# Patient Record
Sex: FEMALE | Race: WHITE | NOT HISPANIC OR LATINO | Employment: OTHER | URBAN - METROPOLITAN AREA
[De-identification: names, ages, dates, MRNs, and addresses within clinical notes are randomized per-mention and may not be internally consistent; named-entity substitution may affect disease eponyms.]

---

## 2017-01-24 ENCOUNTER — HISTORICAL (OUTPATIENT)
Dept: ADMINISTRATIVE | Facility: HOSPITAL | Age: 72
End: 2017-01-24

## 2017-07-26 ENCOUNTER — HISTORICAL (OUTPATIENT)
Dept: ADMINISTRATIVE | Facility: HOSPITAL | Age: 72
End: 2017-07-26

## 2017-11-15 ENCOUNTER — HISTORICAL (OUTPATIENT)
Dept: RADIOLOGY | Facility: HOSPITAL | Age: 72
End: 2017-11-15

## 2017-11-16 ENCOUNTER — HISTORICAL (OUTPATIENT)
Dept: ADMINISTRATIVE | Facility: HOSPITAL | Age: 72
End: 2017-11-16

## 2017-11-21 ENCOUNTER — HISTORICAL (OUTPATIENT)
Dept: LAB | Facility: HOSPITAL | Age: 72
End: 2017-11-21

## 2017-11-28 ENCOUNTER — HISTORICAL (OUTPATIENT)
Dept: LAB | Facility: HOSPITAL | Age: 72
End: 2017-11-28

## 2018-01-02 ENCOUNTER — HISTORICAL (OUTPATIENT)
Dept: LAB | Facility: HOSPITAL | Age: 73
End: 2018-01-02

## 2018-03-15 ENCOUNTER — HISTORICAL (OUTPATIENT)
Dept: LAB | Facility: HOSPITAL | Age: 73
End: 2018-03-15

## 2018-03-20 ENCOUNTER — HISTORICAL (OUTPATIENT)
Dept: ENDOSCOPY | Facility: HOSPITAL | Age: 73
End: 2018-03-20

## 2018-04-02 ENCOUNTER — HISTORICAL (OUTPATIENT)
Dept: LAB | Facility: HOSPITAL | Age: 73
End: 2018-04-02

## 2018-04-26 ENCOUNTER — HISTORICAL (OUTPATIENT)
Dept: CARDIOLOGY | Facility: HOSPITAL | Age: 73
End: 2018-04-26

## 2018-06-11 ENCOUNTER — HISTORICAL (OUTPATIENT)
Dept: RADIOLOGY | Facility: HOSPITAL | Age: 73
End: 2018-06-11

## 2018-06-15 ENCOUNTER — HISTORICAL (OUTPATIENT)
Dept: LAB | Facility: HOSPITAL | Age: 73
End: 2018-06-15

## 2018-06-21 ENCOUNTER — HISTORICAL (OUTPATIENT)
Dept: LAB | Facility: HOSPITAL | Age: 73
End: 2018-06-21

## 2018-10-15 ENCOUNTER — HISTORICAL (OUTPATIENT)
Dept: LAB | Facility: HOSPITAL | Age: 73
End: 2018-10-15

## 2018-10-25 ENCOUNTER — HISTORICAL (OUTPATIENT)
Dept: LAB | Facility: HOSPITAL | Age: 73
End: 2018-10-25

## 2018-11-01 ENCOUNTER — HISTORICAL (OUTPATIENT)
Dept: LAB | Facility: HOSPITAL | Age: 73
End: 2018-11-01

## 2018-11-07 ENCOUNTER — HISTORICAL (OUTPATIENT)
Dept: ADMINISTRATIVE | Facility: HOSPITAL | Age: 73
End: 2018-11-07

## 2018-11-13 ENCOUNTER — HISTORICAL (OUTPATIENT)
Dept: LAB | Facility: HOSPITAL | Age: 73
End: 2018-11-13

## 2018-11-15 ENCOUNTER — HISTORICAL (OUTPATIENT)
Dept: ADMINISTRATIVE | Facility: HOSPITAL | Age: 73
End: 2018-11-15

## 2018-12-19 ENCOUNTER — HISTORICAL (OUTPATIENT)
Dept: ADMINISTRATIVE | Facility: HOSPITAL | Age: 73
End: 2018-12-19

## 2019-01-04 ENCOUNTER — HISTORICAL (OUTPATIENT)
Dept: ADMINISTRATIVE | Facility: HOSPITAL | Age: 74
End: 2019-01-04

## 2019-01-08 ENCOUNTER — HISTORICAL (OUTPATIENT)
Dept: ADMINISTRATIVE | Facility: HOSPITAL | Age: 74
End: 2019-01-08

## 2019-02-25 ENCOUNTER — HISTORICAL (OUTPATIENT)
Dept: ADMINISTRATIVE | Facility: HOSPITAL | Age: 74
End: 2019-02-25

## 2019-08-02 ENCOUNTER — HISTORICAL (OUTPATIENT)
Dept: ADMINISTRATIVE | Facility: HOSPITAL | Age: 74
End: 2019-08-02

## 2020-12-23 ENCOUNTER — HISTORICAL (OUTPATIENT)
Dept: ADMINISTRATIVE | Facility: HOSPITAL | Age: 75
End: 2020-12-23

## 2020-12-23 LAB
ABS NEUT (OLG): 6.28 X10(3)/MCL (ref 2.1–9.2)
ALBUMIN SERPL-MCNC: 2.7 GM/DL (ref 3.4–4.8)
ALBUMIN/GLOB SERPL: 0.9 RATIO (ref 1.1–2)
ALP SERPL-CCNC: 55 UNIT/L (ref 40–150)
ALT SERPL-CCNC: 9 UNIT/L (ref 0–55)
ANISOCYTOSIS BLD QL SMEAR: ABNORMAL
AST SERPL-CCNC: 11 UNIT/L (ref 5–34)
BASOPHILS # BLD AUTO: 0.05 X10(3)/MCL (ref 0–0.2)
BASOPHILS NFR BLD AUTO: 0.5 % (ref 0–1)
BILIRUB SERPL-MCNC: 0.2 MG/DL (ref 0.2–1.2)
BILIRUBIN DIRECT+TOT PNL SERPL-MCNC: <0.1 MG/DL (ref 0–0.5)
BILIRUBIN DIRECT+TOT PNL SERPL-MCNC: >0.1 MG/DL (ref 0–0.8)
BUN SERPL-MCNC: 17.3 MG/DL (ref 9.8–20.1)
CALCIUM SERPL-MCNC: 8.9 MG/DL (ref 8.4–10.2)
CHLORIDE SERPL-SCNC: 99 MMOL/L (ref 98–107)
CO2 SERPL-SCNC: 30 MMOL/L (ref 23–31)
CREAT SERPL-MCNC: 0.61 MG/DL (ref 0.57–1.11)
EOSINOPHIL # BLD AUTO: 0.39 X10(3)/MCL (ref 0–0.9)
EOSINOPHIL NFR BLD AUTO: 3.5 % (ref 0–6.4)
ERYTHROCYTE [DISTWIDTH] IN BLOOD BY AUTOMATED COUNT: 16.9 % (ref 11.5–17)
GLOBULIN SER-MCNC: 3 GM/DL (ref 2.4–3.5)
GLUCOSE SERPL-MCNC: 103 MG/DL (ref 82–115)
HCT VFR BLD AUTO: 35.7 % (ref 37–47)
HGB BLD-MCNC: 10.6 GM/DL (ref 12–16)
HYPOCHROMIA BLD QL SMEAR: ABNORMAL
IMM GRANULOCYTES # BLD AUTO: 0.04 10*3/UL (ref 0–0.02)
IMM GRANULOCYTES NFR BLD AUTO: 0.4 % (ref 0–0.43)
LYMPHOCYTES # BLD AUTO: 3.58 X10(3)/MCL (ref 0.6–4.6)
LYMPHOCYTES NFR BLD AUTO: 32.4 % (ref 16–44)
MCH RBC QN AUTO: 24.8 PG (ref 27–31)
MCHC RBC AUTO-ENTMCNC: 29.7 GM/DL (ref 33–36)
MCV RBC AUTO: 83.4 FL (ref 80–94)
MONOCYTES # BLD AUTO: 0.7 X10(3)/MCL (ref 0.1–1.3)
MONOCYTES NFR BLD AUTO: 6.3 % (ref 4–12.1)
NEUTROPHILS # BLD AUTO: 6.28 X10(3)/MCL (ref 2.1–9.2)
NEUTROPHILS NFR BLD AUTO: 56.9 % (ref 43–73)
NRBC BLD AUTO-RTO: 0 % (ref 0–0.2)
PLATELET # BLD AUTO: 242 X10(3)/MCL (ref 130–400)
PLATELET # BLD EST: ADEQUATE 10*3/UL
PMV BLD AUTO: 10.4 FL (ref 7.4–10.4)
POLYCHROMASIA BLD QL SMEAR: SLIGHT
POTASSIUM SERPL-SCNC: 4.5 MMOL/L (ref 3.5–5.1)
PROT SERPL-MCNC: 5.7 GM/DL (ref 5.8–7.6)
RBC # BLD AUTO: 4.28 X10(6)/MCL (ref 4.2–5.4)
RBC MORPH BLD: ABNORMAL
SODIUM SERPL-SCNC: 140 MMOL/L (ref 136–145)
WBC # SPEC AUTO: 11 X10(3)/MCL (ref 4.5–11.5)

## 2021-01-13 ENCOUNTER — HISTORICAL (OUTPATIENT)
Dept: ADMINISTRATIVE | Facility: HOSPITAL | Age: 76
End: 2021-01-13

## 2021-01-13 LAB
ABS NEUT (OLG): 8.32 X10(3)/MCL (ref 2.1–9.2)
ALBUMIN SERPL-MCNC: 2.6 GM/DL (ref 3.4–4.8)
ALBUMIN/GLOB SERPL: 0.8 RATIO (ref 1.1–2)
ALP SERPL-CCNC: 49 UNIT/L (ref 40–150)
ALT SERPL-CCNC: 8 UNIT/L (ref 0–55)
ANISOCYTOSIS BLD QL SMEAR: ABNORMAL
AST SERPL-CCNC: 10 UNIT/L (ref 5–34)
BASOPHILS # BLD AUTO: 0.07 X10(3)/MCL (ref 0–0.2)
BASOPHILS NFR BLD AUTO: 0.5 % (ref 0–1)
BILIRUB SERPL-MCNC: 0.2 MG/DL (ref 0.2–1.2)
BILIRUBIN DIRECT+TOT PNL SERPL-MCNC: 0.1 MG/DL (ref 0–0.5)
BILIRUBIN DIRECT+TOT PNL SERPL-MCNC: 0.1 MG/DL (ref 0–0.8)
BUN SERPL-MCNC: 17.1 MG/DL (ref 9.8–20.1)
CALCIUM SERPL-MCNC: 8.6 MG/DL (ref 8.4–10.2)
CHLORIDE SERPL-SCNC: 99 MMOL/L (ref 98–107)
CO2 SERPL-SCNC: 30 MMOL/L (ref 23–31)
CREAT SERPL-MCNC: 0.63 MG/DL (ref 0.57–1.11)
EOSINOPHIL # BLD AUTO: 0.49 X10(3)/MCL (ref 0–0.9)
EOSINOPHIL NFR BLD AUTO: 3.8 % (ref 0–6.4)
ERYTHROCYTE [DISTWIDTH] IN BLOOD BY AUTOMATED COUNT: 18.8 % (ref 11.5–17)
GLOBULIN SER-MCNC: 3.3 GM/DL (ref 2.4–3.5)
GLUCOSE SERPL-MCNC: 123 MG/DL (ref 82–115)
HCT VFR BLD AUTO: 40.9 % (ref 37–47)
HGB BLD-MCNC: 11.9 GM/DL (ref 12–16)
HYPOCHROMIA BLD QL SMEAR: ABNORMAL
IMM GRANULOCYTES # BLD AUTO: 0.11 10*3/UL (ref 0–0.02)
IMM GRANULOCYTES NFR BLD AUTO: 0.9 % (ref 0–0.43)
LYMPHOCYTES # BLD AUTO: 2.94 X10(3)/MCL (ref 0.6–4.6)
LYMPHOCYTES NFR BLD AUTO: 22.9 % (ref 16–44)
MACROCYTES BLD QL SMEAR: SLIGHT
MCH RBC QN AUTO: 24.6 PG (ref 27–31)
MCHC RBC AUTO-ENTMCNC: 29.1 GM/DL (ref 33–36)
MCV RBC AUTO: 84.7 FL (ref 80–94)
MICROCYTES BLD QL SMEAR: SLIGHT
MONOCYTES # BLD AUTO: 0.91 X10(3)/MCL (ref 0.1–1.3)
MONOCYTES NFR BLD AUTO: 7.1 % (ref 4–12.1)
NEUTROPHILS # BLD AUTO: 8.32 X10(3)/MCL (ref 2.1–9.2)
NEUTROPHILS NFR BLD AUTO: 64.8 % (ref 43–73)
NRBC BLD AUTO-RTO: 0.3 % (ref 0–0.2)
PLATELET # BLD AUTO: 265 X10(3)/MCL (ref 130–400)
PLATELET # BLD EST: ADEQUATE 10*3/UL
PMV BLD AUTO: 10.1 FL (ref 7.4–10.4)
POTASSIUM SERPL-SCNC: 4.1 MMOL/L (ref 3.5–5.1)
PROT SERPL-MCNC: 5.9 GM/DL (ref 5.8–7.6)
RBC # BLD AUTO: 4.83 X10(6)/MCL (ref 4.2–5.4)
RBC MORPH BLD: ABNORMAL
SODIUM SERPL-SCNC: 140 MMOL/L (ref 136–145)
WBC # SPEC AUTO: 12.8 X10(3)/MCL (ref 4.5–11.5)

## 2021-01-14 ENCOUNTER — HISTORICAL (OUTPATIENT)
Dept: ADMINISTRATIVE | Facility: HOSPITAL | Age: 76
End: 2021-01-14

## 2021-01-14 LAB
ABS NEUT (OLG): 10.53 X10(3)/MCL (ref 2.1–9.2)
ANISOCYTOSIS BLD QL SMEAR: ABNORMAL
APPEARANCE, UA: ABNORMAL
BACTERIA SPEC CULT: ABNORMAL /HPF
BASOPHILS # BLD AUTO: 0.06 X10(3)/MCL (ref 0–0.2)
BASOPHILS NFR BLD AUTO: 0.4 % (ref 0–1)
BILIRUB UR QL STRIP: NEGATIVE
COLOR UR: ABNORMAL
EOSINOPHIL # BLD AUTO: 0.39 X10(3)/MCL (ref 0–0.9)
EOSINOPHIL NFR BLD AUTO: 2.6 % (ref 0–6.4)
ERYTHROCYTE [DISTWIDTH] IN BLOOD BY AUTOMATED COUNT: 18.7 % (ref 11.5–17)
GLUCOSE (UA): NEGATIVE
HCT VFR BLD AUTO: 38.8 % (ref 37–47)
HGB BLD-MCNC: 11.4 GM/DL (ref 12–16)
HGB UR QL STRIP: ABNORMAL
HYPOCHROMIA BLD QL SMEAR: ABNORMAL
IMM GRANULOCYTES # BLD AUTO: 0.07 10*3/UL (ref 0–0.02)
IMM GRANULOCYTES NFR BLD AUTO: 0.5 % (ref 0–0.43)
KETONES UR QL STRIP: NEGATIVE
LEUKOCYTE ESTERASE UR QL STRIP: ABNORMAL
LYMPHOCYTES # BLD AUTO: 3.23 X10(3)/MCL (ref 0.6–4.6)
LYMPHOCYTES NFR BLD AUTO: 21.2 % (ref 16–44)
MCH RBC QN AUTO: 24.6 PG (ref 27–31)
MCHC RBC AUTO-ENTMCNC: 29.4 GM/DL (ref 33–36)
MCV RBC AUTO: 83.6 FL (ref 80–94)
MONOCYTES # BLD AUTO: 0.95 X10(3)/MCL (ref 0.1–1.3)
MONOCYTES NFR BLD AUTO: 6.2 % (ref 4–12.1)
NEUTROPHILS # BLD AUTO: 10.53 X10(3)/MCL (ref 2.1–9.2)
NEUTROPHILS NFR BLD AUTO: 69.1 % (ref 43–73)
NITRITE UR QL STRIP: NEGATIVE
NRBC BLD AUTO-RTO: 0 % (ref 0–0.2)
PH UR STRIP: 6 [PH] (ref 5–7)
PLATELET # BLD AUTO: 294 X10(3)/MCL (ref 130–400)
PLATELET # BLD EST: ADEQUATE 10*3/UL
PMV BLD AUTO: 10.1 FL (ref 7.4–10.4)
POIKILOCYTOSIS BLD QL SMEAR: SLIGHT
POLYCHROMASIA BLD QL SMEAR: ABNORMAL
PROT UR QL STRIP: NEGATIVE
RBC # BLD AUTO: 4.64 X10(6)/MCL (ref 4.2–5.4)
RBC #/AREA URNS HPF: ABNORMAL /HPF
RBC MORPH BLD: ABNORMAL
SP GR UR STRIP: 1.01 (ref 1–1.03)
SQUAMOUS EPITHELIAL, UA: ABNORMAL /LPF
UROBILINOGEN UR STRIP-ACNC: NEGATIVE
WBC # SPEC AUTO: 15.2 X10(3)/MCL (ref 4.5–11.5)
WBC #/AREA URNS HPF: ABNORMAL /HPF

## 2021-01-26 ENCOUNTER — HISTORICAL (OUTPATIENT)
Dept: ADMINISTRATIVE | Facility: HOSPITAL | Age: 76
End: 2021-01-26

## 2021-01-26 LAB
ABS NEUT (OLG): 9.16 X10(3)/MCL (ref 2.1–9.2)
ANISOCYTOSIS BLD QL SMEAR: NORMAL
BASOPHILS # BLD AUTO: 0.05 X10(3)/MCL (ref 0–0.2)
BASOPHILS NFR BLD AUTO: 0.3 % (ref 0–1)
EOSINOPHIL # BLD AUTO: 0.48 X10(3)/MCL (ref 0–0.9)
EOSINOPHIL NFR BLD AUTO: 3.3 % (ref 0–6.4)
ERYTHROCYTE [DISTWIDTH] IN BLOOD BY AUTOMATED COUNT: 18.5 % (ref 11.5–17)
HCT VFR BLD AUTO: 37 % (ref 37–47)
HGB BLD-MCNC: 11 GM/DL (ref 12–16)
HYPOCHROMIA BLD QL SMEAR: NORMAL
IMM GRANULOCYTES # BLD AUTO: 0.05 10*3/UL (ref 0–0.02)
IMM GRANULOCYTES NFR BLD AUTO: 0.3 % (ref 0–0.43)
LYMPHOCYTES # BLD AUTO: 4.16 X10(3)/MCL (ref 0.6–4.6)
LYMPHOCYTES NFR BLD AUTO: 28.5 % (ref 16–44)
MCH RBC QN AUTO: 24.6 PG (ref 27–31)
MCHC RBC AUTO-ENTMCNC: 29.7 GM/DL (ref 33–36)
MCV RBC AUTO: 82.8 FL (ref 80–94)
MONOCYTES # BLD AUTO: 0.71 X10(3)/MCL (ref 0.1–1.3)
MONOCYTES NFR BLD AUTO: 4.9 % (ref 4–12.1)
NEUTROPHILS # BLD AUTO: 9.16 X10(3)/MCL (ref 2.1–9.2)
NEUTROPHILS NFR BLD AUTO: 62.7 % (ref 43–73)
NRBC BLD AUTO-RTO: 0 % (ref 0–0.2)
PLATELET # BLD AUTO: 321 X10(3)/MCL (ref 130–400)
PLATELET # BLD EST: ADEQUATE 10*3/UL
PMV BLD AUTO: 10.2 FL (ref 7.4–10.4)
POLYCHROMASIA BLD QL SMEAR: SLIGHT
RBC # BLD AUTO: 4.47 X10(6)/MCL (ref 4.2–5.4)
WBC # SPEC AUTO: 14.6 X10(3)/MCL (ref 4.5–11.5)

## 2021-02-01 ENCOUNTER — HISTORICAL (OUTPATIENT)
Dept: ADMINISTRATIVE | Facility: HOSPITAL | Age: 76
End: 2021-02-01

## 2021-02-01 LAB
ABS NEUT (OLG): 5.11 X10(3)/MCL (ref 2.1–9.2)
ANISOCYTOSIS BLD QL SMEAR: ABNORMAL
BASOPHILS # BLD AUTO: 0.06 X10(3)/MCL (ref 0–0.2)
BASOPHILS NFR BLD AUTO: 0.6 % (ref 0–1)
EOSINOPHIL # BLD AUTO: 0.62 X10(3)/MCL (ref 0–0.9)
EOSINOPHIL NFR BLD AUTO: 6.4 % (ref 0–6.4)
ERYTHROCYTE [DISTWIDTH] IN BLOOD BY AUTOMATED COUNT: 18.3 % (ref 11.5–17)
HCT VFR BLD AUTO: 33.5 % (ref 37–47)
HGB BLD-MCNC: 9.8 GM/DL (ref 12–16)
HYPOCHROMIA BLD QL SMEAR: ABNORMAL
IMM GRANULOCYTES # BLD AUTO: 0.04 10*3/UL (ref 0–0.02)
IMM GRANULOCYTES NFR BLD AUTO: 0.4 % (ref 0–0.43)
LYMPHOCYTES # BLD AUTO: 3.12 X10(3)/MCL (ref 0.6–4.6)
LYMPHOCYTES NFR BLD AUTO: 32.2 % (ref 16–44)
MACROCYTES BLD QL SMEAR: SLIGHT
MCH RBC QN AUTO: 24.4 PG (ref 27–31)
MCHC RBC AUTO-ENTMCNC: 29.3 GM/DL (ref 33–36)
MCV RBC AUTO: 83.5 FL (ref 80–94)
MICROCYTES BLD QL SMEAR: SLIGHT
MONOCYTES # BLD AUTO: 0.73 X10(3)/MCL (ref 0.1–1.3)
MONOCYTES NFR BLD AUTO: 7.5 % (ref 4–12.1)
NEUTROPHILS # BLD AUTO: 5.11 X10(3)/MCL (ref 2.1–9.2)
NEUTROPHILS NFR BLD AUTO: 52.9 % (ref 43–73)
NRBC BLD AUTO-RTO: 0 % (ref 0–0.2)
PLATELET # BLD AUTO: 233 X10(3)/MCL (ref 130–400)
PLATELET # BLD EST: ADEQUATE 10*3/UL
PMV BLD AUTO: 10.1 FL (ref 7.4–10.4)
RBC # BLD AUTO: 4.01 X10(6)/MCL (ref 4.2–5.4)
RBC MORPH BLD: ABNORMAL
WBC # SPEC AUTO: 9.7 X10(3)/MCL (ref 4.5–11.5)

## 2021-02-02 ENCOUNTER — HISTORICAL (OUTPATIENT)
Dept: ADMINISTRATIVE | Facility: HOSPITAL | Age: 76
End: 2021-02-02

## 2021-04-08 ENCOUNTER — HISTORICAL (OUTPATIENT)
Dept: ADMINISTRATIVE | Facility: HOSPITAL | Age: 76
End: 2021-04-08

## 2021-04-08 LAB
ABS NEUT (OLG): 6.14 X10(3)/MCL (ref 2.1–9.2)
ALBUMIN SERPL-MCNC: 2.8 GM/DL (ref 3.4–4.8)
ALBUMIN/GLOB SERPL: 1 RATIO (ref 1.1–2)
ALP SERPL-CCNC: 50 UNIT/L (ref 40–150)
ALT SERPL-CCNC: 8 UNIT/L (ref 0–55)
ANISOCYTOSIS BLD QL SMEAR: ABNORMAL
AST SERPL-CCNC: 8 UNIT/L (ref 5–34)
BASOPHILS # BLD AUTO: 0.04 X10(3)/MCL (ref 0–0.2)
BASOPHILS NFR BLD AUTO: 0.4 % (ref 0–1)
BILIRUB SERPL-MCNC: 0.2 MG/DL (ref 0.2–1.2)
BILIRUBIN DIRECT+TOT PNL SERPL-MCNC: <0.1 MG/DL (ref 0–0.5)
BILIRUBIN DIRECT+TOT PNL SERPL-MCNC: >0.1 MG/DL (ref 0–0.8)
BUN SERPL-MCNC: 17.6 MG/DL (ref 9.8–20.1)
CALCIUM SERPL-MCNC: 8.7 MG/DL (ref 8.4–10.2)
CHLORIDE SERPL-SCNC: 102 MMOL/L (ref 98–107)
CHOLEST SERPL-MCNC: 142 MG/DL
CHOLEST/HDLC SERPL: 4 {RATIO} (ref 0–5)
CO2 SERPL-SCNC: 32 MMOL/L (ref 23–31)
CREAT SERPL-MCNC: 0.63 MG/DL (ref 0.57–1.11)
EOSINOPHIL # BLD AUTO: 0.61 X10(3)/MCL (ref 0–0.9)
EOSINOPHIL NFR BLD AUTO: 5.7 % (ref 0–6.4)
ERYTHROCYTE [DISTWIDTH] IN BLOOD BY AUTOMATED COUNT: 18.7 % (ref 11.5–17)
EST. AVERAGE GLUCOSE BLD GHB EST-MCNC: 114 MG/DL
GLOBULIN SER-MCNC: 2.9 GM/DL (ref 2.4–3.5)
GLUCOSE SERPL-MCNC: 144 MG/DL (ref 82–115)
HBA1C MFR BLD: 5.6 %
HCT VFR BLD AUTO: 32.8 % (ref 37–47)
HDLC SERPL-MCNC: 38 MG/DL (ref 40–60)
HGB BLD-MCNC: 9.6 GM/DL (ref 12–16)
HYPOCHROMIA BLD QL SMEAR: ABNORMAL
IMM GRANULOCYTES # BLD AUTO: 0.05 10*3/UL (ref 0–0.02)
IMM GRANULOCYTES NFR BLD AUTO: 0.5 % (ref 0–0.43)
LDLC SERPL CALC-MCNC: 70 MG/DL (ref 50–140)
LYMPHOCYTES # BLD AUTO: 3.03 X10(3)/MCL (ref 0.6–4.6)
LYMPHOCYTES NFR BLD AUTO: 28.5 % (ref 16–44)
MACROCYTES BLD QL SMEAR: SLIGHT
MCH RBC QN AUTO: 24.8 PG (ref 27–31)
MCHC RBC AUTO-ENTMCNC: 29.3 GM/DL (ref 33–36)
MCV RBC AUTO: 84.8 FL (ref 80–94)
MICROCYTES BLD QL SMEAR: SLIGHT
MONOCYTES # BLD AUTO: 0.76 X10(3)/MCL (ref 0.1–1.3)
MONOCYTES NFR BLD AUTO: 7.1 % (ref 4–12.1)
NEUTROPHILS # BLD AUTO: 6.14 X10(3)/MCL (ref 2.1–9.2)
NEUTROPHILS NFR BLD AUTO: 57.8 % (ref 43–73)
NRBC BLD AUTO-RTO: 0 % (ref 0–0.2)
PLATELET # BLD AUTO: 233 X10(3)/MCL (ref 130–400)
PLATELET # BLD EST: ADEQUATE 10*3/UL
PMV BLD AUTO: 10.5 FL (ref 7.4–10.4)
POLYCHROMASIA BLD QL SMEAR: SLIGHT
POTASSIUM SERPL-SCNC: 4.1 MMOL/L (ref 3.5–5.1)
PROT SERPL-MCNC: 5.7 GM/DL (ref 5.8–7.6)
RBC # BLD AUTO: 3.87 X10(6)/MCL (ref 4.2–5.4)
RBC MORPH BLD: ABNORMAL
SODIUM SERPL-SCNC: 142 MMOL/L (ref 136–145)
TRIGL SERPL-MCNC: 171 MG/DL (ref 0–150)
TSH SERPL-ACNC: 2.8 UIU/ML (ref 0.35–4.94)
VLDLC SERPL CALC-MCNC: 34 MG/DL
WBC # SPEC AUTO: 10.6 X10(3)/MCL (ref 4.5–11.5)

## 2021-04-14 ENCOUNTER — HISTORICAL (OUTPATIENT)
Dept: ADMINISTRATIVE | Facility: HOSPITAL | Age: 76
End: 2021-04-14

## 2021-04-14 LAB
BUN SERPL-MCNC: 17.3 MG/DL (ref 9.8–20.1)
CALCIUM SERPL-MCNC: 8.4 MG/DL (ref 8.4–10.2)
CHLORIDE SERPL-SCNC: 107 MMOL/L (ref 98–107)
CO2 SERPL-SCNC: 27 MMOL/L (ref 23–31)
CREAT SERPL-MCNC: 0.64 MG/DL (ref 0.57–1.11)
CREAT/UREA NIT SERPL: 27
GLUCOSE SERPL-MCNC: 159 MG/DL (ref 82–115)
POTASSIUM SERPL-SCNC: 4.5 MMOL/L (ref 3.5–5.1)
SODIUM SERPL-SCNC: 140 MMOL/L (ref 136–145)

## 2021-04-23 ENCOUNTER — HISTORICAL (OUTPATIENT)
Dept: ADMINISTRATIVE | Facility: HOSPITAL | Age: 76
End: 2021-04-23

## 2021-04-23 LAB
ALBUMIN SERPL-MCNC: 2.8 GM/DL (ref 3.4–4.8)
ALBUMIN/GLOB SERPL: 1 RATIO (ref 1.1–2)
ALP SERPL-CCNC: 47 UNIT/L (ref 40–150)
ALT SERPL-CCNC: 12 UNIT/L (ref 0–55)
AST SERPL-CCNC: 10 UNIT/L (ref 5–34)
BILIRUB SERPL-MCNC: 0.2 MG/DL (ref 0.2–1.2)
BILIRUBIN DIRECT+TOT PNL SERPL-MCNC: <0.1 MG/DL (ref 0–0.5)
BILIRUBIN DIRECT+TOT PNL SERPL-MCNC: >0.1 MG/DL (ref 0–0.8)
BNP BLD-MCNC: 38.3 PG/ML
BUN SERPL-MCNC: 15.7 MG/DL (ref 9.8–20.1)
CALCIUM SERPL-MCNC: 8.4 MG/DL (ref 8.4–10.2)
CHLORIDE SERPL-SCNC: 103 MMOL/L (ref 98–107)
CO2 SERPL-SCNC: 32 MMOL/L (ref 23–31)
CREAT SERPL-MCNC: 0.58 MG/DL (ref 0.57–1.11)
GLOBULIN SER-MCNC: 2.8 GM/DL (ref 2.4–3.5)
GLUCOSE SERPL-MCNC: 120 MG/DL (ref 82–115)
POTASSIUM SERPL-SCNC: 4.4 MMOL/L (ref 3.5–5.1)
PROT SERPL-MCNC: 5.6 GM/DL (ref 5.8–7.6)
SODIUM SERPL-SCNC: 141 MMOL/L (ref 136–145)

## 2021-04-26 ENCOUNTER — HISTORICAL (OUTPATIENT)
Dept: ADMINISTRATIVE | Facility: HOSPITAL | Age: 76
End: 2021-04-26

## 2021-04-26 LAB — BNP BLD-MCNC: 25 PG/ML

## 2021-05-10 ENCOUNTER — HISTORICAL (OUTPATIENT)
Dept: ADMINISTRATIVE | Facility: HOSPITAL | Age: 76
End: 2021-05-10

## 2021-05-10 LAB
BUN SERPL-MCNC: 18.1 MG/DL (ref 9.8–20.1)
CALCIUM SERPL-MCNC: 8.6 MG/DL (ref 8.4–10.2)
CHLORIDE SERPL-SCNC: 101 MMOL/L (ref 98–107)
CO2 SERPL-SCNC: 30 MMOL/L (ref 23–31)
CREAT SERPL-MCNC: 0.65 MG/DL (ref 0.57–1.11)
CREAT/UREA NIT SERPL: 28
GLUCOSE SERPL-MCNC: 76 MG/DL (ref 82–115)
POTASSIUM SERPL-SCNC: 4.2 MMOL/L (ref 3.5–5.1)
SODIUM SERPL-SCNC: 141 MMOL/L (ref 136–145)

## 2021-05-15 ENCOUNTER — HISTORICAL (OUTPATIENT)
Dept: ADMINISTRATIVE | Facility: HOSPITAL | Age: 76
End: 2021-05-15

## 2021-05-15 LAB
APPEARANCE, UA: ABNORMAL
BACTERIA SPEC CULT: ABNORMAL /HPF
BILIRUB UR QL STRIP: NEGATIVE
COLOR UR: ABNORMAL
GLUCOSE (UA): NEGATIVE
HGB UR QL STRIP: ABNORMAL
KETONES UR QL STRIP: NEGATIVE
LEUKOCYTE ESTERASE UR QL STRIP: ABNORMAL
NITRITE UR QL STRIP: NEGATIVE
PH UR STRIP: 5.5 [PH] (ref 5–7)
PROT UR QL STRIP: NEGATIVE
RBC #/AREA URNS HPF: ABNORMAL /HPF
SP GR UR STRIP: 1.01 (ref 1–1.03)
SQUAMOUS EPITHELIAL, UA: ABNORMAL /LPF
UROBILINOGEN UR STRIP-ACNC: NEGATIVE
WBC #/AREA URNS HPF: ABNORMAL /HPF

## 2021-05-27 ENCOUNTER — HISTORICAL (OUTPATIENT)
Dept: ADMINISTRATIVE | Facility: HOSPITAL | Age: 76
End: 2021-05-27

## 2021-05-27 LAB
ABS NEUT (OLG): 8.43 X10(3)/MCL (ref 2.1–9.2)
ANISOCYTOSIS BLD QL SMEAR: ABNORMAL
BASOPHILS # BLD AUTO: 0.06 X10(3)/MCL (ref 0–0.2)
BASOPHILS NFR BLD AUTO: 0.5 % (ref 0–1)
EOSINOPHIL # BLD AUTO: 0.34 X10(3)/MCL (ref 0–0.9)
EOSINOPHIL NFR BLD AUTO: 2.7 % (ref 0–6.4)
ERYTHROCYTE [DISTWIDTH] IN BLOOD BY AUTOMATED COUNT: 18.1 % (ref 11.5–17)
HCT VFR BLD AUTO: 33.7 % (ref 37–47)
HGB BLD-MCNC: 9.9 GM/DL (ref 12–16)
HYPOCHROMIA BLD QL SMEAR: ABNORMAL
IMM GRANULOCYTES # BLD AUTO: 0.08 10*3/UL (ref 0–0.02)
IMM GRANULOCYTES NFR BLD AUTO: 0.6 % (ref 0–0.43)
LYMPHOCYTES # BLD AUTO: 2.91 X10(3)/MCL (ref 0.6–4.6)
LYMPHOCYTES NFR BLD AUTO: 22.9 % (ref 16–44)
MACROCYTES BLD QL SMEAR: SLIGHT
MCH RBC QN AUTO: 24.5 PG (ref 27–31)
MCHC RBC AUTO-ENTMCNC: 29.4 GM/DL (ref 33–36)
MCV RBC AUTO: 83.4 FL (ref 80–94)
MICROCYTES BLD QL SMEAR: SLIGHT
MONOCYTES # BLD AUTO: 0.91 X10(3)/MCL (ref 0.1–1.3)
MONOCYTES NFR BLD AUTO: 7.1 % (ref 4–12.1)
NEUTROPHILS # BLD AUTO: 8.43 X10(3)/MCL (ref 2.1–9.2)
NEUTROPHILS NFR BLD AUTO: 66.2 % (ref 43–73)
NRBC BLD AUTO-RTO: 0 % (ref 0–0.2)
PLATELET # BLD AUTO: 228 X10(3)/MCL (ref 130–400)
PLATELET # BLD EST: ADEQUATE 10*3/UL
PMV BLD AUTO: 10.7 FL (ref 7.4–10.4)
RBC # BLD AUTO: 4.04 X10(6)/MCL (ref 4.2–5.4)
RBC MORPH BLD: ABNORMAL
WBC # SPEC AUTO: 12.7 X10(3)/MCL (ref 4.5–11.5)

## 2021-06-14 ENCOUNTER — HISTORICAL (OUTPATIENT)
Dept: ADMINISTRATIVE | Facility: HOSPITAL | Age: 76
End: 2021-06-14

## 2021-06-14 LAB
ABS NEUT (OLG): 6.03 X10(3)/MCL (ref 2.1–9.2)
ALBUMIN SERPL-MCNC: 2.8 GM/DL (ref 3.4–4.8)
ALBUMIN/GLOB SERPL: 1 RATIO (ref 1.1–2)
ALP SERPL-CCNC: 99 UNIT/L (ref 40–150)
ALT SERPL-CCNC: 10 UNIT/L (ref 0–55)
ANISOCYTOSIS BLD QL SMEAR: ABNORMAL
AST SERPL-CCNC: 7 UNIT/L (ref 5–34)
BASOPHILS # BLD AUTO: 0.05 X10(3)/MCL (ref 0–0.2)
BASOPHILS NFR BLD AUTO: 0.5 % (ref 0–1)
BILIRUB SERPL-MCNC: 0.2 MG/DL (ref 0.2–1.2)
BILIRUBIN DIRECT+TOT PNL SERPL-MCNC: <0.1 MG/DL (ref 0–0.5)
BILIRUBIN DIRECT+TOT PNL SERPL-MCNC: >0.1 MG/DL (ref 0–0.8)
BUN SERPL-MCNC: 21.4 MG/DL (ref 9.8–20.1)
CALCIUM SERPL-MCNC: 8.6 MG/DL (ref 8.4–10.2)
CHLORIDE SERPL-SCNC: 100 MMOL/L (ref 98–107)
CO2 SERPL-SCNC: 29 MMOL/L (ref 23–31)
CREAT SERPL-MCNC: 0.8 MG/DL (ref 0.57–1.11)
EOSINOPHIL # BLD AUTO: 0.7 X10(3)/MCL (ref 0–0.9)
EOSINOPHIL NFR BLD AUTO: 6.9 % (ref 0–6.4)
ERYTHROCYTE [DISTWIDTH] IN BLOOD BY AUTOMATED COUNT: 17.7 % (ref 11.5–17)
GLOBULIN SER-MCNC: 2.9 GM/DL (ref 2.4–3.5)
GLUCOSE SERPL-MCNC: 150 MG/DL (ref 82–115)
HCT VFR BLD AUTO: 31.8 % (ref 37–47)
HGB BLD-MCNC: 9.1 GM/DL (ref 12–16)
HYPOCHROMIA BLD QL SMEAR: ABNORMAL
IMM GRANULOCYTES # BLD AUTO: 0.03 10*3/UL (ref 0–0.02)
IMM GRANULOCYTES NFR BLD AUTO: 0.3 % (ref 0–0.43)
LYMPHOCYTES # BLD AUTO: 2.71 X10(3)/MCL (ref 0.6–4.6)
LYMPHOCYTES NFR BLD AUTO: 26.6 % (ref 16–44)
MCH RBC QN AUTO: 24.5 PG (ref 27–31)
MCHC RBC AUTO-ENTMCNC: 28.6 GM/DL (ref 33–36)
MCV RBC AUTO: 85.5 FL (ref 80–94)
MONOCYTES # BLD AUTO: 0.65 X10(3)/MCL (ref 0.1–1.3)
MONOCYTES NFR BLD AUTO: 6.4 % (ref 4–12.1)
NEUTROPHILS # BLD AUTO: 6.03 X10(3)/MCL (ref 2.1–9.2)
NEUTROPHILS NFR BLD AUTO: 59.3 % (ref 43–73)
NRBC BLD AUTO-RTO: 0 % (ref 0–0.2)
PLATELET # BLD AUTO: 244 X10(3)/MCL (ref 130–400)
PLATELET # BLD EST: ADEQUATE 10*3/UL
PMV BLD AUTO: 10.9 FL (ref 7.4–10.4)
POTASSIUM SERPL-SCNC: 4.5 MMOL/L (ref 3.5–5.1)
PROT SERPL-MCNC: 5.7 GM/DL (ref 5.8–7.6)
RBC # BLD AUTO: 3.72 X10(6)/MCL (ref 4.2–5.4)
RBC MORPH BLD: ABNORMAL
SODIUM SERPL-SCNC: 139 MMOL/L (ref 136–145)
WBC # SPEC AUTO: 10.2 X10(3)/MCL (ref 4.5–11.5)

## 2021-06-22 ENCOUNTER — HISTORICAL (OUTPATIENT)
Dept: ADMINISTRATIVE | Facility: HOSPITAL | Age: 76
End: 2021-06-22

## 2021-07-12 ENCOUNTER — HISTORICAL (OUTPATIENT)
Dept: ADMINISTRATIVE | Facility: HOSPITAL | Age: 76
End: 2021-07-12

## 2021-07-12 LAB
ABS NEUT (OLG): 7.99 X10(3)/MCL (ref 2.1–9.2)
ALBUMIN SERPL-MCNC: 2.8 GM/DL (ref 3.4–4.8)
ALBUMIN/GLOB SERPL: 0.9 RATIO (ref 1.1–2)
ALP SERPL-CCNC: 50 UNIT/L (ref 40–150)
ALT SERPL-CCNC: 5 UNIT/L (ref 0–55)
ANISOCYTOSIS BLD QL SMEAR: ABNORMAL
AST SERPL-CCNC: 8 UNIT/L (ref 5–34)
BASOPHILS # BLD AUTO: 0.05 X10(3)/MCL (ref 0–0.2)
BASOPHILS NFR BLD AUTO: 0.4 % (ref 0–1)
BILIRUB SERPL-MCNC: 0.2 MG/DL (ref 0.2–1.2)
BILIRUBIN DIRECT+TOT PNL SERPL-MCNC: <0.1 MG/DL (ref 0–0.5)
BILIRUBIN DIRECT+TOT PNL SERPL-MCNC: >0.1 MG/DL (ref 0–0.8)
BUN SERPL-MCNC: 20.7 MG/DL (ref 9.8–20.1)
CALCIUM SERPL-MCNC: 8.9 MG/DL (ref 8.4–10.2)
CHLORIDE SERPL-SCNC: 98 MMOL/L (ref 98–107)
CO2 SERPL-SCNC: 29 MMOL/L (ref 23–31)
CREAT SERPL-MCNC: 0.75 MG/DL (ref 0.57–1.11)
EOSINOPHIL # BLD AUTO: 0.72 X10(3)/MCL (ref 0–0.9)
EOSINOPHIL NFR BLD AUTO: 5.6 % (ref 0–6.4)
ERYTHROCYTE [DISTWIDTH] IN BLOOD BY AUTOMATED COUNT: 17.6 % (ref 11.5–17)
GLOBULIN SER-MCNC: 3 GM/DL (ref 2.4–3.5)
GLUCOSE SERPL-MCNC: 128 MG/DL (ref 82–115)
HCT VFR BLD AUTO: 34 % (ref 37–47)
HGB BLD-MCNC: 9.7 GM/DL (ref 12–16)
HYPOCHROMIA BLD QL SMEAR: ABNORMAL
IMM GRANULOCYTES # BLD AUTO: 0.05 10*3/UL (ref 0–0.02)
IMM GRANULOCYTES NFR BLD AUTO: 0.4 % (ref 0–0.43)
LYMPHOCYTES # BLD AUTO: 3.24 X10(3)/MCL (ref 0.6–4.6)
LYMPHOCYTES NFR BLD AUTO: 25.3 % (ref 16–44)
MCH RBC QN AUTO: 23.8 PG (ref 27–31)
MCHC RBC AUTO-ENTMCNC: 28.5 GM/DL (ref 33–36)
MCV RBC AUTO: 83.3 FL (ref 80–94)
MICROCYTES BLD QL SMEAR: SLIGHT
MONOCYTES # BLD AUTO: 0.76 X10(3)/MCL (ref 0.1–1.3)
MONOCYTES NFR BLD AUTO: 5.9 % (ref 4–12.1)
NEUTROPHILS # BLD AUTO: 7.99 X10(3)/MCL (ref 2.1–9.2)
NEUTROPHILS NFR BLD AUTO: 62.4 % (ref 43–73)
NRBC BLD AUTO-RTO: 0 % (ref 0–0.2)
PLATELET # BLD AUTO: 289 X10(3)/MCL (ref 130–400)
PLATELET # BLD EST: ADEQUATE 10*3/UL
PMV BLD AUTO: 10.5 FL (ref 7.4–10.4)
POTASSIUM SERPL-SCNC: 4.3 MMOL/L (ref 3.5–5.1)
PROT SERPL-MCNC: 5.8 GM/DL (ref 5.8–7.6)
RBC # BLD AUTO: 4.08 X10(6)/MCL (ref 4.2–5.4)
RBC MORPH BLD: ABNORMAL
SODIUM SERPL-SCNC: 139 MMOL/L (ref 136–145)
WBC # SPEC AUTO: 12.8 X10(3)/MCL (ref 4.5–11.5)

## 2021-07-20 ENCOUNTER — HISTORICAL (OUTPATIENT)
Dept: ADMINISTRATIVE | Facility: HOSPITAL | Age: 76
End: 2021-07-20

## 2021-08-20 ENCOUNTER — HOSPITAL ENCOUNTER (OUTPATIENT)
Dept: MEDSURG UNIT | Facility: HOSPITAL | Age: 76
End: 2021-08-24
Attending: INTERNAL MEDICINE | Admitting: INTERNAL MEDICINE

## 2021-08-20 LAB
ABS NEUT (OLG): 10.83 X10(3)/MCL (ref 2.1–9.2)
ALBUMIN SERPL-MCNC: 3.1 GM/DL (ref 3.4–4.8)
ALBUMIN/GLOB SERPL: 0.7 RATIO (ref 1.1–2)
ALP SERPL-CCNC: 68 UNIT/L (ref 40–150)
ALT SERPL-CCNC: 11 UNIT/L (ref 0–55)
AST SERPL-CCNC: 9 UNIT/L (ref 5–34)
BASOPHILS # BLD AUTO: 0.1 X10(3)/MCL (ref 0–0.2)
BASOPHILS NFR BLD AUTO: 0 %
BILIRUB SERPL-MCNC: 0.2 MG/DL
BILIRUBIN DIRECT+TOT PNL SERPL-MCNC: 0.1 MG/DL (ref 0–0.5)
BILIRUBIN DIRECT+TOT PNL SERPL-MCNC: 0.1 MG/DL (ref 0–0.8)
BNP BLD-MCNC: 18.9 PG/ML
BUN SERPL-MCNC: 19.5 MG/DL (ref 9.8–20.1)
CALCIUM SERPL-MCNC: 9.6 MG/DL (ref 8.4–10.2)
CHLORIDE SERPL-SCNC: 102 MMOL/L (ref 98–107)
CO2 SERPL-SCNC: 25 MMOL/L (ref 23–31)
CREAT SERPL-MCNC: 0.74 MG/DL (ref 0.55–1.02)
EOSINOPHIL # BLD AUTO: 1 X10(3)/MCL (ref 0–0.9)
EOSINOPHIL NFR BLD AUTO: 6 %
ERYTHROCYTE [DISTWIDTH] IN BLOOD BY AUTOMATED COUNT: 18.7 % (ref 11.5–17)
GLOBULIN SER-MCNC: 4.3 GM/DL (ref 2.4–3.5)
GLUCOSE SERPL-MCNC: 157 MG/DL (ref 82–115)
HCT VFR BLD AUTO: 42.1 % (ref 37–47)
HGB BLD-MCNC: 11.8 GM/DL (ref 12–16)
LYMPHOCYTES # BLD AUTO: 3.7 X10(3)/MCL (ref 0.6–4.6)
LYMPHOCYTES NFR BLD AUTO: 22 %
MCH RBC QN AUTO: 23.5 PG (ref 27–31)
MCHC RBC AUTO-ENTMCNC: 28 GM/DL (ref 33–36)
MCV RBC AUTO: 83.7 FL (ref 80–94)
MONOCYTES # BLD AUTO: 0.9 X10(3)/MCL (ref 0.1–1.3)
MONOCYTES NFR BLD AUTO: 5 %
NEUTROPHILS # BLD AUTO: 10.83 X10(3)/MCL (ref 2.1–9.2)
NEUTROPHILS NFR BLD AUTO: 65 %
PLATELET # BLD AUTO: 287 X10(3)/MCL (ref 130–400)
PMV BLD AUTO: 10.3 FL (ref 9.4–12.4)
POTASSIUM SERPL-SCNC: 4.3 MMOL/L (ref 3.5–5.1)
PROT SERPL-MCNC: 7.4 GM/DL (ref 5.8–7.6)
RBC # BLD AUTO: 5.03 X10(6)/MCL (ref 4.2–5.4)
SARS-COV-2 AG RESP QL IA.RAPID: NEGATIVE
SODIUM SERPL-SCNC: 141 MMOL/L (ref 136–145)
TROPONIN I SERPL-MCNC: <0.01 NG/ML (ref 0–0.04)
WBC # SPEC AUTO: 16.6 X10(3)/MCL (ref 4.5–11.5)

## 2021-08-21 LAB
APPEARANCE, UA: ABNORMAL
BACTERIA SPEC CULT: ABNORMAL /HPF
BILIRUB UR QL STRIP: NEGATIVE
COLOR UR: YELLOW
GLUCOSE (UA): NEGATIVE
HGB UR QL STRIP: ABNORMAL
KETONES UR QL STRIP: NEGATIVE
LEUKOCYTE ESTERASE UR QL STRIP: ABNORMAL
NITRITE UR QL STRIP: NEGATIVE
PH UR STRIP: 5.5 [PH] (ref 5–9)
PROT UR QL STRIP: ABNORMAL
RBC #/AREA URNS HPF: ABNORMAL /HPF
SP GR UR STRIP: 1.01 (ref 1–1.03)
SQUAMOUS EPITHELIAL, UA: ABNORMAL /HPF (ref 0–4)
UROBILINOGEN UR STRIP-ACNC: 0.2
WBC #/AREA URNS HPF: 82 /HPF (ref 0–3)

## 2021-08-22 LAB
ALBUMIN SERPL-MCNC: 2.5 GM/DL (ref 3.4–4.8)
ALBUMIN/GLOB SERPL: 0.9 RATIO (ref 1.1–2)
ALP SERPL-CCNC: 50 UNIT/L (ref 40–150)
ALT SERPL-CCNC: 5 UNIT/L (ref 0–55)
AST SERPL-CCNC: 7 UNIT/L (ref 5–34)
BILIRUB SERPL-MCNC: 0.3 MG/DL
BILIRUBIN DIRECT+TOT PNL SERPL-MCNC: 0.1 MG/DL (ref 0–0.5)
BILIRUBIN DIRECT+TOT PNL SERPL-MCNC: 0.2 MG/DL (ref 0–0.8)
BUN SERPL-MCNC: 16 MG/DL (ref 9.8–20.1)
CALCIUM SERPL-MCNC: 8.2 MG/DL (ref 8.4–10.2)
CHLORIDE SERPL-SCNC: 104 MMOL/L (ref 98–107)
CO2 SERPL-SCNC: 27 MMOL/L (ref 23–31)
CREAT SERPL-MCNC: 0.57 MG/DL (ref 0.55–1.02)
ERYTHROCYTE [DISTWIDTH] IN BLOOD BY AUTOMATED COUNT: 18.5 % (ref 11.5–17)
GLOBULIN SER-MCNC: 2.8 GM/DL (ref 2.4–3.5)
GLUCOSE SERPL-MCNC: 109 MG/DL (ref 82–115)
HCT VFR BLD AUTO: 32.1 % (ref 37–47)
HGB BLD-MCNC: 9.1 GM/DL (ref 12–16)
LACTATE SERPL-SCNC: 1.1 MMOL/L (ref 0.5–2.2)
MCH RBC QN AUTO: 23.6 PG (ref 27–31)
MCHC RBC AUTO-ENTMCNC: 28.3 GM/DL (ref 33–36)
MCV RBC AUTO: 83.4 FL (ref 80–94)
PLATELET # BLD AUTO: 227 X10(3)/MCL (ref 130–400)
PMV BLD AUTO: 10.2 FL (ref 9.4–12.4)
POTASSIUM SERPL-SCNC: 4.3 MMOL/L (ref 3.5–5.1)
PROT SERPL-MCNC: 5.3 GM/DL (ref 5.8–7.6)
RBC # BLD AUTO: 3.85 X10(6)/MCL (ref 4.2–5.4)
SODIUM SERPL-SCNC: 142 MMOL/L (ref 136–145)
WBC # SPEC AUTO: 11.1 X10(3)/MCL (ref 4.5–11.5)

## 2021-08-23 LAB
BUN SERPL-MCNC: 13.5 MG/DL (ref 9.8–20.1)
CALCIUM SERPL-MCNC: 8.9 MG/DL (ref 8.4–10.2)
CHLORIDE SERPL-SCNC: 101 MMOL/L (ref 98–107)
CO2 SERPL-SCNC: 29 MMOL/L (ref 23–31)
CREAT SERPL-MCNC: 0.63 MG/DL (ref 0.55–1.02)
CREAT/UREA NIT SERPL: 21
GLUCOSE SERPL-MCNC: 163 MG/DL (ref 82–115)
POTASSIUM SERPL-SCNC: 3.6 MMOL/L (ref 3.5–5.1)
SODIUM SERPL-SCNC: 141 MMOL/L (ref 136–145)
VANCOMYCIN TROUGH SERPL-MCNC: 22.4 UG/ML (ref 15–20)

## 2021-08-24 LAB — SARS-COV-2 AG RESP QL IA.RAPID: NEGATIVE

## 2021-08-25 ENCOUNTER — HISTORICAL (OUTPATIENT)
Dept: ADMINISTRATIVE | Facility: HOSPITAL | Age: 76
End: 2021-08-25

## 2021-08-25 LAB
ABS NEUT (OLG): 7.93 X10(3)/MCL (ref 2.1–9.2)
ALBUMIN SERPL-MCNC: 2.7 GM/DL (ref 3.4–4.8)
ALBUMIN/GLOB SERPL: 0.9 RATIO (ref 1.1–2)
ALP SERPL-CCNC: 58 UNIT/L (ref 40–150)
ALT SERPL-CCNC: 15 UNIT/L (ref 0–55)
ANISOCYTOSIS BLD QL SMEAR: ABNORMAL
AST SERPL-CCNC: 17 UNIT/L (ref 5–34)
BASOPHILS # BLD AUTO: 0.06 X10(3)/MCL (ref 0–0.2)
BASOPHILS NFR BLD AUTO: 0.5 % (ref 0–1)
BILIRUB SERPL-MCNC: 0.2 MG/DL (ref 0.2–1.2)
BILIRUBIN DIRECT+TOT PNL SERPL-MCNC: 0.1 MG/DL (ref 0–0.5)
BILIRUBIN DIRECT+TOT PNL SERPL-MCNC: 0.1 MG/DL (ref 0–0.8)
BUN SERPL-MCNC: 16.7 MG/DL (ref 9.8–20.1)
CALCIUM SERPL-MCNC: 8.7 MG/DL (ref 8.4–10.2)
CHLORIDE SERPL-SCNC: 100 MMOL/L (ref 98–107)
CHOLEST SERPL-MCNC: 181 MG/DL
CHOLEST/HDLC SERPL: 5 {RATIO} (ref 0–5)
CO2 SERPL-SCNC: 29 MMOL/L (ref 23–31)
CREAT SERPL-MCNC: 0.67 MG/DL (ref 0.57–1.11)
EOSINOPHIL # BLD AUTO: 0.61 X10(3)/MCL (ref 0–0.9)
EOSINOPHIL NFR BLD AUTO: 4.8 % (ref 0–6.4)
ERYTHROCYTE [DISTWIDTH] IN BLOOD BY AUTOMATED COUNT: 18.4 % (ref 11.5–17)
GLOBULIN SER-MCNC: 3.1 GM/DL (ref 2.4–3.5)
GLUCOSE SERPL-MCNC: 198 MG/DL (ref 82–115)
HCT VFR BLD AUTO: 33.3 % (ref 37–47)
HDLC SERPL-MCNC: 36 MG/DL (ref 40–60)
HGB BLD-MCNC: 9.6 GM/DL (ref 12–16)
HYPOCHROMIA BLD QL SMEAR: ABNORMAL
IMM GRANULOCYTES # BLD AUTO: 0.03 10*3/UL (ref 0–0.02)
IMM GRANULOCYTES NFR BLD AUTO: 0.2 % (ref 0–0.43)
LDLC SERPL CALC-MCNC: 81 MG/DL (ref 50–140)
LYMPHOCYTES # BLD AUTO: 3.34 X10(3)/MCL (ref 0.6–4.6)
LYMPHOCYTES NFR BLD AUTO: 26.1 % (ref 16–44)
MCH RBC QN AUTO: 23.3 PG (ref 27–31)
MCHC RBC AUTO-ENTMCNC: 28.8 GM/DL (ref 33–36)
MCV RBC AUTO: 80.8 FL (ref 80–94)
MICROCYTES BLD QL SMEAR: ABNORMAL
MONOCYTES # BLD AUTO: 0.81 X10(3)/MCL (ref 0.1–1.3)
MONOCYTES NFR BLD AUTO: 6.3 % (ref 4–12.1)
NEUTROPHILS # BLD AUTO: 7.93 X10(3)/MCL (ref 2.1–9.2)
NEUTROPHILS NFR BLD AUTO: 62.1 % (ref 43–73)
NRBC BLD AUTO-RTO: 0 % (ref 0–0.2)
PLATELET # BLD AUTO: 246 X10(3)/MCL (ref 130–400)
PLATELET # BLD EST: ADEQUATE 10*3/UL
PMV BLD AUTO: 10.6 FL (ref 7.4–10.4)
POTASSIUM SERPL-SCNC: 3.7 MMOL/L (ref 3.5–5.1)
PROT SERPL-MCNC: 5.8 GM/DL (ref 5.8–7.6)
RBC # BLD AUTO: 4.12 X10(6)/MCL (ref 4.2–5.4)
RBC MORPH BLD: ABNORMAL
SODIUM SERPL-SCNC: 140 MMOL/L (ref 136–145)
TRIGL SERPL-MCNC: 320 MG/DL (ref 0–150)
VLDLC SERPL CALC-MCNC: 64 MG/DL
WBC # SPEC AUTO: 12.8 X10(3)/MCL (ref 4.5–11.5)

## 2022-04-07 ENCOUNTER — HISTORICAL (OUTPATIENT)
Dept: ADMINISTRATIVE | Facility: HOSPITAL | Age: 77
End: 2022-04-07

## 2022-04-11 ENCOUNTER — HISTORICAL (OUTPATIENT)
Dept: ADMINISTRATIVE | Facility: HOSPITAL | Age: 77
End: 2022-04-11

## 2022-04-11 LAB
ABS NEUT (OLG): 5.59 (ref 2.1–9.2)
ALBUMIN SERPL-MCNC: 3 G/DL (ref 3.4–4.8)
ALBUMIN/GLOB SERPL: 1 {RATIO} (ref 1.1–2)
ALP SERPL-CCNC: 59 U/L (ref 40–150)
ALT SERPL-CCNC: 9 U/L (ref 0–55)
ANISOCYTOSIS BLD QL SMEAR: NORMAL
AST SERPL-CCNC: 10 U/L (ref 5–34)
BASOPHILS # BLD AUTO: 0.06 10*3/UL (ref 0–0.2)
BASOPHILS NFR BLD AUTO: 0.5 % (ref 0–1)
BILIRUB SERPL-MCNC: 0.2 MG/DL (ref 0.2–1.2)
BILIRUBIN DIRECT+TOT PNL SERPL-MCNC: <0.1 (ref 0–0.5)
BILIRUBIN DIRECT+TOT PNL SERPL-MCNC: >0.1 (ref 0–0.8)
BUN SERPL-MCNC: 17.3 MG/DL (ref 9.8–20.1)
CALCIUM SERPL-MCNC: 8.6 MG/DL (ref 8.4–10.2)
CHLORIDE SERPL-SCNC: 99 MMOL/L (ref 98–107)
CHOLEST SERPL-MCNC: 144 MG/DL
CHOLEST/HDLC SERPL: 4 {RATIO} (ref 0–5)
CO2 SERPL-SCNC: 31 MMOL/L (ref 23–31)
CREAT SERPL-MCNC: 0.6 MG/DL (ref 0.57–1.11)
EOSINOPHIL # BLD AUTO: 0.9 10*3/UL (ref 0–0.9)
EOSINOPHIL NFR BLD AUTO: 7.7 % (ref 0–6.4)
ERYTHROCYTE [DISTWIDTH] IN BLOOD BY AUTOMATED COUNT: 18.6 % (ref 11.5–17)
EST. AVERAGE GLUCOSE BLD GHB EST-MCNC: 114 MG/DL
GLOBULIN SER-MCNC: 3 G/DL (ref 2.4–3.5)
GLUCOSE SERPL-MCNC: 66 MG/DL (ref 82–115)
HBA1C MFR BLD: 5.6 %
HCT VFR BLD AUTO: 38.4 % (ref 37–47)
HDLC SERPL-MCNC: 35 MG/DL (ref 40–60)
HEMOLYSIS INTERF INDEX SERPL-ACNC: 9
HGB BLD-MCNC: 11.2 G/DL (ref 12–16)
HYPOCHROMIA BLD QL SMEAR: NORMAL
ICTERIC INTERF INDEX SERPL-ACNC: 0
IMM GRANULOCYTES # BLD AUTO: 0.03 10*3/UL (ref 0–0.02)
IMM GRANULOCYTES NFR BLD AUTO: 0.3 % (ref 0–0.43)
LDLC SERPL CALC-MCNC: 65 MG/DL (ref 50–140)
LIPEMIC INTERF INDEX SERPL-ACNC: -1
LYMPHOCYTES # BLD AUTO: 4.54 10*3/UL (ref 0.6–4.6)
LYMPHOCYTES NFR BLD AUTO: 38.7 % (ref 16–44)
MANUAL DIFF? (OHS): NO
MCH RBC QN AUTO: 24 PG (ref 27–31)
MCHC RBC AUTO-ENTMCNC: 29.2 G/DL (ref 33–36)
MCV RBC AUTO: 82.4 FL (ref 80–94)
MICROCYTES BLD QL SMEAR: NORMAL
MONOCYTES # BLD AUTO: 0.61 10*3/UL (ref 0.1–1.3)
MONOCYTES NFR BLD AUTO: 5.2 % (ref 4–12.1)
NEUTROPHILS # BLD AUTO: 5.59 10*3/UL (ref 2.1–9.2)
NEUTROPHILS NFR BLD AUTO: 47.6 % (ref 43–73)
NRBC BLD AUTO-RTO: 0 % (ref 0–0.2)
PLATELET # BLD AUTO: 263 10*3/UL (ref 130–400)
PLATELET # BLD EST: ADEQUATE 10*3/UL
PMV BLD AUTO: 10.2 FL (ref 7.4–10.4)
POTASSIUM SERPL-SCNC: 4.3 MMOL/L (ref 3.5–5.1)
PROT SERPL-MCNC: 6 G/DL (ref 5.8–7.6)
RBC # BLD AUTO: 4.66 10*6/UL (ref 4.2–5.4)
RBC MORPH BLD: NORMAL
SCAN RECIEVED (OHS): YES
SODIUM SERPL-SCNC: 142 MMOL/L (ref 136–145)
TRIGL SERPL-MCNC: 222 MG/DL (ref 0–150)
VLDLC SERPL CALC-MCNC: 44 MG/DL
WBC # SPEC AUTO: 11.7 10*3/UL (ref 4.5–11.5)

## 2022-04-24 VITALS
DIASTOLIC BLOOD PRESSURE: 79 MMHG | HEIGHT: 56 IN | BODY MASS INDEX: 47.92 KG/M2 | OXYGEN SATURATION: 98 % | WEIGHT: 213 LBS | SYSTOLIC BLOOD PRESSURE: 136 MMHG

## 2022-04-28 NOTE — H&P
This 72-year-old, white female with multiple medical problems was seen in our office on 1/19/18 by our nurse practitioner, Diogenes Mark, for further evaluation of constipation.  The patient was scheduled for a colonoscopy exam for which she presents today.  Her last colonoscopy was in 2006 secondary to chronic diarrhea and that was a normal exam.  She denies any family history of colon polyps or colon cancer.  She was given samples of Linzess to take, but I am not sure if she actually took that medication or not.  Her history is somewhat unreliable and there are no family members present with her this morning at present.  Will proceed with colonoscopy as planned.    ALLERGIES:  Ambien, gluten, wheat.    PAST MEDICAL HISTORY:  Arthritis, celiac disease, insulin dependent diabetes, hypercholesterolemia, hypothyroidism, low blood pressure and sleep apnea.    PAST SURGICAL HISTORY:  She has a shunt in her brain and subsequent shunt revision, cholecystectomy, appendectomy, total abdominal hysterectomy, 3 C-sections, bladder surgery, tonsillectomy.    MEDICATIONS:  Alendronate, butalbital, Acetaminophen, caffeine, cranberry, duloxetine, _______, fluticasone, Lasix, gabapentin, hydrocodone, lactulose, Levemir FlexTouch, levetiracetam, levothyroxine, loratadine, lovastatin, meclizine, metformin, _______, pantoprazole, David's colon health, potassium chloride, Pyridium, Senokot and VESIcare.    SOCIAL HISTORY:  She is  and she is disabled.  No smoking or alcohol.    FAMILY HISTORY:  No family history of colon cancer.    PHYSICAL EXAMINATION:  VITAL SIGNS:  Stable, afebrile.     GENERAL:  She is obese.  She has a walker at her side for ambulation assistance.   HEENT:  Sclerae anicteric.  Conjunctivae pink.  No adenopathy or thyromegaly.   CARDIOVASCULAR:  Regular rate and rhythm.   LUNGS:  Clear.   ABDOMEN:  Soft and nontender.  Bowel sounds are normal.  No mass or organomegaly appreciated.   EXTREMITIES:  No  clubbing, cyanosis or edema.  She walks with a walker.   NEUROLOGIC:  Alert and oriented.  No focal deficits.    IMPRESSION:    1. Chronic constipation.   2. Age risk for colon polyps and colon cancer.   3. History of celiac disease.    RECOMMENDATIONS:  Will proceed with colonoscopy evaluation.        ______________________________  MD BOBBI Bui/QUEENIE  DD:  03/20/2018  Time:  10:12AM  DT:  03/20/2018  Time:  10:54AM  Job #:  [id number]    The H&P was reviewed, the patient was examined, and the following changes to the patients condition are noted:  ______________________________________________________________________________  ______________________________________________________________________________  ______________________________________________________________________________  [  ] No changes to the patient's condition:      ______________________________                                             ___________________  PHYSICIAN SIGNATURE                                                             DATE/TIME    cc: Joseph Zacarias MD

## 2022-04-28 NOTE — OP NOTE
DATE OF SURGERY:    06/22/2021    SURGEON:  Fuad Szymanski MD    PREOPERATIVE DIAGNOSES:    1. Recurrent urinary tract infections.  2. Incomplete bladder emptying.    POSTOPERATIVE DIAGNOSES:    1. Recurrent urinary tract infections.  2. Incomplete bladder emptying.    PROCEDURE PERFORMED:  Cystoscopy with suprapubic tube placement (cystostomy).    OPERATIVE NOTE:  After informed consent was obtained, the patient was taken to the operating room after receiving a preoperative dose of antibiotics.  She was given a general anesthetic and placed in a dorsal lithotomy position.  Her abdomen and genitals were prepped and draped in the usual sterile fashion.  I placed a 22-[place] rigid cystoscope through the urethra into the bladder.  There was some debris in the bladder, and this was washed out.  I then filled her bladder to its capacity with normal saline.  Then, several centimeters above the pubic symphysis, I placed a spinal needle through the abdominal wall successfully into the bladder.  I then used a #15 blade to make an incision just lateral to the spinal needle and, through the same tract, I placed a Johnie trocar into the bladder under visualization, and I placed a 16-[place] catheter through the trocar successfully and blew the balloon up.  She tolerated the procedure well.  There were no complications.  She was extubated and brought to recovery in good condition.        ______________________________  MD LANDON Garcia/SK  DD:  06/22/2021  Time:  03:12PM  DT:  06/22/2021  Time:  03:26PM  Job #:  [id number]

## 2022-04-28 NOTE — ED PROVIDER NOTES
Patient:   Elisabet Choi            MRN: [id number]            FIN: [id number]               Age:   75 years     Sex:  Female     :  1945   Associated Diagnoses:   SOB (shortness of breath); Chronic suprapubic catheter; Sepsis; UTI symptoms; Obstructed urostomy catheter; Leukocytosis; Altered mental status   Author:   Vninie Kemp MD      Basic Information   Time seen: Date & time 2021 22:35:00.   History source: EMS.   Arrival mode: Ambulance.   History limitation: None.   Additional information: Patient's physician(s): Danny Hairston MD      History of Present Illness   The patient presents with altered mental status, Transfer for catheter change and   A 75 year old white female with a history of CHF, CAD, DM, bipolar, seizures, stroke, chronic UTI, and lumbar stenosis presents from NH concerned her suprapubic catheter needed to be changed. EMS called for support when they arrived patient was tachycardic diaphoretic reporting shortness of breath.  The onset was just prior to arrival.  The course/duration of symptoms is constant.  The degree at onset was moderate.  The degree at present is moderate.  The exacerbating factor is none.  Risk factors consist of diabetes mellitus, coronary artery disease, nursing home resident ([facility]), Bipolar, Stroke, Seizures and Chronic UTI.  Prior episodes: none.  Therapy today: emergency medical services.  Associated symptoms: shortness of breath and Diaphoresis.        Review of Systems   Constitutional symptoms:  SweatsNo fever, no chills   Skin symptoms:  No rash, no abrasions   Respiratory symptoms:  Shortness of breathNo cough,    Cardiovascular symptoms:  No chest pain, no palpitations, no syncope.    Gastrointestinal symptoms:  No abdominal pain, no nausea, no vomiting.    Genitourinary symptoms:  No hematuria,    Musculoskeletal symptoms:  No Muscle pain,    Neurologic symptoms:  No headache, no altered level of consciousness.               Additional review of systems information: All other systems reviewed and otherwise negative.      Health Status   Allergies:    Allergic Reactions (All)  Severity Not Documented  Ambien- Hallucinations.  Gluten- Ciliac disease.  Requip- Hallucinations.  Wheat- Ciliac disease.  Canceled/Inactive Reactions (All)  Severity Not Documented  Morphine- No reactions were documented.  No Known Allergies.   Medications:  (Selected)   Prescriptions  Prescribed  Nexium 40 mg oral delayed release cap ([facility]): 40 mg, Oral, Daily, # 90 tab(s), 3 Refill(s), eRx: [facility]  Topamax 100 mg oral tablet: 100 mg = 1 tab(s), Oral, BID, # 180 tab(s), 3 Refill(s), Pharmacy: [facility]  Vitamin B2 100 mg oral tablet: 200 mg = 2 tab(s), Oral, BID, # 360 tab(s), 3 Refill(s), Pharmacy: [facility]  alPRAzolam 0.25 mg oral tab: See Instructions, TAKE ONE TABLET BY MOUTH EVERY MORNING AND AT 2PM, # 60 tab(s), 1 Refill(s), eRx: [facility]  furosemide 40 mg oral tablet: 40 mg 1 tab(s), Oral, BID, # 60 tab(s), 11 Refill(s), eRx: [facility]  levetiracetam 750 mg oral tablet: 750 mg = 1 tab(s), Oral, At Bedtime, # 90 tab(s), 3 Refill(s), called to pharmacy (Rx)  levothyroxine 150 mcg (0.15 mg) oral tablet: See Instructions, TAKE ONE TABLET BY MOUTH EVERY DAY, # 30 tab(s), 11 Refill(s), eRx: [facility]  lovastatin 40 mg oral tablet: 40 mg 1 tab(s), Oral, Once a day (at bedtime), # 30 tab(s), 11 Refill(s), eRx: [facility]  magnesium oxide 400 mg oral tablet: 400 mg = 1 tab(s), Oral, Daily, # 90 tab(s), 3 Refill(s), Pharmacy: [facility]  metformin 500 mg oral tablet: 1,000 mg = 2 tab(s), Oral, BID, with meals, # 60 tab(s), 11 Refill(s), Pharmacy: [facility]  [place]  ondansetron 8 mg oral tablet: 8 mg = 1 tab(s), Oral, TID, PRN PRN nausea/vomiting, # 30 tab(s), 11 Refill(s), Pharmacy: [facility]  potassium chloride 20 mEq oral TABLET extended release: 20 mEq = 1 tab(s), Oral, At Bedtime, # 30 tab(s), 11 Refill(s), Pharmacy: [facility]  Documented Medications  Documented  Amitiza 8 mcg oral capsule: 8 mcg = 1 cap(s), Oral, BID  Compound Cream - Amantadine, Gabapentin, Cyclobenzaprine, Diclofenac, Pentoxifylline, Ibuprofen, Li...: Compound Cream - Amantadine, Gabapentin, Cyclobenzaprine, Diclofenac, Pentoxifylline, Ibuprofen, Lidocaine, See Instructions, 1-2 grams TOP 2-4x daily prn shunt incision pain, 0 Refill(s)  DULoxetine 60 mg oral delayed release capsule: 60 mg = 1 cap(s), Oral, Daily, 0 Refill(s)  Emgality Prefilled Pen 120 mg/mL subcutaneous solution: 120 mg, Subcutaneous, qMonth, 0 Refill(s)  Imitrex 100 mg oral tablet: 100 mg = 1 tab(s), Oral, q12hr, PRN PRN headache, 0 Refill(s)  MiraLax oral powder for reconstitution: = 1 tbsp, Oral, Daily, # 1 bottle(s), 0 Refill(s)  Norco 10 mg-325 mg oral tablet: 1 tab(s), Oral, TID, PRN PRN as needed for pain, 0 Refill(s)  Tresiba FlexTouch 100 units/mL subcutaneous solution: Subcutaneous, Daily, 12 units  Tresiba FlexTouch 200 units/mL subcutaneous solution: 16 units, Subcutaneous, Daily, rotate injection sites, # 9 mL, 0 Refill(s)  Trulicity Pen 1.5 mg/0.5 mL subcutaneous solution: 1.5 mg = 0.5 mL, Subcutaneous, qWednesday, 0 Refill(s)  UTI-Stat: UTI-Stat, 30 mL, Oral, Daily, dilute in 8oz, 0 Refill(s)  Vitamin D2 2000 intl units (50 mcg) oral capsule: 2,000 IntUnit = 1 cap(s), Oral, Daily, with food, # 60 cap(s), 0 Refill(s)  Zinc-220 oral capsule: 220 mg = 1 cap(s), Oral, Daily, # 100 cap(s), 0 Refill(s)  acetaminophen/butalbital/caffeine 325 mg-50 mg-40 mg oral tablet: 1 tab(s), Oral, q4hr, PRN PRN as needed, # 30 tab(s), 0 Refill(s)  cranberry oral capsule: 1 cap, Oral, Daily, 0 Refill(s)  gabapentin 600 mg oral tablet: 600 mg = 1 tab(s), Oral, At Bedtime  meclizine 25 mg oral tablet: 25 mg = 1 tab(s), Oral, TID, PRN PRN as needed  for dizziness, 0 Refill(s)  metFORMIN 1000 mg oral tablet: 1000 mg = 1 tab(s), Oral, BID  methenamine hippurate 1 g oral tablet: 1 gm = 1 tab(s), Oral, BID  nitrofurantoin macrocrystals-monohydrate 100 mg oral capsule: 100 mg = 1 cap(s), Oral, BID  thiamine 100 mg oral tablet: 200 mg = 2 tab(s), Oral, BID, # 7 tab(s), 0 Refill(s)  torsemide 20 mg oral tablet: 20 mg = 1 tab(s), Oral, BID.      Past Medical/ Family/ Social History   Medical history:    Active  arthritis (9342207)  Depression (311)  DM (diabetes mellitus) (250.00)  Dyslipidemia (272.4)  Vertigo (780.4)  Resolved  Abdominal pain (88545303):  Resolved.  Acute sinusitis (78194348):  Resolved.  Acute UTI (8742188542):  Resolved.  Screening mammogram, encounter for (434968719):  Resolved.  Celiac disease (7639875900):  Resolved.  Flank pain ([id number]):  Resolved.  hypothyroidism (72066212):  Resolved.  Itching (9008157460):  Resolved.  migraine headache (82644003):  Resolved.  MRSA (256305634):  Resolved.  Need for influenza vaccination (021385595):  Resolved.  Otitis externa (2837684):  Resolved.  seizures (455165785):  Resolved.  Sleep apnea (574441729):  Resolved.  Stroke (701579405):  Resolved.  Abdominal distension (716120681):  Resolved.  Need for pneumococcal vaccine (275238510):  Resolved., Lumbar cirrhosis.   Surgical history:    Cystoscopy w/ Endoscopic Injection (.) on 7/20/2021 at 75 Years.  Comments:  7/20/2021 15:05 Elidia Duong RN  auto-populated from documented surgical case  Cystostomy Suprapubic (.) on 6/22/2021 at 75 Years.  Comments:  6/22/2021 15:34 Floyd Benitez RN  auto-populated from documented surgical case  Cystoscopy (.) on 2/2/2021 at 75 Years.  Comments:  2/2/2021 15:50 CST - Edilma PARKER, Christine Small  auto-populated from documented surgical case  Injection of cortisone ([id number]) on 1/16/2019 at 73 Years.  Colonoscopy on 3/20/2018 at 72 Years.  Comments:  3/20/2018  11:30 CDT - Bonnie PARKER, Gustabo Newsome  auto-populated from documented surgical case  Colonoscopy (305534246) on 3/20/2018 at 72 Years.  Lasik Eye Surgery - Left on 3/6/2018 at 72 Years.  Compression Fracture of L1 Lumbar Vertebra (805.4) on 5/10/2013 at 67 Years.   (ventriculoperitoneal) shunt status (V45.2) on 2012 at 66 Years.  Creation of shunt; ventriculo-peritoneal, -pleural, other terminus (51799).  Cholecystectomy planned (759630679).   section (86440800).  Hysterectomy (635589032).  bladder surgery.  Comments:  2013 16:22 CDT - Joseph PARKER , Adelita velasquez5  right carotid endarterectomy.  left carotid endarterectomy.  Appendix (587151148).  Tonsillectomy (671165138).  Dilation and curettage (75437686).  Goiter (8579131).  kyphoplasty.  Carotid Artery Occlusion (433.10).  Comments:  2015 16:38 CST - Contributor_system, [id number]  2014 15:41:49 - Marilou Balderas:   H/O: Hysterectomy (V88.01)..   Family history:    Sister  Uterine cancer  Father  Cardiac arrest.  Mother  Peripheral vascular disease.  .   Social history: Alcohol use: past, Tobacco use: Denies, Drug use: Denies, Occupation: Unemployed, Family/social situation: Nursing home resident ([facility]).      Physical Examination               Vital Signs   Vital Signs   2021 22:26 CDT      Temperature Oral          37.6 DegC                             Temperature Oral (calculated)             99.68 DegF                             Peripheral Pulse Rate     129 bpm  HI                             Respiratory Rate          12 br/min                             SpO2                      98 %                             Oxygen Therapy            Room air                             Oxygen Flow Rate          2 L/min                             Systolic Blood Pressure   180 mmHg  HI                             Diastolic Blood Pressure  103 mmHg  HI  .   Measurements   2021 22:26 CDT      Weight Dosing              90.5 kg                             Weight Measured and Calculated in Lbs     199.52 lb                             Weight Estimated          90.5 kg                             Height/Length Dosing      160 cm                             Height/Length Estimated   160 cm                             Body Mass Index Estimated 35.35 kg/m2  .   Basic Oxygen Information   8/20/2021 22:26 CDT      SpO2                      98 %                             Oxygen Flow Rate          2 L/min                             Oxygen Therapy            Room air  .   General:  Alert, no acute distress   Skin:  Warm, dry, intact   Head:  Normocephalic, atraumatic   Eye:  Pupils are equal, round and reactive to light, extraocular movements are intact, normal conjunctiva   Cardiovascular:  Regular rate and rhythm, Normal peripheral perfusion, No edema   Respiratory:  Respirations are non-labored, breath sounds are equal, Symmetrical chest wall expansion, Breath sounds: Right, crackles present.    Gastrointestinal:  Soft, Non distended, Normal bowel sounds, Tenderness: Suprapubic, Guarding: Negative, Rebound: Negative.    , Obstructive uropathy. , Pt has a suprapubic catheter in place with no urine in the bag. , Bladder stain is 0. . Musculoskeletal:  No deformity.   Neurological:  No focal neurological deficit observed, CN II-XII intact, normal speech observed, Chronic paresis lower extremity foam boots present.    Psychiatric:  Cooperative, appropriate mood & affect.       Medical Decision Making   Differential Diagnosis:  Dementia, hypoglycemia, urinary tract infection, pneumonia, electrolyte imbalance.    Rationale:  SILVA Maier   patient is a 75-year-old female presents to the emergency department from nursing home for tachycardia, shortness of breath, obstructed Banda catheter.  Reportedly patient has been more altered.  Upon arrival patient tachycardic with heart rate in 130s, hypertensive, Banda catheter evaluated.  On  exam, patient has a history of dementia, but appears alert, no focal new deficit appreciated.  No wounds to sacrum or legs.  No reported fever.  Bedside ultrasound performed which showed Banda catheter placed.  Repositioned with adequate urine output.  Lab work center for leukocytosis of 16.  Patient meets SIRS criteria with a source started on antibiotics.  Cultures obtained.  Discussed results with son and patient.  Answered all questions at this time.  Discussed with hospitalist who will admit the patient.  Patient weaned from oxygen.  Maintaining oxygen saturation on room air..   Documents reviewed:  Emergency department nurses' notes.   Orders  Launch Order Profile (Selected)   Inpatient Orders  Ordered  EK21 22:57:00 CDT, 21 22:57:00 CDT, Stretcher, Patient Has IV, Patient on O2, Standard Precautions, -1, -1, 21 22:57:00 CDT  Patient Isolation: 21 22:57:58 CDT, Contact Precautions, Constant Indicator  Patient Isolation: 21 22:57:58 CDT, Droplet Precautions, Constant Indicator  Ordered (Dispatched)  BNP: Stat collect, 21 22:58:00 CDT, Blood, Stop date 21 22:58:00 CDT, Lab Collect, Print Label By Order Location, 21 22:58:00 CDT  CBC w/ Auto Diff: Now collect, 21 22:57:00 CDT, Blood, Stop date 21 22:57:00 CDT, Lab Collect, Print Label By Order Location, 21 22:57:00 CDT  CMP: Stat collect, 21 22:57:00 CDT, Blood, Stop date 21 22:57:00 CDT, Lab Collect, Print Label By Order Location, 21 22:57:00 CDT  COVID-19  IDnow: Stat collect, Nasal, 21 22:57:00 CDT, Stop date 21 22:57:00 CDT, Nurse collect  TSH Thyroid Function Reflex Panel: Stat collect, 21 22:58:00 CDT, Blood, Stop date 21 22:58:00 CDT, Lab Collect, Print Label By Order Location, 21 22:58:00 CDT  Troponin-I: Stat collect, 21 22:58:00 CDT, Blood, Stop date 21 22:58:00 CDT, Lab Collect, Print Label By Order Location, 21  22:58:00 CDT  Ordered (Exam Ordered)  CXR 1 View: Stat, 08/20/21 22:57:00 CDT, Pneumonia, None, Stretcher, Patient Has IV?, Patient on Oxygen?, Rad Type, Not Scheduled, 08/20/21 22:57:00 CDT.   Electrocardiogram:  Time 8/20/2021 22:11:00, rate 128, Sinus tachycardia with occasional PVCs.  Left axis deviation.  Rate of 128.  No STEMI..    Results review:  Lab results : (Date Range: 8/19/2021 0:00 CDT - 8/20/2021 22:59 CDT).      Reexamination/ Reevaluation   Vital signs   results included from flowsheet : Vital Signs   8/21/2021 3:58 CDT       Temperature Oral          37.2 DegC                             Temperature Oral (calculated)             98.96 DegF                             Peripheral Pulse Rate     96 bpm                             Heart Rate Monitored      96 bpm                             Respiratory Rate          23 br/min                             SpO2                      92 %  LOW                             Oxygen Therapy            Room air    8/21/2021 3:05 CDT       Peripheral Pulse Rate     95 bpm                             Heart Rate Monitored      93 bpm                             Respiratory Rate          20 br/min                             SpO2                      99 %                             Oxygen Therapy            Room air                             Systolic Blood Pressure   117 mmHg                             Diastolic Blood Pressure  81 mmHg                             Mean Arterial Pressure, Cuff              93 mmHg    8/21/2021 2:15 CDT       Peripheral Pulse Rate     99 bpm                             Heart Rate Monitored      99 bpm                             Respiratory Rate          1 br/min  LOW                             SpO2                      100 %                             Oxygen Therapy            Room air                             Systolic Blood Pressure   103 mmHg                             Diastolic Blood Pressure  67 mmHg                              Mean Arterial Pressure, Cuff              79 mmHg    8/21/2021 1:46 CDT       Peripheral Pulse Rate     100 bpm                             Heart Rate Monitored      100 bpm                             Respiratory Rate          17 br/min                             SpO2                      99 %    8/21/2021 1:05 CDT       Temperature Oral          37.6 DegC                             Temperature Oral (calculated)             99.68 DegF                             Peripheral Pulse Rate     102 bpm  HI                             Heart Rate Monitored      102 bpm  HI                             Respiratory Rate          22 br/min                             SpO2                      98 %                             Oxygen Therapy            Room air                             Systolic Blood Pressure   119 mmHg                             Diastolic Blood Pressure  85 mmHg                             Mean Arterial Pressure, Cuff              96 mmHg    8/21/2021 0:05 CDT       Peripheral Pulse Rate     130 bpm  HI                             Heart Rate Monitored      130 bpm  HI                             Respiratory Rate          10 br/min  LOW                             SpO2                      98 %                             Systolic Blood Pressure   182 mmHg  HI                             Diastolic Blood Pressure  125 mmHg  HI                             Mean Arterial Pressure, Cuff              144 mmHg    8/20/2021 23:06 CDT      Peripheral Pulse Rate     132 bpm  HI                             Heart Rate Monitored      131 bpm  HI                             Respiratory Rate          13 br/min                             SpO2                      99 %                             Systolic Blood Pressure   154 mmHg  HI                             Diastolic Blood Pressure  109 mmHg  HI                             Mean Arterial Pressure, Cuff              124 mmHg    8/20/2021 22:26 CDT      Temperature  Oral          37.6 DegC                             Temperature Oral (calculated)             99.68 DegF                             Peripheral Pulse Rate     129 bpm  HI                             Respiratory Rate          12 br/min                             SpO2                      98 %                             Oxygen Therapy            Room air                             Oxygen Flow Rate          2 L/min                             Systolic Blood Pressure   180 mmHg  HI                             Diastolic Blood Pressure  103 mmHg  HI     Course: improving, not worsening.   Pain status: decreased.   Assessment: exam improved.      Procedure   Renal and bladder ultrasound   Time: 8/21/2021 00:20:00 .    Confirmed: Patient, procedure, and site correct.    Consent: Patient.    Indication: Decreased urine output, Banda placement.    Technique: Patient was scanned across the abdomen and flank with a curvilinear probe.    Sonographic views: Bladder.    Findings: Banda catheter not visualized in the bladder.  Repositioned and subsequently visualized and bladder with excellent urine output.  1.4 L..    Performed by: Self.       Impression and Plan   Diagnosis   SOB (shortness of breath) (TJO07-EW R06.02)   Chronic suprapubic catheter (DLZ35-TY Z93.59)   Sepsis (MHA99-LY A41.9)   UTI symptoms (EBF52-VS R39.9)   Obstructed urostomy catheter (QBV86-UP T83.098A)   Leukocytosis (IRE20-WL D72.829)   Altered mental status (PNED [id number])   Chronic suprapubic catheter (YWH36-RF Z93.59)   SOB (shortness of breath) (DAF92-JX R06.02)   Plan   Condition: Stable.    Disposition: Patient care transitioned to: Time: 8/20/2021 23:56:00, Vinnie Kemp MD.    Counseled: Patient, Regarding diagnosis, Regarding diagnostic results, Regarding treatment plan, Patient indicated understanding of instructions.    Notes: I, Anoop Odonnell, acted solely as a scribe for and in the presence of Dr. Lynne who  performed this service., I, Dr Lynne have read note from scribe and I agree with history and physical except as amended by me.  All information was dictated from my history and my examination of patient..

## 2022-04-28 NOTE — OP NOTE
DATE OF SURGERY:    07/20/2021    SURGEON:  Fuad Szymanski MD    PREOPERATIVE DIAGNOSES:    1. Overactive bladder.    2. Recurrent urinary tract infections.    POSTOPERATIVE DIAGNOSES:    1. Overactive bladder.    2. Recurrent urinary tract infections.    PROCEDURE:  Cystoscopy with bladder Botox injection (300 units).    INDICATIONS:  This is a 75-year-old female with a history of recurrent urinary tract infections as well as overactive bladder.  She recently had a suprapubic tube placement, continues to have some urge incontinence from her urethra.  Today, I plan cystoscopy with 300 units of bladder Botox.    DESCRIPTION OF PROCEDURE:  After informed consent was obtained, the patient was taken to the operating room after receiving a preoperative dose of antibiotics.  She was given a general anesthetic and placed in the dorsal lithotomy position.  Her genitals were prepped and draped in usual sterile fashion.  I placed a 22-Spanish rigid cystoscope through the urethra in the bladder.  Her bladder was filled with approximately 200 mL of normal saline.  I used a 3 mm cystoscopic needle and I injected 21 mL injections of bladder Botox starting above the trigone, going from the left bladder wall to the right bladder wall posteriorly and superiorly for a total dose of 300 units.  She tolerated the procedure well.  There were no complications.  She was extubated and brought to recovery in good condition.        ______________________________  Fuad Szymanski MD    WBR/UN  DD:  08/17/2021  Time:  09:57AM  DT:  08/17/2021  Time:  10:03AM  Job #:  [id number]

## 2022-04-28 NOTE — OP NOTE
DATE OF SURGERY:    02/02/2021    SURGEON:  Fuad Szymanski MD    PREOPERATIVE DIAGNOSES:    1. Recurrent urinary tract infections.  2. Bladder debris.    POSTOPERATIVE DIAGNOSES:    1. Recurrent urinary tract infections.  2. Bladder debris.    PROCEDURE:  Cystoscopy with evacuation of bladder debris.    DESCRIPTION OF PROCEDURE:  After informed consent was obtained, the patient was taken to the operating room and given IV propofol.  She was placed in dorsal lithotomy position.  Her genitals were prepped and draped in usual sterile fashion.  I placed a 22-Yoruba rigid cystoscope through the urethra into the bladder.  The bladder had lots of debris all around the trigone with several fill and empty cycles.  I was able to empty all this tube debris out.  I got a good look at her bladder.  There was some erythema along the floor the bladder.  There were no tumors or stones in the bladder.  She had clear efflux from bilateral ureteral orifices.  She tolerated the procedure well.  She was brought back to Recovery in good condition.        ______________________________  Fuad Szymanski MD    WBR/US  DD:  02/02/2021  Time:  03:44PM  DT:  02/02/2021  Time:  03:53PM  Job #:  [id number]
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
## 2022-05-02 NOTE — HISTORICAL [facility] CERNER
This is a historical note converted from Maegan. Formatting and pictures may have been removed.  Please reference Maegan for original formatting and attached multimedia. Chief Complaint  pt to ed from [facility] who called for a catheter replacement. However on aasi arrival pt htn with sinus tach, profusely sweating c new onset ams . hx chf, chronic uti, hypothyroid  History of Present Illness  5-year-old  female presents to the ED sent from [facility] via EMS?with reports of?difficulty replacing her suprapubic catheter per the nursing home staff,?tachycardia, Sherlyn to me edema.? Patient is a poor historian related to her past medical history, she is able to answer most questions appropriately.?Initially her son was at the bedside and could not?provide any history or information.? According to the emergency contact Deann Harveymadi pt other son his wife Christine Contreras is POA and was the way to the hospital and knows all of the patients history. Spoke with Christine when she arrived to the hospital which she reports she has cared for the patient 12 years at home and 3 years in the NH. PT has history of frequent urinary tract infections in the past. ?Patient recently had suprapubic catheter placed by urologist ? on 6/22/2021. IT is reported the NH attempted to replace the catheter and was unsuccessful which she was sent to the ED for further evaluation. Pt does have complaints of her legs hurting and burning. She is bed and wheelchair bound at the NH. PMH includes anxiety, depression, arthritis,?ERICA?with CPAP, DM, HTN, HLD,?PVD/PAD,?CVA/TIA, CAD. Labwork revealed elevated glucose 157; WBCs 16.6; other indices unremarkable.? Chest x-ray done revealed no acute cardiopulmonary process identified.? Blood cultures were collected ?2 sets.? Patient was started on IV Zosyn therapy.? Initial vital signs revealed /103 pulse 129, respirations 22, temperature 37.6?C; O2 saturation 98% on 2 L  per nasal cannula. PT is admitted to hospital medicine services for further management.  Review of Systems  ????Except as document, all other systems reviewed and negative  Physical Exam  Vitals & Measurements  T:?37.2? ?C (Oral)? TMIN:?37.2? ?C (Oral)? TMAX:?37.6? ?C (Oral)? HR:?87(Peripheral)? HR:?87(Monitored)? RR:?20? BP:?111/63? SpO2:?100%? WT:?90.5?kg?  General:?chronically ill appearing, looks stated age, non-toxic appearing, family at bedside  Eye: PERRL, clear conjunctiva  HENT:?healed surgical intervention noted?to right frontal scalp, oropharynx and nasal mucosal surfaces moist, no maxillary/frontal sinus tenderness to palpation  Neck:? no thyromegaly or lymphadenopathy  Respiratory:?diminished bilaterally, symmetrical expansion, unlabored respirations  Cardiovascular:?tachycardia , regular rate and rhythm, cap refill brisk, 4+ pitting edema BLE  Gastrointestinal:?soft, obese, non-tender, non-distended with normal bowel sounds, without masses to palpation  Genitourinary: no CVA tenderness to palpation, SP catheter to gravity drainage  Musculoskeletal:?generalized weakness  Integumentary: warm and dry, erythema to BLE left greater than right with cellulitic skin changes  Neurologic: no signs of peripheral neurological deficit, motor/sensory function intact  Psychiatric: cooperative, appropriate mood and affect  Cognition and Speech: clear and coherent  Assessment/Plan  IMPRESSION:  Acute UTI-POA  Suprapubic catheter obstruction  Sepsis- likely secondary to UTI  Leukocytosis- likely secondary to UTI  HTN-accelerated  Cellulitis BLE  DM type II with hyperglycemia  PVD/PAD  Seizures- none recent  Hx of CVA/TIA  Weakness  ?   PLAN:  Urine culture and sensitivity  daily weights  Accurate I&O  blood cultures and sensitivities  Will add IV vancomycin with cellulitis, will adjust according to C&S  Repeat labs in the am  GI/DVT prophylaxis  Resume home medication as deemed necessary  ?   Source of history:  patient, family, medical record, ED provider  Present at bedside:family  Referral source:ED  History limitation:clinical condition  ?   PCP: KEITH ULRICH  ?   ADVANCED DIRECTIVES:YES, YURI Contreras  CODE STATUS: DNIR/DNI  ?   I, Carmen BROOKS, FNP, reviewed and discussed the case with ?NAT?Laurent. ?See addendum for further per MD.?  ?   ADDENDUM:  I,?Meena Mancilla?assumed care of this patient today at?2pm  For this patient encounter, I reviewed the NP or PA documentation, treatment plan, and medical decision making; and I had face-to-face time with this patient. ?Labs and imaging were reviewed and I agree with history, physical and medical decision making as detailed above. ?At least 45 minutes have been spent on above H&P, in collaboration with above nurse practitioner  ?  ?   a. History?  5-year-old female?with knownmedical history of anxiety, depression, arthritis,?ERICA?with CPAP, DM, HTN, HLD,?PVD/PAD,?CVA/TIA, CAD, obesity with BMI?35.35?admitted today?for?difficulty placing suprapubic catheter?at the nursing home. ?Patient underwent?this procedure on 6/22/2021 with Dr. Szymanski. ?When nursing home?personnels were unable to place the catheter, they sent patient over to the ER?for further evaluation  Patient?is a poor historian. ?Her biggest complaint is burning of her?left leg?and wants her to be better. ?Noted both legs are swollen?left greater than right?has more redness. ?She reports she is bedbound and wheelchair-bound, does not get up and walk.? Used to live with her daughter?now living in nursing home at [facility]  Urinalysis in the ER was concerning for UTI  Currently on 2 L/min oxygen by nasal cannula  blood pressure on arrival to the ER was elevated but now well controlled  Labs shows glucose of 157  WBC 16.6 with left shift  Urinalysis shows 2+ blood, 2+ protein, 2+ leukocyte, nitrite negative, WBC 82, squamous epithelial none  Covid rapid negative  Urine culture has been sent  Blood  cultures x2 collected  ?  b. Physical exam  Gen:, Elderly, poor historian, laying in bed, hard of hearing  CVS:?S1-S2 positive, plus pitting edema bilateral lower extremity  Resp:?Lungs clear to auscultation to the bases  Abd:?Soft, obese, bowel sounds positive  Skin: Erythema and swelling of the left leg greater than right  CNS:?Awake?and alert  ?   c. Medical decision making  Acute UTI-POA  Suprapubic catheter obstruction-6/22/2021  Cellulitis BLE  Leukocytosis- likely secondary to UTI  HTN-accelerated  DM type II with hyperglycemia  PVD/PAD  Seizures- none recent  Hx of CVA/TIA  ?  Admitted to our service as inpatient on 8/21/2021  Empirically started on Zosyn and bank?for UTI and cellulitis lower extremity  Urine culture sent  Blood cultures x2 collected and sent  Continue Lasix  Resume appropriate home medication for chronic medical conditions  Tramadol 50 mg every 8 hourly as needed?pain  Daily weights  Accurate I&O  Elevate legs if possible  Morning labs ordered  Insulin sliding scale  Accu-Cheks before meals and at bedtime  Hypoglycemia protocol in place  ?   AAsadMD  ?   Problem List/Past Medical History  Chronic UTI, anxiety, depression, vertigo, osteoarthritis, bipolar disorder, celiac disease, ERICA with CPAP, DM, HTN, HLD, seizures, CVA, TIA, PVD/PAD, chronic back pain, TMJ, migraines, prior MRSA infection  Procedure/Surgical History  Cystoscopy w/ Endoscopic Injection (.) (07/20/2021)  Cystourethroscopy, with injection(s) for chemodenervation of the bladder (07/20/2021)  Introduction of Other Therapeutic Substance into Genitourinary Tract, Via Natural or Artificial Opening Endoscopic (07/20/2021)  Cystostomy Suprapubic (.) (06/22/2021)  Cystostomy, cystotomy with drainage (06/22/2021)  Drainage of Bladder with Drainage Device, Percutaneous Approach (06/22/2021)  Cystoscopy (.) (02/02/2021)  Cystourethroscopy (separate procedure) (02/02/2021)  Inspection of Bladder, Via Natural or Artificial Opening  Endoscopic (2021)  Insertion of Infusion Device into Superior Vena Cava, Percutaneous Approach (2020)  Injection of cortisone (2019)  Colonoscopy (2018)  Colonoscopy (2018)  Colonoscopy, flexible; diagnostic, including collection of specimen(s) by brushing or washing, when performed (separate procedure) (2018)  Inspection of Lower Intestinal Tract, Via Natural or Artificial Opening Endoscopic (2018)  Lasik Eye Surgery - Left (2018)  HOSPITAL OBSERVATION SERVICE, PER HOUR (2014)  Biopsy of other bone, except facial bones (05/10/2013)  Compression Fracture of L1 Lumbar Vertebra (05/10/2013)  Fusion or refusion of 2-3 vertebrae (05/10/2013)  Lumbar and Lumbosacral Fusion of the Posterior Column, Posterior Technique (05/10/2013)  Other excision of joint of other specified site (05/10/2013)  Other repair and plastic operations on spinal cord structures (05/10/2013)  Percutaneous vertebral augmentation (05/10/2013)  Central Venous Catheter Placement with Guidance (2013)  Insertion of indwelling urinary catheter (11/10/2012)  Insertion of temporary indwelling bladder catheter; simple (eg, Banda). (11/10/2012)   (ventriculoperitoneal) shunt status (2012)  Application of short arm splint (forearm to hand); static. (2012)  Application of splint (2012)  H/O: Hysterectomy  Appendix  bladder surgery   section  Cholecystectomy planned  Creation of shunt; ventriculo-peritoneal, -pleural, other terminus  Dilation and curettage  Goiter  Hysterectomy  kyphoplasty  left carotid endarterectomy  Carotid Artery Occlusion  right carotid endarterectomy  Tonsillectomy   Medications  Inpatient  acetaminophen, 1000 mg= 2 tab(s), Oral, q6hr, PRN  Dextrose 50% and Water (50 mL vial/syringe), 25 mL, IV Push, Once, PRN  Dextrose 50% and Water (50 mL vial/syringe), 25 mL, IV Push, As Directed, PRN  Dextrose 50% and Water (50 mL vial/syringe), 50 mL, IV Push, As  Directed, PRN  Dextrose 50% in Water intravenous solution, 25 mL, IV Push, As Directed, PRN  glucagon, 1 mg= 1 EA, IM, q10min, PRN  glucagon, 1 mg= 1 EA, IM, q10min, PRN  insulin lispro, 2-14 units, Subcutaneous, As Directed, PRN  Normal Saline (0.9% NS) IV 1,000 mL, 1000 mL, IV  Zofran, 4 mg= 2 mL, IV Push, q4hr, PRN  Zosyn 3.375 gm (for IVPB)  Home  acetaminophen/butalbital/caffeine 325 mg-50 mg-40 mg oral tablet, 1 tab(s), Oral, q4hr, PRN  alPRAzolam 0.25 mg oral tab, See Instructions, 1 refills  Amitiza 8 mcg oral capsule, 8 mcg= 1 cap(s), Oral, BID  aspirin 81 mg oral tablet, CHEWABLE, Daily  Compound Cream - Amantadine, Gabapentin, Cyclobenzaprine, Diclofenac, Pentoxifylline, Ibuprofen, Li, See Instructions,? ?Not taking  cranberry oral capsule, 1 cap, Oral, Daily  DULoxetine 60 mg oral delayed release capsule, 60 mg= 1 cap(s), Oral, Daily  Emgality Prefilled Pen 120 mg/mL subcutaneous solution, 120 mg, Subcutaneous, qMonth  furosemide 40 mg oral tablet, 40 mg= 1 tab(s), Oral, BID, 11 refills,? ?Not taking  gabapentin 600 mg oral tablet, 600 mg= 1 tab(s), Oral, At Bedtime  Imitrex 100 mg oral tablet, 100 mg= 1 tab(s), Oral, q12hr, PRN  levetiracetam 750 mg oral tablet, 750 mg= 1 tab(s), Oral, At Bedtime, 3 refills  levothyroxine 150 mcg (0.15 mg) oral tablet, See Instructions, 11 refills  lovastatin 40 mg oral tablet, 40 mg= 1 tab(s), Oral, Once a day (at bedtime), 11 refills  magnesium oxide 400 mg oral tablet, 400 mg= 1 tab(s), Oral, Daily, 3 refills  meclizine 25 mg oral tablet, 25 mg= 1 tab(s), Oral, TID, PRN,? ?Not taking  metFORMIN 1000 mg oral tablet, 1000 mg= 1 tab(s), Oral, BID  metformin 500 mg oral tablet, 1000 mg= 2 tab(s), Oral, BID, 11 refills,? ?Not taking  methenamine hippurate 1 g oral tablet, 1 gm= 1 tab(s), Oral, BID  MiraLax oral powder for reconstitution, 1 tbsp, Oral, Daily  Nexium 40 mg oral delayed release cap ([facility] Substitution), 40 mg, Oral, Daily, 3 refills  nitrofurantoin  macrocrystals-monohydrate 100 mg oral capsule, 100 mg= 1 cap(s), Oral, BID  Norco 10 mg-325 mg oral tablet, 1 tab(s), Oral, TID, PRN  ondansetron 8 mg oral tablet, 8 mg= 1 tab(s), Oral, TID, PRN, 11 refills,? ?Not taking  potassium chloride 20 mEq oral TABLET extended release, 20 mEq= 1 tab(s), Oral, At Bedtime, 11 refills  thiamine 100 mg oral tablet, 200 mg= 2 tab(s), Oral, BID,? ?Not taking  Topamax 100 mg oral tablet, 100 mg= 1 tab(s), Oral, BID, 3 refills  torsemide 20 mg oral tablet, 20 mg= 1 tab(s), Oral, BID  Tresiba FlexTouch 100 units/mL subcutaneous solution, Subcutaneous, Daily  Tresiba FlexTouch 200 units/mL subcutaneous solution, 16 units, Subcutaneous, Daily,? ?Not taking  Trulicity Pen 1.5 mg/0.5 mL subcutaneous solution, 1.5 mg= 0.5 mL, Subcutaneous, qWednesday  UTI-Stat, 30 mL, Oral, Daily  Vitamin B2 100 mg oral tablet, 200 mg= 2 tab(s), Oral, BID, 3 refills,? ?Not taking  Vitamin D2 2000 intl units (50 mcg) oral capsule, 2000 IntUnit= 1 cap(s), Oral, Daily,? ?Not taking  Zinc-220 oral capsule, 220 mg= 1 cap(s), Oral, Daily,? ?Not taking  Allergies  Ambien?(hallucinations)  Gluten?(ciliac disease)  Requip?(hallucinations)  Wheat?(ciliac disease)  Social History  Abuse/Neglect  No, No, Yes, 08/20/2021  No, 07/20/2021  No, 06/22/2021  No, No, Yes, 02/02/2021  No, 02/02/2021  No, No, Yes, 02/01/2021  Alcohol - Denies Alcohol Use, 06/10/2012  Past, 01/28/2016  Employment/School  Unemployed, 03/20/2015  Exercise - Does not exercise, 03/20/2015  Exercise frequency: Daily. Exercise type: Walking., 01/28/2016  Home/Environment  Lives with Children. Living situation: Home with assistance. Home equipment: Walker/Cane., 01/28/2016  Nutrition/Health  Regular, 03/20/2015  Sexual  Sexually active: No., 01/28/2016  Spiritual/Cultural  [employer], 02/01/2021  Substance Use - Denies Substance Abuse, 01/27/2013  Never, 09/07/2020  Never, 01/28/2016  Tobacco - Denies Tobacco Use, 06/10/2012  Never (less than 100 in  lifetime), No, 08/20/2021  Never (less than 100 in lifetime), N/A, 07/20/2021  Never (less than 100 in lifetime), N/A, 06/22/2021  Never (less than 100 in lifetime), N/A, 02/02/2021  Never (less than 100 in lifetime), N/A, 02/01/2021  Family History  Cardiac arrest.: Father.  Peripheral vascular disease.: Mother.  Uterine cancer: Sister.  Immunizations  Vaccine Date Status Comments   COVID-19 mRNA, LNP-S, PF - Moderna 01/29/2021 Recorded 2nd dose   COVID-19 mRNA, LNP-S, PF - Moderna 01/02/2021 Recorded 1st dose   influenza virus vaccine, inactivated 10/15/2018 Given    zoster vaccine live 10/30/2017 Recorded    pneumococcal 13-valent conjugate vaccine 07/27/2017 Given    influenza virus vaccine, inactivated 01/02/2017 Recorded    pneumococcal 23-polyvalent vaccine 01/01/2012 Recorded    Lab Results  Labs Last 24 Hours?  ?Chemistry? Hematology/Coagulation?   Sodium Lvl: 141 mmol/L (08/20/21 23:15:00) WBC:?16.6 x10(3)/mcL?High (08/20/21 23:15:00)   Potassium Lvl: 4.3 mmol/L (08/20/21 23:15:00) RBC: 5.03 x10(6)/mcL (08/20/21 23:15:00)   Chloride: 102 mmol/L (08/20/21 23:15:00) Hgb:?11.8 gm/dL?Low (08/20/21 23:15:00)   CO2: 25 mmol/L (08/20/21 23:15:00) Hct: 42.1 % (08/20/21 23:15:00)   Calcium Lvl: 9.6 mg/dL (08/20/21 23:15:00) Platelet: 287 x10(3)/mcL (08/20/21 23:15:00)   Glucose Lvl:?157 mg/dL?High (08/20/21 23:15:00) MCV: 83.7 fL (08/20/21 23:15:00)   BUN: 19.5 mg/dL (08/20/21 23:15:00) MCH:?23.5 pg?Low (08/20/21 23:15:00)   Creatinine: 0.74 mg/dL (08/20/21 23:15:00) MCHC:?28 gm/dL?Low (08/20/21 23:15:00)   Est Creat Clearance Ser: 54.32 mL/min (08/21/21 00:27:55) RDW:?18.7 %?High (08/20/21 23:15:00)   eGFR-AA: >60 (08/20/21 23:15:00) MPV: 10.3 fL (08/20/21 23:15:00)   eGFR-ROSALINA: >60 (08/20/21 23:15:00) Abs Neut:?10.83 x10(3)/mcL?High (08/20/21 23:15:00)   Bili Total: 0.2 mg/dL (08/20/21 23:15:00) Neutro Auto: 65 % (08/20/21 23:15:00)   Bili Direct: 0.1 mg/dL (08/20/21 23:15:00) Lymph Auto: 22 % (08/20/21 23:15:00)    Bili Indirect: 0.1 mg/dL (08/20/21 23:15:00) Mono Auto: 5 % (08/20/21 23:15:00)   AST: 9 unit/L (08/20/21 23:15:00) Eos Auto: 6 % (08/20/21 23:15:00)   ALT: 11 unit/L (08/20/21 23:15:00) Abs Eos:?1 x10(3)/mcL?High (08/20/21 23:15:00)   Alk Phos: 68 unit/L (08/20/21 23:15:00) Basophil Auto: 0 % (08/20/21 23:15:00)   Total Protein: 7.4 gm/dL (08/20/21 23:15:00) Abs Neutro:?10.83 x10(3)/mcL?High (08/20/21 23:15:00)   Albumin Lvl:?3.1 gm/dL?Low (08/20/21 23:15:00) Abs Lymph: 3.7 x10(3)/mcL (08/20/21 23:15:00)   Globulin:?4.3 gm/dL?High (08/20/21 23:15:00) Abs Mono: 0.9 x10(3)/mcL (08/20/21 23:15:00)   A/G Ratio:?0.7 ratio?Low (08/20/21 23:15:00) Abs Baso: 0.1 x10(3)/mcL (08/20/21 23:15:00)   BNP: 18.9 pg/mL (08/20/21 23:15:00)    Troponin-I: <0.010 (08/20/21 23:15:00)    TSH: 3.0929 uIU/mL (08/20/21 23:15:00)    Diagnostic Results  Accession:?[id number]  Order:?XR Chest 1 View  Report Dt/Tm:?08/21/2021 08:59  Report:?  ?  CLINICAL: ?Pneumonia.  ?  COMPARISON: May 25, 2021.  ?  FINDINGS: ?Cardiopericardial silhouette is within normal limits. Lung  lower lung zone mild atelectasis. No acute dense focal or segmental  consolidation, congestion, pleural effusion or pneumothorax. Catheter  tubing projects over the right neck, chest and abdomen  ?  IMPRESSION:  ?  No acute cardiopulmonary process identified.  ?  ?  ?

## 2022-05-02 NOTE — HISTORICAL [facility]
This is a historical note converted from Maegan. Formatting and pictures may have been removed.  Please reference Certrudy for original formatting and attached multimedia. Admit and Discharge Dates  Admit Date: 08/20/2021  Discharge Date: 08/24/2021  Physicians  Attending Physician - Darrell MEDLEY, Boubacar LOPEZ  Admitting Physician - Darrell MEDLEY, Boubacar LOPEZ  Primary Care Physician - Yovanny MEDLEY, Danny DELGADO  Discharge Diagnosis  Acute UTI-POA--> Pseudomonas Aeruginosa and Pryeus Mirabilis  Suprapubic catheter obstruction-placed recently on 6/22/2021- replaced on admission 8/20  Cellulitis BLE, Left?>Right- improved  Leukocytosis- likely secondary to UTI/ Cellulitis - resolved  Anemia- chronic  HTN-accelerated- resolved  DM type II with hyperglycemia  PVD/PAD  Seizures- none recent  Known Hx of CVA/TIA  Surgical Procedures  No procedures recorded for this visit.  Immunizations  No immunizations recorded for this visit.  Hospital Course  75-year-old  female presents to the ED sent from [facility] via EMS?with reports of?difficulty replacing her suprapubic catheter per the nursing home staff,?tachycardia, Sherlyn to me edema.? Patient is a poor historian related to her past medical history, she is able to answer most questions appropriately.?Initially her son was at the bedside and could not?provide any history or information.? According to the emergency contact Deann Contreras pt other son his wife Christine Contreras is POA and was the way to the hospital and knows all of the patients history. Spoke with Christine when she arrived to the hospital which she reports she has cared for the patient 12 years at home and 3 years in the NH. PT has history of frequent urinary tract infections in the past. ?Patient recently had suprapubic catheter placed by urologist ? on 6/22/2021. IT is reported the NH attempted to replace the catheter and was unsuccessful which she was sent to the ED for further evaluation. Pt  does have complaints of her legs hurting and burning. She is bed and wheelchair bound at the NH. PMH includes anxiety, depression, arthritis,?ERICA?with CPAP, DM, HTN, HLD,?PVD/PAD,?CVA/TIA, CAD. Labwork revealed elevated glucose 157; WBCs 16.6; other indices unremarkable.? Chest x-ray done revealed no acute cardiopulmonary process identified.? Blood cultures were collected ?2 sets.? Patient was started on IV Zosyn therapy.? Initial vital signs revealed /103 pulse 129, respirations 22, temperature 37.6?C; O2 saturation 98% on 2 L per nasal cannula.  PT is admitted to hospital medicine services for further management on 8/21 for Acute UTI and cellulitis B.L LE (L>R)  Empirically started on Zosyn and Vanc for UTI and cellulitis lower extremity?respectively  Urine culture finalized as Pseudomonas Aeruginosa and Proteus Mirabilis sensitive to cefepime, zosyn, discussed with nursing home, they prefer cefepime. ?It will treat both UTI?as well as the cellulitis. ?Ordered?cefepime 2 g IV every 12?for 2 more days to complete total 5 days treatment  Nursing home requested?midline placed?before discharge.  Blood cultures x2 so far negative  We resumed appropriate home medication for chronic medical conditions  Tramadol 50 mg every 8 hourly as needed?pain  Elevate legs if possible  She will remain?hemodynamically stable and afebrile during the hospital stay.? Reported she is bedbound/wheelchair bound  ?   Was seen and examined on the day of discharge  Vital signs reviewed  Gen:, Elderly, poor historian, laying in bed, hard of hearing  CVS:?S1-S2 positive, 1+plus pitting edema bilateral lower extremity  Resp:?Lungs clear to auscultation to the bases  Abd:?Soft, obese, bowel sounds positive, suprapubic catheter present  Skin: Erythema and swelling much improved  CNS:?Awake?and alert  ?   Explained in detail to the patient about the discharge plan, medications, and follow-up?visits.?Pt understand and agree with the treatment  plan.  Condition stable  Diet- diabetic/ cardiac  Medications Per DC med rec  Activities as tolerated  Follow-up with PCP in 1 to 2 weeks  F/U with Dr Szymanski in 2 wks  For further questions contact hospitalist office  ?   Discharge time?48 minutes   Discharge Medication Reconciliation  Prescribed  cefepime (cefepime 2 g injection)?2 gm, IV, q12hr  Continue  DULoxetine (DULoxetine 60 mg oral delayed release capsule)?60 mg, Oral, Daily  SUMAtriptan (Imitrex 100 mg oral tablet)?100 mg, Oral, q12hr, PRN headache  Template Non-Formulary Med (Compound Cream - Amantadine, Gabapentin, Cyclobenzaprine, Diclofenac, Pentoxifylline, Ibuprofen, Li...)?See Instructions  Template Non-Formulary Med (UTI-Stat)?30 mL, Oral, Daily  acetaminophen/butalbital/caffeine (acetaminophen/butalbital/caffeine 325 mg-50 mg-40 mg oral tablet)?1 tab(s), Oral, q4hr, PRN as needed  alPRAzolam (alPRAzolam 0.25 mg oral tab)?See Instructions  aspirin (aspirin 81 mg oral tablet, CHEWABLE), Daily  cranberry (cranberry oral capsule)?1 cap, Oral, Daily  dulaglutide (Trulicity Pen 1.5 mg/0.5 mL subcutaneous solution)?1.5 mg, Subcutaneous, qWednesday  ergocalciferol (Vitamin D2 2000 intl units (50 mcg) oral capsule)?2,000 IntUnit, Oral, Daily  furosemide (furosemide 40 mg oral tablet)?40 mg, Oral, BID  gabapentin (gabapentin 600 mg oral tablet)?600 mg, Oral, At Bedtime  galcanezumab (Emgality Prefilled Pen 120 mg/mL subcutaneous solution)?120 mg, Subcutaneous, qMonth  insulin degludec (Tresiba FlexTouch 100 units/mL subcutaneous solution), Subcutaneous, Daily  levETIRAcetam (levetiracetam 750 mg oral tablet)?750 mg, Oral, At Bedtime  levothyroxine (levothyroxine 150 mcg (0.15 mg) oral tablet)?See Instructions  lovastatin (lovastatin 40 mg oral tablet)?40 mg, Oral, Once a day (at bedtime)  lubiprostone (Amitiza 8 mcg oral capsule)?8 mcg, Oral, BID  magnesium oxide (magnesium oxide 400 mg oral tablet)?400 mg, Oral, Daily  metFORMIN (metFORMIN 1000 mg oral  tablet)?1000 mg, Oral, BID  methenamine (methenamine hippurate 1 g oral tablet)?1 gm, Oral, BID  polyethylene glycol 3350 (MiraLax oral powder for reconstitution)?1 tbsp, Oral, Daily  potassium chloride (potassium chloride 20 mEq oral TABLET extended release)?20 mEq, Oral, At Bedtime  riboflavin (Vitamin B2 100 mg oral tablet)?200 mg, Oral, BID  thiamine (thiamine 100 mg oral tablet)?200 mg, Oral, BID  torsemide (torsemide 20 mg oral tablet)?20 mg, Oral, BID  zinc sulfate (Zinc-220 oral capsule)?220 mg, Oral, Daily  Discontinue  acetaminophen-HYDROcodone (Norco 10 mg-325 mg oral tablet)?1 tab(s), Oral, TID, PRN as needed for pain  insulin degludec (Tresiba FlexTouch 200 units/mL subcutaneous solution)?16 units, Subcutaneous, Daily  meclizine (meclizine 25 mg oral tablet)?25 mg, Oral, TID, PRN as needed for dizziness  metFORMIN (metformin 500 mg oral tablet)?1,000 mg, Oral, BID  nitrofurantoin (nitrofurantoin macrocrystals-monohydrate 100 mg oral capsule)?100 mg, Oral, BID  ondansetron (ondansetron 8 mg oral tablet)?8 mg, Oral, TID, PRN nausea/vomiting  pantoprazole (Nexium 40 mg oral delayed release cap ([facility] Substitution))?40 mg, Oral, Daily  topiramate (Topamax 100 mg oral tablet)?100 mg, Oral, BID  Education and Orders Provided  Discharge - 08/24/21 8:51:00 CDT, Post-Acute Services/Facilities, Give all scheduled vaccinations prior to discharge. Return to [facility] once midline is placed for 2 more days of Cefipime 2 gms q 8 for cellulitis and P. Aeruginosa and P. Mirabilis UTI.?  Discharge Activity - Activity as Tolerated?  Discharge Diet - Diabetic, Diabetic/Cardiac diet?  Follow up  Danny Hairston, within 2 to 4 weeks  Fuad Szymanski, within 2 to 4 weeks  [employer]  No Qualifying Data

## 2022-05-04 ENCOUNTER — LAB REQUISITION (OUTPATIENT)
Dept: LAB | Facility: HOSPITAL | Age: 77
End: 2022-05-04
Payer: MEDICARE

## 2022-05-04 DIAGNOSIS — D64.9 ANEMIA, UNSPECIFIED: ICD-10-CM

## 2022-05-04 LAB
ANISOCYTOSIS BLD QL SMEAR: ABNORMAL
BASOPHILS # BLD AUTO: 0.07 X10(3)/MCL (ref 0–0.2)
BASOPHILS NFR BLD AUTO: 0.4 %
EOSINOPHIL # BLD AUTO: 0.46 X10(3)/MCL (ref 0–0.9)
EOSINOPHIL NFR BLD AUTO: 2.7 %
ERYTHROCYTE [DISTWIDTH] IN BLOOD BY AUTOMATED COUNT: 19.2 % (ref 11.5–17)
HCT VFR BLD AUTO: 40.9 % (ref 37–47)
HGB BLD-MCNC: 12.1 GM/DL (ref 12–16)
HYPOCHROMIA BLD QL SMEAR: ABNORMAL
IMM GRANULOCYTES # BLD AUTO: 0.07 X10(3)/MCL (ref 0–0.02)
IMM GRANULOCYTES NFR BLD AUTO: 0.4 % (ref 0–0.43)
LYMPHOCYTES # BLD AUTO: 5.87 X10(3)/MCL (ref 0.6–4.6)
LYMPHOCYTES NFR BLD AUTO: 35.1 %
MACROCYTES BLD QL SMEAR: ABNORMAL
MCH RBC QN AUTO: 24.2 PG (ref 27–31)
MCHC RBC AUTO-ENTMCNC: 29.6 MG/DL (ref 33–36)
MCV RBC AUTO: 82 FL (ref 80–94)
MICROCYTES BLD QL SMEAR: ABNORMAL
MONOCYTES # BLD AUTO: 1.05 X10(3)/MCL (ref 0.1–1.3)
MONOCYTES NFR BLD AUTO: 6.3 %
NEUTROPHILS # BLD AUTO: 9.2 X10(3)/MCL (ref 2.1–9.2)
NEUTROPHILS NFR BLD AUTO: 55.1 %
NRBC BLD AUTO-RTO: 0 %
PLATELET # BLD AUTO: 316 X10(3)/MCL (ref 130–400)
PLATELET # BLD EST: ADEQUATE 10*3/UL
PMV BLD AUTO: 10.8 FL (ref 9.4–12.4)
RBC # BLD AUTO: 4.99 X10(6)/MCL (ref 4.2–5.4)
RBC MORPH BLD: ABNORMAL
WBC # SPEC AUTO: 16.7 X10(3)/MCL (ref 4.5–11.5)

## 2022-05-04 PROCEDURE — 85025 COMPLETE CBC W/AUTO DIFF WBC: CPT | Performed by: INTERNAL MEDICINE

## 2022-05-05 ENCOUNTER — LAB REQUISITION (OUTPATIENT)
Dept: LAB | Facility: HOSPITAL | Age: 77
End: 2022-05-05
Payer: MEDICARE

## 2022-05-05 DIAGNOSIS — D72.829 ELEVATED WHITE BLOOD CELL COUNT, UNSPECIFIED: ICD-10-CM

## 2022-05-05 LAB
ALBUMIN SERPL-MCNC: 3.3 GM/DL (ref 3.4–4.8)
ALBUMIN/GLOB SERPL: 1 RATIO (ref 1.1–2)
ALP SERPL-CCNC: 63 UNIT/L (ref 40–150)
ALT SERPL-CCNC: 11 UNIT/L (ref 0–55)
ANISOCYTOSIS BLD QL SMEAR: ABNORMAL
AST SERPL-CCNC: 10 UNIT/L (ref 5–34)
BASOPHILS # BLD AUTO: 0.09 X10(3)/MCL (ref 0–0.2)
BASOPHILS NFR BLD AUTO: 0.5 %
BILIRUBIN DIRECT+TOT PNL SERPL-MCNC: 0.1 MG/DL (ref 0–0.5)
BILIRUBIN DIRECT+TOT PNL SERPL-MCNC: 0.1 MG/DL (ref 0–0.8)
BILIRUBIN DIRECT+TOT PNL SERPL-MCNC: 0.2 MG/DL
BUN SERPL-MCNC: 31.8 MG/DL (ref 9.8–20.1)
CALCIUM SERPL-MCNC: 9.2 MG/DL (ref 8.4–10.2)
CHLORIDE SERPL-SCNC: 98 MMOL/L (ref 98–107)
CO2 SERPL-SCNC: 30 MMOL/L (ref 23–31)
CREAT SERPL-MCNC: 0.66 MG/DL (ref 0.55–1.02)
EOSINOPHIL # BLD AUTO: 0.54 X10(3)/MCL (ref 0–0.9)
EOSINOPHIL NFR BLD AUTO: 3.2 %
ERYTHROCYTE [DISTWIDTH] IN BLOOD BY AUTOMATED COUNT: 19.6 % (ref 11.5–17)
GLOBULIN SER-MCNC: 3.3 GM/DL (ref 2.4–3.5)
GLUCOSE SERPL-MCNC: 120 MG/DL (ref 82–115)
HCT VFR BLD AUTO: 40.7 % (ref 37–47)
HGB BLD-MCNC: 11.8 GM/DL (ref 12–16)
HYPOCHROMIA BLD QL SMEAR: ABNORMAL
IMM GRANULOCYTES # BLD AUTO: 0.07 X10(3)/MCL (ref 0–0.02)
IMM GRANULOCYTES NFR BLD AUTO: 0.4 % (ref 0–0.43)
LYMPHOCYTES # BLD AUTO: 5.83 X10(3)/MCL (ref 0.6–4.6)
LYMPHOCYTES NFR BLD AUTO: 34.8 %
MACROCYTES BLD QL SMEAR: ABNORMAL
MCH RBC QN AUTO: 24 PG (ref 27–31)
MCHC RBC AUTO-ENTMCNC: 29 MG/DL (ref 33–36)
MCV RBC AUTO: 82.9 FL (ref 80–94)
MICROCYTES BLD QL SMEAR: ABNORMAL
MONOCYTES # BLD AUTO: 1.46 X10(3)/MCL (ref 0.1–1.3)
MONOCYTES NFR BLD AUTO: 8.7 %
NEUTROPHILS # BLD AUTO: 8.8 X10(3)/MCL (ref 2.1–9.2)
NEUTROPHILS NFR BLD AUTO: 52.4 %
NRBC BLD AUTO-RTO: 0 %
PLATELET # BLD AUTO: 312 X10(3)/MCL (ref 130–400)
PLATELET # BLD EST: ADEQUATE 10*3/UL
PMV BLD AUTO: 10.4 FL (ref 9.4–12.4)
POTASSIUM SERPL-SCNC: 4.5 MMOL/L (ref 3.5–5.1)
PROT SERPL-MCNC: 6.6 GM/DL (ref 5.8–7.6)
RBC # BLD AUTO: 4.91 X10(6)/MCL (ref 4.2–5.4)
RBC MORPH BLD: ABNORMAL
SODIUM SERPL-SCNC: 142 MMOL/L (ref 136–145)
WBC # SPEC AUTO: 16.7 X10(3)/MCL (ref 4.5–11.5)

## 2022-05-05 PROCEDURE — 85025 COMPLETE CBC W/AUTO DIFF WBC: CPT | Performed by: INTERNAL MEDICINE

## 2022-05-05 PROCEDURE — 80053 COMPREHEN METABOLIC PANEL: CPT | Performed by: INTERNAL MEDICINE

## 2022-05-07 ENCOUNTER — LAB REQUISITION (OUTPATIENT)
Dept: LAB | Facility: HOSPITAL | Age: 77
End: 2022-05-07
Payer: MEDICARE

## 2022-05-07 DIAGNOSIS — D72.829 ELEVATED WHITE BLOOD CELL COUNT, UNSPECIFIED: ICD-10-CM

## 2022-05-07 PROCEDURE — 87077 CULTURE AEROBIC IDENTIFY: CPT | Performed by: INTERNAL MEDICINE

## 2022-05-07 PROCEDURE — 87045 FECES CULTURE AEROBIC BACT: CPT | Performed by: INTERNAL MEDICINE

## 2022-05-09 ENCOUNTER — LAB REQUISITION (OUTPATIENT)
Dept: LAB | Facility: HOSPITAL | Age: 77
End: 2022-05-09
Payer: MEDICARE

## 2022-05-09 DIAGNOSIS — N17.9 ACUTE KIDNEY FAILURE, UNSPECIFIED: ICD-10-CM

## 2022-05-09 LAB
ALBUMIN SERPL-MCNC: 2.9 GM/DL (ref 3.4–4.8)
ALBUMIN/GLOB SERPL: 0.7 RATIO (ref 1.1–2)
ALP SERPL-CCNC: 52 UNIT/L (ref 40–150)
ALT SERPL-CCNC: 13 UNIT/L (ref 0–55)
AST SERPL-CCNC: 14 UNIT/L (ref 5–34)
BILIRUBIN DIRECT+TOT PNL SERPL-MCNC: 0.1 MG/DL (ref 0–0.5)
BILIRUBIN DIRECT+TOT PNL SERPL-MCNC: 0.1 MG/DL (ref 0–0.8)
BILIRUBIN DIRECT+TOT PNL SERPL-MCNC: 0.2 MG/DL
BUN SERPL-MCNC: 13.3 MG/DL (ref 9.8–20.1)
CALCIUM SERPL-MCNC: 9.1 MG/DL (ref 8.4–10.2)
CHLORIDE SERPL-SCNC: 97 MMOL/L (ref 98–107)
CO2 SERPL-SCNC: 30 MMOL/L (ref 23–31)
CREAT SERPL-MCNC: 0.58 MG/DL (ref 0.55–1.02)
GLOBULIN SER-MCNC: 3.9 GM/DL (ref 2.4–3.5)
GLUCOSE SERPL-MCNC: 112 MG/DL (ref 82–115)
POTASSIUM SERPL-SCNC: 4.1 MMOL/L (ref 3.5–5.1)
PROT SERPL-MCNC: 6.8 GM/DL (ref 5.8–7.6)
SODIUM SERPL-SCNC: 139 MMOL/L (ref 136–145)

## 2022-05-09 PROCEDURE — 80053 COMPREHEN METABOLIC PANEL: CPT | Performed by: INTERNAL MEDICINE

## 2022-05-10 ENCOUNTER — LAB REQUISITION (OUTPATIENT)
Dept: LAB | Facility: HOSPITAL | Age: 77
End: 2022-05-10
Payer: MEDICARE

## 2022-05-10 DIAGNOSIS — D64.1 SECONDARY SIDEROBLASTIC ANEMIA DUE TO DISEASE: ICD-10-CM

## 2022-05-10 LAB
ANISOCYTOSIS BLD QL SMEAR: ABNORMAL
BACTERIA SPEC CULT: NORMAL
BASOPHILS # BLD AUTO: 0.06 X10(3)/MCL (ref 0–0.2)
BASOPHILS NFR BLD AUTO: 0.5 %
EOSINOPHIL # BLD AUTO: 0.58 X10(3)/MCL (ref 0–0.9)
EOSINOPHIL NFR BLD AUTO: 4.4 %
ERYTHROCYTE [DISTWIDTH] IN BLOOD BY AUTOMATED COUNT: 18.9 % (ref 11.5–17)
HCT VFR BLD AUTO: 38 % (ref 37–47)
HGB BLD-MCNC: 11.2 GM/DL (ref 12–16)
HYPOCHROMIA BLD QL SMEAR: ABNORMAL
IMM GRANULOCYTES # BLD AUTO: 0.05 X10(3)/MCL (ref 0–0.02)
IMM GRANULOCYTES NFR BLD AUTO: 0.4 % (ref 0–0.43)
LYMPHOCYTES # BLD AUTO: 3.73 X10(3)/MCL (ref 0.6–4.6)
LYMPHOCYTES NFR BLD AUTO: 28.4 %
MACROCYTES BLD QL SMEAR: ABNORMAL
MCH RBC QN AUTO: 24.4 PG (ref 27–31)
MCHC RBC AUTO-ENTMCNC: 29.5 MG/DL (ref 33–36)
MCV RBC AUTO: 82.8 FL (ref 80–94)
MICROCYTES BLD QL SMEAR: ABNORMAL
MONOCYTES # BLD AUTO: 0.67 X10(3)/MCL (ref 0.1–1.3)
MONOCYTES NFR BLD AUTO: 5.1 %
NEUTROPHILS # BLD AUTO: 8 X10(3)/MCL (ref 2.1–9.2)
NEUTROPHILS NFR BLD AUTO: 61.2 %
NRBC BLD AUTO-RTO: 0 %
PLATELET # BLD AUTO: 287 X10(3)/MCL (ref 130–400)
PLATELET # BLD EST: ADEQUATE 10*3/UL
PMV BLD AUTO: 10.3 FL (ref 9.4–12.4)
RBC # BLD AUTO: 4.59 X10(6)/MCL (ref 4.2–5.4)
RBC MORPH BLD: ABNORMAL
WBC # SPEC AUTO: 13.1 X10(3)/MCL (ref 4.5–11.5)

## 2022-05-10 PROCEDURE — 85025 COMPLETE CBC W/AUTO DIFF WBC: CPT | Performed by: INTERNAL MEDICINE

## 2022-06-26 ENCOUNTER — HOSPITAL ENCOUNTER (INPATIENT)
Facility: HOSPITAL | Age: 77
LOS: 6 days | Discharge: NURSING, REHAB OR LONG-TERM CARE | DRG: 536 | End: 2022-07-02
Attending: STUDENT IN AN ORGANIZED HEALTH CARE EDUCATION/TRAINING PROGRAM | Admitting: INTERNAL MEDICINE
Payer: MEDICARE

## 2022-06-26 DIAGNOSIS — S70.00XA CONTUSION OF HIP, UNSPECIFIED LATERALITY, INITIAL ENCOUNTER: ICD-10-CM
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
## 2022-06-26 DIAGNOSIS — Z86.73 H/O: CVA (CEREBROVASCULAR ACCIDENT): Primary | ICD-10-CM

## 2022-06-26 DIAGNOSIS — R07.9 CHEST PAIN, RULE OUT ACUTE MYOCARDIAL INFARCTION: ICD-10-CM

## 2022-06-26 DIAGNOSIS — R07.9 CHEST PAIN: ICD-10-CM

## 2022-06-26 DIAGNOSIS — S72.002A HIP FX, LEFT, CLOSED, INITIAL ENCOUNTER: ICD-10-CM

## 2022-06-26 LAB
ALBUMIN SERPL-MCNC: 2.9 GM/DL (ref 3.4–4.8)
ALBUMIN/GLOB SERPL: 1 RATIO (ref 1.1–2)
ALP SERPL-CCNC: 78 UNIT/L (ref 40–150)
ALT SERPL-CCNC: 10 UNIT/L (ref 0–55)
AST SERPL-CCNC: 10 UNIT/L (ref 5–34)
BASOPHILS # BLD AUTO: 0.05 X10(3)/MCL (ref 0–0.2)
BASOPHILS NFR BLD AUTO: 0.4 %
BILIRUBIN DIRECT+TOT PNL SERPL-MCNC: 0.2 MG/DL
BUN SERPL-MCNC: 14.7 MG/DL (ref 9.8–20.1)
CALCIUM SERPL-MCNC: 8.7 MG/DL (ref 8.4–10.2)
CHLORIDE SERPL-SCNC: 98 MMOL/L (ref 98–107)
CO2 SERPL-SCNC: 30 MMOL/L (ref 23–31)
CREAT SERPL-MCNC: 0.58 MG/DL (ref 0.55–1.02)
EOSINOPHIL # BLD AUTO: 0.41 X10(3)/MCL (ref 0–0.9)
EOSINOPHIL NFR BLD AUTO: 3.1 %
ERYTHROCYTE [DISTWIDTH] IN BLOOD BY AUTOMATED COUNT: 18.6 % (ref 11.5–17)
GLOBULIN SER-MCNC: 2.9 GM/DL (ref 2.4–3.5)
GLUCOSE SERPL-MCNC: 95 MG/DL (ref 82–115)
HCT VFR BLD AUTO: 38.5 % (ref 37–47)
HGB BLD-MCNC: 11.4 GM/DL (ref 12–16)
IMM GRANULOCYTES # BLD AUTO: 0.06 X10(3)/MCL (ref 0–0.02)
IMM GRANULOCYTES NFR BLD AUTO: 0.4 % (ref 0–0.43)
LYMPHOCYTES # BLD AUTO: 3.52 X10(3)/MCL (ref 0.6–4.6)
LYMPHOCYTES NFR BLD AUTO: 26.2 %
MCH RBC QN AUTO: 24.6 PG (ref 27–31)
MCHC RBC AUTO-ENTMCNC: 29.6 MG/DL (ref 33–36)
MCV RBC AUTO: 83.2 FL (ref 80–94)
MONOCYTES # BLD AUTO: 0.66 X10(3)/MCL (ref 0.1–1.3)
MONOCYTES NFR BLD AUTO: 4.9 %
NEUTROPHILS # BLD AUTO: 8.7 X10(3)/MCL (ref 2.1–9.2)
NEUTROPHILS NFR BLD AUTO: 65 %
NRBC BLD AUTO-RTO: 0 %
PLATELET # BLD AUTO: 254 X10(3)/MCL (ref 130–400)
PMV BLD AUTO: 10.9 FL (ref 9.4–12.4)
POTASSIUM SERPL-SCNC: 4 MMOL/L (ref 3.5–5.1)
PROT SERPL-MCNC: 5.8 GM/DL (ref 5.8–7.6)
RBC # BLD AUTO: 4.63 X10(6)/MCL (ref 4.2–5.4)
SODIUM SERPL-SCNC: 138 MMOL/L (ref 136–145)
WBC # SPEC AUTO: 13.4 X10(3)/MCL (ref 4.5–11.5)

## 2022-06-26 PROCEDURE — 99285 EMERGENCY DEPT VISIT HI MDM: CPT | Mod: 25

## 2022-06-26 PROCEDURE — 36415 COLL VENOUS BLD VENIPUNCTURE: CPT | Performed by: STUDENT IN AN ORGANIZED HEALTH CARE EDUCATION/TRAINING PROGRAM

## 2022-06-26 PROCEDURE — 27000221 HC OXYGEN, UP TO 24 HOURS

## 2022-06-26 PROCEDURE — 11000001 HC ACUTE MED/SURG PRIVATE ROOM

## 2022-06-26 PROCEDURE — 25000003 PHARM REV CODE 250: Performed by: STUDENT IN AN ORGANIZED HEALTH CARE EDUCATION/TRAINING PROGRAM

## 2022-06-26 PROCEDURE — 85025 COMPLETE CBC W/AUTO DIFF WBC: CPT | Performed by: STUDENT IN AN ORGANIZED HEALTH CARE EDUCATION/TRAINING PROGRAM

## 2022-06-26 PROCEDURE — 80053 COMPREHEN METABOLIC PANEL: CPT | Performed by: STUDENT IN AN ORGANIZED HEALTH CARE EDUCATION/TRAINING PROGRAM

## 2022-06-26 RX ORDER — NALOXONE HCL 0.4 MG/ML
0.02 VIAL (ML) INJECTION
Status: DISCONTINUED | OUTPATIENT
Start: 2022-06-27 | End: 2022-07-02 | Stop reason: HOSPADM

## 2022-06-26 RX ORDER — IBUPROFEN 200 MG
16 TABLET ORAL
Status: DISCONTINUED | OUTPATIENT
Start: 2022-06-27 | End: 2022-07-02 | Stop reason: HOSPADM

## 2022-06-26 RX ORDER — ENOXAPARIN SODIUM 100 MG/ML
40 INJECTION SUBCUTANEOUS EVERY 24 HOURS
Status: DISCONTINUED | OUTPATIENT
Start: 2022-06-27 | End: 2022-07-02 | Stop reason: HOSPADM

## 2022-06-26 RX ORDER — SODIUM CHLORIDE 0.9 % (FLUSH) 0.9 %
10 SYRINGE (ML) INJECTION EVERY 12 HOURS PRN
Status: DISCONTINUED | OUTPATIENT
Start: 2022-06-27 | End: 2022-07-02 | Stop reason: HOSPADM

## 2022-06-26 RX ORDER — GLUCAGON 1 MG
1 KIT INJECTION
Status: DISCONTINUED | OUTPATIENT
Start: 2022-06-27 | End: 2022-07-02 | Stop reason: HOSPADM

## 2022-06-26 RX ORDER — ACETAMINOPHEN 500 MG
1000 TABLET ORAL
Status: COMPLETED | OUTPATIENT
Start: 2022-06-26 | End: 2022-06-26

## 2022-06-26 RX ORDER — INSULIN ASPART 100 [IU]/ML
0-5 INJECTION, SOLUTION INTRAVENOUS; SUBCUTANEOUS
Status: DISCONTINUED | OUTPATIENT
Start: 2022-06-27 | End: 2022-07-02 | Stop reason: HOSPADM

## 2022-06-26 RX ORDER — IBUPROFEN 200 MG
24 TABLET ORAL
Status: DISCONTINUED | OUTPATIENT
Start: 2022-06-27 | End: 2022-07-02 | Stop reason: HOSPADM

## 2022-06-26 RX ADMIN — ACETAMINOPHEN 1000 MG: 500 TABLET, FILM COATED ORAL at 08:06

## 2022-06-27 LAB
ALBUMIN SERPL-MCNC: 2.7 GM/DL (ref 3.4–4.8)
ALBUMIN/GLOB SERPL: 1 RATIO (ref 1.1–2)
ALP SERPL-CCNC: 75 UNIT/L (ref 40–150)
ALT SERPL-CCNC: 9 UNIT/L (ref 0–55)
AST SERPL-CCNC: 9 UNIT/L (ref 5–34)
BASOPHILS # BLD AUTO: 0.06 X10(3)/MCL (ref 0–0.2)
BASOPHILS NFR BLD AUTO: 0.5 %
BILIRUBIN DIRECT+TOT PNL SERPL-MCNC: 0.2 MG/DL
BUN SERPL-MCNC: 14.6 MG/DL (ref 9.8–20.1)
CALCIUM SERPL-MCNC: 8.7 MG/DL (ref 8.4–10.2)
CHLORIDE SERPL-SCNC: 100 MMOL/L (ref 98–107)
CO2 SERPL-SCNC: 32 MMOL/L (ref 23–31)
CREAT SERPL-MCNC: 0.55 MG/DL (ref 0.55–1.02)
EOSINOPHIL # BLD AUTO: 0.38 X10(3)/MCL (ref 0–0.9)
EOSINOPHIL NFR BLD AUTO: 3.3 %
ERYTHROCYTE [DISTWIDTH] IN BLOOD BY AUTOMATED COUNT: 18.4 % (ref 11.5–17)
GLOBULIN SER-MCNC: 2.8 GM/DL (ref 2.4–3.5)
GLUCOSE SERPL-MCNC: 115 MG/DL (ref 82–115)
HCT VFR BLD AUTO: 38.2 % (ref 37–47)
HGB BLD-MCNC: 11.3 GM/DL (ref 12–16)
IMM GRANULOCYTES # BLD AUTO: 0.07 X10(3)/MCL (ref 0–0.02)
IMM GRANULOCYTES NFR BLD AUTO: 0.6 % (ref 0–0.43)
LYMPHOCYTES # BLD AUTO: 3.54 X10(3)/MCL (ref 0.6–4.6)
LYMPHOCYTES NFR BLD AUTO: 30.5 %
MCH RBC QN AUTO: 24.9 PG (ref 27–31)
MCHC RBC AUTO-ENTMCNC: 29.6 MG/DL (ref 33–36)
MCV RBC AUTO: 84.3 FL (ref 80–94)
MONOCYTES # BLD AUTO: 0.6 X10(3)/MCL (ref 0.1–1.3)
MONOCYTES NFR BLD AUTO: 5.2 %
NEUTROPHILS # BLD AUTO: 7 X10(3)/MCL (ref 2.1–9.2)
NEUTROPHILS NFR BLD AUTO: 59.9 %
NRBC BLD AUTO-RTO: 0 %
PLATELET # BLD AUTO: 233 X10(3)/MCL (ref 130–400)
PMV BLD AUTO: 10 FL (ref 9.4–12.4)
POCT GLUCOSE: 119 MG/DL (ref 70–110)
POCT GLUCOSE: 124 MG/DL (ref 70–110)
POCT GLUCOSE: 128 MG/DL (ref 70–110)
POTASSIUM SERPL-SCNC: 4.1 MMOL/L (ref 3.5–5.1)
PROT SERPL-MCNC: 5.5 GM/DL (ref 5.8–7.6)
RBC # BLD AUTO: 4.53 X10(6)/MCL (ref 4.2–5.4)
SODIUM SERPL-SCNC: 139 MMOL/L (ref 136–145)
WBC # SPEC AUTO: 11.6 X10(3)/MCL (ref 4.5–11.5)

## 2022-06-27 PROCEDURE — 63600175 PHARM REV CODE 636 W HCPCS: Performed by: INTERNAL MEDICINE

## 2022-06-27 PROCEDURE — 11000001 HC ACUTE MED/SURG PRIVATE ROOM

## 2022-06-27 PROCEDURE — 80053 COMPREHEN METABOLIC PANEL: CPT | Performed by: NURSE PRACTITIONER

## 2022-06-27 PROCEDURE — 99223 1ST HOSP IP/OBS HIGH 75: CPT | Mod: ,,, | Performed by: ORTHOPAEDIC SURGERY

## 2022-06-27 PROCEDURE — 36415 COLL VENOUS BLD VENIPUNCTURE: CPT | Performed by: NURSE PRACTITIONER

## 2022-06-27 PROCEDURE — 85025 COMPLETE CBC W/AUTO DIFF WBC: CPT | Performed by: NURSE PRACTITIONER

## 2022-06-27 PROCEDURE — 25000003 PHARM REV CODE 250: Performed by: INTERNAL MEDICINE

## 2022-06-27 PROCEDURE — 99223 PR INITIAL HOSPITAL CARE,LEVL III: ICD-10-PCS | Mod: ,,, | Performed by: ORTHOPAEDIC SURGERY

## 2022-06-27 PROCEDURE — 63600175 PHARM REV CODE 636 W HCPCS: Performed by: NURSE PRACTITIONER

## 2022-06-27 PROCEDURE — C9399 UNCLASSIFIED DRUGS OR BIOLOG: HCPCS | Performed by: INTERNAL MEDICINE

## 2022-06-27 RX ORDER — HYDROCODONE BITARTRATE AND ACETAMINOPHEN 10; 325 MG/1; MG/1
1 TABLET ORAL EVERY 6 HOURS PRN
Status: DISCONTINUED | OUTPATIENT
Start: 2022-06-27 | End: 2022-07-02 | Stop reason: HOSPADM

## 2022-06-27 RX ORDER — LEVETIRACETAM 500 MG/1
500 TABLET ORAL 2 TIMES DAILY
Status: DISCONTINUED | OUTPATIENT
Start: 2022-06-27 | End: 2022-07-02 | Stop reason: HOSPADM

## 2022-06-27 RX ORDER — FLUOXETINE HYDROCHLORIDE 20 MG/1
20 CAPSULE ORAL 2 TIMES DAILY
Status: DISCONTINUED | OUTPATIENT
Start: 2022-06-27 | End: 2022-07-02 | Stop reason: HOSPADM

## 2022-06-27 RX ORDER — LEVOTHYROXINE SODIUM 100 UG/1
200 TABLET ORAL DAILY
Status: DISCONTINUED | OUTPATIENT
Start: 2022-06-27 | End: 2022-07-02 | Stop reason: HOSPADM

## 2022-06-27 RX ORDER — OXYBUTYNIN CHLORIDE 5 MG/1
10 TABLET, EXTENDED RELEASE ORAL DAILY
Status: DISCONTINUED | OUTPATIENT
Start: 2022-06-27 | End: 2022-07-02 | Stop reason: HOSPADM

## 2022-06-27 RX ORDER — PANTOPRAZOLE SODIUM 40 MG/1
40 TABLET, DELAYED RELEASE ORAL DAILY
Status: DISCONTINUED | OUTPATIENT
Start: 2022-06-27 | End: 2022-07-02 | Stop reason: HOSPADM

## 2022-06-27 RX ORDER — ASPIRIN 81 MG/1
81 TABLET ORAL DAILY
Status: DISCONTINUED | OUTPATIENT
Start: 2022-06-27 | End: 2022-07-02 | Stop reason: HOSPADM

## 2022-06-27 RX ORDER — NITROFURANTOIN MACROCRYSTALS 50 MG/1
100 CAPSULE ORAL NIGHTLY
Status: DISCONTINUED | OUTPATIENT
Start: 2022-06-27 | End: 2022-07-02 | Stop reason: HOSPADM

## 2022-06-27 RX ORDER — TOPIRAMATE 100 MG/1
100 TABLET, FILM COATED ORAL 2 TIMES DAILY
Status: DISCONTINUED | OUTPATIENT
Start: 2022-06-27 | End: 2022-07-02 | Stop reason: HOSPADM

## 2022-06-27 RX ORDER — LEVOTHYROXINE SODIUM 25 UG/1
25 TABLET ORAL DAILY
Status: DISCONTINUED | OUTPATIENT
Start: 2022-06-27 | End: 2022-07-02 | Stop reason: HOSPADM

## 2022-06-27 RX ORDER — ATORVASTATIN CALCIUM 10 MG/1
20 TABLET, FILM COATED ORAL NIGHTLY
Status: DISCONTINUED | OUTPATIENT
Start: 2022-06-27 | End: 2022-07-02 | Stop reason: HOSPADM

## 2022-06-27 RX ORDER — METOPROLOL SUCCINATE 50 MG/1
50 TABLET, EXTENDED RELEASE ORAL DAILY
Status: DISCONTINUED | OUTPATIENT
Start: 2022-06-27 | End: 2022-06-28

## 2022-06-27 RX ORDER — FUROSEMIDE 40 MG/1
40 TABLET ORAL 2 TIMES DAILY
Status: DISCONTINUED | OUTPATIENT
Start: 2022-06-27 | End: 2022-06-28

## 2022-06-27 RX ORDER — MORPHINE SULFATE 15 MG/1
15 TABLET ORAL EVERY 12 HOURS
Status: DISCONTINUED | OUTPATIENT
Start: 2022-06-27 | End: 2022-06-28

## 2022-06-27 RX ORDER — AMITRIPTYLINE HYDROCHLORIDE 25 MG/1
25 TABLET, FILM COATED ORAL NIGHTLY PRN
Status: DISCONTINUED | OUTPATIENT
Start: 2022-06-27 | End: 2022-07-02 | Stop reason: HOSPADM

## 2022-06-27 RX ORDER — MORPHINE SULFATE 4 MG/ML
4 INJECTION, SOLUTION INTRAMUSCULAR; INTRAVENOUS EVERY 4 HOURS PRN
Status: DISCONTINUED | OUTPATIENT
Start: 2022-06-27 | End: 2022-07-02 | Stop reason: HOSPADM

## 2022-06-27 RX ORDER — LISINOPRIL 5 MG/1
5 TABLET ORAL DAILY
Status: DISCONTINUED | OUTPATIENT
Start: 2022-06-27 | End: 2022-06-28

## 2022-06-27 RX ORDER — ONDANSETRON 2 MG/ML
4 INJECTION INTRAMUSCULAR; INTRAVENOUS EVERY 4 HOURS PRN
Status: DISCONTINUED | OUTPATIENT
Start: 2022-06-27 | End: 2022-07-02 | Stop reason: HOSPADM

## 2022-06-27 RX ADMIN — ATORVASTATIN CALCIUM 20 MG: 10 TABLET, FILM COATED ORAL at 08:06

## 2022-06-27 RX ADMIN — MORPHINE SULFATE 15 MG: 15 TABLET ORAL at 08:06

## 2022-06-27 RX ADMIN — LEVETIRACETAM 500 MG: 500 TABLET, FILM COATED ORAL at 08:06

## 2022-06-27 RX ADMIN — LISINOPRIL 5 MG: 5 TABLET ORAL at 02:06

## 2022-06-27 RX ADMIN — FLUOXETINE 20 MG: 20 CAPSULE ORAL at 08:06

## 2022-06-27 RX ADMIN — INSULIN DETEMIR 15 UNITS: 100 INJECTION, SOLUTION SUBCUTANEOUS at 08:06

## 2022-06-27 RX ADMIN — MORPHINE SULFATE 4 MG: 4 INJECTION INTRAVENOUS at 06:06

## 2022-06-27 RX ADMIN — FUROSEMIDE 40 MG: 40 TABLET ORAL at 08:06

## 2022-06-27 RX ADMIN — METOPROLOL SUCCINATE 50 MG: 50 TABLET, FILM COATED, EXTENDED RELEASE ORAL at 02:06

## 2022-06-27 RX ADMIN — TOPIRAMATE 100 MG: 100 TABLET, FILM COATED ORAL at 08:06

## 2022-06-27 RX ADMIN — HYDROCODONE BITARTRATE AND ACETAMINOPHEN 1 TABLET: 10; 325 TABLET ORAL at 03:06

## 2022-06-27 RX ADMIN — ASPIRIN 81 MG: 81 TABLET, COATED ORAL at 02:06

## 2022-06-27 RX ADMIN — NITROFURANTOIN MACROCRYSTALS 100 MG: 50 CAPSULE ORAL at 08:06

## 2022-06-27 RX ADMIN — PANTOPRAZOLE SODIUM 40 MG: 40 TABLET, DELAYED RELEASE ORAL at 02:06

## 2022-06-27 NOTE — PROGRESS NOTES
Hospital Medicine  Progress Note    Patient Name: Elisabet Choi  MRN: [id number]  Status: IP- Inpatient   Admission Date: 6/26/2022  Length of Stay: 1      CC: hospital follow-up for left hip pain       SUBJECTIVE   76 year old female with multiple medica comorbidities and bed-bound, presented to the ED 6/26 from the NH following an unwitnessed fall.  Patient states that she fell out of the bed and hit her head.  Patient's daughter-in-law at the bedside states that the patient does not walk, mostly bed-bound  Patient's only complaint is a left-sided headache. ED lab work showed WBC 13.4, all other indices unremarkable.  CT head negative for acute intracranial abnormality.  CT C-spine negative.  XR noted questionable left hip fracture.  Orthopedics was consulted and patient was admitted to Hospital Medicine for further management.    Today: Patient seen and examined at bedside, and chart reviewed. Complains of left hip and headache.  Otherwise stable.   Seen by Ortho CT ordered, but would be a poor candidate for surgical intervention.  CT of the LLE notes possible displaced avulsion fracture involving posterior facet of the left greater trochanter.       MEDICATIONS   Scheduled   enoxaparin  40 mg Subcutaneous Daily     Continuous Infusions  None    PRN  dextrose 10%, dextrose 10%, glucagon (human recombinant), glucose, glucose, insulin aspart U-100, morphine, naloxone, ondansetron, sodium chloride 0.9%       PHYSICAL EXAM   VITALS: T 98.4 °F (36.9 °C)   BP (!) 124/58   P 73   RR 17   O2 100 %    GENERAL: Awake and in NAD  LUNGS: CTA B/L  CVS: Normal rate  GI/: Soft, NT, bowel sounds positive.  EXTREMITIES: LLE foot drop noted, but pedal pulse 2+  NEURO: AAOx3  PSYCH: Cooperative      LABS   CBC  Recent Labs     06/27/22  0422   WBC 11.6*   HGB 11.3*   HCT 38.2         CHEM  Recent Labs     06/27/22 0422      K 4.1   CO2 32*   BUN 14.6   CREATININE 0.55   CALCIUM 8.7   BILITOT 0.2   AST 9   ALT 9    ALKPHOS 75   ALBUMIN 2.7*        DIAGNOSITICS   X-Ray Hip 2 or 3 views Left (with Pelvis when performed)  Impression: No acute osseous process appreciated.  Date:    06/27/2022  Time:    11:06    CT Pelvis Without Contrast  Impression:   1.  A tiny calcification present adjacent to the posterior facet of the left greater trochanter is favored to represent a displaced avulsion fracture.  Please correlate with the patient's traumatic history and consider MRI to confirm if additional imaging is desired.  2.  Contour deformity to the left pubic body which is not favored to represent an acute fracture.  This may represent an old healed fracture  3.  Subtle wedge compression deformity to the L5 vertebral body favored to represent an age indeterminate superior endplate compression fracture with less than 10% loss of height.  L1 vertebral augmentation and transpedicular fusion not included in the field of view.  4.  Diffuse demineralization  5.  Suprapubic catheter with suspected cystitis.  6.  Nonspecific inflammatory changes are present to the subcutaneous tissue about the pelvis consistent with anasarca or cellulitis.  Date:    06/27/2022  Time:    08:40      ASSESSMENT   Presumed left femoral neck fracture, closed/displaced  L5 compression fracutre  Mild leukocytosis, suspect reactive  IDDM II  Essential HTN  h/o cardiomyopathy, compensated  ERICA on CPAP  Seizure disorder  h/o hydrocephalus  h/o Celiac's disease    PLAN   Will await Ortho input on CT findings, but patient poor surgical candidate  In the interim will work on pain control, resume home Swain, will add long-acting morphine.  Continue with IV morphine as needed for breakthrough  Home medications reviewed and resumed as deemed appropriate        Prophylaxis: SC Lovenox        Rodriguez Peters MD  [facility]  06/27/2022

## 2022-06-27 NOTE — H&P
[facility] Hospital Medicine History & Physical Examination       Patient Name: Elisabet Choi  MRN: [id number]  Patient Class: IP- Inpatient   Admission Date: 6/26/2022  6:50 PM  Length of Stay: 0  Admitting Service: Hospital Medicine   Attending Physician: Boubacar Ramírez MD   Primary Care Provider: Joseph Zacarias MD  History source: EMR, patient and/or patient's family    CHIEF COMPLAINT   Fall (Sent from [facility]. Had unwitnessed fall out of bed this afternoon. Hx of dementia and multiple brain sx per ems. Pt at baseline mental status per ems. -bt. -loc. Ccollar present on arrival. C/o l sided head pain)      HISTORY OF PRESENT ILLNESS:   Ms. Choi is a 76 year old female who presented to the ED from the NH which she resides after an unwitnessed fall today.  Patient states that she fell out of the bed and hit her head.  Patient's daughter-in-law at the bedside states that the patient does not walk in the bed has no railings.  Patient's only complaint is a left-sided headache.  Today's ED lab work showed WBC 13.4, all other indices unremarkable.  CT head negative for acute intracranial abnormality.  CT C-spine negative.  Left hip x-ray with pelvis showed left hip fracture per ER MD read.  Orthopedics was consulted in the ED.  The patient was admitted to Hospital Medicine for management.    PAST MEDICAL HISTORY:     Diabetes mellitus type 2  Hypertension  Hyperlipidemia  CHF  Hydrocephalus  CVA/TIA  Seizure disorder  PVD/CAD  ERICA with CPAP  Celiac disease  Chronic UTI  Chronic back pain  TMJ  Migraines  Anxiety/depression  Vertigo  Bipolar  Osteoarthritis      PAST SURGICAL HISTORY:     Past Surgical History:   Procedure Laterality Date    ABDOMINAL SURGERY      APPENDECTOMY      BACK SURGERY      BLADDER SURGERY      BRAIN SURGERY      CAROTID ENDARTERECTOMY      CHOLECYSTECTOMY      CSF SHUNT      HERNIA REPAIR      HYSTERECTOMY      TONSILLECTOMY       VASCULAR SURGERY         ALLERGIES:   Ambien [zolpidem], Gluten protein, and Wheat containing prod    FAMILY HISTORY:   Reviewed and non-contributory     SOCIAL HISTORY:     Social History     Tobacco Use    Smoking status: Never Smoker    Smokeless tobacco: Not on file   Substance Use Topics    Alcohol use: No        HOME MEDICATIONS:     Prior to Admission medications    Medication Sig Start Date End Date Taking? Authorizing Provider   amitriptyline (ELAVIL) 25 MG tablet Take 25 mg by mouth nightly as needed for Insomnia.    Historical Provider   aspirin (ECOTRIN) 81 MG EC tablet Take 1 tablet (81 mg total) by mouth once daily. 7/1/14 7/1/15  Anca Sharma NP   fluoxetine (PROZAC) 20 MG capsule Take 20 mg by mouth 3 (three) times daily.    Historical Provider   furosemide (LASIX) 40 MG tablet Take 40 mg by mouth 2 (two) times daily.    Historical Provider   insulin detemir (LEVEMIR) 100 unit/mL injection Inject 17 Units into the skin every evening.    Historical Provider   levetiracetam (KEPPRA) 750 MG Tab Take 500 mg by mouth 2 (two) times daily.    Historical Provider   levothyroxine (SYNTHROID) 200 MCG tablet Take 200 mcg by mouth once daily.    Historical Provider   levothyroxine (SYNTHROID) 25 MCG tablet Take 25 mcg by mouth once daily.    Historical Provider   lisinopril (PRINIVIL,ZESTRIL) 5 MG tablet Take 1 tablet (5 mg total) by mouth once daily. 6/30/14   Anca Sharma NP   metformin (GLUCOPHAGE) 1000 MG tablet Take 1 tablet (1,000 mg total) by mouth 2 (two) times daily with meals. 6/30/14   Anca Sharma NP   metoprolol succinate (TOPROL-XL) 50 MG 24 hr tablet Take 1 tablet (50 mg total) by mouth once daily. 6/30/14 6/30/15  Anca Sharma NP   nitrofurantoin (MACRODANTIN) 100 MG capsule Take 100 mg by mouth nightly.    Historical Provider   nitroGLYCERIN 0.4 MG/DOSE TL SPRY (NITROLINGUAL) 0.4 mg/dose spray Place 1 spray under the tongue every 5 (five) minutes as needed for Chest  pain. 6/30/14 6/30/15  Anca Sharma NP   pantoprazole (PROTONIX) 40 MG tablet Take 1 tablet (40 mg total) by mouth once daily. 6/30/14 6/30/15  Anca Sharma NP   simvastatin (ZOCOR) 40 MG tablet Take 40 mg by mouth every evening.    Historical Provider   solifenacin (VESICARE) 5 MG tablet Take 10 mg by mouth every evening.    Historical Provider   topiramate (TOPAMAX) 100 MG tablet Take 100 mg by mouth 2 (two) times daily.    Historical Provider   triazolam (HALCION) 0.25 MG Tab Take 0.125 mg by mouth nightly as needed.    Historical Provider       REVIEW OF SYSTEMS:   Except as documented, all other systems reviewed and negative     PHYSICAL EXAM:   T 98.2 °F (36.8 °C)   /73   P 83   RR 20   O2 99 %  GENERAL: awake, alert, oriented and in no acute distress, non-toxic appearing   HEENT: normocephalic atraumatic   NECK: supple   LUNGS: Clear bilaterally, no wheezing or rales, no accessory muscle use   CVS: Regular rate and rhythm, normal peripheral perfusion  ABD: Soft, non-tender, non-distended, bowel sounds present  EXTREMITIES: no clubbing or cyanosis  SKIN: Warm, dry.   NEURO: alert and oriented, grossly without focal deficits   PSYCHIATRIC: Cooperative    LABS AND IMAGING:     Recent Labs     06/26/22  2200   WBC 13.4*   RBC 4.63   HGB 11.4*   HCT 38.5   MCV 83.2   MCH 24.6*   MCHC 29.6*   RDW 18.6*        No results for input(s): LACTIC in the last 72 hours.  No results for input(s): INR, APTT, D-DIMER in the last 72 hours.  No results for input(s): HGBA1C, CHOL, TRIG, LDL, VLDL, HDL in the last 72 hours.   No results for input(s): NA, K, CHLORIDE, CO2, BUN, CREATININE, GLUCOSE, CALCIUM, MG, PHOS, ALBUMIN, GLOBULIN, ALKPHOS, ALT, AST, BILITOT, LIPASE, CRP, LDH, HAPTOGLOBIN, FERRITIN, IRON, TIBC, TSH, FREET4 in the last 72 hours.  No results for input(s): BNP, CPK, TROPONINI in the last 72 hours.       CT Cervical Spine Without Contrast  Narrative: EXAMINATION:  CT CERVICAL SPINE WITHOUT  CONTRAST    CLINICAL HISTORY:  fall;    TECHNIQUE:  Multiple cross-sectional images of the cervical spine were obtained without the use of contrast.  Coronal and sagittal reformatted images were obtained.  An automated dose exposure technique was utilized.  This limits radiation does the patient.    COMPARISON:  None    FINDINGS:  The alignment curvature of the cervical spine is normal.  The vertebral body heights and intervertebral disc spaces are maintained.  No evidence for compression deformity, fracture, or subluxation.  The predental space and prevertebral soft tissues are normal.  Multilevel uncovertebral facet arthropathy identified with areas of neural foraminal narrowing.  No evidence for central canal stenosis.    The soft tissues of the neck are normal.  The lung apices are clear.  Impression: Spondylotic changes of the cervical spine without fracture.    Electronically signed by: Brandan Chavez MD  Date:    06/26/2022  Time:    19:33  CT Head Without Contrast  Narrative: EXAMINATION:  CT HEAD WITHOUT CONTRAST    CLINICAL HISTORY:  fall;    TECHNIQUE:  Multiple cross-section of the head were obtained without the use of contrast.  Coronal and sagittal reformatted images were obtained.  An automated dose exposure technique was utilized.  This limits radiation does the patient.    COMPARISON:  None    FINDINGS:  A right frontal approach ventriculostomy shunt is identified with prior craniectomy/craniotomy.  No evidence for acute abnormality.  Ventriculostomy shunt terminates in the expected location of the right ventricle adjacent to the medial wall posteriorly.  No acute ischemic changes or infarct.  No intraparenchymal or contusion.  No mass, mass effect midline shift, edema, or extra-axial fluid collection.  Gray-white matter differentiation maintained with hypodensities in the periventricular deep cortical white matter consistent with nonspecific small-vessel ischemia.  Age-appropriate cerebral and  cerebellar volume loss with ex vacuo dilatation.  Remote infarct is identified in the left cerebellar hemisphere with encephalomalacia and gliosis.    The globes are intact.  The paranasal sinuses and mastoid air cells are well pneumatized.  Impression: No acute intracranial abnormality with prior ventriculostomy shunt as above.    Electronically signed by: Brandan Chavez MD  Date:    06/26/2022  Time:    19:30      ASSESSMENT & PLAN:     1. Ground level fall resulting in left hip fracture  2.  Mild leukoyctosis - likely reactive    Hx: HTN, HLD, DM2, CHF, CVA/TIA, PAD/PVD    PLAN:  -Orthopedics consulted-appreciate recommendations  -Pain control  -Low dose ISS  -Fall precautions-  -Resume home meds as appropriate once reconciled  -Labs in AM    I, Jaqui Holman NP have reviewed and discussed this case with Dr. Ramírez.  Please see addendum for further assessment and plan from attending MD.    DVT prophylaxis: Lovenox  Code status: Full    __________________________________________________________________  I, Dr. Boubacar Ramírez assumed care of this patient.  For the patient encounter, I performed the substantive portion of the visit, I reviewed the NPPA documentation, treatment plan, and medical decision making.  I had face to face time with this patient.  I have personally reviewed the labs and test results that are presently available. I have reviewed or attempted to review medical records based upon their availability. If patient was admitted under observational status it is with my approval.      76-year-old female with multiple medical comorbidities including dementia, status post  shunt, CVA/TIAs, and hyper all prior documentation is a very poor historian.  She is essentially bedbound but fell out of her bed and this has resulted in left hip fracture.  At bedside she is in no distress and no pain and was resting comfortably upon my arrival.  Benefit of hip surgery minimal given her nonambulatory status and  medical comorbidities, but Orthopedic surgery will evaluate the patient in the morning.  The nursing home has sent absolutely no paperwork including code status or LA post, medications, etc and this information is being requested this time.  Last admission note states the patient was a DNR/DNI but requesting this information now.    Time seen: 11PM   Boubacar Ramírez MD

## 2022-06-27 NOTE — ED PROVIDER NOTES
Encounter Date: 6/26/2022    SCRIBE #1 NOTE: I, Kirstin Molina, am scribing for, and in the presence of,  Vinnie Kemp MD. I have scribed the following portions of the note - Other sections scribed: HPI, ROS, PE.       History     Chief Complaint   Patient presents with    Fall     Sent from [facility]. Had unwitnessed fall out of bed this afternoon. Hx of dementia and multiple brain sx per ems. Pt at baseline mental status per ems. -bt. -loc. Ccollar present on arrival. C/o l sided head pain     75 y/o female with hx of celiac disease, DM, CHF, and hydrocephalus with a  shunt presents to the ED following a fall at the nursing home this morning. The pt states she fell out of bed and hit her head. She complains of a left sided HA. Denies any other complaints. The pt's daughter-in-law reports the pt's bed has no railings. She notes the pt does not walk.     The history is provided by the patient and a relative. No  was used.   Fall  The accident occurred today. The fall occurred from a bed. She fell from an unknown height. She landed on a hard floor. The point of impact was the head. The pain is present in the head. Associated symptoms include headaches. Pertinent negatives include no fever and no abdominal pain. Treatment on scene includes a c-collar.     Review of patient's allergies indicates:   Allergen Reactions    Ambien [zolpidem]      hallucinates    Gluten protein     Wheat containing prod      Past Medical History:   Diagnosis Date    Arthritis     Back pain     Celiac disease     CHF (congestive heart failure)     Diabetes mellitus     Encounter for blood transfusion     Fluid retention     Goiter     Hydrocephalus     Stroke      Past Surgical History:   Procedure Laterality Date    ABDOMINAL SURGERY      APPENDECTOMY      BACK SURGERY      BLADDER SURGERY      BRAIN SURGERY      CAROTID ENDARTERECTOMY      CHOLECYSTECTOMY      CSF SHUNT       HERNIA REPAIR      HYSTERECTOMY      TONSILLECTOMY      VASCULAR SURGERY       History reviewed. No pertinent family history.  Social History     Tobacco Use    Smoking status: Never Smoker   Substance Use Topics    Alcohol use: No    Drug use: No     Review of Systems   Constitutional: Negative for fever.   HENT: Negative for sore throat.    Eyes: Negative for visual disturbance.   Respiratory: Negative for shortness of breath.    Cardiovascular: Negative for chest pain.   Gastrointestinal: Negative for abdominal pain.   Genitourinary: Negative for dysuria.   Musculoskeletal: Negative for joint swelling.   Skin: Negative for rash.   Neurological: Positive for headaches. Negative for weakness.   Psychiatric/Behavioral: Negative for confusion.   All other systems reviewed and are negative.      Physical Exam     Initial Vitals [06/26/22 1846]   BP Pulse Resp Temp SpO2   (!) 112/55 83 20 98.2 °F (36.8 °C) 97 %      MAP       --         Physical Exam    Nursing note and vitals reviewed.  Constitutional: She appears well-developed and well-nourished.   HENT:   Head: Normocephalic and atraumatic.    shunt to right scalp   Eyes: EOM are normal. Pupils are equal, round, and reactive to light.   Neck:   Normal range of motion.  Cardiovascular: Normal rate, regular rhythm, normal heart sounds and intact distal pulses.   No murmur heard.  Pulmonary/Chest: Breath sounds normal. No respiratory distress. She has no wheezes. She has no rales.   Abdominal: Abdomen is soft. She exhibits distension. There is no abdominal tenderness. There is no rebound.   Genitourinary:    Genitourinary Comments: Suprapubic kraft catheter with clear urine in tube, no surrounding erythema.     Musculoskeletal:         General: No tenderness or edema. Normal range of motion.      Cervical back: Normal range of motion.      Comments: LLE shortened and externally rotated     Neurological: She is alert. She has normal strength. No cranial nerve  deficit. GCS score is 15. GCS eye subscore is 4. GCS verbal subscore is 5. GCS motor subscore is 6.   Skin: Skin is warm and dry. Capillary refill takes less than 2 seconds. No rash noted. No erythema.   Craniotomy scar to right scalp   Psychiatric: She has a normal mood and affect.         ED Course   Procedures  Labs Reviewed   COMPREHENSIVE METABOLIC PANEL - Abnormal; Notable for the following components:       Result Value    Albumin Level 2.9 (*)     Albumin/Globulin Ratio 1.0 (*)     All other components within normal limits   CBC WITH DIFFERENTIAL - Abnormal; Notable for the following components:    WBC 13.4 (*)     Hgb 11.4 (*)     MCH 24.6 (*)     MCHC 29.6 (*)     RDW 18.6 (*)     IG# 0.06 (*)     All other components within normal limits   COMPREHENSIVE METABOLIC PANEL - Abnormal; Notable for the following components:    Carbon Dioxide 32 (*)     Protein Total 5.5 (*)     Albumin Level 2.7 (*)     Albumin/Globulin Ratio 1.0 (*)     All other components within normal limits   CBC WITH DIFFERENTIAL - Abnormal; Notable for the following components:    WBC 11.6 (*)     Hgb 11.3 (*)     MCH 24.9 (*)     MCHC 29.6 (*)     RDW 18.4 (*)     IG# 0.07 (*)     IG% 0.6 (*)     All other components within normal limits   POCT GLUCOSE - Abnormal; Notable for the following components:    POCT Glucose 124 (*)     All other components within normal limits   CBC W/ AUTO DIFFERENTIAL    Narrative:     The following orders were created for panel order CBC auto differential.  Procedure                               Abnormality         Status                     ---------                               -----------         ------                     CBC with Differential[[id number]]        Abnormal            Final result                 Please view results for these tests on the individual orders.   CBC W/ AUTO DIFFERENTIAL    Narrative:     The following orders were created for panel order CBC with Automated  Differential.  Procedure                               Abnormality         Status                     ---------                               -----------         ------                     CBC with Differential[[id number]]        Abnormal            Final result                 Please view results for these tests on the individual orders.        ECG Results    None       Imaging Results          CT Pelvis Without Contrast (Final result)  Result time 06/27/22 08:40:37    Final result by Ck Brian MD (06/27/22 08:40:37)                 Impression:      A tiny calcification present adjacent to the posterior facet of the left greater trochanter is favored to represent a displaced avulsion fracture.  Please correlate with the patient's traumatic history and consider MRI to confirm if additional imaging is desired.    Contour deformity to the left pubic body which is not favored to represent an acute fracture.  This may represent an old healed fracture    Subtle wedge compression deformity to the L5 vertebral body favored to represent an age indeterminate superior endplate compression fracture with less than 10% loss of height.  L1 vertebral augmentation and transpedicular fusion not included in the field of view.    Diffuse demineralization    Suprapubic catheter with suspected cystitis.    Nonspecific inflammatory changes are present to the subcutaneous tissue about the pelvis consistent with anasarca or cellulitis.    Other findings as above      Electronically signed by: Ck Brian  Date:    06/27/2022  Time:    08:40             Narrative:    EXAMINATION:  CT PELVIS WITHOUT CONTRAST    CLINICAL HISTORY:  Fx;    TECHNIQUE:  Unenhanced axial images of the pelvis and right hip were obtained along with coronal and sagittal reconstructions.  Automatic exposure control was used to reduce radiation exposure to the patient.    COMPARISON:  CT scan pelvis used for comparison dated 06/29/2014 a suprapubic catheter is  in place.  Thickening of the bladder wall consistent with cystitis.    FINDINGS:  The bones are diffusely demineralized.  10% loss of height noted to the L5 vertebral body with 3 mm anterolisthesis at L4.  Vertebral augmentation at L1 with residual compression deformity.  Facet joints are well articulated.  The transpedicular shawnee and screw construct is partially visualized in the field of view.    A tiny calcification present adjacent to the posterior facet of the left greater trochanter is favored to represent a displaced avulsion fracture and is best seen on image 75 of series 2..  A contour deformity is present to the left pubic body.  Hypertrophic changes present to the acetabula consistent with osteoarthritis.  No evidence for avascular necrosis.    Nonspecific inflammatory changes are present about the pelvis along with subcutaneous edema that may represent anasarca.  A radiopaque catheter is coiled within the right lower quadrant and is not entirely included in the field of view.  Renal cortical scarring and bilateral extrarenal pelvises are incidentally noted.  The uterus is not clearly visible and may be surgically absent.                    Preliminary result by Ck Brian MD (06/27/22 05:58:39)                 Narrative:    START OF REPORT:  Technique: CT imaging of the pelvis was performed without intravenous contrast with direct axial as well as sagittal and coronal reconstruction images.    Comparison: None.    Clinical history: Fall, hip injury.    Findings: Overall ,diffusely osteopenic bones.  Pelvis: No acute fracture is identified.  Hip:  Right: No fracture dislocation or subluxation is seen involving the visualized right hip bony structures.  Left: No fracture dislocation or subluxation is seen involving the visualized left hip bony structures.  Femur:  Proximal Femur: The visualized proximal right and left femurs appear intact.  Pelvic structures:  Bladder: The urinary bladder is empty  with a suprapubic catheter in place with the walls appearing somewhat thickened even given collapsed state.  Rectosigmoid colon: Normal.  Uterus: Not visualised.  Adnexa: No adnexal masses.  Lymph nodes: No pathologically enlarged nodes.  Pelvic cavity: Normal.  Pelvic wall: Bilateral inguinal hernia containing fat.      Impression:  1. The urinary bladder is empty with a suprapubic catheter in place with the walls appearing somewhat thickened even given collapsed state. An element of cystitis is not excluded. Correlate with clinical and laboratory findings.  2. No fracture dislocation or subluxation is seen involving the visualized bony structures of the bilateral hips. Details and other findings as discussed above.                                 X-Ray Hip 2 or 3 views Left (with Pelvis when performed) (Final result)  Result time 06/27/22 11:06:33    Final result by Elfego Dobbs MD (06/27/22 11:06:33)                 Impression:      No acute osseous process appreciated.      Electronically signed by: Elfego Dobbs  Date:    06/27/2022  Time:    11:06             Narrative:    EXAMINATION:  XR HIP WITH PELVIS WHEN PERFORMED, 2 OR 3 VIEWS LEFT    CLINICAL HISTORY:  pain, left leg shortened, rotated;    TECHNIQUE:  AP view of the pelvis and frog leg lateral view of the left hip.    COMPARISON:  No relevant comparison studies available at time of dictation.    FINDINGS:  No acute fracture identified.  Left hip aligned with moderate underlying degenerative changes.  A catheter overlies the lower pelvis.                               CT Cervical Spine Without Contrast (Final result)  Result time 06/26/22 19:33:17    Final result by Brandan Chavez MD (06/26/22 19:33:17)                 Impression:      Spondylotic changes of the cervical spine without fracture.      Electronically signed by: Brandan Chavez MD  Date:    06/26/2022  Time:    19:33             Narrative:    EXAMINATION:  CT CERVICAL SPINE WITHOUT  CONTRAST    CLINICAL HISTORY:  fall;    TECHNIQUE:  Multiple cross-sectional images of the cervical spine were obtained without the use of contrast.  Coronal and sagittal reformatted images were obtained.  An automated dose exposure technique was utilized.  This limits radiation does the patient.    COMPARISON:  None    FINDINGS:  The alignment curvature of the cervical spine is normal.  The vertebral body heights and intervertebral disc spaces are maintained.  No evidence for compression deformity, fracture, or subluxation.  The predental space and prevertebral soft tissues are normal.  Multilevel uncovertebral facet arthropathy identified with areas of neural foraminal narrowing.  No evidence for central canal stenosis.    The soft tissues of the neck are normal.  The lung apices are clear.                               CT Head Without Contrast (Final result)  Result time 06/26/22 19:30:03    Final result by Brandan Chavez MD (06/26/22 19:30:03)                 Impression:      No acute intracranial abnormality with prior ventriculostomy shunt as above.      Electronically signed by: Brandan Chavez MD  Date:    06/26/2022  Time:    19:30             Narrative:    EXAMINATION:  CT HEAD WITHOUT CONTRAST    CLINICAL HISTORY:  fall;    TECHNIQUE:  Multiple cross-section of the head were obtained without the use of contrast.  Coronal and sagittal reformatted images were obtained.  An automated dose exposure technique was utilized.  This limits radiation does the patient.    COMPARISON:  None    FINDINGS:  A right frontal approach ventriculostomy shunt is identified with prior craniectomy/craniotomy.  No evidence for acute abnormality.  Ventriculostomy shunt terminates in the expected location of the right ventricle adjacent to the medial wall posteriorly.  No acute ischemic changes or infarct.  No intraparenchymal or contusion.  No mass, mass effect midline shift, edema, or extra-axial fluid collection.  Gray-white matter  differentiation maintained with hypodensities in the periventricular deep cortical white matter consistent with nonspecific small-vessel ischemia.  Age-appropriate cerebral and cerebellar volume loss with ex vacuo dilatation.  Remote infarct is identified in the left cerebellar hemisphere with encephalomalacia and gliosis.    The globes are intact.  The paranasal sinuses and mastoid air cells are well pneumatized.                               RADIOLOGY REPORT (Final result)  Result time 07/06/22 15:44:31    Final result by Unknown User (07/06/22 15:44:31)                                   Medications   ketorolac injection 15 mg (15 mg Intravenous Given 6/29/22 1009)   acetaminophen tablet 1,000 mg (1,000 mg Oral Given 6/26/22 2038)   sodium chloride 0.9% bolus 500 mL (0 mLs Intravenous Stopped 6/28/22 0935)     Medical Decision Making:   ED Management:  Patient is a 75 y/o female presents for fall, L hip pain, shortened/rotated.  Imaging with concern for fx.  Discussed with ortho.  Admitted to medicine.  Results discussed with family.  Answered questions at this time.           Scribe Attestation:   Scribe #1: I performed the above scribed service and the documentation accurately describes the services I performed. I attest to the accuracy of the note.        ED Course as of 07/07/22 0725   Sun Jun 26, 2022 2149 Consulted ortho [VM]      ED Course User Index  [VM] Kirstin Molina             Clinical Impression:   Final diagnoses:  [S72.002A] Hip fx, left, closed, initial encounter  [W19.XXXA] Fall, initial encounter  [S70.00XA] Contusion of hip, unspecified laterality, initial encounter          ED Disposition Condition    Admit               Vinnie Kemp MD  07/07/22 0742

## 2022-06-27 NOTE — CONSULTS
[facility] - Emergency Dept  Orthopedic Trauma  Consult Note    Patient Name: Elisabet Choi  MRN: [id number]  Admission Date: 6/26/2022  Hospital Length of Stay: 1 days  Attending Provider: Boubacar Ramírez MD  Primary Care Provider: No primary care provider on file.        Consults  Subjective:         Chief Complaint:   Chief Complaint   Patient presents with    Fall     Sent from [facility]. Had unwitnessed fall out of bed this afternoon. Hx of dementia and multiple brain sx per ems. Pt at baseline mental status per ems. -bt. -loc. Ccollar present on arrival. C/o l sided head pain        HPI:  Consult of her left hip pain.  And fracture.  There is no fracture on imaging.  Patient is nonambulatory oxygen a poor surgical candidate.       75 y/o female with hx of celiac disease, DM, CHF, and hydrocephalus with a  shunt presents to the ED following a fall at the nursing home this morning. The pt states she fell out of bed and hit her head. She complains of a left sided HA. Denies any other complaints. The pt's daughter-in-law reports the pt's bed has no railings. She notes the pt does not walk.     Past Medical History:   Diagnosis Date    Arthritis     Back pain     Celiac disease     CHF (congestive heart failure)     Diabetes mellitus     Encounter for blood transfusion     Fluid retention     Goiter     Hydrocephalus     Stroke        Past Surgical History:   Procedure Laterality Date    ABDOMINAL SURGERY      APPENDECTOMY      BACK SURGERY      BLADDER SURGERY      BRAIN SURGERY      CAROTID ENDARTERECTOMY      CHOLECYSTECTOMY      CSF SHUNT      HERNIA REPAIR      HYSTERECTOMY      TONSILLECTOMY      VASCULAR SURGERY         Review of patient's allergies indicates:   Allergen Reactions    Ambien [zolpidem]      hallucinates    Gluten protein     Wheat containing prod        Current Facility-Administered Medications   Medication    dextrose 10% bolus  125 mL    dextrose 10% bolus 250 mL    enoxaparin injection 40 mg    glucagon (human recombinant) injection 1 mg    glucose chewable tablet 16 g    glucose chewable tablet 24 g    insulin aspart U-100 injection 0-5 Units    morphine injection 4 mg    naloxone 0.4 mg/mL injection 0.02 mg    ondansetron injection 4 mg    sodium chloride 0.9% flush 10 mL     Current Outpatient Medications   Medication Sig    amitriptyline (ELAVIL) 25 MG tablet Take 25 mg by mouth nightly as needed for Insomnia.    aspirin (ECOTRIN) 81 MG EC tablet Take 1 tablet (81 mg total) by mouth once daily.    fluoxetine (PROZAC) 20 MG capsule Take 20 mg by mouth 3 (three) times daily.    furosemide (LASIX) 40 MG tablet Take 40 mg by mouth 2 (two) times daily.    insulin detemir (LEVEMIR) 100 unit/mL injection Inject 17 Units into the skin every evening.    levetiracetam (KEPPRA) 750 MG Tab Take 500 mg by mouth 2 (two) times daily.    levothyroxine (SYNTHROID) 200 MCG tablet Take 200 mcg by mouth once daily.    levothyroxine (SYNTHROID) 25 MCG tablet Take 25 mcg by mouth once daily.    lisinopril (PRINIVIL,ZESTRIL) 5 MG tablet Take 1 tablet (5 mg total) by mouth once daily.    metformin (GLUCOPHAGE) 1000 MG tablet Take 1 tablet (1,000 mg total) by mouth 2 (two) times daily with meals.    metoprolol succinate (TOPROL-XL) 50 MG 24 hr tablet Take 1 tablet (50 mg total) by mouth once daily.    nitrofurantoin (MACRODANTIN) 100 MG capsule Take 100 mg by mouth nightly.    nitroGLYCERIN 0.4 MG/DOSE TL SPRY (NITROLINGUAL) 0.4 mg/dose spray Place 1 spray under the tongue every 5 (five) minutes as needed for Chest pain.    pantoprazole (PROTONIX) 40 MG tablet Take 1 tablet (40 mg total) by mouth once daily.    simvastatin (ZOCOR) 40 MG tablet Take 40 mg by mouth every evening.    solifenacin (VESICARE) 5 MG tablet Take 10 mg by mouth every evening.    topiramate (TOPAMAX) 100 MG tablet Take 100 mg by mouth 2 (two) times daily.     triazolam (HALCION) 0.25 MG Tab Take 0.125 mg by mouth nightly as needed.     Family History    None       Tobacco Use    Smoking status: Never Smoker    Smokeless tobacco: Not on file   Substance and Sexual Activity    Alcohol use: No    Drug use: No    Sexual activity: Not on file       ROS:  Constitutional: Denies fever chills  Eyes: No change in vision  ENT: No ringing or current infections  CV: No chest pain  Resp: No labored breathing  MSK: Pain evident at site of injury located in HPI,   Integ: No signs of abrasions or lacerations  Neuro: No numbness or tingling  Lymphatic: No swelling outside the area of injury   Objective:     Vital Signs (Most Recent):  Temp: 98.4 °F (36.9 °C) (06/27/22 0700)  Pulse: 70 (06/27/22 0700)  Resp: 18 (06/27/22 0611)  BP: (!) 96/57 (06/27/22 0700)  SpO2: 99 % (06/27/22 0700) Vital Signs (24h Range):  Temp:  [97.9 °F (36.6 °C)-98.4 °F (36.9 °C)] 98.4 °F (36.9 °C)  Pulse:  [67-83] 70  Resp:  [12-20] 18  SpO2:  [97 %-100 %] 99 %  BP: ()/(55-73) 96/57           There is no height or weight on file to calculate BMI.      Intake/Output Summary (Last 24 hours) at 6/27/2022 0851  Last data filed at 6/27/2022 0616  Gross per 24 hour   Intake --   Output 500 ml   Net -500 ml       Ortho/SPM Exam  General the patient is alert and oriented x3 no acute distress nontoxic-appearing appropriate affect.    Constitutional: Vital signs are examined and stable.  Resp: No signs of labored breathing                 LLE: -Skin:  Negative log roll negative axial compression test           -MSK:  Minimal movement on exam which is patient's baseline 5/5           -Neuro:  Sensation intact to light touch L3-S1 dermatomes           -Lymphatic: No signs of lymphadenopathy           -CV: Capillary refill is less than 2 seconds. DP/PT pulses 2/4. Compartments soft and compressible                      RLE: -Skin: Dressing CDI, No signs of new abrasions or lacerations, no scars           -MSK: :   Minimal movement on exam which the patient baseline           -Neuro:  Sensation intact to light touch L3-S1 dermatomes           -Lymphatic: No signs of lymphadenopathy           -CV: Capillary refill is less than 2 seconds. DP/PT pulses  2/4. Compartments soft and compressible.     Significant Labs: All pertinent labs within the past 24 hours have been reviewed.  Recent Lab Results       06/27/22  0623   06/27/22  0422   06/26/22  2200        Albumin/Globulin Ratio   1.0   1.0       Albumin   2.7   2.9       Alkaline Phosphatase   75   78       ALT   9   10       AST   9   10       Baso #   0.06   0.05       Basophil %   0.5   0.4       BILIRUBIN TOTAL   0.2   0.2       BUN   14.6   14.7       Calcium   8.7   8.7       Chloride   100   98       CO2   32   30       Creatinine   0.55   0.58       eGFR if non African American   >60   >60       Eos #   0.38   0.41       Eosinophil %   3.3   3.1       Globulin, Total   2.8   2.9       Glucose   115   95       Hematocrit   38.2   38.5       Hemoglobin   11.3   11.4       Immature Grans (Abs)   0.07   0.06       Immature Granulocytes   0.6   0.4       Lymph #   3.54   3.52       LYMPH %   30.5   26.2       MCH   24.9   24.6       MCHC   29.6   29.6       MCV   84.3   83.2       Mono #   0.60   0.66       Mono %   5.2   4.9       MPV   10.0   10.9       Neut #   7.0   8.7       Neut %   59.9   65.0       nRBC   0.0   0.0       Platelets   233   254       POCT Glucose 124           Potassium   4.1   4.0       PROTEIN TOTAL   5.5   5.8       RBC   4.53   4.63       RDW   18.4   18.6       Sodium   139   138       WBC   11.6   13.4              Significant Imaging: I have reviewed all pertinent imaging results/findings.  CT Head Without Contrast    Result Date: 6/26/2022  EXAMINATION: CT HEAD WITHOUT CONTRAST CLINICAL HISTORY: fall; TECHNIQUE: Multiple cross-section of the head were obtained without the use of contrast.  Coronal and sagittal reformatted images were obtained.   An automated dose exposure technique was utilized.  This limits radiation does the patient. COMPARISON: None FINDINGS: A right frontal approach ventriculostomy shunt is identified with prior craniectomy/craniotomy.  No evidence for acute abnormality.  Ventriculostomy shunt terminates in the expected location of the right ventricle adjacent to the medial wall posteriorly.  No acute ischemic changes or infarct.  No intraparenchymal or contusion.  No mass, mass effect midline shift, edema, or extra-axial fluid collection.  Gray-white matter differentiation maintained with hypodensities in the periventricular deep cortical white matter consistent with nonspecific small-vessel ischemia.  Age-appropriate cerebral and cerebellar volume loss with ex vacuo dilatation.  Remote infarct is identified in the left cerebellar hemisphere with encephalomalacia and gliosis. The globes are intact.  The paranasal sinuses and mastoid air cells are well pneumatized.     No acute intracranial abnormality with prior ventriculostomy shunt as above. Electronically signed by: Brandan Chavez MD Date:    06/26/2022 Time:    19:30    CT Cervical Spine Without Contrast    Result Date: 6/26/2022  EXAMINATION: CT CERVICAL SPINE WITHOUT CONTRAST CLINICAL HISTORY: fall; TECHNIQUE: Multiple cross-sectional images of the cervical spine were obtained without the use of contrast.  Coronal and sagittal reformatted images were obtained.  An automated dose exposure technique was utilized.  This limits radiation does the patient. COMPARISON: None FINDINGS: The alignment curvature of the cervical spine is normal.  The vertebral body heights and intervertebral disc spaces are maintained.  No evidence for compression deformity, fracture, or subluxation.  The predental space and prevertebral soft tissues are normal.  Multilevel uncovertebral facet arthropathy identified with areas of neural foraminal narrowing.  No evidence for central canal stenosis. The soft  tissues of the neck are normal.  The lung apices are clear.     Spondylotic changes of the cervical spine without fracture. Electronically signed by: Brandan Chavez MD Date:    06/26/2022 Time:    19:33    CT Pelvis Without Contrast    Result Date: 6/27/2022  START OF REPORT: Technique: CT imaging of the pelvis was performed without intravenous contrast with direct axial as well as sagittal and coronal reconstruction images. Comparison: None. Clinical history: Fall, hip injury. Findings: Overall ,diffusely osteopenic bones. Pelvis: No acute fracture is identified. Hip: Right: No fracture dislocation or subluxation is seen involving the visualized right hip bony structures. Left: No fracture dislocation or subluxation is seen involving the visualized left hip bony structures. Femur: Proximal Femur: The visualized proximal right and left femurs appear intact. Pelvic structures: Bladder: The urinary bladder is empty with a suprapubic catheter in place with the walls appearing somewhat thickened even given collapsed state. Rectosigmoid colon: Normal. Uterus: Not visualised. Adnexa: No adnexal masses. Lymph nodes: No pathologically enlarged nodes. Pelvic cavity: Normal. Pelvic wall: Bilateral inguinal hernia containing fat.     1. The urinary bladder is empty with a suprapubic catheter in place with the walls appearing somewhat thickened even given collapsed state. An element of cystitis is not excluded. Correlate with clinical and laboratory findings. 2. No fracture dislocation or subluxation is seen involving the visualized bony structures of the bilateral hips. Details and other findings as discussed above.       Assessment/Plan:     There are no hospital problems to display for this patient.    Patient does not have a femoral neck fracture or IT fracture seen on imaging.  She could have an occult fracture although she is a poor surgical candidate and already nonambulatory.  She does not put weight on her lower  extremities.  She will be treated non operatively and conservative.  She can follow up outpatient with a joint arthroplasty surgeon.        Thank you for your consult. I will sign off. Please contact us if you have any additional questions.        his note/OR report was created with the assistance of  voice recognition software or phone  dictation.  There may be transcription errors as a result of using this technology however minimal. Effort has been made to assure accuracy of transcription but any obvious errors or omissions should be clarified with the author of the document.       Ramos Beltran, DO   Orthopedic Trauma Surgery  [facility] - Emergency Dept

## 2022-06-28 LAB
POCT GLUCOSE: 122 MG/DL (ref 70–110)
POCT GLUCOSE: 127 MG/DL (ref 70–110)
POCT GLUCOSE: 152 MG/DL (ref 70–110)
POCT GLUCOSE: 93 MG/DL (ref 70–110)

## 2022-06-28 PROCEDURE — C9399 UNCLASSIFIED DRUGS OR BIOLOG: HCPCS | Performed by: INTERNAL MEDICINE

## 2022-06-28 PROCEDURE — 25000003 PHARM REV CODE 250: Performed by: INTERNAL MEDICINE

## 2022-06-28 PROCEDURE — 63600175 PHARM REV CODE 636 W HCPCS: Performed by: INTERNAL MEDICINE

## 2022-06-28 PROCEDURE — 27000221 HC OXYGEN, UP TO 24 HOURS

## 2022-06-28 PROCEDURE — 11000001 HC ACUTE MED/SURG PRIVATE ROOM

## 2022-06-28 PROCEDURE — 63600175 PHARM REV CODE 636 W HCPCS: Performed by: NURSE PRACTITIONER

## 2022-06-28 RX ORDER — KETOROLAC TROMETHAMINE 30 MG/ML
15 INJECTION, SOLUTION INTRAMUSCULAR; INTRAVENOUS EVERY 6 HOURS PRN
Status: DISPENSED | OUTPATIENT
Start: 2022-06-28 | End: 2022-07-01

## 2022-06-28 RX ADMIN — LEVETIRACETAM 500 MG: 500 TABLET, FILM COATED ORAL at 09:06

## 2022-06-28 RX ADMIN — PANTOPRAZOLE SODIUM 40 MG: 40 TABLET, DELAYED RELEASE ORAL at 09:06

## 2022-06-28 RX ADMIN — OXYBUTYNIN CHLORIDE 10 MG: 5 TABLET, EXTENDED RELEASE ORAL at 09:06

## 2022-06-28 RX ADMIN — INSULIN DETEMIR 15 UNITS: 100 INJECTION, SOLUTION SUBCUTANEOUS at 09:06

## 2022-06-28 RX ADMIN — ATORVASTATIN CALCIUM 20 MG: 10 TABLET, FILM COATED ORAL at 09:06

## 2022-06-28 RX ADMIN — ASPIRIN 81 MG: 81 TABLET, COATED ORAL at 09:06

## 2022-06-28 RX ADMIN — NITROFURANTOIN MACROCRYSTALS 100 MG: 50 CAPSULE ORAL at 09:06

## 2022-06-28 RX ADMIN — TOPIRAMATE 100 MG: 100 TABLET, FILM COATED ORAL at 09:06

## 2022-06-28 RX ADMIN — FLUOXETINE 20 MG: 20 CAPSULE ORAL at 09:06

## 2022-06-28 RX ADMIN — SODIUM CHLORIDE 500 ML: 9 INJECTION, SOLUTION INTRAVENOUS at 08:06

## 2022-06-28 RX ADMIN — LEVOTHYROXINE SODIUM 25 MCG: 25 TABLET ORAL at 09:06

## 2022-06-28 RX ADMIN — ENOXAPARIN SODIUM 40 MG: 40 INJECTION SUBCUTANEOUS at 04:06

## 2022-06-28 NOTE — NURSING
WOCN Consult-left heel  77 y/o female in with let hip pain from fall at nursing home. No surgery was indicated-See Dr. Drake note.    Other skin issues sm maroon area on lt outer heel-see photo- care put in place and will follow-up in a few days.

## 2022-06-28 NOTE — PROGRESS NOTES
Hospital Medicine  Progress Note    Patient Name: Elisabet Choi  MRN: [id number]  Status: IP- Inpatient   Admission Date: 6/26/2022  Length of Stay: 2      CC: hospital follow-up for left hip pain       SUBJECTIVE   76 year old female with multiple medica comorbidities and bed-bound, presented to the ED 6/26 from the NH following an unwitnessed fall.  Patient states that she fell out of the bed and hit her head.  Patient's daughter-in-law at the bedside states that the patient does not walk, mostly bed-bound  Patient's only complaint is a left-sided headache. ED lab work showed WBC 13.4, all other indices unremarkable.  CT head negative for acute intracranial abnormality.  CT C-spine negative.  XR noted questionable left hip fracture.  Orthopedics was consulted and patient was admitted to Hospital Medicine for further management.  Seen by Ortho CT ordered, but would be a poor candidate for surgical intervention.  CT of the LLE notes possible displaced avulsion fracture involving posterior facet of the left greater trochanter.  Surgical intervention no recommended, and patient/family also against surgery.    Today: Patient seen and examined at bedside, and chart reviewed. Patient lethargic and hypotensive this AM.  Has responded to IV fluid bolus.      MEDICATIONS   Scheduled   aspirin  81 mg Oral Daily    atorvastatin  20 mg Oral QHS    enoxaparin  40 mg Subcutaneous Daily    FLUoxetine  20 mg Oral BID    insulin detemir U-100  15 Units Subcutaneous QHS    levETIRAcetam  500 mg Oral BID    levothyroxine  200 mcg Oral Daily    levothyroxine  25 mcg Oral Daily    nitrofurantoin  100 mg Oral Nightly    oxybutynin  10 mg Oral Daily    pantoprazole  40 mg Oral Daily    topiramate  100 mg Oral BID     Continuous Infusions  None    PRN  amitriptyline, dextrose 10%, dextrose 10%, glucagon (human recombinant), glucose, glucose, HYDROcodone-acetaminophen, insulin aspart U-100, ketorolac, morphine, naloxone,  ondansetron, sodium chloride 0.9%       PHYSICAL EXAM   VITALS: T 98.7 °F (37.1 °C)   BP (!) 77/49   P 61   RR 18   O2 99 %    GENERAL: Awake and in NAD  LUNGS: CTA B/L  CVS: Normal rate  GI/: Soft, NT, bowel sounds positive.  EXTREMITIES: perihperal pusles 1+  NEURO: Lethargic, but awakens  PSYCH: Cooperative      LABS   CBC  Recent Labs     06/27/22  0422   WBC 11.6*   HGB 11.3*   HCT 38.2         CHEM  Recent Labs     06/27/22  0422      K 4.1   CO2 32*   BUN 14.6   CREATININE 0.55   CALCIUM 8.7   BILITOT 0.2   AST 9   ALT 9   ALKPHOS 75   ALBUMIN 2.7*        ASSESSMENT   Presumed left femoral neck fracture, closed/displaced  L5 compression fracutre  Mild leukocytosis, suspect reactive  IDDM II  Essential HTN  h/o cardiomyopathy, compensated  ERICA on CPAP  Seizure disorder  h/o hydrocephalus  h/o Celiac's disease    PLAN   Hold all antihypertensives  Intermittent IV fluid boluses to keep SBP >90  Hold Narcotics, will add Toradol for pain  If pressure drops again, can attempt a trial of Narcan  Otherwise continue to monitor        Prophylaxis: SC Lovenox        Rodriguez Peters MD  [facility]  06/28/2022

## 2022-06-28 NOTE — PLAN OF CARE
06/28/22 1057   Discharge Assessment   Assessment Type Discharge Planning Assessment   Source of Information family   If unable to respond/provide information was family/caregiver contacted? Yes   Contact Name/Number Deann Mancuso [phone]   Communicated JOSÉ MIGUEL with patient/caregiver Date not available/Unable to determine   Lives With other (see comments)  (Resides at [facility] nursing and rehab)   Facility Arrived From: [facility]   Do you expect to return to your current living situation? Yes   Do you have help at home or someone to help you manage your care at home? Yes   Prior to hospitilization cognitive status: Alert/Oriented   Current cognitive status: Alert/Oriented   Walking or Climbing Stairs Difficulty ambulation difficulty, dependent   Mobility Management Staff uses lift to get pt up in wheelchair otherwise, bedbound   Dressing/Bathing Difficulty dressing difficulty, dependent;bathing difficulty, dependent   Discharge Plan A Skilled Nursing Facility   Discharge Plan B Skilled Nursing Facility   DME Needed Upon Discharge  none   Discharge Plan discussed with: Adult children;Patient   Discharge Barriers Identified None   DCP to return to [facility]

## 2022-06-28 NOTE — PLAN OF CARE
Problem: Adult Inpatient Plan of Care  Goal: Plan of Care Review  Outcome: Ongoing, Progressing  Flowsheets (Taken 6/27/2022 2237)  Plan of Care Reviewed With: patient     Problem: Fall Injury Risk  Goal: Absence of Fall and Fall-Related Injury  Outcome: Ongoing, Progressing  Intervention: Promote Injury-Free Environment  Flowsheets (Taken 6/27/2022 2237)  Safety Promotion/Fall Prevention:   assistive device/personal item within reach   bed alarm set   high risk medications identified   lighting adjusted   medications reviewed     Problem: Diabetes Comorbidity  Goal: Blood Glucose Level Within Targeted Range  Outcome: Ongoing, Progressing  Intervention: Monitor and Manage Glycemia  Flowsheets (Taken 6/27/2022 2237)  Glycemic Management:   blood glucose monitored   supplemental insulin given     Problem: Impaired Wound Healing  Goal: Optimal Wound Healing  Outcome: Ongoing, Progressing  Intervention: Promote Wound Healing  Flowsheets (Taken 6/27/2022 2237)  Activity Management:   Arm raise - L1   Rolling - L1

## 2022-06-29 LAB
ANION GAP SERPL CALC-SCNC: 8 MEQ/L
BUN SERPL-MCNC: 14.9 MG/DL (ref 9.8–20.1)
CALCIUM SERPL-MCNC: 8.8 MG/DL (ref 8.4–10.2)
CHLORIDE SERPL-SCNC: 103 MMOL/L (ref 98–107)
CO2 SERPL-SCNC: 28 MMOL/L (ref 23–31)
CREAT SERPL-MCNC: 0.54 MG/DL (ref 0.55–1.02)
CREAT/UREA NIT SERPL: 28
ERYTHROCYTE [DISTWIDTH] IN BLOOD BY AUTOMATED COUNT: 18.9 % (ref 11.5–17)
GLUCOSE SERPL-MCNC: 116 MG/DL (ref 82–115)
HCT VFR BLD AUTO: 38.9 % (ref 37–47)
HGB BLD-MCNC: 11.3 GM/DL (ref 12–16)
MCH RBC QN AUTO: 24.8 PG (ref 27–31)
MCHC RBC AUTO-ENTMCNC: 29 MG/DL (ref 33–36)
MCV RBC AUTO: 85.3 FL (ref 80–94)
NRBC BLD AUTO-RTO: 0 %
PLATELET # BLD AUTO: 226 X10(3)/MCL (ref 130–400)
PMV BLD AUTO: 10.1 FL (ref 9.4–12.4)
POCT GLUCOSE: 125 MG/DL (ref 70–110)
POTASSIUM SERPL-SCNC: 3.8 MMOL/L (ref 3.5–5.1)
RBC # BLD AUTO: 4.56 X10(6)/MCL (ref 4.2–5.4)
SODIUM SERPL-SCNC: 139 MMOL/L (ref 136–145)
WBC # SPEC AUTO: 10 X10(3)/MCL (ref 4.5–11.5)

## 2022-06-29 PROCEDURE — 80048 BASIC METABOLIC PNL TOTAL CA: CPT | Performed by: INTERNAL MEDICINE

## 2022-06-29 PROCEDURE — 11000001 HC ACUTE MED/SURG PRIVATE ROOM

## 2022-06-29 PROCEDURE — 25000003 PHARM REV CODE 250: Performed by: INTERNAL MEDICINE

## 2022-06-29 PROCEDURE — 36415 COLL VENOUS BLD VENIPUNCTURE: CPT | Performed by: INTERNAL MEDICINE

## 2022-06-29 PROCEDURE — 85027 COMPLETE CBC AUTOMATED: CPT | Performed by: INTERNAL MEDICINE

## 2022-06-29 PROCEDURE — 63600175 PHARM REV CODE 636 W HCPCS: Performed by: NURSE PRACTITIONER

## 2022-06-29 PROCEDURE — C9399 UNCLASSIFIED DRUGS OR BIOLOG: HCPCS | Performed by: INTERNAL MEDICINE

## 2022-06-29 PROCEDURE — 63600175 PHARM REV CODE 636 W HCPCS: Performed by: INTERNAL MEDICINE

## 2022-06-29 RX ADMIN — ATORVASTATIN CALCIUM 20 MG: 10 TABLET, FILM COATED ORAL at 08:06

## 2022-06-29 RX ADMIN — OXYBUTYNIN CHLORIDE 10 MG: 5 TABLET, EXTENDED RELEASE ORAL at 09:06

## 2022-06-29 RX ADMIN — LEVOTHYROXINE SODIUM 25 MCG: 25 TABLET ORAL at 09:06

## 2022-06-29 RX ADMIN — LEVETIRACETAM 500 MG: 500 TABLET, FILM COATED ORAL at 09:06

## 2022-06-29 RX ADMIN — FLUOXETINE 20 MG: 20 CAPSULE ORAL at 08:06

## 2022-06-29 RX ADMIN — LEVETIRACETAM 500 MG: 500 TABLET, FILM COATED ORAL at 08:06

## 2022-06-29 RX ADMIN — ENOXAPARIN SODIUM 40 MG: 40 INJECTION SUBCUTANEOUS at 04:06

## 2022-06-29 RX ADMIN — HYDROCODONE BITARTRATE AND ACETAMINOPHEN 1 TABLET: 10; 325 TABLET ORAL at 11:06

## 2022-06-29 RX ADMIN — FLUOXETINE 20 MG: 20 CAPSULE ORAL at 09:06

## 2022-06-29 RX ADMIN — TOPIRAMATE 100 MG: 100 TABLET, FILM COATED ORAL at 08:06

## 2022-06-29 RX ADMIN — TOPIRAMATE 100 MG: 100 TABLET, FILM COATED ORAL at 09:06

## 2022-06-29 RX ADMIN — NITROFURANTOIN MACROCRYSTALS 100 MG: 50 CAPSULE ORAL at 08:06

## 2022-06-29 RX ADMIN — LEVOTHYROXINE SODIUM 200 MCG: 100 TABLET ORAL at 09:06

## 2022-06-29 RX ADMIN — PANTOPRAZOLE SODIUM 40 MG: 40 TABLET, DELAYED RELEASE ORAL at 09:06

## 2022-06-29 RX ADMIN — INSULIN DETEMIR 15 UNITS: 100 INJECTION, SOLUTION SUBCUTANEOUS at 08:06

## 2022-06-29 RX ADMIN — ASPIRIN 81 MG: 81 TABLET, COATED ORAL at 09:06

## 2022-06-29 RX ADMIN — KETOROLAC TROMETHAMINE 15 MG: 30 INJECTION, SOLUTION INTRAMUSCULAR; INTRAVENOUS at 10:06

## 2022-06-29 NOTE — PLAN OF CARE
Problem: Adult Inpatient Plan of Care  Goal: Plan of Care Review  Outcome: Ongoing, Progressing  Flowsheets (Taken 6/28/2022 2355)  Plan of Care Reviewed With: patient     Problem: Infection  Goal: Absence of Infection Signs and Symptoms  Outcome: Ongoing, Progressing  Intervention: Prevent or Manage Infection  Flowsheets (Taken 6/28/2022 2355)  Fever Reduction/Comfort Measures: lightweight bedding  Infection Management: aseptic technique maintained     Problem: Diabetes Comorbidity  Goal: Blood Glucose Level Within Targeted Range  Outcome: Ongoing, Progressing  Intervention: Monitor and Manage Glycemia  Flowsheets (Taken 6/28/2022 2355)  Glycemic Management:   blood glucose monitored   oral hydration promoted

## 2022-06-29 NOTE — CONSULTS
[facility] - Ortho Neuro  Adult Nutrition  Consult Note    SUMMARY     Recommendations    Recommendation/Intervention: - continue diabetic diet; encourage intake  - added boost glucose control; provides 240kcal, 14 gm protein per container  Goals: - po intake >75% of meals by follow up  Nutrition Goal Status: new    Assessment and Plan    Nutrition Problem  Inadequate Oral Intake    Related to (etiology):   Current condition    Signs and Symptoms (as evidenced by):   Intake <75% of meals    Interventions(treatment strategy):  Modified composition of meals / snacks and Commercial beverage    Nutrition Diagnosis Status:   New             Malnutrition Assessment  Malnutrition in the context of acute illness or injury    Degree of Malnutrition:  Does not meet criteria  Energy Intake:  </= 50% of estimated energy requirement for >/= 5 days  Interpretation of Weight Loss:  does not meet criteria  Body Fat: does not meet criteria  Area of Body Fat Loss:  does not meet criteria  Muscle Mass Loss:  does not meet criteria  Area of Muscle Mass Loss: does not meet criteria  Fluid Accumulation:  does not meet criteria  Edema:  does not meet criteria   Reduced  Strength:  not applicable    A minimum of two characteristics is recommended for diagnosis of either severe or non-severe malnutrition.                                          Reason for Assessment    Diagnosis: other (see comments) (Presumed left femoral neck fracture, closed/displaced  L5 compression fracutre  Mild leukocytosis, suspect reactive  IDDM II  Essential HTN)  Relevant Medical History: h/o cardiomyopathy, compensated  ERICA on CPAP  Seizure disorder  h/o hydrocephalus  h/o Celiac's disease  General Information Comments: - pt awake but not able to give much hx, nods yes to if she ate lunch today and if she had a good appetite before admit; shakes her head no when asked if she has a good appetite now; agreeable to adding boost oral supplement  "chocolate flavor    Nutrition Risk Screen    Nutrition Risk Screen: no indicators present    Nutrition/Diet History    Factors Affecting Nutritional Intake: decreased appetite    Anthropometrics    Temp: 97.9 °F (36.6 °C)  Height: 4' 9.01" (144.8 cm)  Height (inches): 57.01 in  Weight Method: Bed Scale  Weight: 91.3 kg (201 lb 4.5 oz)  Weight (lb): 201.28 lb  Ideal Body Weight (IBW), Female: 85.05 lb  % Ideal Body Weight, Female (lb): 236.66 %  BMI (Calculated): 43.5  BMI Grade: greater than 40 - morbid obesity  Usual Body Weight (UBW), kg:  (unknown)       Lab/Procedures/Meds    Pertinent Labs Comments: 6/29: glu 116, Crea0.54  Pertinent Medications Comments: insulin    Physical Findings/Assessment         Estimated/Assessed Needs    Weight Used For Calorie Calculations: 91.3 kg (201 lb 4.5 oz)  Energy Calorie Requirements (kcal): 1532 kcal (1.2 stress factor)  Energy Need Method: Benjy[place]  Protein Requirements: 100 gm (1.1 gm/kg)  Weight Used For Protein Calculations: 91.3 kg (201 lb 4.5 oz)     Estimated Fluid Requirement Method: RDA Method  RDA Method (mL): 1532         Nutrition Prescription Ordered    Current Diet Order: diabetic  Oral Nutrition Supplement: boost glucose control    Evaluation of Received Nutrient/Fluid Intake    Energy Calories Required: not meeting needs  Protein Required: not meeting needs  Tolerance: tolerating  % Intake of Estimated Energy Needs: 25 - 50 %  % Meal Intake: 25 - 50 %    Nutrition Risk    Level of Risk/Frequency of Follow-up: moderate       Monitor and Evaluation    Food and Nutrient Intake: food and beverage intake  Anthropometric Measurements: weight change       Nutrition Follow-Up    RD Follow-up?: Yes  "

## 2022-06-29 NOTE — PROGRESS NOTES
Hospital Medicine  Progress Note    Patient Name: Elisabet Choi  MRN: [id number]  Status: IP- Inpatient   Admission Date: 6/26/2022  Length of Stay: 3      CC: hospital follow-up for left hip pain       SUBJECTIVE   76 year old female with multiple medica comorbidities and bed-bound, presented to the ED 6/26 from the NH following an unwitnessed fall.  Patient states that she fell out of the bed and hit her head.  Patient's daughter-in-law at the bedside states that the patient does not walk, mostly bed-bound  Patient's only complaint is a left-sided headache. ED lab work showed WBC 13.4, all other indices unremarkable.  CT head negative for acute intracranial abnormality.  CT C-spine negative.  XR noted questionable left hip fracture.  Orthopedics was consulted and patient was admitted to Hospital Medicine for further management.  Seen by Ortho CT ordered, but would be a poor candidate for surgical intervention.  CT of the LLE notes possible displaced avulsion fracture involving posterior facet of the left greater trochanter.  Surgical intervention no recommended, and patient/family also against surgery.    Today: Patient seen and examined at bedside, and chart reviewed. Pressures better today, though still very weak.  Complains of a lot of pain lower back and leg.      MEDICATIONS   Scheduled   aspirin  81 mg Oral Daily    atorvastatin  20 mg Oral QHS    enoxaparin  40 mg Subcutaneous Daily    FLUoxetine  20 mg Oral BID    insulin detemir U-100  15 Units Subcutaneous QHS    levETIRAcetam  500 mg Oral BID    levothyroxine  200 mcg Oral Daily    levothyroxine  25 mcg Oral Daily    nitrofurantoin  100 mg Oral Nightly    oxybutynin  10 mg Oral Daily    pantoprazole  40 mg Oral Daily    topiramate  100 mg Oral BID     Continuous Infusions  None    PRN  amitriptyline, dextrose 10%, dextrose 10%, glucagon (human recombinant), glucose, glucose, HYDROcodone-acetaminophen, insulin aspart U-100, ketorolac, morphine,  naloxone, ondansetron, sodium chloride 0.9%       PHYSICAL EXAM   VITALS: T 97.9 °F (36.6 °C)   /69   P 72   RR 18   O2 98 %    GENERAL: Awake and in mild distress due to pain  LUNGS: CTA B/L  CVS: Normal rate  GI/: Soft, NT, bowel sounds positive.  EXTREMITIES: perihperal pusles 1+  NEURO: Awake and alert  PSYCH: Cooperative      LABS   CBC  Recent Labs     06/29/22  0415   WBC 10.0   HGB 11.3*   HCT 38.9         CHEM  Recent Labs     06/27/22  0422 06/29/22  0415    139   K 4.1 3.8   CO2 32* 28   BUN 14.6 14.9   CREATININE 0.55 0.54*   CALCIUM 8.7 8.8   BILITOT 0.2  --    AST 9  --    ALT 9  --    ALKPHOS 75  --    ALBUMIN 2.7*  --         ASSESSMENT   Presumed left femoral neck fracture, closed/displaced  L5 compression fracutre  Mild leukocytosis, suspect reactive  IDDM II  Essential HTN  h/o cardiomyopathy, compensated  ERICA on CPAP  Seizure disorder  h/o hydrocephalus  h/o Celiac's disease    PLAN   Hold all antihypertensives  Cautious with narcotics, continue multimodal analgesics  Will continue to monitor for now.  Otherwise continue to monitor        Prophylaxis: SC Lovenoron Peters MD  [facility]  06/29/2022

## 2022-06-30 LAB
POCT GLUCOSE: 101 MG/DL (ref 70–110)
POCT GLUCOSE: 163 MG/DL (ref 70–110)
POCT GLUCOSE: 187 MG/DL (ref 70–110)

## 2022-06-30 PROCEDURE — 63600175 PHARM REV CODE 636 W HCPCS: Performed by: INTERNAL MEDICINE

## 2022-06-30 PROCEDURE — C9399 UNCLASSIFIED DRUGS OR BIOLOG: HCPCS | Performed by: INTERNAL MEDICINE

## 2022-06-30 PROCEDURE — 25000003 PHARM REV CODE 250: Performed by: INTERNAL MEDICINE

## 2022-06-30 PROCEDURE — 11000001 HC ACUTE MED/SURG PRIVATE ROOM

## 2022-06-30 PROCEDURE — 63600175 PHARM REV CODE 636 W HCPCS: Performed by: NURSE PRACTITIONER

## 2022-06-30 PROCEDURE — 27000221 HC OXYGEN, UP TO 24 HOURS

## 2022-06-30 RX ADMIN — LEVOTHYROXINE SODIUM 25 MCG: 25 TABLET ORAL at 08:06

## 2022-06-30 RX ADMIN — INSULIN DETEMIR 15 UNITS: 100 INJECTION, SOLUTION SUBCUTANEOUS at 10:06

## 2022-06-30 RX ADMIN — PANTOPRAZOLE SODIUM 40 MG: 40 TABLET, DELAYED RELEASE ORAL at 08:06

## 2022-06-30 RX ADMIN — ENOXAPARIN SODIUM 40 MG: 40 INJECTION SUBCUTANEOUS at 05:06

## 2022-06-30 RX ADMIN — TOPIRAMATE 100 MG: 100 TABLET, FILM COATED ORAL at 08:06

## 2022-06-30 RX ADMIN — NITROFURANTOIN MACROCRYSTALS 100 MG: 50 CAPSULE ORAL at 10:06

## 2022-06-30 RX ADMIN — OXYBUTYNIN CHLORIDE 10 MG: 5 TABLET, EXTENDED RELEASE ORAL at 08:06

## 2022-06-30 RX ADMIN — TOPIRAMATE 100 MG: 100 TABLET, FILM COATED ORAL at 10:06

## 2022-06-30 RX ADMIN — ATORVASTATIN CALCIUM 20 MG: 10 TABLET, FILM COATED ORAL at 10:06

## 2022-06-30 RX ADMIN — LEVOTHYROXINE SODIUM 200 MCG: 100 TABLET ORAL at 08:06

## 2022-06-30 RX ADMIN — LEVETIRACETAM 500 MG: 500 TABLET, FILM COATED ORAL at 08:06

## 2022-06-30 RX ADMIN — FLUOXETINE 20 MG: 20 CAPSULE ORAL at 08:06

## 2022-06-30 RX ADMIN — FLUOXETINE 20 MG: 20 CAPSULE ORAL at 10:06

## 2022-06-30 RX ADMIN — ASPIRIN 81 MG: 81 TABLET, COATED ORAL at 08:06

## 2022-06-30 RX ADMIN — LEVETIRACETAM 500 MG: 500 TABLET, FILM COATED ORAL at 10:06

## 2022-06-30 NOTE — PROGRESS NOTES
Hospital Medicine  Progress Note    Patient Name: Elisabet Choi  MRN: [id number]  Status: IP- Inpatient   Admission Date: 6/26/2022  Length of Stay: 4      CC: hospital follow-up for left hip pain       SUBJECTIVE   76 year old female with multiple medica comorbidities and bed-bound, presented to the ED 6/26 from the NH following an unwitnessed fall.  Patient states that she fell out of the bed and hit her head.  Patient's daughter-in-law at the bedside states that the patient does not walk, mostly bed-bound  Patient's only complaint is a left-sided headache. ED lab work showed WBC 13.4, all other indices unremarkable.  CT head negative for acute intracranial abnormality.  CT C-spine negative.  XR noted questionable left hip fracture.  Orthopedics was consulted and patient was admitted to Hospital Medicine for further management.  Seen by Ortho CT ordered, but would be a poor candidate for surgical intervention.  CT of the LLE notes possible displaced avulsion fracture involving posterior facet of the left greater trochanter.  Surgical intervention no recommended, and patient/family also against surgery.    Today: Patient is ox3 and NAD.   She was sleeping when I entered the room. Very pleasant lady       MEDICATIONS   Scheduled   aspirin  81 mg Oral Daily    atorvastatin  20 mg Oral QHS    enoxaparin  40 mg Subcutaneous Daily    FLUoxetine  20 mg Oral BID    insulin detemir U-100  15 Units Subcutaneous QHS    levETIRAcetam  500 mg Oral BID    levothyroxine  200 mcg Oral Daily    levothyroxine  25 mcg Oral Daily    nitrofurantoin  100 mg Oral Nightly    oxybutynin  10 mg Oral Daily    pantoprazole  40 mg Oral Daily    topiramate  100 mg Oral BID     Continuous Infusions  None    PRN  amitriptyline, dextrose 10%, dextrose 10%, glucagon (human recombinant), glucose, glucose, HYDROcodone-acetaminophen, insulin aspart U-100, ketorolac, morphine, naloxone, ondansetron, sodium chloride 0.9%       PHYSICAL EXAM    VITALS: T 98.9 °F (37.2 °C)   /68   P 73   RR 20   O2 100 %    GENERAL: Awake and in mild distress due to pain  LUNGS: CTA B/L  CVS: Normal rate  GI/: Soft, NT, bowel sounds positive.  EXTREMITIES: perihperal pusles 1+  NEURO: Awake and alert  PSYCH: Cooperative      LABS   CBC  Recent Labs     06/29/22  0415   WBC 10.0   HGB 11.3*   HCT 38.9         CHEM  Recent Labs     06/29/22  0415      K 3.8   CO2 28   BUN 14.9   CREATININE 0.54*   CALCIUM 8.8        ASSESSMENT   Presumed left femoral neck fracture, closed/displaced  L5 compression fracutre  Mild leukocytosis, suspect reactive  IDDM II  Essential HTN  h/o cardiomyopathy, compensated  ERICA on CPAP  Seizure disorder  h/o hydrocephalus  h/o Celiac's disease    PLAN   Hold all antihypertensives  Cautious with narcotics, continue multimodal analgesics  Will continue to monitor for now.  Otherwise continue to monitor  Monitor, possible dc tomorrow am,       Prophylaxis: SC Lovenox        El Orantes MD  [facility] Medicine  06/30/2022

## 2022-07-01 LAB
POCT GLUCOSE: 143 MG/DL (ref 70–110)
POCT GLUCOSE: 82 MG/DL (ref 70–110)
TROPONIN I SERPL-MCNC: <0.01 NG/ML (ref 0–0.04)

## 2022-07-01 PROCEDURE — 84484 ASSAY OF TROPONIN QUANT: CPT | Performed by: STUDENT IN AN ORGANIZED HEALTH CARE EDUCATION/TRAINING PROGRAM

## 2022-07-01 PROCEDURE — 63600175 PHARM REV CODE 636 W HCPCS: Performed by: NURSE PRACTITIONER

## 2022-07-01 PROCEDURE — 63600175 PHARM REV CODE 636 W HCPCS: Performed by: INTERNAL MEDICINE

## 2022-07-01 PROCEDURE — 36415 COLL VENOUS BLD VENIPUNCTURE: CPT | Performed by: STUDENT IN AN ORGANIZED HEALTH CARE EDUCATION/TRAINING PROGRAM

## 2022-07-01 PROCEDURE — 93010 ELECTROCARDIOGRAM REPORT: CPT | Mod: ,,, | Performed by: INTERNAL MEDICINE

## 2022-07-01 PROCEDURE — 93005 ELECTROCARDIOGRAM TRACING: CPT

## 2022-07-01 PROCEDURE — 11000001 HC ACUTE MED/SURG PRIVATE ROOM

## 2022-07-01 PROCEDURE — 93010 EKG 12-LEAD: ICD-10-PCS | Mod: ,,, | Performed by: INTERNAL MEDICINE

## 2022-07-01 PROCEDURE — 25000003 PHARM REV CODE 250: Performed by: INTERNAL MEDICINE

## 2022-07-01 PROCEDURE — 27000221 HC OXYGEN, UP TO 24 HOURS

## 2022-07-01 PROCEDURE — C9399 UNCLASSIFIED DRUGS OR BIOLOG: HCPCS | Performed by: INTERNAL MEDICINE

## 2022-07-01 RX ADMIN — ENOXAPARIN SODIUM 40 MG: 40 INJECTION SUBCUTANEOUS at 05:07

## 2022-07-01 RX ADMIN — OXYBUTYNIN CHLORIDE 10 MG: 5 TABLET, EXTENDED RELEASE ORAL at 08:07

## 2022-07-01 RX ADMIN — LEVETIRACETAM 500 MG: 500 TABLET, FILM COATED ORAL at 08:07

## 2022-07-01 RX ADMIN — LEVOTHYROXINE SODIUM 200 MCG: 100 TABLET ORAL at 08:07

## 2022-07-01 RX ADMIN — TOPIRAMATE 100 MG: 100 TABLET, FILM COATED ORAL at 08:07

## 2022-07-01 RX ADMIN — NITROFURANTOIN MACROCRYSTALS 100 MG: 50 CAPSULE ORAL at 08:07

## 2022-07-01 RX ADMIN — ASPIRIN 81 MG: 81 TABLET, COATED ORAL at 08:07

## 2022-07-01 RX ADMIN — ATORVASTATIN CALCIUM 20 MG: 10 TABLET, FILM COATED ORAL at 08:07

## 2022-07-01 RX ADMIN — FLUOXETINE 20 MG: 20 CAPSULE ORAL at 08:07

## 2022-07-01 RX ADMIN — PANTOPRAZOLE SODIUM 40 MG: 40 TABLET, DELAYED RELEASE ORAL at 08:07

## 2022-07-01 RX ADMIN — INSULIN DETEMIR 15 UNITS: 100 INJECTION, SOLUTION SUBCUTANEOUS at 08:07

## 2022-07-01 RX ADMIN — HYDROCODONE BITARTRATE AND ACETAMINOPHEN 1 TABLET: 10; 325 TABLET ORAL at 10:07

## 2022-07-01 RX ADMIN — LEVOTHYROXINE SODIUM 25 MCG: 25 TABLET ORAL at 08:07

## 2022-07-01 NOTE — CONSULTS
[facility] - Ortho Neuro  Adult Nutrition  Consult Note    SUMMARY     Recommendations    Recommendation/Intervention: - continue diabetic diet; encourage intake  - added boost glucose control; provides 240kcal, 14 gm protein per container  Goals: - po intake >75% of meals by follow up  Nutrition Goal Status: progressing    Assessment and Plan    Nutrition Problem  Inadequate Oral Intake    Related to (etiology):   Current condition    Signs and Symptoms (as evidenced by):   Intake <75% of meals    Interventions(treatment strategy):  Modified composition of meals / snacks and Commercial beverage    Nutrition Diagnosis Status:   Continues             Malnutrition Assessment  Malnutrition in the context of acute illness or injury    Degree of Malnutrition:  Does not meet criteria  Energy Intake:  </= 50% of estimated energy requirement for >/= 5 days  Interpretation of Weight Loss:  does not meet criteria  Body Fat: does not meet criteria  Area of Body Fat Loss:  does not meet criteria  Muscle Mass Loss:  does not meet criteria  Area of Muscle Mass Loss: does not meet criteria  Fluid Accumulation:  does not meet criteria  Edema:  does not meet criteria   Reduced  Strength:  not applicable    A minimum of two characteristics is recommended for diagnosis of either severe or non-severe malnutrition.                                          Reason for Assessment    Diagnosis: other (see comments) (Presumed left femoral neck fracture, closed/displaced  L5 compression fracutre  Mild leukocytosis, suspect reactive  IDDM II  Essential HTN)  Relevant Medical History: h/o cardiomyopathy, compensated  ERICA on CPAP  Seizure disorder  h/o hydrocephalus  h/o Celiac's disease  General Information Comments: - pt awake but not able to give much hx, nods yes to if she ate lunch today and if she had a good appetite before admit; shakes her head no when asked if she has a good appetite now; agreeable to adding boost  "oral supplement chocolate flavor   7/1: eating about 50% of meals per nursing, tolerating diet    Nutrition Risk Screen    Nutrition Risk Screen: no indicators present    Nutrition/Diet History    Factors Affecting Nutritional Intake: decreased appetite    Anthropometrics    Temp: 98.5 °F (36.9 °C)  Height: 4' 9.01" (144.8 cm)  Height (inches): 57.01 in  Weight Method: Bed Scale  Weight: 91.3 kg (201 lb 4.5 oz)  Weight (lb): 201.28 lb  Ideal Body Weight (IBW), Female: 85.05 lb  % Ideal Body Weight, Female (lb): 236.66 %  BMI (Calculated): 43.5  BMI Grade: greater than 40 - morbid obesity  Usual Body Weight (UBW), kg:  (unknown)       Lab/Procedures/Meds    Pertinent Labs Comments: 6/29: glu 116, Crea0.54  Pertinent Medications Comments: insulin    Physical Findings/Assessment         Estimated/Assessed Needs    Weight Used For Calorie Calculations: 91.3 kg (201 lb 4.5 oz)  Energy Calorie Requirements (kcal): 1532 kcal (1.2 stress factor)  Energy Need Method: [place]  Protein Requirements: 100 gm (1.1 gm/kg)  Weight Used For Protein Calculations: 91.3 kg (201 lb 4.5 oz)     Estimated Fluid Requirement Method: RDA Method  RDA Method (mL): 1532         Nutrition Prescription Ordered    Current Diet Order: diabetic  Oral Nutrition Supplement: boost glucose control    Evaluation of Received Nutrient/Fluid Intake    Energy Calories Required: not meeting needs  Protein Required: not meeting needs  Tolerance: tolerating  % Intake of Estimated Energy Needs: 25 - 50 %  % Meal Intake: 25 - 50 %    Nutrition Risk    Level of Risk/Frequency of Follow-up: moderate       Monitor and Evaluation    Food and Nutrient Intake: food and beverage intake  Anthropometric Measurements: weight change       Nutrition Follow-Up    RD Follow-up?: Yes  "

## 2022-07-01 NOTE — PLAN OF CARE
Problem: Adult Inpatient Plan of Care  Goal: Plan of Care Review  Outcome: Ongoing, Progressing  Flowsheets (Taken 6/30/2022 2313)  Plan of Care Reviewed With: patient  Goal: Patient-Specific Goal (Individualized)  Outcome: Ongoing, Progressing  Flowsheets (Taken 6/30/2022 2313)  Patient-Specific Goals (Include Timeframe): to go back to NH  Goal: Absence of Hospital-Acquired Illness or Injury  Outcome: Ongoing, Progressing  Intervention: Prevent Skin Injury  Flowsheets (Taken 6/30/2022 2313)  Skin Protection:   adhesive use limited   protective footwear used   skin-to-device areas padded

## 2022-07-01 NOTE — PROGRESS NOTES
Hospital Medicine  Progress Note    Patient Name: Elisabet Choi  MRN: [id number]  Status: IP- Inpatient   Admission Date: 6/26/2022  Length of Stay: 5      CC: hospital follow-up for left hip pain       SUBJECTIVE   76 year old female with multiple medica comorbidities and bed-bound, presented to the ED 6/26 from the NH following an unwitnessed fall.  Patient states that she fell out of the bed and hit her head.  Patient's daughter-in-law at the bedside states that the patient does not walk, mostly bed-bound  Patient's only complaint is a left-sided headache. ED lab work showed WBC 13.4, all other indices unremarkable.  CT head negative for acute intracranial abnormality.  CT C-spine negative.  XR noted questionable left hip fracture.  Orthopedics was consulted and patient was admitted to Hospital Medicine for further management.  Seen by Ortho CT ordered, but would be a poor candidate for surgical intervention.  CT of the LLE notes possible displaced avulsion fracture involving posterior facet of the left greater trochanter.  Surgical intervention no recommended, and patient/family also against surgery.    Today: Patient is ox3 and NAD.   She was complaining of positional chest pain today with movement.  Denies shortness of breath and lightheadedness.      MEDICATIONS   Scheduled   aspirin  81 mg Oral Daily    atorvastatin  20 mg Oral QHS    enoxaparin  40 mg Subcutaneous Daily    FLUoxetine  20 mg Oral BID    insulin detemir U-100  15 Units Subcutaneous QHS    levETIRAcetam  500 mg Oral BID    levothyroxine  200 mcg Oral Daily    levothyroxine  25 mcg Oral Daily    nitrofurantoin  100 mg Oral Nightly    oxybutynin  10 mg Oral Daily    pantoprazole  40 mg Oral Daily    topiramate  100 mg Oral BID     Continuous Infusions  None    PRN  amitriptyline, dextrose 10%, dextrose 10%, glucagon (human recombinant), glucose, glucose, HYDROcodone-acetaminophen, insulin aspart U-100, morphine, naloxone, ondansetron,  sodium chloride 0.9%       PHYSICAL EXAM   VITALS: T 98.5 °F (36.9 °C)   /67   P 69   RR 20   O2 100 %    GENERAL: Awake and in mild distress due to pain  LUNGS: CTA B/L  CVS: Normal rate  GI/: Soft, NT, bowel sounds positive.  EXTREMITIES: perihperal pusles 1+  NEURO: Awake and alert  PSYCH: Cooperative      LABS   CBC  Recent Labs     06/29/22  0415   WBC 10.0   HGB 11.3*   HCT 38.9         CHEM  Recent Labs     06/29/22  0415      K 3.8   CO2 28   BUN 14.9   CREATININE 0.54*   CALCIUM 8.8        ASSESSMENT   Presumed left femoral neck fracture, closed/displaced  L5 compression fracutre  Mild leukocytosis, suspect reactive  IDDM II  Essential HTN  h/o cardiomyopathy, compensated  ERICA on CPAP  Seizure disorder  h/o hydrocephalus  h/o Celiac's disease    PLAN   Although chest pain is most likely positional and musculoskeletal will get EKG and troponin rule out ACS.  Troponin x2 negative, discharge the pt today.   Hold all antihypertensives  Cautious with narcotics, continue multimodal analgesics  Will continue to monitor for now.  Otherwise continue to monitor  Monitor, possible dc tomorrow am    Prophylaxis: ERIN Oratnes MD  [facility] Medicine  07/01/2022

## 2022-07-01 NOTE — PLAN OF CARE
Problem: Adult Inpatient Plan of Care  Goal: Plan of Care Review  Outcome: Ongoing, Progressing  Goal: Patient-Specific Goal (Individualized)  Outcome: Ongoing, Progressing  Goal: Absence of Hospital-Acquired Illness or Injury  Outcome: Ongoing, Progressing  Goal: Optimal Comfort and Wellbeing  Outcome: Ongoing, Progressing  Goal: Readiness for Transition of Care  Outcome: Ongoing, Progressing     Problem: Infection  Goal: Absence of Infection Signs and Symptoms  Outcome: Ongoing, Progressing     Problem: Skin Injury Risk Increased  Goal: Skin Health and Integrity  Outcome: Ongoing, Progressing     Problem: Fall Injury Risk  Goal: Absence of Fall and Fall-Related Injury  Outcome: Ongoing, Progressing     Problem: Diabetes Comorbidity  Goal: Blood Glucose Level Within Targeted Range  Outcome: Ongoing, Progressing     Problem: Impaired Wound Healing  Goal: Optimal Wound Healing  Outcome: Ongoing, Progressing     Problem: Bariatric Environmental Safety  Goal: Safety Maintained with Care  Outcome: Ongoing, Progressing

## 2022-07-02 VITALS
HEIGHT: 57 IN | DIASTOLIC BLOOD PRESSURE: 73 MMHG | TEMPERATURE: 98 F | OXYGEN SATURATION: 97 % | RESPIRATION RATE: 18 BRPM | SYSTOLIC BLOOD PRESSURE: 128 MMHG | WEIGHT: 201.25 LBS | BODY MASS INDEX: 43.42 KG/M2 | HEART RATE: 67 BPM

## 2022-07-02 LAB
ALBUMIN SERPL-MCNC: 2.5 GM/DL (ref 3.4–4.8)
ALBUMIN/GLOB SERPL: 0.8 RATIO (ref 1.1–2)
ALP SERPL-CCNC: 92 UNIT/L (ref 40–150)
ALT SERPL-CCNC: 16 UNIT/L (ref 0–55)
AST SERPL-CCNC: 13 UNIT/L (ref 5–34)
BILIRUBIN DIRECT+TOT PNL SERPL-MCNC: 0.2 MG/DL
BUN SERPL-MCNC: 8.7 MG/DL (ref 9.8–20.1)
CALCIUM SERPL-MCNC: 9.3 MG/DL (ref 8.4–10.2)
CHLORIDE SERPL-SCNC: 109 MMOL/L (ref 98–107)
CO2 SERPL-SCNC: 22 MMOL/L (ref 23–31)
CREAT SERPL-MCNC: 0.44 MG/DL (ref 0.55–1.02)
GLOBULIN SER-MCNC: 3.2 GM/DL (ref 2.4–3.5)
GLUCOSE SERPL-MCNC: 93 MG/DL (ref 82–115)
POCT GLUCOSE: 107 MG/DL (ref 70–110)
POCT GLUCOSE: 167 MG/DL (ref 70–110)
POTASSIUM SERPL-SCNC: 3.8 MMOL/L (ref 3.5–5.1)
PROT SERPL-MCNC: 5.7 GM/DL (ref 5.8–7.6)
SODIUM SERPL-SCNC: 139 MMOL/L (ref 136–145)

## 2022-07-02 PROCEDURE — 36415 COLL VENOUS BLD VENIPUNCTURE: CPT | Performed by: STUDENT IN AN ORGANIZED HEALTH CARE EDUCATION/TRAINING PROGRAM

## 2022-07-02 PROCEDURE — 25000003 PHARM REV CODE 250: Performed by: INTERNAL MEDICINE

## 2022-07-02 PROCEDURE — 80053 COMPREHEN METABOLIC PANEL: CPT | Performed by: STUDENT IN AN ORGANIZED HEALTH CARE EDUCATION/TRAINING PROGRAM

## 2022-07-02 RX ORDER — HYDROCODONE BITARTRATE AND ACETAMINOPHEN 10; 325 MG/1; MG/1
1 TABLET ORAL EVERY 6 HOURS PRN
Qty: 15 TABLET | Refills: 0 | Status: ON HOLD | OUTPATIENT
Start: 2022-07-02 | End: 2024-03-26 | Stop reason: HOSPADM

## 2022-07-02 RX ADMIN — HYDROCODONE BITARTRATE AND ACETAMINOPHEN 1 TABLET: 10; 325 TABLET ORAL at 01:07

## 2022-07-02 RX ADMIN — PANTOPRAZOLE SODIUM 40 MG: 40 TABLET, DELAYED RELEASE ORAL at 09:07

## 2022-07-02 RX ADMIN — OXYBUTYNIN CHLORIDE 10 MG: 5 TABLET, EXTENDED RELEASE ORAL at 09:07

## 2022-07-02 RX ADMIN — HYDROCODONE BITARTRATE AND ACETAMINOPHEN 1 TABLET: 10; 325 TABLET ORAL at 09:07

## 2022-07-02 RX ADMIN — TOPIRAMATE 100 MG: 100 TABLET, FILM COATED ORAL at 09:07

## 2022-07-02 RX ADMIN — LEVOTHYROXINE SODIUM 25 MCG: 25 TABLET ORAL at 09:07

## 2022-07-02 RX ADMIN — LEVETIRACETAM 500 MG: 500 TABLET, FILM COATED ORAL at 09:07

## 2022-07-02 RX ADMIN — LEVOTHYROXINE SODIUM 200 MCG: 100 TABLET ORAL at 09:07

## 2022-07-02 RX ADMIN — ASPIRIN 81 MG: 81 TABLET, COATED ORAL at 09:07

## 2022-07-02 RX ADMIN — FLUOXETINE 20 MG: 20 CAPSULE ORAL at 09:07

## 2022-07-02 NOTE — PLAN OF CARE
Problem: Adult Inpatient Plan of Care  Goal: Plan of Care Review  Outcome: Ongoing, Progressing  Flowsheets (Taken 7/1/2022 2138)  Plan of Care Reviewed With: patient  Goal: Patient-Specific Goal (Individualized)  Outcome: Ongoing, Progressing  Flowsheets (Taken 7/1/2022 2138)  Patient-Specific Goals (Include Timeframe): to go  back to NH  Goal: Absence of Hospital-Acquired Illness or Injury  Outcome: Ongoing, Progressing  Intervention: Prevent Skin Injury  Flowsheets (Taken 7/1/2022 2138)  Skin Protection:   adhesive use limited   protective footwear used   incontinence pads utilized  Intervention: Prevent Infection  Flowsheets (Taken 7/1/2022 2138)  Infection Prevention:   rest/sleep promoted   single patient room provided   hand hygiene promoted

## 2022-07-02 NOTE — PLAN OF CARE
Pt is returning to [facility] home today,  Transport per [facility].  Nurse Ce co-ordinated.  Updated clinical sent to [facility] thru [facility]

## 2022-07-11 NOTE — DISCHARGE SUMMARY
[facility] Hospital Medicine Discharge Summary    Admit Date: 6/26/2022  Discharge Date and Time: 7/2/2022  2:07 PM   Discharging Physician: El Orantes MD.  Primary Care Physician: No primary care provider on file.  Consults: orthopedic surgery    Discharge Diagnoses:  Presumed left femoral neck fracture, closed/displaced  L5 compression fracutre  Mild leukocytosis, suspect reactive  IDDM II  Essential HTN  h/o cardiomyopathy, compensated  ERICA on CPAP  Seizure disorder  h/o hydrocephalus  h/o Celiac's disease    Hospital Course:   A 76 year old female with multiple medica comorbidities and bed-bound, presented to the ED 6/26 from the NH following an unwitnessed fall.  Patient states that she fell out of the bed and hit her head.  Patient's daughter-in-law at the bedside states that the patient does not walk, mostly bed-bound  Patient's only complaint is a left-sided headache. ED lab work showed WBC 13.4, all other indices unremarkable.  CT head negative for acute intracranial abnormality.  CT C-spine negative.  XR noted questionable left hip fracture.  Orthopedics was consulted and patient was admitted to Hospital Medicine for further management.  Seen by Ortho CT ordered, but would be a poor candidate for surgical intervention.  CT of the LLE notes possible displaced avulsion fracture involving posterior facet of the left greater trochanter.  Surgical intervention no recommended, and patient/family also against surgery.  Patient is a poor surgical candidate.    Although chest pain is most likely positional and musculoskeletal will get EKG and troponin rule out ACS.  Troponin x2 negative, discharge the pt today.   Hold all antihypertensives  Cautious with narcotics, continue multimodal analgesics  Will continue to monitor for now.  Otherwise continue to monitor    Vitals:  VITAL SIGNS: 24 HRS MIN & MAX LAST   No data recorded 98 °F (36.7 °C)   No data recorded 128/73   No data  recorded  67   No data recorded 18   No data recorded 97 %       Physical Exam:  GENERAL: Awake and in mild distress due to pain  LUNGS: CTA B/L  CVS: Normal rate  GI/: Soft, NT, bowel sounds positive.  EXTREMITIES: perihperal pusles 1+  NEURO: Awake and alert  PSYCH: Cooperative    Procedures Performed: No admission procedures for hospital encounter.     Significant Diagnostic Studies: See Full reports for all details    No results for input(s): WBC, RBC, HGB, HCT, MCV, MCH, MCHC, RDW, PLT, MPV, GRAN, LYMPH, MONO, BASO, NRBC in the last 168 hours.    No results for input(s): NA, K, CL, CO2, ANIONGAP, BUN, CREATININE, GLU, CALCIUM, PH, MG, ALBUMIN, PROT, ALKPHOS, ALT, AST, BILITOT in the last 168 hours.     Microbiology Results (last 7 days)     ** No results found for the last 168 hours. **           X-Ray Hip 2 or 3 views Left (with Pelvis when performed)  Narrative: EXAMINATION:  XR HIP WITH PELVIS WHEN PERFORMED, 2 OR 3 VIEWS LEFT    CLINICAL HISTORY:  pain, left leg shortened, rotated;    TECHNIQUE:  AP view of the pelvis and frog leg lateral view of the left hip.    COMPARISON:  No relevant comparison studies available at time of dictation.    FINDINGS:  No acute fracture identified.  Left hip aligned with moderate underlying degenerative changes.  A catheter overlies the lower pelvis.  Impression: No acute osseous process appreciated.    Electronically signed by: Elfego Dobbs  Date:    06/27/2022  Time:    11:06  CT Pelvis Without Contrast  Narrative: EXAMINATION:  CT PELVIS WITHOUT CONTRAST    CLINICAL HISTORY:  Fx;    TECHNIQUE:  Unenhanced axial images of the pelvis and right hip were obtained along with coronal and sagittal reconstructions.  Automatic exposure control was used to reduce radiation exposure to the patient.    COMPARISON:  CT scan pelvis used for comparison dated 06/29/2014 a suprapubic catheter is in place.  Thickening of the bladder wall consistent with cystitis.    FINDINGS:  The bones  are diffusely demineralized.  10% loss of height noted to the L5 vertebral body with 3 mm anterolisthesis at L4.  Vertebral augmentation at L1 with residual compression deformity.  Facet joints are well articulated.  The transpedicular shawnee and screw construct is partially visualized in the field of view.    A tiny calcification present adjacent to the posterior facet of the left greater trochanter is favored to represent a displaced avulsion fracture and is best seen on image 75 of series 2..  A contour deformity is present to the left pubic body.  Hypertrophic changes present to the acetabula consistent with osteoarthritis.  No evidence for avascular necrosis.    Nonspecific inflammatory changes are present about the pelvis along with subcutaneous edema that may represent anasarca.  A radiopaque catheter is coiled within the right lower quadrant and is not entirely included in the field of view.  Renal cortical scarring and bilateral extrarenal pelvises are incidentally noted.  The uterus is not clearly visible and may be surgically absent.  Impression: A tiny calcification present adjacent to the posterior facet of the left greater trochanter is favored to represent a displaced avulsion fracture.  Please correlate with the patient's traumatic history and consider MRI to confirm if additional imaging is desired.    Contour deformity to the left pubic body which is not favored to represent an acute fracture.  This may represent an old healed fracture    Subtle wedge compression deformity to the L5 vertebral body favored to represent an age indeterminate superior endplate compression fracture with less than 10% loss of height.  L1 vertebral augmentation and transpedicular fusion not included in the field of view.    Diffuse demineralization    Suprapubic catheter with suspected cystitis.    Nonspecific inflammatory changes are present to the subcutaneous tissue about the pelvis consistent with anasarca or  cellulitis.    Other findings as above    Electronically signed by: Ck Brian  Date:    06/27/2022  Time:    08:40         Medication List      START taking these medications    HYDROcodone-acetaminophen  mg per tablet  Commonly known as: NORCO  Take 1 tablet by mouth every 6 (six) hours as needed for Pain.        CONTINUE taking these medications    amitriptyline 25 MG tablet  Commonly known as: ELAVIL     aspirin 81 MG EC tablet  Commonly known as: ECOTRIN  Take 1 tablet (81 mg total) by mouth once daily.     FLUoxetine 20 MG capsule     furosemide 40 MG tablet  Commonly known as: LASIX     insulin detemir U-100 100 unit/mL injection  Commonly known as: Levemir     levETIRAcetam 750 MG Tab  Commonly known as: KEPPRA     * levothyroxine 25 MCG tablet  Commonly known as: SYNTHROID     * levothyroxine 200 MCG tablet  Commonly known as: SYNTHROID     nitrofurantoin 100 MG capsule  Commonly known as: MACRODANTIN     pantoprazole 40 MG tablet  Commonly known as: PROTONIX  Take 1 tablet (40 mg total) by mouth once daily.     simvastatin 40 MG tablet  Commonly known as: ZOCOR     solifenacin 5 MG tablet  Commonly known as: VESICARE     topiramate 100 MG tablet  Commonly known as: TOPAMAX         * This list has 2 medication(s) that are the same as other medications prescribed for you. Read the directions carefully, and ask your doctor or other care provider to review them with you.            STOP taking these medications    lisinopriL 5 MG tablet  Commonly known as: PRINIVIL,ZESTRIL     metFORMIN 1000 MG tablet  Commonly known as: GLUCOPHAGE     metoprolol succinate 50 MG 24 hr tablet  Commonly known as: TOPROL-XL     nitroGLYCERIN 0.4 MG/DOSE TL SPRY 400 mcg/spray spray  Commonly known as: NITROLINGUAL     triazolam 0.25 MG Tab  Commonly known as: HALCION           Where to Get Your Medications      You can get these medications from any pharmacy    Bring a paper prescription for each of these  medications  · HYDROcodone-acetaminophen  mg per tablet          Explained in detail to the patient about the discharge plan, medications, and follow-up visits. Pt understands and agrees with the treatment plan  Discharge Disposition: Skilled Nursing Facility   Discharged Condition: stable  Diet-    Medications Per DC med rec  Activities as tolerated    For further questions contact hospitalist office    Discharge time 33 minutes    For worsening symptoms, chest pain, shortness of breath, increased abdominal pain, high grade fever, stroke or stroke like symptoms, immediately go to the nearest Emergency Room or call 911 as soon as possible.      El Boogie M.D, on 7/11/2022. at 3:38 PM.         30 year-old  male, with history of depression and anxiety, with prior in-patient hospitalization (2), with no prior suicidal ideation or suicide attempt, presenting self-referred for medication refill in the setting of not being linked with psychiatrist.    Patient is calm and cooperative, pleasant; linear, organized. Patient has no presence of thought disorder. Reports good sleep and appetite. Denies depressed mood or anhedonia, hopelessness or suicidal ideation. Denies manic / psychotic symptoms. No delusions elicited. Reports positive therapeutic relationships and strong social supports.      Patient reports needing his medication secondary to running out of them in the setting of not being link with psychiatrist at [facility]. Reports future orientation, with motivation to continue outpatient treatment. Engaged in safety planning. Patient denying any other needs or complaints.

## 2022-07-24 ENCOUNTER — HOSPITAL ENCOUNTER (EMERGENCY)
Facility: HOSPITAL | Age: 77
Discharge: HOME | End: 2022-07-24
Attending: EMERGENCY MEDICINE
Payer: MEDICARE

## 2022-07-24 VITALS
OXYGEN SATURATION: 96 % | TEMPERATURE: 98 F | SYSTOLIC BLOOD PRESSURE: 119 MMHG | BODY MASS INDEX: 39.14 KG/M2 | HEIGHT: 56 IN | HEART RATE: 86 BPM | WEIGHT: 174 LBS | DIASTOLIC BLOOD PRESSURE: 70 MMHG | RESPIRATION RATE: 18 BRPM

## 2022-07-24 DIAGNOSIS — T83.010A SUPRAPUBIC CATHETER DYSFUNCTION, INITIAL ENCOUNTER: Primary | ICD-10-CM

## 2022-07-24 PROCEDURE — 99285 EMERGENCY DEPT VISIT HI MDM: CPT | Mod: 25

## 2022-07-24 PROCEDURE — 63600175 PHARM REV CODE 636 W HCPCS: Performed by: EMERGENCY MEDICINE

## 2022-07-24 PROCEDURE — 96372 THER/PROPH/DIAG INJ SC/IM: CPT | Performed by: EMERGENCY MEDICINE

## 2022-07-24 RX ORDER — CEPHALEXIN 500 MG/1
500 CAPSULE ORAL 4 TIMES DAILY
Qty: 20 CAPSULE | Refills: 0 | Status: SHIPPED | OUTPATIENT
Start: 2022-07-24 | End: 2022-07-29

## 2022-07-24 RX ORDER — CEFTRIAXONE 1 G/1
500 INJECTION, POWDER, FOR SOLUTION INTRAMUSCULAR; INTRAVENOUS
Status: COMPLETED | OUTPATIENT
Start: 2022-07-24 | End: 2022-07-24

## 2022-07-24 RX ADMIN — CEFTRIAXONE SODIUM 500 MG: 1 INJECTION, POWDER, FOR SOLUTION INTRAMUSCULAR; INTRAVENOUS at 07:07

## 2022-07-24 NOTE — ED PROVIDER NOTES
Encounter Date: 7/24/2022       History     Chief Complaint   Patient presents with    Suprapubic cath problem     Patient sent from [facility], suprapubic cath changed at 1330 today no urine output, hx of CVA     Patient is a 76-year-old female that was transferred from nursing home secondary to displaced suprapubic catheter.  Patient's family member states super pubic catheter became displaced earlier today and nursing staff nursing home try to replace it.  She was sent to the ER because there is no urine out put from the catheter.  Family member states that patient had this suprapubic catheter placed approximately 6 months ago by Dr. Szymanski.        Review of patient's allergies indicates:   Allergen Reactions    Ambien [zolpidem]      hallucinates    Gluten protein     Wheat containing prod      Past Medical History:   Diagnosis Date    Arthritis     Back pain     Celiac disease     CHF (congestive heart failure)     Diabetes mellitus     Encounter for blood transfusion     Fluid retention     Goiter     Hydrocephalus     Stroke      Past Surgical History:   Procedure Laterality Date    ABDOMINAL SURGERY      APPENDECTOMY      BACK SURGERY      BLADDER SURGERY      BRAIN SURGERY      CAROTID ENDARTERECTOMY      CHOLECYSTECTOMY      CSF SHUNT      HERNIA REPAIR      HYSTERECTOMY      TONSILLECTOMY      VASCULAR SURGERY       No family history on file.  Social History     Tobacco Use    Smoking status: Never Smoker   Substance Use Topics    Alcohol use: No    Drug use: No     Review of Systems   Constitutional: Negative.    Respiratory: Negative.    Cardiovascular: Negative.    Gastrointestinal: Negative.    Genitourinary: Negative for hematuria and vaginal bleeding.        Displaced suprapubic catheter.   Musculoskeletal: Negative.    Neurological: Negative for dizziness, numbness and headaches.   Psychiatric/Behavioral: Negative for agitation.       Physical Exam     Initial Vitals  [07/24/22 1752]   BP Pulse Resp Temp SpO2   102/68 88 18 97.5 °F (36.4 °C) 98 %      MAP       --         Physical Exam    Nursing note and vitals reviewed.  Constitutional: She appears well-developed and well-nourished.   Cardiovascular: Normal rate, regular rhythm and normal heart sounds.   Abdominal: Abdomen is soft. She exhibits no distension. There is no abdominal tenderness. There is no guarding.   Genitourinary:    Genitourinary Comments: No urine within suprapubic catheter tubing.  Suprapubic ostomy site is several inches to the right of midline.       Neurological: She is alert. She has normal strength.   Skin: Skin is warm. No rash noted. No erythema.   Psychiatric: She has a normal mood and affect.         ED Course   Procedures  Labs Reviewed - No data to display       Imaging Results          CT Pelvis Without Contrast (Final result)  Result time 07/24/22 18:48:56    Final result by Jarod Mae MD (07/24/22 18:48:56)                 Impression:      Right approach suprapubic catheter terminates within the anterior pelvic wall musculature and does not enter into the urinary bladder.  Please reposition.      Electronically signed by: Jarod Mae  Date:    07/24/2022  Time:    18:48             Narrative:    EXAMINATION:  CT PELVIS WITHOUT CONTRAST    CLINICAL HISTORY:  Suprapubic cath placement;    TECHNIQUE:  Multidetector axial images were performed of the pelvis without administration of contrast.  Images were reconstructed.    Dose length product was 755 mGycm. Automated radiation control was utilized to minimize radiation dose.    COMPARISON:  June 27, 2022.    FINDINGS:  There has been interval replacement of Asuprapubic urinary bladder catheter.  The new catheter subcutaneously enters right anterolateral aspect of the pelvis on image 50 and terminates within the right anterior pelvic wall musculature on image 42 series 3.  The catheter does not traverses into the urinary bladder lumen.  Along  the path of the new catheter are subcutaneous inflammations, small air locules and also small fluid collection measuring 3.2 x 2.5 cm on image 45 series 3.  There is mild thickening of the right anterolateral wall of the urinary bladder likely related to previous catheter placement.  There is adjacent track of thick opacity related to previous catheter placement.  No urinary bladder lumen calculus.    Bilateral inguinal fat containing hernias.  Noninflamed diverticulosis coli.  There is similar some rectosigmoid mural thickening probably contributed by decompression.  No intra-abdominopelvic free fluid.  Bilateral mild dilatation of the kidneys pelvis without hydroureter is without interval difference.  Osseous findings as described in the recent CT pelvis report.                                 Medications   cefTRIAXone injection 500 mg (has no administration in time range)                 ED Course as of 07/24/22 1937   Sun Jul 24, 2022 1918 CT pelvis shows that suprapubic catheter is not in the bladder.  I tried to manipulate catheter but could not advance it into the bladder. [KG]   1920 Dr.A. Trinh-recommends remove suprapubic catheter at, either place Banda catheter or just have patient urinate in her diaper.  Patient to follow-up with Dr. Szymanski. [KG]   1921 I discussed option of Badna versus no Banda.  Daughter and patient agree that they do not want a Banda cath at this time,  the patient will use a bathroom in her diaper. [KG]   1922 Patient is in no apparent distress.  Wound dressing will be put over the suprapubic ostomy site. [KG]      ED Course User Index  [KG] Wilfrido Mccoy MD             Clinical Impression:   Final diagnoses:  [T83.010A] Suprapubic catheter dysfunction, initial encounter (Primary)          ED Disposition Condition    Discharge Stable        ED Prescriptions     Medication Sig Dispense Start Date End Date Auth. Provider    cephALEXin (KEFLEX) 500 MG capsule Take 1 capsule  (500 mg total) by mouth 4 (four) times daily. for 5 days 20 capsule 7/24/2022 7/29/2022 Wilfrido Mccoy MD        Follow-up Information     Follow up With Specialties Details Why Contact Info    [facility] - Emergency Dept Emergency Medicine  As needed [address] [address]  [phone]           Wilfrido Mccoy MD  07/24/22 1934       Wilfrido Mccoy MD  07/24/22 1935       Wilfrido Mccoy MD  07/24/22 1937

## 2022-07-25 NOTE — DISCHARGE INSTRUCTIONS
Follow-up with Dr. Szymanski, urologist later this week secondary to dislodged suprapubic catheter.

## 2022-09-14 ENCOUNTER — LAB REQUISITION (OUTPATIENT)
Dept: LAB | Facility: HOSPITAL | Age: 77
End: 2022-09-14
Payer: MEDICARE

## 2022-09-14 DIAGNOSIS — D64.9 ANEMIA, UNSPECIFIED: ICD-10-CM

## 2022-09-14 LAB
BASOPHILS # BLD AUTO: 0.06 X10(3)/MCL (ref 0–0.2)
BASOPHILS NFR BLD AUTO: 0.4 %
EOSINOPHIL # BLD AUTO: 0.86 X10(3)/MCL (ref 0–0.9)
EOSINOPHIL NFR BLD AUTO: 6.3 %
ERYTHROCYTE [DISTWIDTH] IN BLOOD BY AUTOMATED COUNT: 17.1 % (ref 11.5–17)
HCT VFR BLD AUTO: 34 % (ref 37–47)
HGB BLD-MCNC: 10.5 GM/DL (ref 12–16)
IMM GRANULOCYTES # BLD AUTO: 0.05 X10(3)/MCL (ref 0–0.04)
IMM GRANULOCYTES NFR BLD AUTO: 0.4 %
LYMPHOCYTES # BLD AUTO: 4.73 X10(3)/MCL (ref 0.6–4.6)
LYMPHOCYTES NFR BLD AUTO: 34.7 %
MCH RBC QN AUTO: 26.5 PG (ref 27–31)
MCHC RBC AUTO-ENTMCNC: 30.9 MG/DL (ref 33–36)
MCV RBC AUTO: 85.9 FL (ref 80–94)
MONOCYTES # BLD AUTO: 0.9 X10(3)/MCL (ref 0.1–1.3)
MONOCYTES NFR BLD AUTO: 6.6 %
NEUTROPHILS # BLD AUTO: 7 X10(3)/MCL (ref 2.1–9.2)
NEUTROPHILS NFR BLD AUTO: 51.6 %
NRBC BLD AUTO-RTO: 0 %
PLATELET # BLD AUTO: 224 X10(3)/MCL (ref 130–400)
PMV BLD AUTO: 10.6 FL (ref 7.4–10.4)
RBC # BLD AUTO: 3.96 X10(6)/MCL (ref 4.2–5.4)
WBC # SPEC AUTO: 13.6 X10(3)/MCL (ref 4.5–11.5)

## 2022-09-14 PROCEDURE — 85025 COMPLETE CBC W/AUTO DIFF WBC: CPT | Performed by: INTERNAL MEDICINE

## 2022-10-06 ENCOUNTER — LAB REQUISITION (OUTPATIENT)
Dept: LAB | Facility: HOSPITAL | Age: 77
End: 2022-10-06
Payer: MEDICARE

## 2022-10-06 DIAGNOSIS — E78.5 HYPERLIPIDEMIA, UNSPECIFIED: ICD-10-CM

## 2022-10-06 DIAGNOSIS — I10 ESSENTIAL (PRIMARY) HYPERTENSION: ICD-10-CM

## 2022-10-06 LAB
ALBUMIN SERPL-MCNC: 2.6 GM/DL (ref 3.4–4.8)
ALBUMIN/GLOB SERPL: 1 RATIO (ref 1.1–2)
ALP SERPL-CCNC: 75 UNIT/L (ref 40–150)
ALT SERPL-CCNC: 14 UNIT/L (ref 0–55)
AST SERPL-CCNC: 10 UNIT/L (ref 5–34)
BASOPHILS # BLD AUTO: 0.04 X10(3)/MCL (ref 0–0.2)
BASOPHILS NFR BLD AUTO: 0.5 %
BILIRUBIN DIRECT+TOT PNL SERPL-MCNC: 0.3 MG/DL
BUN SERPL-MCNC: 19.3 MG/DL (ref 9.8–20.1)
CALCIUM SERPL-MCNC: 8.6 MG/DL (ref 8.4–10.2)
CHLORIDE SERPL-SCNC: 101 MMOL/L (ref 98–107)
CHOLEST SERPL-MCNC: 198 MG/DL
CHOLEST/HDLC SERPL: 6 {RATIO} (ref 0–5)
CO2 SERPL-SCNC: 29 MMOL/L (ref 23–31)
CREAT SERPL-MCNC: 0.61 MG/DL (ref 0.55–1.02)
EOSINOPHIL # BLD AUTO: 0.29 X10(3)/MCL (ref 0–0.9)
EOSINOPHIL NFR BLD AUTO: 3.4 %
ERYTHROCYTE [DISTWIDTH] IN BLOOD BY AUTOMATED COUNT: 16.9 % (ref 11.5–17)
EST. AVERAGE GLUCOSE BLD GHB EST-MCNC: 99.7 MG/DL
GFR SERPLBLD CREATININE-BSD FMLA CKD-EPI: >60 MLS/MIN/1.73/M2
GLOBULIN SER-MCNC: 2.7 GM/DL (ref 2.4–3.5)
GLUCOSE SERPL-MCNC: 85 MG/DL (ref 82–115)
HBA1C MFR BLD: 5.1 %
HCT VFR BLD AUTO: 32.1 % (ref 37–47)
HDLC SERPL-MCNC: 35 MG/DL (ref 35–60)
HGB BLD-MCNC: 9.7 GM/DL (ref 12–16)
IMM GRANULOCYTES # BLD AUTO: 0.03 X10(3)/MCL (ref 0–0.04)
IMM GRANULOCYTES NFR BLD AUTO: 0.4 %
LDLC SERPL CALC-MCNC: 131 MG/DL (ref 50–140)
LYMPHOCYTES # BLD AUTO: 3.25 X10(3)/MCL (ref 0.6–4.6)
LYMPHOCYTES NFR BLD AUTO: 38.3 %
MCH RBC QN AUTO: 26.8 PG (ref 27–31)
MCHC RBC AUTO-ENTMCNC: 30.2 MG/DL (ref 33–36)
MCV RBC AUTO: 88.7 FL (ref 80–94)
MONOCYTES # BLD AUTO: 0.76 X10(3)/MCL (ref 0.1–1.3)
MONOCYTES NFR BLD AUTO: 9 %
NEUTROPHILS # BLD AUTO: 4.1 X10(3)/MCL (ref 2.1–9.2)
NEUTROPHILS NFR BLD AUTO: 48.4 %
NRBC BLD AUTO-RTO: 0 %
PLATELET # BLD AUTO: 219 X10(3)/MCL (ref 130–400)
PMV BLD AUTO: 11 FL (ref 7.4–10.4)
POTASSIUM SERPL-SCNC: 4 MMOL/L (ref 3.5–5.1)
PROT SERPL-MCNC: 5.3 GM/DL (ref 5.8–7.6)
RBC # BLD AUTO: 3.62 X10(6)/MCL (ref 4.2–5.4)
SODIUM SERPL-SCNC: 139 MMOL/L (ref 136–145)
TRIGL SERPL-MCNC: 158 MG/DL (ref 37–140)
VLDLC SERPL CALC-MCNC: 32 MG/DL
WBC # SPEC AUTO: 8.5 X10(3)/MCL (ref 4.5–11.5)

## 2022-10-06 PROCEDURE — 80061 LIPID PANEL: CPT | Performed by: INTERNAL MEDICINE

## 2022-10-06 PROCEDURE — 83036 HEMOGLOBIN GLYCOSYLATED A1C: CPT | Performed by: INTERNAL MEDICINE

## 2022-10-06 PROCEDURE — 80053 COMPREHEN METABOLIC PANEL: CPT | Performed by: INTERNAL MEDICINE

## 2022-10-06 PROCEDURE — 85025 COMPLETE CBC W/AUTO DIFF WBC: CPT | Performed by: INTERNAL MEDICINE

## 2022-12-21 ENCOUNTER — LAB REQUISITION (OUTPATIENT)
Dept: LAB | Facility: HOSPITAL | Age: 77
End: 2022-12-21
Payer: MEDICARE

## 2022-12-21 DIAGNOSIS — I50.9 HEART FAILURE, UNSPECIFIED: ICD-10-CM

## 2022-12-21 LAB
ANION GAP SERPL CALC-SCNC: 9 MEQ/L
BUN SERPL-MCNC: 30.5 MG/DL (ref 9.8–20.1)
CALCIUM SERPL-MCNC: 8.4 MG/DL (ref 8.4–10.2)
CHLORIDE SERPL-SCNC: 101 MMOL/L (ref 98–107)
CO2 SERPL-SCNC: 30 MMOL/L (ref 23–31)
CREAT SERPL-MCNC: 0.74 MG/DL (ref 0.55–1.02)
CREAT/UREA NIT SERPL: 41
GFR SERPLBLD CREATININE-BSD FMLA CKD-EPI: >60 MLS/MIN/1.73/M2
GLUCOSE SERPL-MCNC: 144 MG/DL (ref 82–115)
POTASSIUM SERPL-SCNC: 4.3 MMOL/L (ref 3.5–5.1)
SODIUM SERPL-SCNC: 140 MMOL/L (ref 136–145)

## 2022-12-21 PROCEDURE — 80048 BASIC METABOLIC PNL TOTAL CA: CPT | Performed by: INTERNAL MEDICINE

## 2023-01-12 ENCOUNTER — LAB REQUISITION (OUTPATIENT)
Dept: LAB | Facility: HOSPITAL | Age: 78
End: 2023-01-12
Payer: MEDICARE

## 2023-01-12 DIAGNOSIS — M86.9 OSTEOMYELITIS, UNSPECIFIED: ICD-10-CM

## 2023-01-12 LAB
ALBUMIN SERPL-MCNC: 2.8 G/DL (ref 3.4–4.8)
ALBUMIN/GLOB SERPL: 0.8 RATIO (ref 1.1–2)
ALP SERPL-CCNC: 60 UNIT/L (ref 40–150)
ALT SERPL-CCNC: 13 UNIT/L (ref 0–55)
AST SERPL-CCNC: 12 UNIT/L (ref 5–34)
BASOPHILS # BLD AUTO: 0.05 X10(3)/MCL (ref 0–0.2)
BASOPHILS NFR BLD AUTO: 0.5 %
BILIRUBIN DIRECT+TOT PNL SERPL-MCNC: <0.5 MG/DL
BUN SERPL-MCNC: 25.7 MG/DL (ref 9.8–20.1)
CALCIUM SERPL-MCNC: 9.4 MG/DL (ref 8.4–10.2)
CHLORIDE SERPL-SCNC: 101 MMOL/L (ref 98–107)
CO2 SERPL-SCNC: 28 MMOL/L (ref 23–31)
CREAT SERPL-MCNC: 0.73 MG/DL (ref 0.55–1.02)
CRP SERPL HS-MCNC: 26 MG/L
EOSINOPHIL # BLD AUTO: 0.49 X10(3)/MCL (ref 0–0.9)
EOSINOPHIL NFR BLD AUTO: 5.3 %
ERYTHROCYTE [DISTWIDTH] IN BLOOD BY AUTOMATED COUNT: 17.1 % (ref 11.5–17)
ERYTHROCYTE [SEDIMENTATION RATE] IN BLOOD: 57 MM/HR (ref 0–20)
GFR SERPLBLD CREATININE-BSD FMLA CKD-EPI: >60 MLS/MIN/1.73/M2
GLOBULIN SER-MCNC: 3.4 GM/DL (ref 2.4–3.5)
GLUCOSE SERPL-MCNC: 142 MG/DL (ref 82–115)
HCT VFR BLD AUTO: 33.9 % (ref 37–47)
HGB BLD-MCNC: 10.2 GM/DL (ref 12–16)
IMM GRANULOCYTES # BLD AUTO: 0.04 X10(3)/MCL (ref 0–0.04)
IMM GRANULOCYTES NFR BLD AUTO: 0.4 %
LYMPHOCYTES # BLD AUTO: 3.67 X10(3)/MCL (ref 0.6–4.6)
LYMPHOCYTES NFR BLD AUTO: 40 %
MCH RBC QN AUTO: 25.5 PG
MCHC RBC AUTO-ENTMCNC: 30.1 MG/DL (ref 33–36)
MCV RBC AUTO: 84.8 FL (ref 80–94)
MONOCYTES # BLD AUTO: 0.64 X10(3)/MCL (ref 0.1–1.3)
MONOCYTES NFR BLD AUTO: 7 %
NEUTROPHILS # BLD AUTO: 4.29 X10(3)/MCL (ref 2.1–9.2)
NEUTROPHILS NFR BLD AUTO: 46.8 %
NRBC BLD AUTO-RTO: 0 %
PLATELET # BLD AUTO: 197 X10(3)/MCL (ref 130–400)
PMV BLD AUTO: 10.2 FL (ref 7.4–10.4)
POTASSIUM SERPL-SCNC: 3.9 MMOL/L (ref 3.5–5.1)
PROT SERPL-MCNC: 6.2 GM/DL (ref 5.8–7.6)
RBC # BLD AUTO: 4 X10(6)/MCL (ref 4.2–5.4)
SODIUM SERPL-SCNC: 141 MMOL/L (ref 136–145)
WBC # SPEC AUTO: 9.2 X10(3)/MCL (ref 4.5–11.5)

## 2023-01-12 PROCEDURE — 85651 RBC SED RATE NONAUTOMATED: CPT | Performed by: INTERNAL MEDICINE

## 2023-01-12 PROCEDURE — 86141 C-REACTIVE PROTEIN HS: CPT | Performed by: INTERNAL MEDICINE

## 2023-01-12 PROCEDURE — 80053 COMPREHEN METABOLIC PANEL: CPT | Performed by: INTERNAL MEDICINE

## 2023-01-12 PROCEDURE — 85025 COMPLETE CBC W/AUTO DIFF WBC: CPT | Performed by: INTERNAL MEDICINE

## 2023-02-02 ENCOUNTER — LAB REQUISITION (OUTPATIENT)
Dept: LAB | Facility: HOSPITAL | Age: 78
End: 2023-02-02
Payer: MEDICARE

## 2023-02-02 DIAGNOSIS — M86.9 OSTEOMYELITIS, UNSPECIFIED: ICD-10-CM

## 2023-02-02 LAB
ALBUMIN SERPL-MCNC: 3 G/DL (ref 3.4–4.8)
ALBUMIN/GLOB SERPL: 1.1 RATIO (ref 1.1–2)
ALP SERPL-CCNC: 65 UNIT/L (ref 40–150)
ALT SERPL-CCNC: 26 UNIT/L (ref 0–55)
ANISOCYTOSIS BLD QL SMEAR: ABNORMAL
AST SERPL-CCNC: 9 UNIT/L (ref 5–34)
BASOPHILS # BLD AUTO: 0.06 X10(3)/MCL (ref 0–0.2)
BASOPHILS NFR BLD AUTO: 0.5 %
BILIRUBIN DIRECT+TOT PNL SERPL-MCNC: 0.2 MG/DL
BUN SERPL-MCNC: 26.8 MG/DL (ref 9.8–20.1)
CALCIUM SERPL-MCNC: 8.5 MG/DL (ref 8.4–10.2)
CHLORIDE SERPL-SCNC: 100 MMOL/L (ref 98–107)
CO2 SERPL-SCNC: 28 MMOL/L (ref 23–31)
CREAT SERPL-MCNC: 0.7 MG/DL (ref 0.55–1.02)
CRP SERPL HS-MCNC: 12 MG/L
EOSINOPHIL # BLD AUTO: 0.59 X10(3)/MCL (ref 0–0.9)
EOSINOPHIL NFR BLD AUTO: 5 %
ERYTHROCYTE [DISTWIDTH] IN BLOOD BY AUTOMATED COUNT: 17 % (ref 11.5–17)
ERYTHROCYTE [SEDIMENTATION RATE] IN BLOOD: 42 MM/HR (ref 0–20)
GFR SERPLBLD CREATININE-BSD FMLA CKD-EPI: >60 MLS/MIN/1.73/M2
GLOBULIN SER-MCNC: 2.8 GM/DL (ref 2.4–3.5)
GLUCOSE SERPL-MCNC: 100 MG/DL (ref 82–115)
HCT VFR BLD AUTO: 36.4 % (ref 37–47)
HGB BLD-MCNC: 10.9 GM/DL (ref 12–16)
HYPOCHROMIA BLD QL SMEAR: ABNORMAL
IMM GRANULOCYTES # BLD AUTO: 0.05 X10(3)/MCL (ref 0–0.04)
IMM GRANULOCYTES NFR BLD AUTO: 0.4 %
LYMPHOCYTES # BLD AUTO: 4.64 X10(3)/MCL (ref 0.6–4.6)
LYMPHOCYTES NFR BLD AUTO: 39.6 %
MACROCYTES BLD QL SMEAR: SLIGHT
MCH RBC QN AUTO: 25.8 PG
MCHC RBC AUTO-ENTMCNC: 29.9 MG/DL (ref 33–36)
MCV RBC AUTO: 86.3 FL (ref 80–94)
MICROCYTES BLD QL SMEAR: SLIGHT
MONOCYTES # BLD AUTO: 0.81 X10(3)/MCL (ref 0.1–1.3)
MONOCYTES NFR BLD AUTO: 6.9 %
NEUTROPHILS # BLD AUTO: 5.58 X10(3)/MCL (ref 2.1–9.2)
NEUTROPHILS NFR BLD AUTO: 47.6 %
NRBC BLD AUTO-RTO: 0 %
PLATELET # BLD AUTO: 198 X10(3)/MCL (ref 130–400)
PLATELET # BLD EST: ADEQUATE 10*3/UL
PMV BLD AUTO: 10.5 FL (ref 7.4–10.4)
POTASSIUM SERPL-SCNC: 4.6 MMOL/L (ref 3.5–5.1)
PROT SERPL-MCNC: 5.8 GM/DL (ref 5.8–7.6)
RBC # BLD AUTO: 4.22 X10(6)/MCL (ref 4.2–5.4)
RBC MORPH BLD: ABNORMAL
SODIUM SERPL-SCNC: 138 MMOL/L (ref 136–145)
WBC # SPEC AUTO: 11.7 X10(3)/MCL (ref 4.5–11.5)

## 2023-02-02 PROCEDURE — 85651 RBC SED RATE NONAUTOMATED: CPT | Performed by: INTERNAL MEDICINE

## 2023-02-02 PROCEDURE — 80053 COMPREHEN METABOLIC PANEL: CPT | Performed by: INTERNAL MEDICINE

## 2023-02-02 PROCEDURE — 86141 C-REACTIVE PROTEIN HS: CPT | Performed by: INTERNAL MEDICINE

## 2023-02-02 PROCEDURE — 85025 COMPLETE CBC W/AUTO DIFF WBC: CPT | Performed by: INTERNAL MEDICINE

## 2023-03-01 ENCOUNTER — LAB REQUISITION (OUTPATIENT)
Dept: LAB | Facility: HOSPITAL | Age: 78
End: 2023-03-01
Payer: MEDICARE

## 2023-03-01 DIAGNOSIS — M86.9 OSTEOMYELITIS, UNSPECIFIED: ICD-10-CM

## 2023-03-01 LAB
ALBUMIN SERPL-MCNC: 3.1 G/DL (ref 3.4–4.8)
ALBUMIN/GLOB SERPL: 1 RATIO (ref 1.1–2)
ALP SERPL-CCNC: 73 UNIT/L (ref 40–150)
ALT SERPL-CCNC: 15 UNIT/L (ref 0–55)
AST SERPL-CCNC: 12 UNIT/L (ref 5–34)
BASOPHILS # BLD AUTO: 0.05 X10(3)/MCL (ref 0–0.2)
BASOPHILS NFR BLD AUTO: 0.5 %
BILIRUBIN DIRECT+TOT PNL SERPL-MCNC: 0.3 MG/DL
BUN SERPL-MCNC: 28.9 MG/DL (ref 9.8–20.1)
CALCIUM SERPL-MCNC: 9.4 MG/DL (ref 8.4–10.2)
CHLORIDE SERPL-SCNC: 93 MMOL/L (ref 98–107)
CO2 SERPL-SCNC: 32 MMOL/L (ref 23–31)
CREAT SERPL-MCNC: 0.83 MG/DL (ref 0.55–1.02)
CRP SERPL HS-MCNC: 4 MG/L
EOSINOPHIL # BLD AUTO: 0.47 X10(3)/MCL (ref 0–0.9)
EOSINOPHIL NFR BLD AUTO: 4.3 %
ERYTHROCYTE [DISTWIDTH] IN BLOOD BY AUTOMATED COUNT: 17 % (ref 11.5–17)
ERYTHROCYTE [SEDIMENTATION RATE] IN BLOOD: 26 MM/HR (ref 0–20)
GFR SERPLBLD CREATININE-BSD FMLA CKD-EPI: >60 MLS/MIN/1.73/M2
GLOBULIN SER-MCNC: 3 GM/DL (ref 2.4–3.5)
GLUCOSE SERPL-MCNC: 72 MG/DL (ref 82–115)
HCT VFR BLD AUTO: 38.9 % (ref 37–47)
HGB BLD-MCNC: 11.9 G/DL (ref 12–16)
IMM GRANULOCYTES # BLD AUTO: 0.03 X10(3)/MCL (ref 0–0.04)
IMM GRANULOCYTES NFR BLD AUTO: 0.3 %
LYMPHOCYTES # BLD AUTO: 4.33 X10(3)/MCL (ref 0.6–4.6)
LYMPHOCYTES NFR BLD AUTO: 39.7 %
MCH RBC QN AUTO: 26.6 PG
MCHC RBC AUTO-ENTMCNC: 30.6 G/DL (ref 33–36)
MCV RBC AUTO: 87 FL (ref 80–94)
MONOCYTES # BLD AUTO: 0.8 X10(3)/MCL (ref 0.1–1.3)
MONOCYTES NFR BLD AUTO: 7.3 %
NEUTROPHILS # BLD AUTO: 5.22 X10(3)/MCL (ref 2.1–9.2)
NEUTROPHILS NFR BLD AUTO: 47.9 %
NRBC BLD AUTO-RTO: 0 %
PLATELET # BLD AUTO: 171 X10(3)/MCL (ref 130–400)
PMV BLD AUTO: 10.7 FL (ref 7.4–10.4)
POTASSIUM SERPL-SCNC: 4.8 MMOL/L (ref 3.5–5.1)
PROT SERPL-MCNC: 6.1 GM/DL (ref 5.8–7.6)
RBC # BLD AUTO: 4.47 X10(6)/MCL (ref 4.2–5.4)
SODIUM SERPL-SCNC: 139 MMOL/L (ref 136–145)
WBC # SPEC AUTO: 10.9 X10(3)/MCL (ref 4.5–11.5)

## 2023-03-01 PROCEDURE — 85651 RBC SED RATE NONAUTOMATED: CPT | Performed by: INTERNAL MEDICINE

## 2023-03-01 PROCEDURE — 85025 COMPLETE CBC W/AUTO DIFF WBC: CPT | Performed by: INTERNAL MEDICINE

## 2023-03-01 PROCEDURE — 80053 COMPREHEN METABOLIC PANEL: CPT | Performed by: INTERNAL MEDICINE

## 2023-03-01 PROCEDURE — 86141 C-REACTIVE PROTEIN HS: CPT | Performed by: INTERNAL MEDICINE

## 2023-04-10 ENCOUNTER — LAB REQUISITION (OUTPATIENT)
Dept: LAB | Facility: HOSPITAL | Age: 78
End: 2023-04-10
Payer: MEDICARE

## 2023-04-10 DIAGNOSIS — I10 ESSENTIAL (PRIMARY) HYPERTENSION: ICD-10-CM

## 2023-04-10 LAB
ALBUMIN SERPL-MCNC: 3.1 G/DL (ref 3.4–4.8)
ALBUMIN/GLOB SERPL: 1 RATIO (ref 1.1–2)
ALP SERPL-CCNC: 70 UNIT/L (ref 40–150)
ALT SERPL-CCNC: 14 UNIT/L (ref 0–55)
AST SERPL-CCNC: 11 UNIT/L (ref 5–34)
BASOPHILS # BLD AUTO: 0.08 X10(3)/MCL (ref 0–0.2)
BASOPHILS NFR BLD AUTO: 0.6 %
BILIRUBIN DIRECT+TOT PNL SERPL-MCNC: 0.3 MG/DL
BUN SERPL-MCNC: 19.4 MG/DL (ref 9.8–20.1)
CALCIUM SERPL-MCNC: 8.8 MG/DL (ref 8.4–10.2)
CHLORIDE SERPL-SCNC: 101 MMOL/L (ref 98–107)
CHOLEST SERPL-MCNC: 215 MG/DL
CHOLEST/HDLC SERPL: 5 {RATIO} (ref 0–5)
CO2 SERPL-SCNC: 26 MMOL/L (ref 23–31)
CREAT SERPL-MCNC: 0.77 MG/DL (ref 0.55–1.02)
EOSINOPHIL # BLD AUTO: 0.55 X10(3)/MCL (ref 0–0.9)
EOSINOPHIL NFR BLD AUTO: 4.1 %
ERYTHROCYTE [DISTWIDTH] IN BLOOD BY AUTOMATED COUNT: 16.4 % (ref 11.5–17)
EST. AVERAGE GLUCOSE BLD GHB EST-MCNC: 125.5 MG/DL
GFR SERPLBLD CREATININE-BSD FMLA CKD-EPI: >60 MLS/MIN/1.73/M2
GLOBULIN SER-MCNC: 3.1 GM/DL (ref 2.4–3.5)
GLUCOSE SERPL-MCNC: 175 MG/DL (ref 82–115)
HBA1C MFR BLD: 6 %
HCT VFR BLD AUTO: 37.9 % (ref 37–47)
HDLC SERPL-MCNC: 44 MG/DL (ref 35–60)
HGB BLD-MCNC: 11.7 G/DL (ref 12–16)
IMM GRANULOCYTES # BLD AUTO: 0.08 X10(3)/MCL (ref 0–0.04)
IMM GRANULOCYTES NFR BLD AUTO: 0.6 %
LDLC SERPL CALC-MCNC: 127 MG/DL (ref 50–140)
LYMPHOCYTES # BLD AUTO: 3.91 X10(3)/MCL (ref 0.6–4.6)
LYMPHOCYTES NFR BLD AUTO: 29 %
MCH RBC QN AUTO: 27.9 PG (ref 27–31)
MCHC RBC AUTO-ENTMCNC: 30.9 G/DL (ref 33–36)
MCV RBC AUTO: 90.5 FL (ref 80–94)
MONOCYTES # BLD AUTO: 0.78 X10(3)/MCL (ref 0.1–1.3)
MONOCYTES NFR BLD AUTO: 5.8 %
NEUTROPHILS # BLD AUTO: 8.1 X10(3)/MCL (ref 2.1–9.2)
NEUTROPHILS NFR BLD AUTO: 59.9 %
NRBC BLD AUTO-RTO: 0 %
PLATELET # BLD AUTO: 211 X10(3)/MCL (ref 130–400)
PMV BLD AUTO: 10.5 FL (ref 7.4–10.4)
POTASSIUM SERPL-SCNC: 4 MMOL/L (ref 3.5–5.1)
PROT SERPL-MCNC: 6.2 GM/DL (ref 5.8–7.6)
RBC # BLD AUTO: 4.19 X10(6)/MCL (ref 4.2–5.4)
SODIUM SERPL-SCNC: 141 MMOL/L (ref 136–145)
TRIGL SERPL-MCNC: 222 MG/DL (ref 37–140)
TSH SERPL-ACNC: 2.25 UIU/ML (ref 0.35–4.94)
VLDLC SERPL CALC-MCNC: 44 MG/DL
WBC # SPEC AUTO: 13.5 X10(3)/MCL (ref 4.5–11.5)

## 2023-04-10 PROCEDURE — 80061 LIPID PANEL: CPT | Performed by: INTERNAL MEDICINE

## 2023-04-10 PROCEDURE — 83036 HEMOGLOBIN GLYCOSYLATED A1C: CPT | Performed by: INTERNAL MEDICINE

## 2023-04-10 PROCEDURE — 84443 ASSAY THYROID STIM HORMONE: CPT | Performed by: INTERNAL MEDICINE

## 2023-04-10 PROCEDURE — 85025 COMPLETE CBC W/AUTO DIFF WBC: CPT | Performed by: INTERNAL MEDICINE

## 2023-04-10 PROCEDURE — 80053 COMPREHEN METABOLIC PANEL: CPT | Performed by: INTERNAL MEDICINE

## 2023-04-21 ENCOUNTER — HOSPITAL ENCOUNTER (OUTPATIENT)
Facility: HOSPITAL | Age: 78
Discharge: NURSING, REHAB OR LONG-TERM CARE | End: 2023-04-24
Attending: STUDENT IN AN ORGANIZED HEALTH CARE EDUCATION/TRAINING PROGRAM | Admitting: INTERNAL MEDICINE
Payer: MEDICARE

## 2023-04-21 DIAGNOSIS — T83.010A SUPRAPUBIC CATHETER DYSFUNCTION, INITIAL ENCOUNTER: Primary | ICD-10-CM

## 2023-04-21 DIAGNOSIS — B99.9 INTRA-ABDOMINAL INFECTION: ICD-10-CM

## 2023-04-21 DIAGNOSIS — N39.0 RECURRENT UTI: ICD-10-CM

## 2023-04-21 LAB
ALBUMIN SERPL-MCNC: 3 G/DL (ref 3.4–4.8)
ALBUMIN/GLOB SERPL: 0.8 RATIO (ref 1.1–2)
ALP SERPL-CCNC: 65 UNIT/L (ref 40–150)
ALT SERPL-CCNC: 12 UNIT/L (ref 0–55)
APPEARANCE UR: CLEAR
AST SERPL-CCNC: 9 UNIT/L (ref 5–34)
BACTERIA #/AREA URNS AUTO: ABNORMAL /HPF
BASOPHILS # BLD AUTO: 0.06 X10(3)/MCL (ref 0–0.2)
BASOPHILS NFR BLD AUTO: 0.5 %
BILIRUB UR QL STRIP.AUTO: NEGATIVE MG/DL
BILIRUBIN DIRECT+TOT PNL SERPL-MCNC: 0.2 MG/DL
BUN SERPL-MCNC: 28.1 MG/DL (ref 9.8–20.1)
CALCIUM SERPL-MCNC: 9.7 MG/DL (ref 8.4–10.2)
CHLORIDE SERPL-SCNC: 102 MMOL/L (ref 98–107)
CO2 SERPL-SCNC: 29 MMOL/L (ref 23–31)
COLOR UR AUTO: YELLOW
CREAT SERPL-MCNC: 0.73 MG/DL (ref 0.55–1.02)
EOSINOPHIL # BLD AUTO: 0.74 X10(3)/MCL (ref 0–0.9)
EOSINOPHIL NFR BLD AUTO: 6.5 %
ERYTHROCYTE [DISTWIDTH] IN BLOOD BY AUTOMATED COUNT: 15.9 % (ref 11.5–17)
GFR SERPLBLD CREATININE-BSD FMLA CKD-EPI: >60 MLS/MIN/1.73/M2
GLOBULIN SER-MCNC: 4 GM/DL (ref 2.4–3.5)
GLUCOSE SERPL-MCNC: 236 MG/DL (ref 82–115)
GLUCOSE UR QL STRIP.AUTO: NEGATIVE MG/DL
HCT VFR BLD AUTO: 37.7 % (ref 37–47)
HGB BLD-MCNC: 11.6 G/DL (ref 12–16)
IMM GRANULOCYTES # BLD AUTO: 0.04 X10(3)/MCL (ref 0–0.04)
IMM GRANULOCYTES NFR BLD AUTO: 0.4 %
KETONES UR QL STRIP.AUTO: NEGATIVE MG/DL
LACTATE SERPL-SCNC: 1.7 MMOL/L (ref 0.5–2.2)
LEUKOCYTE ESTERASE UR QL STRIP.AUTO: ABNORMAL UNIT/L
LYMPHOCYTES # BLD AUTO: 3.27 X10(3)/MCL (ref 0.6–4.6)
LYMPHOCYTES NFR BLD AUTO: 28.8 %
MCH RBC QN AUTO: 28.4 PG (ref 27–31)
MCHC RBC AUTO-ENTMCNC: 30.8 G/DL (ref 33–36)
MCV RBC AUTO: 92.2 FL (ref 80–94)
MONOCYTES # BLD AUTO: 0.72 X10(3)/MCL (ref 0.1–1.3)
MONOCYTES NFR BLD AUTO: 6.3 %
NEUTROPHILS # BLD AUTO: 6.53 X10(3)/MCL (ref 2.1–9.2)
NEUTROPHILS NFR BLD AUTO: 57.5 %
NITRITE UR QL STRIP.AUTO: NEGATIVE
NRBC BLD AUTO-RTO: 0 %
PH UR STRIP.AUTO: 6.5 [PH]
PLATELET # BLD AUTO: 242 X10(3)/MCL (ref 130–400)
PMV BLD AUTO: 9.8 FL (ref 7.4–10.4)
POTASSIUM SERPL-SCNC: 4.1 MMOL/L (ref 3.5–5.1)
PROT SERPL-MCNC: 7 GM/DL (ref 5.8–7.6)
PROT UR QL STRIP.AUTO: ABNORMAL MG/DL
RBC # BLD AUTO: 4.09 X10(6)/MCL (ref 4.2–5.4)
RBC #/AREA URNS AUTO: ABNORMAL /HPF
RBC UR QL AUTO: ABNORMAL UNIT/L
SODIUM SERPL-SCNC: 138 MMOL/L (ref 136–145)
SP GR UR STRIP.AUTO: 1.01
SQUAMOUS #/AREA URNS AUTO: ABNORMAL /HPF
UROBILINOGEN UR STRIP-ACNC: 0.2 MG/DL
WBC # SPEC AUTO: 11.4 X10(3)/MCL (ref 4.5–11.5)
WBC #/AREA URNS AUTO: ABNORMAL /HPF

## 2023-04-21 PROCEDURE — 96365 THER/PROPH/DIAG IV INF INIT: CPT

## 2023-04-21 PROCEDURE — 51705 CHANGE OF BLADDER TUBE: CPT | Mod: 52

## 2023-04-21 PROCEDURE — G0378 HOSPITAL OBSERVATION PER HR: HCPCS

## 2023-04-21 PROCEDURE — 80053 COMPREHEN METABOLIC PANEL: CPT | Performed by: STUDENT IN AN ORGANIZED HEALTH CARE EDUCATION/TRAINING PROGRAM

## 2023-04-21 PROCEDURE — 87040 BLOOD CULTURE FOR BACTERIA: CPT | Mod: 91 | Performed by: STUDENT IN AN ORGANIZED HEALTH CARE EDUCATION/TRAINING PROGRAM

## 2023-04-21 PROCEDURE — 99285 EMERGENCY DEPT VISIT HI MDM: CPT | Mod: 25

## 2023-04-21 PROCEDURE — 25000003 PHARM REV CODE 250: Performed by: STUDENT IN AN ORGANIZED HEALTH CARE EDUCATION/TRAINING PROGRAM

## 2023-04-21 PROCEDURE — 85025 COMPLETE CBC W/AUTO DIFF WBC: CPT | Performed by: STUDENT IN AN ORGANIZED HEALTH CARE EDUCATION/TRAINING PROGRAM

## 2023-04-21 PROCEDURE — 25500020 PHARM REV CODE 255: Performed by: STUDENT IN AN ORGANIZED HEALTH CARE EDUCATION/TRAINING PROGRAM

## 2023-04-21 PROCEDURE — 87186 SC STD MICRODIL/AGAR DIL: CPT | Performed by: STUDENT IN AN ORGANIZED HEALTH CARE EDUCATION/TRAINING PROGRAM

## 2023-04-21 PROCEDURE — 87088 URINE BACTERIA CULTURE: CPT | Performed by: STUDENT IN AN ORGANIZED HEALTH CARE EDUCATION/TRAINING PROGRAM

## 2023-04-21 PROCEDURE — 96366 THER/PROPH/DIAG IV INF ADDON: CPT | Mod: 59

## 2023-04-21 PROCEDURE — 81001 URINALYSIS AUTO W/SCOPE: CPT | Performed by: STUDENT IN AN ORGANIZED HEALTH CARE EDUCATION/TRAINING PROGRAM

## 2023-04-21 PROCEDURE — 83605 ASSAY OF LACTIC ACID: CPT | Performed by: STUDENT IN AN ORGANIZED HEALTH CARE EDUCATION/TRAINING PROGRAM

## 2023-04-21 PROCEDURE — 63600175 PHARM REV CODE 636 W HCPCS: Performed by: STUDENT IN AN ORGANIZED HEALTH CARE EDUCATION/TRAINING PROGRAM

## 2023-04-21 RX ORDER — HYDROCODONE BITARTRATE AND ACETAMINOPHEN 10; 325 MG/1; MG/1
1 TABLET ORAL
Status: COMPLETED | OUTPATIENT
Start: 2023-04-21 | End: 2023-04-21

## 2023-04-21 RX ORDER — SODIUM CHLORIDE 0.9 % (FLUSH) 0.9 %
10 SYRINGE (ML) INJECTION
Status: DISCONTINUED | OUTPATIENT
Start: 2023-04-21 | End: 2023-04-24 | Stop reason: HOSPADM

## 2023-04-21 RX ORDER — TALC
6 POWDER (GRAM) TOPICAL NIGHTLY PRN
Status: DISCONTINUED | OUTPATIENT
Start: 2023-04-21 | End: 2023-04-24 | Stop reason: HOSPADM

## 2023-04-21 RX ADMIN — IOPAMIDOL 100 ML: 755 INJECTION, SOLUTION INTRAVENOUS at 03:04

## 2023-04-21 RX ADMIN — HYDROCODONE BITARTRATE AND ACETAMINOPHEN 1 TABLET: 10; 325 TABLET ORAL at 06:04

## 2023-04-21 RX ADMIN — PIPERACILLIN AND TAZOBACTAM 4.5 G: 4; .5 INJECTION, POWDER, FOR SOLUTION INTRAVENOUS; PARENTERAL at 06:04

## 2023-04-21 NOTE — ED PROVIDER NOTES
Encounter Date: 4/21/2023       History     Chief Complaint   Patient presents with    Abdominal Pain     Redness w/leaking around suprapubic noted by staff on yesterday.      HPI    77-year-old nursing home resident with a past medical history CHF, type 2 diabetes, recurrent urinary tract infections with a suprapubic catheter, history of CVA/TIA, psychiatric disorder, epilepsy and insulin-dependent type 2 diabetes presents emergency department for concerns of redness leaking around suprapubic catheter.  Nursing home noticed this yesterday.  They attempted to change the suprapubic catheter today and sent her to emergency department.  The daughter states they sent her because he was not producing urine with the nursing home states they sent her because there was redness and leaking around the catheter.      Patient had the suprapubic catheter placed by Dr. Szymanski in June of 2021 secondary to recurrent urinary tract infections  Review of patient's allergies indicates:   Allergen Reactions    Gluten      Other reaction(s): ciliac disease    Gluten protein     Ropinirole      Other reaction(s): hallucinations    Wheat      Other reaction(s): ciliac disease    Wheat containing prod     Zolpidem      hallucinates  Other reaction(s): hallucinations     Past Medical History:   Diagnosis Date    Arthritis     Back pain     Celiac disease     CHF (congestive heart failure)     Diabetes mellitus     Encounter for blood transfusion     Fluid retention     Goiter     Hydrocephalus     Stroke      Past Surgical History:   Procedure Laterality Date    ABDOMINAL SURGERY      APPENDECTOMY      BACK SURGERY      BLADDER SURGERY      BRAIN SURGERY      CAROTID ENDARTERECTOMY      CHOLECYSTECTOMY      CSF SHUNT      HERNIA REPAIR      HYSTERECTOMY      TONSILLECTOMY      VASCULAR SURGERY       No family history on file.  Social History     Tobacco Use    Smoking status: Never   Substance Use Topics    Alcohol use: No    Drug use: No      Review of Systems   Constitutional:  Negative for fever.   Respiratory:  Negative for cough and shortness of breath.    Cardiovascular:  Negative for chest pain.   Gastrointestinal:  Positive for abdominal distention and abdominal pain. Negative for constipation, diarrhea, nausea and vomiting.   All other systems reviewed and are negative.    Physical Exam     Initial Vitals [04/21/23 1234]   BP Pulse Resp Temp SpO2   (!) 101/59 106 20 98.5 °F (36.9 °C) 96 %      MAP       --         Physical Exam    Nursing note and vitals reviewed.  Constitutional: She appears well-developed and well-nourished. No distress.   Cardiovascular:  Normal rate and regular rhythm.           Pulmonary/Chest: Breath sounds normal. No respiratory distress.   Abdominal: Abdomen is soft. Bowel sounds are normal. She exhibits distension. There is abdominal tenderness.   A suprapubic catheter noted to the right lower abdomen few inches away from midline There is no rebound and no guarding.   Musculoskeletal:         General: No tenderness. Normal range of motion.     Neurological: She is alert.   Skin: Skin is warm. Capillary refill takes less than 2 seconds. No erythema.   Psychiatric: She has a normal mood and affect. Thought content normal.       ED Course   Suprapubic Tube    Date/Time: 4/21/2023 5:53 PM  Performed by: Fredo Holland MD  Authorized by: Keri Hector MD   Consent: Verbal consent obtained.  Risks and benefits: risks, benefits and alternatives were discussed  Consent given by: patient  Patient understanding: patient states understanding of the procedure being performed  Indications: catheter change  Local anesthesia used: no    Anesthesia:  Local anesthesia used: no    Sedation:  Patient sedated: no    Suprapubic aspiration by: catheter  Catheter type: Banda  Catheter size: 16 Fr  Number of attempts: 1  Comments: Unable to pass a Banda.  Per Urology he stated not to put too much pressure.  Will not complete  this procedure      Labs Reviewed   COMPREHENSIVE METABOLIC PANEL - Abnormal; Notable for the following components:       Result Value    Glucose Level 236 (*)     Blood Urea Nitrogen 28.1 (*)     Albumin Level 3.0 (*)     Globulin 4.0 (*)     Albumin/Globulin Ratio 0.8 (*)     All other components within normal limits   URINALYSIS, REFLEX TO URINE CULTURE - Abnormal; Notable for the following components:    Protein, UA Trace (*)     Blood, UA Trace (*)     Leukocyte Esterase, UA Moderate (*)     All other components within normal limits   CBC WITH DIFFERENTIAL - Abnormal; Notable for the following components:    RBC 4.09 (*)     Hgb 11.6 (*)     MCHC 30.8 (*)     All other components within normal limits   URINALYSIS, MICROSCOPIC - Abnormal; Notable for the following components:    WBC, UA 50-99 (*)     All other components within normal limits   LACTIC ACID, PLASMA - Normal   CULTURE, URINE   BLOOD CULTURE [facility]   BLOOD CULTURE [facility]   CBC W/ AUTO DIFFERENTIAL    Narrative:     The following orders were created for panel order CBC auto differential.  Procedure                               Abnormality         Status                     ---------                               -----------         ------                     CBC with Differential[[id number]]        Abnormal            Final result                 Please view results for these tests on the individual orders.          Imaging Results               CT Abdomen Pelvis With Contrast (Final result)  Result time 04/21/23 15:38:14      Final result by Alicia Helton MD (04/21/23 15:38:14)                   Impression:      The suprapubic catheter has retracted from the urinary bladder and is in the soft tissues the right anterior pelvic wall.  The balloon is still inflated.  There is some air and inflammatory changes seen in the soft tissues of the anterior pelvic wall on the right side consistent with possible developing infection.  No drainable fluid  collection is seen at this time.    This report was flagged in Epic as abnormal.      Electronically signed by: Alicia Baileybennett  Date:    04/21/2023  Time:    15:38               Narrative:    EXAMINATION:  CT ABDOMEN PELVIS WITH CONTRAST    CLINICAL HISTORY:  Abdominal pain, acute, nonlocalized;    TECHNIQUE:  Low dose axial images, sagittal and coronal reformations were obtained from the lung bases to the pubic symphysis following the IV administration of contrast. Automatic exposure control (AEC) is utilized to reduce patient radiation exposure.    COMPARISON:  None.    FINDINGS:  There is bibasilar atelectasis.  There is a small right-sided pleural effusion..    The liver appears normal.  No liver mass or lesion is seen.  Portal and hepatic veins appear normal.    The gallbladder appears normal.  No obvious gallstones are seen.  No biliary dilatation is seen.  No pericholecystic fluid is seen.    The pancreas appears normal.  No pancreatic mass or lesion is seen.    The spleen shows no acute abnormality.    The adrenal glands appear normal.  No adrenal nodule is seen.    The kidneys appear normal.  No hydronephrosis is seen.  No hydroureter is seen.  No nephrolithiasis is seen.  No obvious ureteral stones are seen.    There is a ventriculoperitoneal shunt seen in place.  This was seen on prior examination as well.    There is a suprapubic catheter seen which appears to have retracted from the urinary bladder and is along the anterior pelvic wall.  There is a tract from the right anterior pelvic wall to the urinary bladder.  There is some air in the soft tissues of the anterior pelvic wall on images number 64 through 69.  There are associated inflammatory changes in the region.  An abscess is not seen at this time..  There is a small bubble of air within the urinary bladder is well.    No colitis is seen.  No diverticulitis is seen.  No obvious colonic mass or lesion is seen.    No free air is seen.  No free  fluid is seen.                                       Medications   sodium chloride 0.9% flush 10 mL (has no administration in time range)   melatonin tablet 6 mg (has no administration in time range)   piperacillin-tazobactam (ZOSYN) 4.5 g in dextrose 5 % in water (D5W) 5 % 100 mL IVPB (MB+) (has no administration in time range)   iopamidoL (ISOVUE-370) injection 100 mL (100 mLs Intravenous Given 4/21/23 1523)     Medical Decision Making:   Differential Diagnosis:   malplacement of catheter, UTI, abdominal pain, constipation, cirrhosis   Medical Decision Making  Problems Addressed:  Intra-abdominal infection: undiagnosed new problem with uncertain prognosis  Suprapubic catheter dysfunction, initial encounter: acute illness or injury    Amount and/or Complexity of Data Reviewed  Independent Historian: friend  External Data Reviewed: labs and notes.  Labs: ordered. Decision-making details documented in ED Course.  Radiology: ordered.    Risk  OTC drugs.  Prescription drug management.  Decision regarding hospitalization.              ED Course as of 04/21/23 1755 Fri Apr 21, 2023   1345 Bacteria, UA: None Seen [BS]   1610 BUN(!): 28.1 [BS]   1610 Creatinine: 0.73 [BS]   1610 Suprapubic catheter in subcutaneous tissues on CT.  This catheter was removed.  Concern for starting of an infection secondary to mild placement of catheter.  We will consult hospitalist for admission.  Will also speak with Urology. [BS]   1636 Spoke with Urology on-call, Dr. Villar, recommends to attempt to replace the suprapubic catheter.  I informed him of the CT scan results.  He states that it does not go in easily leave it removed out and place a Banda catheter. [BS]   1707 Hospitalist agrees with admission and antibiotic coverage [BS]   1750 Hospitalist agrees with admission and Zosyn. [BS]   1750 Unable to place suprapubic catheter secondary to resistance [BS]      ED Course User Index  [BS] Fredo Holland MD                    Clinical Impression:   Final diagnoses:  [B99.9] Intra-abdominal infection  [T83.010A] Suprapubic catheter dysfunction, initial encounter (Primary)        ED Disposition Condition    Observation Stable                Fredo Holland MD  04/21/23 4597

## 2023-04-21 NOTE — Clinical Note
Diagnosis: Intra-abdominal infection [390833]   Future Attending Provider: JAYANT CORONADO [[id number]]   Admitting Provider:: KIMBERLY DAVIS [[id number]]

## 2023-04-22 LAB
ALBUMIN SERPL-MCNC: 3 G/DL (ref 3.4–4.8)
ALBUMIN/GLOB SERPL: 0.9 RATIO (ref 1.1–2)
ALP SERPL-CCNC: 63 UNIT/L (ref 40–150)
ALT SERPL-CCNC: 11 UNIT/L (ref 0–55)
AST SERPL-CCNC: 9 UNIT/L (ref 5–34)
BASOPHILS # BLD AUTO: 0.07 X10(3)/MCL (ref 0–0.2)
BASOPHILS NFR BLD AUTO: 0.5 %
BILIRUBIN DIRECT+TOT PNL SERPL-MCNC: 0.3 MG/DL
BUN SERPL-MCNC: 27.6 MG/DL (ref 9.8–20.1)
CALCIUM SERPL-MCNC: 9 MG/DL (ref 8.4–10.2)
CHLORIDE SERPL-SCNC: 105 MMOL/L (ref 98–107)
CO2 SERPL-SCNC: 24 MMOL/L (ref 23–31)
CREAT SERPL-MCNC: 0.75 MG/DL (ref 0.55–1.02)
EOSINOPHIL # BLD AUTO: 0.64 X10(3)/MCL (ref 0–0.9)
EOSINOPHIL NFR BLD AUTO: 4.9 %
ERYTHROCYTE [DISTWIDTH] IN BLOOD BY AUTOMATED COUNT: 15.9 % (ref 11.5–17)
GFR SERPLBLD CREATININE-BSD FMLA CKD-EPI: >60 MLS/MIN/1.73/M2
GLOBULIN SER-MCNC: 3.4 GM/DL (ref 2.4–3.5)
GLUCOSE SERPL-MCNC: 219 MG/DL (ref 82–115)
HCT VFR BLD AUTO: 37.2 % (ref 37–47)
HGB BLD-MCNC: 11.6 G/DL (ref 12–16)
IMM GRANULOCYTES # BLD AUTO: 0.05 X10(3)/MCL (ref 0–0.04)
IMM GRANULOCYTES NFR BLD AUTO: 0.4 %
LYMPHOCYTES # BLD AUTO: 3.64 X10(3)/MCL (ref 0.6–4.6)
LYMPHOCYTES NFR BLD AUTO: 27.6 %
MAGNESIUM SERPL-MCNC: 2.3 MG/DL (ref 1.6–2.6)
MCH RBC QN AUTO: 28.2 PG (ref 27–31)
MCHC RBC AUTO-ENTMCNC: 31.2 G/DL (ref 33–36)
MCV RBC AUTO: 90.3 FL (ref 80–94)
MONOCYTES # BLD AUTO: 0.72 X10(3)/MCL (ref 0.1–1.3)
MONOCYTES NFR BLD AUTO: 5.5 %
NEUTROPHILS # BLD AUTO: 8.06 X10(3)/MCL (ref 2.1–9.2)
NEUTROPHILS NFR BLD AUTO: 61.1 %
NRBC BLD AUTO-RTO: 0 %
PHOSPHATE SERPL-MCNC: 4.3 MG/DL (ref 2.3–4.7)
PLATELET # BLD AUTO: 257 X10(3)/MCL (ref 130–400)
PMV BLD AUTO: 9.7 FL (ref 7.4–10.4)
POCT GLUCOSE: 183 MG/DL (ref 70–110)
POCT GLUCOSE: 190 MG/DL (ref 70–110)
POCT GLUCOSE: 196 MG/DL (ref 70–110)
POTASSIUM SERPL-SCNC: 4.1 MMOL/L (ref 3.5–5.1)
PROT SERPL-MCNC: 6.4 GM/DL (ref 5.8–7.6)
RBC # BLD AUTO: 4.12 X10(6)/MCL (ref 4.2–5.4)
SODIUM SERPL-SCNC: 141 MMOL/L (ref 136–145)
WBC # SPEC AUTO: 13.2 X10(3)/MCL (ref 4.5–11.5)

## 2023-04-22 PROCEDURE — 25000003 PHARM REV CODE 250: Performed by: STUDENT IN AN ORGANIZED HEALTH CARE EDUCATION/TRAINING PROGRAM

## 2023-04-22 PROCEDURE — 84100 ASSAY OF PHOSPHORUS: CPT | Performed by: NURSE PRACTITIONER

## 2023-04-22 PROCEDURE — 96366 THER/PROPH/DIAG IV INF ADDON: CPT

## 2023-04-22 PROCEDURE — 80053 COMPREHEN METABOLIC PANEL: CPT | Performed by: STUDENT IN AN ORGANIZED HEALTH CARE EDUCATION/TRAINING PROGRAM

## 2023-04-22 PROCEDURE — 51798 US URINE CAPACITY MEASURE: CPT

## 2023-04-22 PROCEDURE — G0378 HOSPITAL OBSERVATION PER HR: HCPCS

## 2023-04-22 PROCEDURE — 83735 ASSAY OF MAGNESIUM: CPT | Performed by: NURSE PRACTITIONER

## 2023-04-22 PROCEDURE — 25000003 PHARM REV CODE 250: Performed by: NURSE PRACTITIONER

## 2023-04-22 PROCEDURE — 63600175 PHARM REV CODE 636 W HCPCS: Performed by: STUDENT IN AN ORGANIZED HEALTH CARE EDUCATION/TRAINING PROGRAM

## 2023-04-22 PROCEDURE — 85025 COMPLETE CBC W/AUTO DIFF WBC: CPT | Performed by: STUDENT IN AN ORGANIZED HEALTH CARE EDUCATION/TRAINING PROGRAM

## 2023-04-22 RX ORDER — LISINOPRIL 40 MG/1
40 TABLET ORAL DAILY
Status: ON HOLD | COMMUNITY
End: 2023-04-24 | Stop reason: HOSPADM

## 2023-04-22 RX ORDER — LEVETIRACETAM 100 MG/ML
750 SOLUTION ORAL 2 TIMES DAILY
Status: DISCONTINUED | OUTPATIENT
Start: 2023-04-22 | End: 2023-04-24 | Stop reason: HOSPADM

## 2023-04-22 RX ORDER — DOXAZOSIN 1 MG/1
1 TABLET ORAL NIGHTLY
COMMUNITY

## 2023-04-22 RX ORDER — LANOLIN ALCOHOL/MO/W.PET/CERES
100 CREAM (GRAM) TOPICAL DAILY
COMMUNITY

## 2023-04-22 RX ORDER — FOLIC ACID 1 MG/1
1 TABLET ORAL DAILY
COMMUNITY

## 2023-04-22 RX ORDER — ATORVASTATIN CALCIUM 80 MG/1
80 TABLET, FILM COATED ORAL NIGHTLY
COMMUNITY

## 2023-04-22 RX ORDER — AMLODIPINE BESYLATE 10 MG/1
10 TABLET ORAL DAILY
COMMUNITY

## 2023-04-22 RX ORDER — CHOLECALCIFEROL (VITAMIN D3) 25 MCG
1000 TABLET ORAL DAILY
COMMUNITY

## 2023-04-22 RX ORDER — TORSEMIDE 20 MG/1
20 TABLET ORAL 2 TIMES DAILY
Status: DISCONTINUED | OUTPATIENT
Start: 2023-04-22 | End: 2023-04-23

## 2023-04-22 RX ORDER — ALPRAZOLAM 0.25 MG/1
0.25 TABLET ORAL 2 TIMES DAILY
Status: ON HOLD | COMMUNITY
End: 2024-03-26

## 2023-04-22 RX ORDER — HYDROCODONE BITARTRATE AND ACETAMINOPHEN 5; 325 MG/1; MG/1
1 TABLET ORAL EVERY 6 HOURS PRN
Status: DISCONTINUED | OUTPATIENT
Start: 2023-04-22 | End: 2023-04-24 | Stop reason: HOSPADM

## 2023-04-22 RX ORDER — ACETAMINOPHEN 500 MG
1000 TABLET ORAL EVERY 4 HOURS PRN
COMMUNITY

## 2023-04-22 RX ORDER — EZETIMIBE 10 MG/1
10 TABLET ORAL DAILY
COMMUNITY

## 2023-04-22 RX ORDER — ACETAMINOPHEN 325 MG/1
650 TABLET ORAL EVERY 6 HOURS PRN
Status: DISCONTINUED | OUTPATIENT
Start: 2023-04-22 | End: 2023-04-24 | Stop reason: HOSPADM

## 2023-04-22 RX ORDER — POLYETHYLENE GLYCOL 3350 17 G/17G
17 POWDER, FOR SOLUTION ORAL DAILY
COMMUNITY

## 2023-04-22 RX ORDER — GABAPENTIN 600 MG/1
600 TABLET ORAL NIGHTLY
Status: ON HOLD | COMMUNITY
End: 2024-03-26

## 2023-04-22 RX ADMIN — LEVETIRACETAM 750 MG: 100 SOLUTION ORAL at 09:04

## 2023-04-22 RX ADMIN — HYDROCODONE BITARTRATE AND ACETAMINOPHEN 1 TABLET: 5; 325 TABLET ORAL at 11:04

## 2023-04-22 RX ADMIN — TORSEMIDE 20 MG: 20 TABLET ORAL at 09:04

## 2023-04-22 RX ADMIN — TORSEMIDE 20 MG: 20 TABLET ORAL at 01:04

## 2023-04-22 RX ADMIN — PIPERACILLIN AND TAZOBACTAM 4.5 G: 4; .5 INJECTION, POWDER, FOR SOLUTION INTRAVENOUS; PARENTERAL at 11:04

## 2023-04-22 RX ADMIN — PIPERACILLIN AND TAZOBACTAM 4.5 G: 4; .5 INJECTION, POWDER, FOR SOLUTION INTRAVENOUS; PARENTERAL at 04:04

## 2023-04-22 RX ADMIN — HYDROCODONE BITARTRATE AND ACETAMINOPHEN 1 TABLET: 5; 325 TABLET ORAL at 05:04

## 2023-04-22 RX ADMIN — PIPERACILLIN AND TAZOBACTAM 4.5 G: 4; .5 INJECTION, POWDER, FOR SOLUTION INTRAVENOUS; PARENTERAL at 07:04

## 2023-04-22 NOTE — PLAN OF CARE
Problem: Adult Inpatient Plan of Care  Goal: Plan of Care Review  Outcome: Ongoing, Progressing  Goal: Patient-Specific Goal (Individualized)  Outcome: Ongoing, Progressing  Goal: Absence of Hospital-Acquired Illness or Injury  Outcome: Ongoing, Progressing  Goal: Optimal Comfort and Wellbeing  Outcome: Ongoing, Progressing  Goal: Readiness for Transition of Care  Outcome: Ongoing, Progressing     Problem: Infection  Goal: Absence of Infection Signs and Symptoms  Outcome: Ongoing, Progressing     Problem: Diabetes Comorbidity  Goal: Blood Glucose Level Within Targeted Range  Outcome: Ongoing, Progressing     Problem: Fall Injury Risk  Goal: Absence of Fall and Fall-Related Injury  Outcome: Ongoing, Progressing     Problem: Skin Injury Risk Increased  Goal: Skin Health and Integrity  Outcome: Ongoing, Progressing     Problem: Impaired Wound Healing  Goal: Optimal Wound Healing  Outcome: Ongoing, Progressing

## 2023-04-22 NOTE — H&P
[facility] Hospital Medicine History & Physical Examination       Patient Name: Elisabet Choi  MRN: [id number]  Patient Class: OP- Observation   Admission Date: 4/21/2023   Admitting Physician: El Orantes MD   Length of Stay: 0  Attending Physician: El Orantes MD   Primary Care Provider: [facility]  Face-to-Face encounter date: 04/22/2023  Code Status: Full code   Chief Complaint: Abdominal Pain (Redness w/leaking around suprapubic noted by staff on yesterday. )      Patient information was obtained from patient, patient's family, past medical records and ER records.     HISTORY OF PRESENT ILLNESS:   Elisabet Choi is a 77 y.o. female with a past medical history of essential hypertension, hyperlipidemia, CAD, CHF, PVD, diabetes mellitus type 2, CVA, seizure disorder, hydrocephalus, celiac disease, ERICA, osteoarthritis, vertigo, bipolar disorder, anxiety, and depression presented to [facility] on 4/21/2023 transferred from [facility] for  suprapubic catheter malfunction.  Nursing home reported that they noticed leaking from suprapubic catheter yesterday, 4/21 with redness around site.  Staff attempted to change catheter without success, prompting patient to outside facility.  Suprapubic catheter was placed in June of 2021 by Dr. Szymanski for recurrent UTIs.  Labs revealed WBC 11.4, BUN 28.1, creatinine 0.73, glucose 236, and lactic acid 1.7.  UA revealed trace protein, trace occult blood, moderate leukocytes, 0-2 RBCs, and 50-99 white blood cells.  Blood and urine cultures were obtained.  CT abdomen and pelvis with contrast revealed suprapubic has retracted from the urinary bladder and is in the soft tissues the right anterior pelvic wall with balloon still inflated and inflammatory changes seen in the soft tissues of the anterior pelvic wall on the right side consistent with possible developing infection without drainable fluid collection.  Urology was consulted.  Patient's  current suprapubic catheter was removed and new catheter placement was attempted in ED without success. Patient's family refused indwelling kraft and requested purewick.  Patient was started on IV Zosyn and transferred to [facility] for urology services. Patient was admitted to hospital medicine service for further medical management.     PAST MEDICAL HISTORY:   Essential hypertension  Hyperlipidemia  CAD  CHF   PVD  Diabetes mellitus type 2   CVA   Seizure disorder  Hydrocephalus   Celiac disease  ERICA  Osteoarthritis  Vertigo   Bipolar disorder  Anxiety  Depression    PAST SURGICAL HISTORY:     Past Surgical History:   Procedure Laterality Date    ABDOMINAL SURGERY      APPENDECTOMY      BACK SURGERY      BLADDER SURGERY      BRAIN SURGERY      CAROTID ENDARTERECTOMY      CHOLECYSTECTOMY      CSF SHUNT      HERNIA REPAIR      HYSTERECTOMY      TONSILLECTOMY      VASCULAR SURGERY         ALLERGIES:   Gluten, Gluten protein, Ropinirole, Wheat, Wheat containing prod, and Zolpidem    FAMILY HISTORY:   Reviewed and negative    SOCIAL HISTORY:   Denies tobacco, drug, and alcohol use    HOME MEDICATIONS:     Prior to Admission medications    Medication Sig Start Date End Date Taking? Authorizing Provider   amitriptyline (ELAVIL) 25 MG tablet Take 25 mg by mouth nightly as needed for Insomnia.    Historical Provider   aspirin (ECOTRIN) 81 MG EC tablet Take 1 tablet (81 mg total) by mouth once daily. 7/1/14 7/1/15  Anca Sharma NP   fluoxetine (PROZAC) 20 MG capsule Take 20 mg by mouth 3 (three) times daily.    Historical Provider   furosemide (LASIX) 40 MG tablet Take 40 mg by mouth 2 (two) times daily.    Historical Provider   HYDROcodone-acetaminophen (NORCO)  mg per tablet Take 1 tablet by mouth every 6 (six) hours as needed for Pain. 7/2/22   El Orantes MD   insulin detemir (LEVEMIR) 100 unit/mL injection Inject 17 Units into the skin every evening.    Historical Provider   levetiracetam (KEPPRA) 750 MG Tab Take  500 mg by mouth 2 (two) times daily.    Historical Provider   levothyroxine (SYNTHROID) 200 MCG tablet Take 200 mcg by mouth once daily.    Historical Provider   levothyroxine (SYNTHROID) 25 MCG tablet Take 25 mcg by mouth once daily.    Historical Provider   nitrofurantoin (MACRODANTIN) 100 MG capsule Take 100 mg by mouth nightly.    Historical Provider   pantoprazole (PROTONIX) 40 MG tablet Take 1 tablet (40 mg total) by mouth once daily. 6/30/14 6/30/15  Anca Sharma NP   simvastatin (ZOCOR) 40 MG tablet Take 40 mg by mouth every evening.    Historical Provider   solifenacin (VESICARE) 5 MG tablet Take 10 mg by mouth every evening.    Historical Provider   topiramate (TOPAMAX) 100 MG tablet Take 100 mg by mouth 2 (two) times daily.    Historical Provider   lisinopril (PRINIVIL,ZESTRIL) 5 MG tablet Take 1 tablet (5 mg total) by mouth once daily. 6/30/14 7/2/22  Anca Sharma NP   metformin (GLUCOPHAGE) 1000 MG tablet Take 1 tablet (1,000 mg total) by mouth 2 (two) times daily with meals. 6/30/14 7/2/22  Anca Sharma NP   metoprolol succinate (TOPROL-XL) 50 MG 24 hr tablet Take 1 tablet (50 mg total) by mouth once daily. 6/30/14 7/2/22  Anca Sharma NP   nitroGLYCERIN 0.4 MG/DOSE TL SPRY (NITROLINGUAL) 0.4 mg/dose spray Place 1 spray under the tongue every 5 (five) minutes as needed for Chest pain. 6/30/14 7/2/22  Anca Sharma NP       REVIEW OF SYSTEMS:   Except as documented, all other systems reviewed and negative     PHYSICAL EXAM:     VITAL SIGNS: 24 HRS MIN & MAX LAST   Temp  Min: 98.2 °F (36.8 °C)  Max: 99.1 °F (37.3 °C) 98.4 °F (36.9 °C)   BP  Min: 101/59  Max: 150/87 120/88   Pulse  Min: 81  Max: 116  81   Resp  Min: 18  Max: 24 18   SpO2  Min: 92 %  Max: 99 % 96 %       General appearance: Female in no apparent distress.  HEENNT: Atraumatic head.   Lungs: Clear to auscultation bilaterally.   Heart: Regular rate and rhythm.    Abdomen: Soft, non-distended. Tenderness to palpation of  suprapubic area. Previous suprapubic catheter site to right lower abdomen. PureWick in place   Extremities: No cyanosis, clubbing, or edema. No deformities.   Skin: No Rash. Warm and dry.   Neuro: Awake and alert. Intermittent confusion.   Psych/mental status: Appropriate mood and affect.     LABS AND IMAGING:     Recent Labs   Lab 04/21/23  1345 04/22/23  0634   WBC 11.4 13.2*   RBC 4.09* 4.12*   HGB 11.6* 11.6*   HCT 37.7 37.2   MCV 92.2 90.3   MCH 28.4 28.2   MCHC 30.8* 31.2*   RDW 15.9 15.9    257   MPV 9.8 9.7       Recent Labs   Lab 04/21/23  1345 04/22/23  0634    141   K 4.1 4.1   CO2 29 24   BUN 28.1* 27.6*   CREATININE 0.73 0.75   CALCIUM 9.7 9.0   MG  --  2.30   ALBUMIN 3.0* 3.0*   ALKPHOS 65 63   ALT 12 11   AST 9 9   BILITOT 0.2 0.3       Microbiology Results (last 7 days)       Procedure Component Value Units Date/Time    Blood culture #1 **CANNOT BE ORDERED STAT** [[id number]] Collected: 04/21/23 1838    Order Status: Resulted Specimen: Blood Updated: 04/21/23 1838    Blood culture #2 **CANNOT BE ORDERED STAT** [[id number]] Collected: 04/21/23 1838    Order Status: Resulted Specimen: Blood Updated: 04/21/23 1838    Urine culture [[id number]] Collected: 04/21/23 1326    Order Status: Sent Specimen: Urine Updated: 04/21/23 1345             CT Abdomen Pelvis With Contrast  Narrative: EXAMINATION:  CT ABDOMEN PELVIS WITH CONTRAST    CLINICAL HISTORY:  Abdominal pain, acute, nonlocalized;    TECHNIQUE:  Low dose axial images, sagittal and coronal reformations were obtained from the lung bases to the pubic symphysis following the IV administration of contrast. Automatic exposure control (AEC) is utilized to reduce patient radiation exposure.    COMPARISON:  None.    FINDINGS:  There is bibasilar atelectasis.  There is a small right-sided pleural effusion..    The liver appears normal.  No liver mass or lesion is seen.  Portal and hepatic veins appear normal.    The gallbladder appears normal.  No  obvious gallstones are seen.  No biliary dilatation is seen.  No pericholecystic fluid is seen.    The pancreas appears normal.  No pancreatic mass or lesion is seen.    The spleen shows no acute abnormality.    The adrenal glands appear normal.  No adrenal nodule is seen.    The kidneys appear normal.  No hydronephrosis is seen.  No hydroureter is seen.  No nephrolithiasis is seen.  No obvious ureteral stones are seen.    There is a ventriculoperitoneal shunt seen in place.  This was seen on prior examination as well.    There is a suprapubic catheter seen which appears to have retracted from the urinary bladder and is along the anterior pelvic wall.  There is a tract from the right anterior pelvic wall to the urinary bladder.  There is some air in the soft tissues of the anterior pelvic wall on images number 64 through 69.  There are associated inflammatory changes in the region.  An abscess is not seen at this time..  There is a small bubble of air within the urinary bladder is well.    No colitis is seen.  No diverticulitis is seen.  No obvious colonic mass or lesion is seen.    No free air is seen.  No free fluid is seen.  Impression: The suprapubic catheter has retracted from the urinary bladder and is in the soft tissues the right anterior pelvic wall.  The balloon is still inflated.  There is some air and inflammatory changes seen in the soft tissues of the anterior pelvic wall on the right side consistent with possible developing infection.  No drainable fluid collection is seen at this time.    This report was flagged in Epic as abnormal.    Electronically signed by: Alicia Helton  Date:    04/21/2023  Time:    15:38        ASSESSMENT & PLAN:   Assessment:  Suprapubic catheter dysfunction  UTI  Leukocytosis  History of essential hypertension, hyperlipidemia, CAD, CHF, PVD, diabetes mellitus type 2, CVA, seizure disorder, hydrocephalus, celiac disease, ERICA, osteoarthritis, vertigo, bipolar disorder,  anxiety, and depression     Plan:  IV Zosyn  Blood and urine cultures pending, follow-up results   Urology consulted, appreciate recommendations  Insulin sliding scale with Accu-checks  Continue appropriate home medications once med rec updated   Labs in a.m.    VTE Prophylaxis: SCDs    __________________________________________________________________________  INPATIENT LIST OF MEDICATIONS     Scheduled Meds:   piperacillin-tazobactam (ZOSYN) IVPB  4.5 g Intravenous Q8H     Continuous Infusions:  PRN Meds:.acetaminophen, HYDROcodone-acetaminophen, melatonin, sodium chloride 0.9%      ILisandro PA-C, have reviewed and discussed the case with Dr. El Orantes MD   Please see the following addendum for further assessment and plan from there attending MD.    04/22/2023    ________________________________________________________________________________    MD Addendum:  I,  assumed care of this patient today at ---am/pm  For the patient encounter, I performed the substantive portion of the visit, I reviewed the PA documentation, treatment plan, and medical decision making.  I had face to face time with this patient     Seen and examined the patient.  Agree with above history physical exam and management.    She was [facility] to [facility] due to dysfunction of the suprapubic catheter.  She shows signs of infection and place an IV antibiotics.    She is vitals stable hemodynamically stable in my exam she does not have any acute complaints.    Oriented times 2 no acute distress,   Regular rate and rhythm no murmur or gallop  Abdomen is distended nontender no rigidity rebound.   Extremities no edema,   Neurology CN 2-12 intact however bilateral lower extremity with weakness, daughter states is chronic.    Skin:  Stage I sacral decubitus with only redness,    - continue Zosyn and wait for the cultures.  - urology is consulted pending suprapubic catheter replacement.    -continue PureWick.    She is urinating from the  urethra bladder scan shows 100 cc.    - precautions, wound care, air mattress.    Discharge Planning and Disposition: No mobility needs. Ambulating well. Good social support system.   Anticipated discharge    All diagnosis and differential diagnosis have been reviewed; assessment and plan has been documented; I have personally reviewed the labs and test results that are presently available; I have reviewed the patients medication list; I have reviewed the consulting providers response and recommendations. I have reviewed or attempted to review medical records based upon their availability.    All of the patient and family questions have been addressed and answered. Patient's is agreeable to the above stated plan. I will continue to monitor closely and make adjustments to medical management as needed.      04/22/2023

## 2023-04-22 NOTE — NURSING
Nurses Note -- 4 Eyes      4/22/2023   3:38 AM      Skin assessed during: Admit      [] No Altered Skin Integrity Present    []Prevention Measures Documented      [x] Yes- Altered Skin Integrity Present or Discovered   [x] LDA Added if Not in Epic (Describe Wound)   [x] New Altered Skin Integrity was Present on Admit and Documented in LDA   [x] Wound Image Taken    Wound Care Consulted? Yes    Attending Nurse:  Moon Donaldson RN     Second RN/Staff Member:  Nora Oneill RN

## 2023-04-22 NOTE — ED NOTES
CURRENTLY AWAITING TRANSFER FOR UROLOGY ,  ARRIVED EARLIES WITH SUPRAPUBIC CATH IN PLACE PER NURSING HOME WITH QUESTIONABLE PLACEMENT , TRACE BLOOD AROUND SITE , UNABLE TO ASP URINE , IMPROPER PLACEMENT WAS CONFIRMED WITH CT AND LATTER REMOVED WITH OUT DIFFICULTY , 4/4 DRSING APPLIED , NO REDNESS NOEDEMA , SONIYA WELL AWAITING TRANSFER . REPORT TO NIGHT SHIFT

## 2023-04-22 NOTE — ED NOTES
ARRIVE VIA [facility] FROM NURSING HOME TO HAVE SUPRAPUBIC CATH CHECKED , REPORTED TO MEDICS NOT FLUSHING AND HAVING HESITIENCY AND BLEEDING AROUND SITE , ON ARRIVAL CLEAR YELLOW URINE PRESENT IN CATH BUT DARK ANSHUL IN BAG , ONLY SMALL AMOUT OF BLOOD NOTED AROUND SITE , NO REDNESS OR EDEMA , , PT IN ADULT DIAPER COMPLETELY SATURATED IN URINE AND SMALL BM ,  PT BEDDING AND DIAPER CHANGED JOSE CARE PROVIDIED AND PURWIC APPLIED , SONIYA WELL , SKIN RAW APPEARANCE IN JOSE AREA NO OBVIOUS BREAKDOWN NOTED , EXCEPT SMALL  AREA ON LT HEAL .   WILL TURN Q2 IN BED AND MAINTAIN GOOD SKIN CARE .

## 2023-04-22 NOTE — ED NOTES
1740 SUPRA PUBIC KLEBER REMOVED PER DR GEORGE EARLIER AND UNSUCCESSFUL ATTEMP TO RE INSERT WITH HESITATION AND NO URINE RETURN , MD ORDER INDWELLING FOLY , PT FAMILY REQUEST TO CONTINUE WITH PURWIC AND NO INDWELLING IF POSSIBLE . DR GEORGE AGREED NO INDWELLING AT THIS TIME

## 2023-04-22 NOTE — PLAN OF CARE
Problem: Adult Inpatient Plan of Care  Goal: Plan of Care Review  Outcome: Ongoing, Progressing  Goal: Patient-Specific Goal (Individualized)  Outcome: Ongoing, Progressing  Goal: Absence of Hospital-Acquired Illness or Injury  Outcome: Ongoing, Progressing  Goal: Optimal Comfort and Wellbeing  Outcome: Ongoing, Progressing  Goal: Readiness for Transition of Care  Outcome: Ongoing, Progressing     Problem: Infection  Goal: Absence of Infection Signs and Symptoms  Outcome: Ongoing, Progressing     Problem: Diabetes Comorbidity  Goal: Blood Glucose Level Within Targeted Range  Outcome: Ongoing, Progressing  Intervention: Monitor and Manage Glycemia  Flowsheets (Taken 4/22/2023 1202)  Glycemic Management:   blood glucose monitored   carbohydrate replacement provided     Problem: Fall Injury Risk  Goal: Absence of Fall and Fall-Related Injury  Outcome: Ongoing, Progressing     Problem: Skin Injury Risk Increased  Goal: Skin Health and Integrity  Outcome: Ongoing, Progressing     Problem: Impaired Wound Healing  Goal: Optimal Wound Healing  Outcome: Ongoing, Progressing

## 2023-04-22 NOTE — NURSING
Called daughter in law and let family know that patient was admitted and what room number she was in.

## 2023-04-23 ENCOUNTER — HOSPITAL ENCOUNTER (OUTPATIENT)
Dept: RADIOLOGY | Facility: HOSPITAL | Age: 78
Discharge: HOME | End: 2023-04-23
Attending: UROLOGY
Payer: MEDICARE

## 2023-04-23 LAB
ALBUMIN SERPL-MCNC: 2.9 G/DL (ref 3.4–4.8)
ALBUMIN/GLOB SERPL: 0.8 RATIO (ref 1.1–2)
ALP SERPL-CCNC: 60 UNIT/L (ref 40–150)
ALT SERPL-CCNC: 12 UNIT/L (ref 0–55)
AST SERPL-CCNC: 10 UNIT/L (ref 5–34)
BASOPHILS # BLD AUTO: 0.06 X10(3)/MCL (ref 0–0.2)
BASOPHILS NFR BLD AUTO: 0.5 %
BILIRUBIN DIRECT+TOT PNL SERPL-MCNC: <0.5 MG/DL
BUN SERPL-MCNC: 21.1 MG/DL (ref 9.8–20.1)
CALCIUM SERPL-MCNC: 8.9 MG/DL (ref 8.4–10.2)
CHLORIDE SERPL-SCNC: 102 MMOL/L (ref 98–107)
CO2 SERPL-SCNC: 27 MMOL/L (ref 23–31)
CREAT SERPL-MCNC: 0.73 MG/DL (ref 0.55–1.02)
EOSINOPHIL # BLD AUTO: 0.7 X10(3)/MCL (ref 0–0.9)
EOSINOPHIL NFR BLD AUTO: 6.1 %
ERYTHROCYTE [DISTWIDTH] IN BLOOD BY AUTOMATED COUNT: 15.6 % (ref 11.5–17)
EST. AVERAGE GLUCOSE BLD GHB EST-MCNC: 131.2 MG/DL
GFR SERPLBLD CREATININE-BSD FMLA CKD-EPI: >60 MLS/MIN/1.73/M2
GLOBULIN SER-MCNC: 3.8 GM/DL (ref 2.4–3.5)
GLUCOSE SERPL-MCNC: 176 MG/DL (ref 82–115)
HBA1C MFR BLD: 6.2 %
HCT VFR BLD AUTO: 37 % (ref 37–47)
HGB BLD-MCNC: 11.5 G/DL (ref 12–16)
IMM GRANULOCYTES # BLD AUTO: 0.04 X10(3)/MCL (ref 0–0.04)
IMM GRANULOCYTES NFR BLD AUTO: 0.3 %
LYMPHOCYTES # BLD AUTO: 4.43 X10(3)/MCL (ref 0.6–4.6)
LYMPHOCYTES NFR BLD AUTO: 38.6 %
MCH RBC QN AUTO: 28.2 PG (ref 27–31)
MCHC RBC AUTO-ENTMCNC: 31.1 G/DL (ref 33–36)
MCV RBC AUTO: 90.7 FL (ref 80–94)
MONOCYTES # BLD AUTO: 0.64 X10(3)/MCL (ref 0.1–1.3)
MONOCYTES NFR BLD AUTO: 5.6 %
NEUTROPHILS # BLD AUTO: 5.62 X10(3)/MCL (ref 2.1–9.2)
NEUTROPHILS NFR BLD AUTO: 48.9 %
NRBC BLD AUTO-RTO: 0 %
PLATELET # BLD AUTO: 260 X10(3)/MCL (ref 130–400)
PMV BLD AUTO: 9.8 FL (ref 7.4–10.4)
POCT GLUCOSE: 199 MG/DL (ref 70–110)
POTASSIUM SERPL-SCNC: 3.7 MMOL/L (ref 3.5–5.1)
PROT SERPL-MCNC: 6.7 GM/DL (ref 5.8–7.6)
RBC # BLD AUTO: 4.08 X10(6)/MCL (ref 4.2–5.4)
SODIUM SERPL-SCNC: 140 MMOL/L (ref 136–145)
WBC # SPEC AUTO: 11.5 X10(3)/MCL (ref 4.5–11.5)

## 2023-04-23 PROCEDURE — 25000003 PHARM REV CODE 250: Performed by: STUDENT IN AN ORGANIZED HEALTH CARE EDUCATION/TRAINING PROGRAM

## 2023-04-23 PROCEDURE — 85025 COMPLETE CBC W/AUTO DIFF WBC: CPT | Performed by: PHYSICIAN ASSISTANT

## 2023-04-23 PROCEDURE — 83036 HEMOGLOBIN GLYCOSYLATED A1C: CPT | Performed by: STUDENT IN AN ORGANIZED HEALTH CARE EDUCATION/TRAINING PROGRAM

## 2023-04-23 PROCEDURE — 96366 THER/PROPH/DIAG IV INF ADDON: CPT

## 2023-04-23 PROCEDURE — 25000003 PHARM REV CODE 250: Performed by: NURSE PRACTITIONER

## 2023-04-23 PROCEDURE — 63600175 PHARM REV CODE 636 W HCPCS: Performed by: STUDENT IN AN ORGANIZED HEALTH CARE EDUCATION/TRAINING PROGRAM

## 2023-04-23 PROCEDURE — G0378 HOSPITAL OBSERVATION PER HR: HCPCS

## 2023-04-23 PROCEDURE — 94761 N-INVAS EAR/PLS OXIMETRY MLT: CPT

## 2023-04-23 PROCEDURE — 80053 COMPREHEN METABOLIC PANEL: CPT | Performed by: PHYSICIAN ASSISTANT

## 2023-04-23 PROCEDURE — 76857 US EXAM PELVIC LIMITED: CPT | Mod: TC

## 2023-04-23 RX ORDER — DOXAZOSIN 1 MG/1
1 TABLET ORAL NIGHTLY
Status: DISCONTINUED | OUTPATIENT
Start: 2023-04-23 | End: 2023-04-24 | Stop reason: HOSPADM

## 2023-04-23 RX ORDER — INSULIN ASPART 100 [IU]/ML
1-10 INJECTION, SOLUTION INTRAVENOUS; SUBCUTANEOUS
Status: DISCONTINUED | OUTPATIENT
Start: 2023-04-23 | End: 2023-04-24 | Stop reason: HOSPADM

## 2023-04-23 RX ORDER — AMLODIPINE BESYLATE 5 MG/1
10 TABLET ORAL DAILY
Status: DISCONTINUED | OUTPATIENT
Start: 2023-04-24 | End: 2023-04-24 | Stop reason: HOSPADM

## 2023-04-23 RX ORDER — PNV NO.95/FERROUS FUM/FOLIC AC 28MG-0.8MG
100 TABLET ORAL DAILY
Status: DISCONTINUED | OUTPATIENT
Start: 2023-04-24 | End: 2023-04-24 | Stop reason: HOSPADM

## 2023-04-23 RX ORDER — IBUPROFEN 200 MG
24 TABLET ORAL
Status: DISCONTINUED | OUTPATIENT
Start: 2023-04-23 | End: 2023-04-24 | Stop reason: HOSPADM

## 2023-04-23 RX ORDER — TOPIRAMATE 100 MG/1
100 TABLET, FILM COATED ORAL 2 TIMES DAILY
Status: DISCONTINUED | OUTPATIENT
Start: 2023-04-23 | End: 2023-04-24 | Stop reason: HOSPADM

## 2023-04-23 RX ORDER — EZETIMIBE 10 MG/1
10 TABLET ORAL DAILY
Status: DISCONTINUED | OUTPATIENT
Start: 2023-04-24 | End: 2023-04-24 | Stop reason: HOSPADM

## 2023-04-23 RX ORDER — IBUPROFEN 200 MG
16 TABLET ORAL
Status: DISCONTINUED | OUTPATIENT
Start: 2023-04-23 | End: 2023-04-24 | Stop reason: HOSPADM

## 2023-04-23 RX ORDER — OXYBUTYNIN CHLORIDE 5 MG/1
5 TABLET, EXTENDED RELEASE ORAL DAILY
Status: DISCONTINUED | OUTPATIENT
Start: 2023-04-24 | End: 2023-04-24 | Stop reason: HOSPADM

## 2023-04-23 RX ORDER — AMITRIPTYLINE HYDROCHLORIDE 25 MG/1
25 TABLET, FILM COATED ORAL NIGHTLY PRN
Status: DISCONTINUED | OUTPATIENT
Start: 2023-04-23 | End: 2023-04-24 | Stop reason: HOSPADM

## 2023-04-23 RX ORDER — LEVOTHYROXINE SODIUM 100 UG/1
200 TABLET ORAL DAILY
Status: DISCONTINUED | OUTPATIENT
Start: 2023-04-24 | End: 2023-04-23

## 2023-04-23 RX ORDER — LEVOTHYROXINE SODIUM 150 UG/1
150 TABLET ORAL DAILY
Status: DISCONTINUED | OUTPATIENT
Start: 2023-04-24 | End: 2023-04-24 | Stop reason: HOSPADM

## 2023-04-23 RX ORDER — ALPRAZOLAM 0.5 MG/1
1 TABLET ORAL NIGHTLY
Status: DISCONTINUED | OUTPATIENT
Start: 2023-04-23 | End: 2023-04-24 | Stop reason: HOSPADM

## 2023-04-23 RX ORDER — GABAPENTIN 300 MG/1
300 CAPSULE ORAL 3 TIMES DAILY
Status: DISCONTINUED | OUTPATIENT
Start: 2023-04-23 | End: 2023-04-24 | Stop reason: HOSPADM

## 2023-04-23 RX ORDER — FOLIC ACID 1 MG/1
1 TABLET ORAL DAILY
Status: DISCONTINUED | OUTPATIENT
Start: 2023-04-24 | End: 2023-04-24 | Stop reason: HOSPADM

## 2023-04-23 RX ORDER — GLUCAGON 1 MG
1 KIT INJECTION
Status: DISCONTINUED | OUTPATIENT
Start: 2023-04-23 | End: 2023-04-24 | Stop reason: HOSPADM

## 2023-04-23 RX ORDER — ATORVASTATIN CALCIUM 40 MG/1
80 TABLET, FILM COATED ORAL NIGHTLY
Status: DISCONTINUED | OUTPATIENT
Start: 2023-04-23 | End: 2023-04-24 | Stop reason: HOSPADM

## 2023-04-23 RX ORDER — FLUOXETINE HYDROCHLORIDE 20 MG/1
20 CAPSULE ORAL 3 TIMES DAILY
Status: DISCONTINUED | OUTPATIENT
Start: 2023-04-23 | End: 2023-04-24 | Stop reason: HOSPADM

## 2023-04-23 RX ADMIN — TOPIRAMATE 100 MG: 100 TABLET, FILM COATED ORAL at 09:04

## 2023-04-23 RX ADMIN — GABAPENTIN 300 MG: 300 CAPSULE ORAL at 09:04

## 2023-04-23 RX ADMIN — PIPERACILLIN AND TAZOBACTAM 4.5 G: 4; .5 INJECTION, POWDER, FOR SOLUTION INTRAVENOUS; PARENTERAL at 03:04

## 2023-04-23 RX ADMIN — FLUOXETINE 20 MG: 20 CAPSULE ORAL at 09:04

## 2023-04-23 RX ADMIN — ATORVASTATIN CALCIUM 80 MG: 40 TABLET, FILM COATED ORAL at 09:04

## 2023-04-23 RX ADMIN — DOXAZOSIN 1 MG: 1 TABLET ORAL at 09:04

## 2023-04-23 RX ADMIN — HYDROCODONE BITARTRATE AND ACETAMINOPHEN 1 TABLET: 5; 325 TABLET ORAL at 09:04

## 2023-04-23 RX ADMIN — ACETAMINOPHEN 650 MG: 325 TABLET ORAL at 04:04

## 2023-04-23 RX ADMIN — ALPRAZOLAM 1 MG: 0.5 TABLET ORAL at 09:04

## 2023-04-23 RX ADMIN — HYDROCODONE BITARTRATE AND ACETAMINOPHEN 1 TABLET: 5; 325 TABLET ORAL at 03:04

## 2023-04-23 RX ADMIN — TORSEMIDE 20 MG: 20 TABLET ORAL at 09:04

## 2023-04-23 RX ADMIN — PIPERACILLIN AND TAZOBACTAM 4.5 G: 4; .5 INJECTION, POWDER, FOR SOLUTION INTRAVENOUS; PARENTERAL at 10:04

## 2023-04-23 RX ADMIN — LEVETIRACETAM 750 MG: 100 SOLUTION ORAL at 09:04

## 2023-04-23 NOTE — PROGRESS NOTES
[facility]  Hospital Medicine Progress Note        Chief Complaint: Inpatient Follow-up for     Subjective:  Elisabet Choi is a 77 y.o. female with a past medical history of essential hypertension, hyperlipidemia, CAD, CHF, PVD, diabetes mellitus type 2, CVA, seizure disorder, hydrocephalus, celiac disease, ERICA, osteoarthritis, vertigo, bipolar disorder, anxiety, and depression presented to [facility] on 4/21/2023 transferred from [facility] for  suprapubic catheter malfunction.  Nursing home reported that they noticed leaking from suprapubic catheter yesterday, 4/21 with redness around site.  Staff attempted to change catheter without success, prompting patient to outside facility.  Suprapubic catheter was placed in June of 2021 by Dr. Szymanski for recurrent UTIs.  Labs revealed WBC 11.4, BUN 28.1, creatinine 0.73, glucose 236, and lactic acid 1.7.  UA revealed trace protein, trace occult blood, moderate leukocytes, 0-2 RBCs, and 50-99 white blood cells.  Blood and urine cultures were obtained.  CT abdomen and pelvis with contrast revealed suprapubic has retracted from the urinary bladder and is in the soft tissues the right anterior pelvic wall with balloon still inflated and inflammatory changes seen in the soft tissues of the anterior pelvic wall on the right side consistent with possible developing infection without drainable fluid collection.  Urology was consulted.  Patient's current suprapubic catheter was removed and new catheter placement was attempted in ED without success. Patient's family refused indwelling kraft and requested purewick.  Patient was started on IV Zosyn and transferred to [facility] for urology services. Patient was admitted to hospital medicine service for further medical management.        Seen and examined the pt. VSS and HDS.   No acute issues. Able to urinate from urethra.     Objective/physical exam:  General: In no acute distress, AAOx3   Chest: Clear to  auscultation bilaterally, non-labored   Heart: RRR, +S1, S2, no appreciable murmur  Abdomen: Soft, nontender, BS +  MSK: Warm, no lower extremity edema, no clubbing or cyanosis  Neurologic:  Cranial nerve II-XII intact, Strength 5/5 in all 4 extremities    VITAL SIGNS: 24 HRS MIN & MAX LAST   Temp  Min: 97.7 °F (36.5 °C)  Max: 98.8 °F (37.1 °C) 98 °F (36.7 °C)   BP  Min: 100/65  Max: 155/86 (!) 155/86     Pulse  Min: 72  Max: 97  81   Resp  Min: 16  Max: 20 18   SpO2  Min: 94 %  Max: 98 % 95 %       Labs, Microbiology and Imaging were Reviewed.      Microbiology Results (last 7 days)       Procedure Component Value Units Date/Time    Urine culture [[id number]]  (Abnormal) Collected: 04/21/23 1326    Order Status: Completed Specimen: Urine Updated: 04/23/23 0646     Urine Culture 10,000 - 25,000 colonies/ml Gram-negative Rods    Blood culture #1 **CANNOT BE ORDERED STAT** [[id number]]  (Normal) Collected: 04/21/23 1838    Order Status: Completed Specimen: Blood Updated: 04/22/23 2200     CULTURE, BLOOD ([facility]) No Growth At 24 Hours    Blood culture #2 **CANNOT BE ORDERED STAT** [[id number]]  (Normal) Collected: 04/21/23 1838    Order Status: Completed Specimen: Blood Updated: 04/22/23 2200     CULTURE, BLOOD ([facility]) No Growth At 24 Hours               Medications   levetiracetam  750 mg Oral BID    piperacillin-tazobactam (ZOSYN) IVPB  4.5 g Intravenous Q8H    torsemide  20 mg Oral BID        acetaminophen, HYDROcodone-acetaminophen, melatonin, sodium chloride 0.9%     Radiology:  CT Abdomen Pelvis With Contrast  Narrative: EXAMINATION:  CT ABDOMEN PELVIS WITH CONTRAST    CLINICAL HISTORY:  Abdominal pain, acute, nonlocalized;    TECHNIQUE:  Low dose axial images, sagittal and coronal reformations were obtained from the lung bases to the pubic symphysis following the IV administration of contrast. Automatic exposure control (AEC) is utilized to reduce patient radiation exposure.    COMPARISON:  None.    FINDINGS:  There is  bibasilar atelectasis.  There is a small right-sided pleural effusion..    The liver appears normal.  No liver mass or lesion is seen.  Portal and hepatic veins appear normal.    The gallbladder appears normal.  No obvious gallstones are seen.  No biliary dilatation is seen.  No pericholecystic fluid is seen.    The pancreas appears normal.  No pancreatic mass or lesion is seen.    The spleen shows no acute abnormality.    The adrenal glands appear normal.  No adrenal nodule is seen.    The kidneys appear normal.  No hydronephrosis is seen.  No hydroureter is seen.  No nephrolithiasis is seen.  No obvious ureteral stones are seen.    There is a ventriculoperitoneal shunt seen in place.  This was seen on prior examination as well.    There is a suprapubic catheter seen which appears to have retracted from the urinary bladder and is along the anterior pelvic wall.  There is a tract from the right anterior pelvic wall to the urinary bladder.  There is some air in the soft tissues of the anterior pelvic wall on images number 64 through 69.  There are associated inflammatory changes in the region.  An abscess is not seen at this time..  There is a small bubble of air within the urinary bladder is well.    No colitis is seen.  No diverticulitis is seen.  No obvious colonic mass or lesion is seen.    No free air is seen.  No free fluid is seen.  Impression: The suprapubic catheter has retracted from the urinary bladder and is in the soft tissues the right anterior pelvic wall.  The balloon is still inflated.  There is some air and inflammatory changes seen in the soft tissues of the anterior pelvic wall on the right side consistent with possible developing infection.  No drainable fluid collection is seen at this time.    This report was flagged in Epic as abnormal.    Electronically signed by: Alicia Helton  Date:    04/21/2023  Time:    15:38          Assessment/Plan:  Suprapubic catheter  dysfunction  UTI  Leukocytosis  History of essential hypertension, hyperlipidemia, CAD, CHF, PVD, diabetes mellitus type 2, CVA, seizure disorder, hydrocephalus, celiac disease, ERICA, osteoarthritis, vertigo, bipolar disorder, anxiety, and depression      Plan:  Cultures are not suggestive of acute infection, will dc abx.   - can go with prophylactic dose of macrolide  - spoke to urologist, no urgency of placing the cath from their stand point. He will see the patient late today.   - continue monitor.      All diagnosis and differential diagnosis have been reviewed; assessment and plan has been documented; I have personally reviewed the labs and test results that are presently available; I have reviewed the patients medication list; I have reviewed the consulting providers response and recommendations. I have reviewed or attempted to review medical records based upon their availability.       El Orantes MD   04/23/2023

## 2023-04-23 NOTE — CONSULTS
consultation    Reasons for consultation:  Suprapubic tube loss    History of present illness:  This patient is a 77-year-old white female with history of suprapubic tube which as best I understand was placed for hygiene and recurrent infection.  She lives in a nursing home.  They attempted to replace her catheter unsuccessfully.  I am not sure over how much time that transpired but they were unable to replace the catheter in the emergency department on Friday.  [facility]  I recommended they place a urethral catheter and arrange for outpatient follow-up with Dr. Szymanski for replacement of her suprapubic tube.  Apparently the patient was transferred to the main campus and urologic consultation was requested and I never received it until yesterday evening.  I was detained in surgery until late last night and had planned to come by today.  Her attending physician called me this morning and I made the same recommendations.  The patient remains on the floor.  She complained of discomfort with voiding.  The family remains resistant to the idea of replacing a urethral catheter.    Chart reviewed for past history allergies and medications.      Vital signs:  Afebrile with normal vital signs     Physical exam:    General-moderately obese white female lying in bed.  Dysarthric speech and some aphasia    Abdomen-protuberant abdomen with right lower quadrant suprapubic site without drainage.  General full feeling but unable to discretely palpate bladder.    Pelvic examination-there is no erythema or discharge noted and a pure wick was in place.    Laboratory: Patient with normal renal function and serum electrolytes.  Slight leukocytosis with normal hemoglobin and hematocrit and platelets.  Urinalysis consistent with history of suprapubic tube.  Culture with low colony count of rods    Radiologic studies:  CT scan reviewed from 04/21.  Images and report.    Impression:  Loss of suprapubic catheter  placed for presumably hygienic reasons.  And to prevent or decrease urinary infection.    Recommendation:  I am going to get ultrasound of the abdomen and pelvis to make sure she is not retentive though bladder scan suggest she is not.  There was no stone seen on the CT scan in her bladder.  Other considerations would be some type of infection happening in her abdominal wall related to the catheter Ms. Placement but that does not seem to be happening on physical exam.    Cal Villar MD

## 2023-04-23 NOTE — PLAN OF CARE
Problem: Adult Inpatient Plan of Care  Goal: Plan of Care Review  4/23/2023 1044 by Tiesha Stone RN  Outcome: Ongoing, Progressing  4/23/2023 0631 by Tiesha Stone RN  Outcome: Ongoing, Progressing  Goal: Patient-Specific Goal (Individualized)  4/23/2023 1044 by Tiesha Stone RN  Outcome: Ongoing, Progressing  4/23/2023 0631 by Tiesha Stone RN  Outcome: Ongoing, Progressing  Goal: Absence of Hospital-Acquired Illness or Injury  4/23/2023 1044 by Tiesha Stone RN  Outcome: Ongoing, Progressing  4/23/2023 0631 by Tiesha Stone RN  Outcome: Ongoing, Progressing  Goal: Optimal Comfort and Wellbeing  4/23/2023 1044 by Tiesha Stone RN  Outcome: Ongoing, Progressing  4/23/2023 0631 by Tiesha Stone RN  Outcome: Ongoing, Progressing  Goal: Readiness for Transition of Care  4/23/2023 1044 by Tiesha Stone RN  Outcome: Ongoing, Progressing  4/23/2023 0631 by Tiesha Stone RN  Outcome: Ongoing, Progressing     Problem: Infection  Goal: Absence of Infection Signs and Symptoms  4/23/2023 1044 by Tiesha Stone RN  Outcome: Ongoing, Progressing  4/23/2023 0631 by Tiesha Stone RN  Outcome: Ongoing, Progressing     Problem: Diabetes Comorbidity  Goal: Blood Glucose Level Within Targeted Range  4/23/2023 1044 by Tiesha Stone, KEITH  Outcome: Ongoing, Progressing  4/23/2023 0631 by Tiesha Stone RN  Outcome: Ongoing, Progressing     Problem: Fall Injury Risk  Goal: Absence of Fall and Fall-Related Injury  4/23/2023 1044 by Tiesha Stone, RN  Outcome: Ongoing, Progressing  4/23/2023 0631 by Tiesha Stone RN  Outcome: Ongoing, Progressing     Problem: Skin Injury Risk Increased  Goal: Skin Health and Integrity  4/23/2023 1044 by Tiesha Stone, KEITH  Outcome: Ongoing, Progressing  4/23/2023 0631 by Tiesha Stone RN  Outcome: Ongoing, Progressing     Problem: Impaired Wound Healing  Goal: Optimal Wound Healing  4/23/2023 1044 by Tiesha Stone  RN  Outcome: Ongoing, Progressing  4/23/2023 0631 by Tiesha Stone RN  Outcome: Ongoing, Progressing

## 2023-04-24 VITALS
HEIGHT: 56 IN | SYSTOLIC BLOOD PRESSURE: 97 MMHG | TEMPERATURE: 98 F | DIASTOLIC BLOOD PRESSURE: 66 MMHG | HEART RATE: 82 BPM | WEIGHT: 174.19 LBS | RESPIRATION RATE: 18 BRPM | BODY MASS INDEX: 39.18 KG/M2 | OXYGEN SATURATION: 96 %

## 2023-04-24 LAB
BACTERIA UR CULT: ABNORMAL
SARS-COV-2 RDRP RESP QL NAA+PROBE: NEGATIVE

## 2023-04-24 PROCEDURE — 87635 SARS-COV-2 COVID-19 AMP PRB: CPT | Performed by: STUDENT IN AN ORGANIZED HEALTH CARE EDUCATION/TRAINING PROGRAM

## 2023-04-24 PROCEDURE — G0378 HOSPITAL OBSERVATION PER HR: HCPCS

## 2023-04-24 PROCEDURE — 25000003 PHARM REV CODE 250: Performed by: STUDENT IN AN ORGANIZED HEALTH CARE EDUCATION/TRAINING PROGRAM

## 2023-04-24 PROCEDURE — 25000003 PHARM REV CODE 250: Performed by: NURSE PRACTITIONER

## 2023-04-24 RX ADMIN — LEVOTHYROXINE SODIUM 150 MCG: 150 TABLET ORAL at 10:04

## 2023-04-24 RX ADMIN — HYDROCODONE BITARTRATE AND ACETAMINOPHEN 1 TABLET: 5; 325 TABLET ORAL at 10:04

## 2023-04-24 RX ADMIN — VITAM B12 100 MCG: 100 TAB at 10:04

## 2023-04-24 RX ADMIN — OXYBUTYNIN CHLORIDE 5 MG: 5 TABLET, EXTENDED RELEASE ORAL at 10:04

## 2023-04-24 RX ADMIN — LEVETIRACETAM 750 MG: 100 SOLUTION ORAL at 10:04

## 2023-04-24 RX ADMIN — EZETIMIBE 10 MG: 10 TABLET ORAL at 10:04

## 2023-04-24 RX ADMIN — GABAPENTIN 300 MG: 300 CAPSULE ORAL at 10:04

## 2023-04-24 RX ADMIN — FLUOXETINE 20 MG: 20 CAPSULE ORAL at 10:04

## 2023-04-24 RX ADMIN — FOLIC ACID 1 MG: 1 TABLET ORAL at 10:04

## 2023-04-24 RX ADMIN — TOPIRAMATE 100 MG: 100 TABLET, FILM COATED ORAL at 10:04

## 2023-04-24 NOTE — PROGRESS NOTES
UROLOGY  PROGRESS  NOTE    Elisabet Choi 1945  [id number]  4/24/2023    No labs today  VSS, afebrile  Decent urine output but reports some dysuria      Intake/Output:  No intake/output data recorded.  I/O last 3 completed shifts:  In: 980 [P.O.:980]  Out: 1050 [Urine:1050]       Exam:    NAD  Card RRR  Resp unlabored   no urine for assessment        Recent Results (from the past 24 hour(s))   POCT glucose    Collection Time: 04/23/23  4:47 PM   Result Value Ref Range    POCT Glucose 199 (H) 70 - 110 mg/dL       Imaging:  EXAMINATION:  US PELVIS LIMITED NON OB     CLINICAL HISTORY:  Check for urinary retention.  Portable exam okay if easier;     TECHNIQUE:  Multiple real-time images were performed of urinary bladder in various planes by the sonographer.     COMPARISON:  CT abdomen pelvis April 21, 2023     FINDINGS:  Urinary bladder measures 4.8 x 4.4 x 5.2 cm.  Urinary bladder volume is 76 mL.  Urinary bladder wall is not thickened and there is no apparent echogenic debris within the uterine lumen.     Impression:     Nondistended urinary bladder.      Assessment:  Displaced suprapubic catheter  Dysuria, agree with Pyridium      Plan:  No acute indication to replace suprapubic catheter as an inpatient as she is able to empty her bladder adequately through her urethra. Family not wanting urethral Banda. She can follow up as an outpatient with Dr. Szymanski to replace SP tube in the office.    Mary Kay Ayon, YECENIA

## 2023-04-24 NOTE — CLINICAL REVIEW
DEVYN Oneal completed with YURI King, over the phone. Email sent to JOMAR Finch for delivery    Followed protocol

## 2023-04-24 NOTE — PLAN OF CARE
[facility] provided caregiver/family with MOON  form to sign/date  [facility] gave caregiver/family a copy & inserted original form in pt chart

## 2023-04-24 NOTE — DISCHARGE SUMMARY
[facility] Hospital Medicine Discharge Summary    Admit Date: 4/21/2023  Discharge Date and Time: 4/24/2023  Discharging Physician: El Orantes MD.  Consults: Urology    Discharge Diagnoses:  Suprapubic catheter dysfunction  UTI  Leukocytosis  History of essential hypertension, hyperlipidemia, CAD, CHF, PVD, diabetes mellitus type 2, CVA, seizure disorder, hydrocephalus, celiac disease, ERICA, osteoarthritis, vertigo, bipolar disorder, anxiety, and depression      Hospital Course:   Elisabet Choi is a 77 y.o. female with a past medical history of essential hypertension, hyperlipidemia, CAD, CHF, PVD, diabetes mellitus type 2, CVA, seizure disorder, hydrocephalus, celiac disease, ERICA, osteoarthritis, vertigo, bipolar disorder, anxiety, and depression presented to [facility] on 4/21/2023 transferred from [facility] for  suprapubic catheter malfunction.  Nursing home reported that they noticed leaking from suprapubic catheter yesterday, 4/21 with redness around site.  Staff attempted to change catheter without success, prompting patient to outside facility.  Suprapubic catheter was placed in June of 2021 by Dr. Szymanski for recurrent UTIs.  Labs revealed WBC 11.4, BUN 28.1, creatinine 0.73, glucose 236, and lactic acid 1.7.  UA revealed trace protein, trace occult blood, moderate leukocytes, 0-2 RBCs, and 50-99 white blood cells.  Blood and urine cultures were obtained.  CT abdomen and pelvis with contrast revealed suprapubic has retracted from the urinary bladder and is in the soft tissues the right anterior pelvic wall with balloon still inflated and inflammatory changes seen in the soft tissues of the anterior pelvic wall on the right side consistent with possible developing infection without drainable fluid collection.  Urology was consulted.  Patient's current suprapubic catheter was removed and new catheter placement was attempted in ED without success. Patient's family refused  indwelling kraft and requested purewick.  Patient was started on IV Zosyn and transferred to [facility] for urology services. Patient was admitted to hospital medicine service for further medical management.     Cultures are not suggestive of acute infection, will dc abx.   - can go with prophylactic dose of macrolide  - spoke to urologist, no urgency of placing the cath from their stand point. He will see the patient late today.   - continue monitor.    - will discharge patient today to the nursing home.     General: In no acute distress, AAOx3   Chest: Clear to auscultation bilaterally, non-labored   Heart: RRR, +S1, S2, no appreciable murmur  Abdomen: Soft, nontender, BS +  MSK: Warm, no lower extremity edema, no clubbing or cyanosis  Neurologic:  Cranial nerve II-XII intact, Strength 5/5 in all 4 extremities    Vitals:  VITAL SIGNS: 24 HRS MIN & MAX LAST   Temp  Min: 97.5 °F (36.4 °C)  Max: 98.3 °F (36.8 °C) 98.3 °F (36.8 °C)   BP  Min: 95/65  Max: 155/86 95/65   Pulse  Min: 68  Max: 102  102   Resp  Min: 16  Max: 18 16   SpO2  Min: 90 %  Max: 97 % (!) 90 %           Procedures Performed: No admission procedures for hospital encounter.     Significant Diagnostic Studies: See Full reports for all details    Recent Labs   Lab 04/21/23  1345 04/22/23  0634 04/23/23  0452   WBC 11.4 13.2* 11.5   RBC 4.09* 4.12* 4.08*   HGB 11.6* 11.6* 11.5*   HCT 37.7 37.2 37.0   MCV 92.2 90.3 90.7   MCH 28.4 28.2 28.2   MCHC 30.8* 31.2* 31.1*   RDW 15.9 15.9 15.6    257 260   MPV 9.8 9.7 9.8       Recent Labs   Lab 04/21/23  1345 04/22/23  0634 04/23/23  0452    141 140   K 4.1 4.1 3.7   CO2 29 24 27   BUN 28.1* 27.6* 21.1*   CREATININE 0.73 0.75 0.73   CALCIUM 9.7 9.0 8.9   MG  --  2.30  --    ALBUMIN 3.0* 3.0* 2.9*   ALKPHOS 65 63 60   ALT 12 11 12   AST 9 9 10   BILITOT 0.2 0.3 <0.5        Microbiology Results (last 7 days)       Procedure Component Value Units Date/Time    Urine culture [[id number]]  (Abnormal)   (Susceptibility) Collected: 04/21/23 1326    Order Status: Completed Specimen: Urine Updated: 04/24/23 0751     Urine Culture 10,000 - 25,000 colonies/ml Proteus mirabilis    Blood culture #1 **CANNOT BE ORDERED STAT** [[id number]]  (Normal) Collected: 04/21/23 1838    Order Status: Completed Specimen: Blood Updated: 04/23/23 2200     CULTURE, BLOOD ([facility]) No Growth At 48 Hours    Blood culture #2 **CANNOT BE ORDERED STAT** [[id number]]  (Normal) Collected: 04/21/23 1838    Order Status: Completed Specimen: Blood Updated: 04/23/23 2200     CULTURE, BLOOD ([facility]) No Growth At 48 Hours             US Pelvis Limited Non OB  Narrative: EXAMINATION:  US PELVIS LIMITED NON OB    CLINICAL HISTORY:  Check for urinary retention.  Portable exam okay if easier;    TECHNIQUE:  Multiple real-time images were performed of urinary bladder in various planes by the sonographer.    COMPARISON:  CT abdomen pelvis April 21, 2023    FINDINGS:  Urinary bladder measures 4.8 x 4.4 x 5.2 cm.  Urinary bladder volume is 76 mL.  Urinary bladder wall is not thickened and there is no apparent echogenic debris within the uterine lumen.  Impression: Nondistended urinary bladder.    Electronically signed by: Jarod Mae  Date:    04/23/2023  Time:    18:57         Medication List        ASK your doctor about these medications      acetaminophen 500 MG tablet  Commonly known as: TYLENOL     ALPRAZolam 1 MG tablet  Commonly known as: XANAX     amitriptyline 25 MG tablet  Commonly known as: ELAVIL     amLODIPine 10 MG tablet  Commonly known as: NORVASC     aspirin 81 MG EC tablet  Commonly known as: ECOTRIN  Take 1 tablet (81 mg total) by mouth once daily.     atorvastatin 80 MG tablet  Commonly known as: LIPITOR     cyanocobalamin 1000 MCG tablet  Commonly known as: VITAMIN B-12     doxazosin 1 MG tablet  Commonly known as: CARDURA     ezetimibe 10 mg tablet  Commonly known as: ZETIA     FLUoxetine 20 MG capsule     folic acid 1 MG tablet  Commonly known  as: FOLVITE     furosemide 40 MG tablet  Commonly known as: LASIX     gabapentin 300 MG capsule  Commonly known as: NEURONTIN     HYDROcodone-acetaminophen  mg per tablet  Commonly known as: NORCO  Take 1 tablet by mouth every 6 (six) hours as needed for Pain.     insulin detemir U-100 100 unit/mL injection  Commonly known as: Levemir     levETIRAcetam 750 MG Tab  Commonly known as: KEPPRA     * levothyroxine 25 MCG tablet  Commonly known as: SYNTHROID     * levothyroxine 200 MCG tablet  Commonly known as: SYNTHROID     lisinopriL 40 MG tablet  Commonly known as: PRINIVIL,ZESTRIL     nitrofurantoin 100 MG capsule  Commonly known as: MACRODANTIN     pantoprazole 40 MG tablet  Commonly known as: PROTONIX  Take 1 tablet (40 mg total) by mouth once daily.     polyethylene glycol 17 gram Pwpk  Commonly known as: GLYCOLAX     simvastatin 40 MG tablet  Commonly known as: ZOCOR     solifenacin 5 MG tablet  Commonly known as: VESICARE     topiramate 100 MG tablet  Commonly known as: TOPAMAX     vitamin D 1000 units Tab  Commonly known as: VITAMIN D3           * This list has 2 medication(s) that are the same as other medications prescribed for you. Read the directions carefully, and ask your doctor or other care provider to review them with you.                   Explained in detail to the patient about the discharge plan, medications, and follow-up visits. Pt understands and agrees with the treatment plan  Discharge Disposition: Home or Self Care   Discharged Condition: stable  Diet-   Dietary Orders (From admission, onward)       Start     Ordered    04/21/23 1752  Diet diabetic  (Diet/Nutrition [facility])  Diet effective now         04/21/23 1752                   Medications Per DC med rec  Activities as tolerated    For further questions contact hospitalist office    Discharge time 33 minutes    For worsening symptoms, chest pain, shortness of breath, increased abdominal pain, high grade fever, stroke or stroke like  symptoms, immediately go to the nearest Emergency Room or call 911 as soon as possible.      El Boogie M.D, on 4/24/2023. at 1:16 PM.

## 2023-04-24 NOTE — PLAN OF CARE
[facility] sent updated clinicals via [facility] to [facility] ( pt is already established with facility )

## 2023-04-24 NOTE — PROGRESS NOTES
[facility] - 8th Floor Med Surg  Wound Care    Patient Name:  Elisabet Choi   MRN:  [id number]  Date: 4/24/2023  Diagnosis: Chest pain    History:     Past Medical History:   Diagnosis Date    Arthritis     Back pain     Celiac disease     CHF (congestive heart failure)     Diabetes mellitus     Encounter for blood transfusion     Fluid retention     Goiter     Hydrocephalus     Stroke        Social History     Socioeconomic History    Marital status:    Tobacco Use    Smoking status: Never   Substance and Sexual Activity    Alcohol use: No    Drug use: No       Precautions:     Allergies as of 04/21/2023 - Reviewed 04/21/2023   Allergen Reaction Noted    Gluten  12/10/2019    Gluten protein  06/29/2014    Ropinirole  07/24/2022    Wheat  12/10/2019    Wheat containing prod  06/29/2014    Zolpidem  06/29/2014       WOC Assessment Details/Treatment        04/24/23 1251        Altered Skin Integrity 04/22/23 0200 Perineum Other (comment) Intact skin with non-blanchable redness of localized area   Date First Assessed/Time First Assessed: 04/22/23 0200   Altered Skin Integrity Present on Admission - Did Patient arrive to the hospital with altered skin?: yes  Location: Perineum  Is this injury device related?: No  Primary Wound Type: Other (comme...   Wound Image    Dressing Appearance Open to air   Drainage Amount None   Appearance Red   Periwound Area Denuded;Moist   Care Cleansed with:;Sterile normal saline   Dressing Applied   Skin Interventions   Pressure Reduction Devices positioning supports utilized   Pressure Reduction Techniques frequent weight shift encouraged     WOCN consulted  in for initial visit. Daughter in law at bedside. Treatment recommendations put into place Apply Desitin. Keep areas clean and dry, no adult briefs while in bed. Nursing to continue with preventative measures. Will follow up.     04/24/2023

## 2023-04-24 NOTE — PLAN OF CARE
[facility] sent discharge summary/ orders, avs & mar to [facility] via [facility]    SSC provided nurse Mccall with discharge packet to call report to Jacqueline [phone]

## 2023-04-26 LAB
BACTERIA BLD CULT: NORMAL
BACTERIA BLD CULT: NORMAL

## 2023-05-23 ENCOUNTER — LAB REQUISITION (OUTPATIENT)
Dept: LAB | Facility: HOSPITAL | Age: 78
End: 2023-05-23
Payer: MEDICARE

## 2023-05-23 DIAGNOSIS — L89.622 PRESSURE ULCER OF LEFT HEEL, STAGE 2: ICD-10-CM

## 2023-05-23 LAB
BASOPHILS # BLD AUTO: 0.05 X10(3)/MCL
BASOPHILS NFR BLD AUTO: 0.4 %
CRP SERPL HS-MCNC: 20 MG/L
EOSINOPHIL # BLD AUTO: 0.49 X10(3)/MCL (ref 0–0.9)
EOSINOPHIL NFR BLD AUTO: 4.1 %
ERYTHROCYTE [DISTWIDTH] IN BLOOD BY AUTOMATED COUNT: 14.8 % (ref 11.5–17)
ERYTHROCYTE [SEDIMENTATION RATE] IN BLOOD: 17 MM/HR (ref 0–20)
HCT VFR BLD AUTO: 36.9 % (ref 37–47)
HGB BLD-MCNC: 11.4 G/DL (ref 12–16)
IMM GRANULOCYTES # BLD AUTO: 0.04 X10(3)/MCL (ref 0–0.04)
IMM GRANULOCYTES NFR BLD AUTO: 0.3 %
LYMPHOCYTES # BLD AUTO: 3.87 X10(3)/MCL (ref 0.6–4.6)
LYMPHOCYTES NFR BLD AUTO: 32.1 %
MCH RBC QN AUTO: 28.3 PG (ref 27–31)
MCHC RBC AUTO-ENTMCNC: 30.9 G/DL (ref 33–36)
MCV RBC AUTO: 91.6 FL (ref 80–94)
MONOCYTES # BLD AUTO: 0.84 X10(3)/MCL (ref 0.1–1.3)
MONOCYTES NFR BLD AUTO: 7 %
NEUTROPHILS # BLD AUTO: 6.78 X10(3)/MCL (ref 2.1–9.2)
NEUTROPHILS NFR BLD AUTO: 56.1 %
NRBC BLD AUTO-RTO: 0 %
PLATELET # BLD AUTO: 222 X10(3)/MCL (ref 130–400)
PMV BLD AUTO: 10.4 FL (ref 7.4–10.4)
RBC # BLD AUTO: 4.03 X10(6)/MCL (ref 4.2–5.4)
WBC # SPEC AUTO: 12.07 X10(3)/MCL (ref 4.5–11.5)

## 2023-05-23 PROCEDURE — 86141 C-REACTIVE PROTEIN HS: CPT | Performed by: INTERNAL MEDICINE

## 2023-05-23 PROCEDURE — 85025 COMPLETE CBC W/AUTO DIFF WBC: CPT | Performed by: INTERNAL MEDICINE

## 2023-05-23 PROCEDURE — 85651 RBC SED RATE NONAUTOMATED: CPT | Performed by: INTERNAL MEDICINE

## 2023-06-07 ENCOUNTER — LAB REQUISITION (OUTPATIENT)
Dept: LAB | Facility: HOSPITAL | Age: 78
End: 2023-06-07
Payer: MEDICARE

## 2023-06-07 DIAGNOSIS — L89.622 PRESSURE ULCER OF LEFT HEEL, STAGE 2: ICD-10-CM

## 2023-06-07 LAB
BASOPHILS # BLD AUTO: 0.07 X10(3)/MCL
BASOPHILS NFR BLD AUTO: 0.6 %
CRP SERPL HS-MCNC: 3 MG/L
EOSINOPHIL # BLD AUTO: 0.58 X10(3)/MCL (ref 0–0.9)
EOSINOPHIL NFR BLD AUTO: 5.1 %
ERYTHROCYTE [DISTWIDTH] IN BLOOD BY AUTOMATED COUNT: 14.6 % (ref 11.5–17)
ERYTHROCYTE [SEDIMENTATION RATE] IN BLOOD: 27 MM/HR (ref 0–20)
HCT VFR BLD AUTO: 34.9 % (ref 37–47)
HGB BLD-MCNC: 10.8 G/DL (ref 12–16)
IMM GRANULOCYTES # BLD AUTO: 0.04 X10(3)/MCL (ref 0–0.04)
IMM GRANULOCYTES NFR BLD AUTO: 0.4 %
LYMPHOCYTES # BLD AUTO: 4.19 X10(3)/MCL (ref 0.6–4.6)
LYMPHOCYTES NFR BLD AUTO: 36.9 %
MCH RBC QN AUTO: 27.6 PG (ref 27–31)
MCHC RBC AUTO-ENTMCNC: 30.9 G/DL (ref 33–36)
MCV RBC AUTO: 89 FL (ref 80–94)
MONOCYTES # BLD AUTO: 0.76 X10(3)/MCL (ref 0.1–1.3)
MONOCYTES NFR BLD AUTO: 6.7 %
NEUTROPHILS # BLD AUTO: 5.72 X10(3)/MCL (ref 2.1–9.2)
NEUTROPHILS NFR BLD AUTO: 50.3 %
NRBC BLD AUTO-RTO: 0 %
PLATELET # BLD AUTO: 178 X10(3)/MCL (ref 130–400)
PMV BLD AUTO: 10.4 FL (ref 7.4–10.4)
RBC # BLD AUTO: 3.92 X10(6)/MCL (ref 4.2–5.4)
WBC # SPEC AUTO: 11.36 X10(3)/MCL (ref 4.5–11.5)

## 2023-06-07 PROCEDURE — 85025 COMPLETE CBC W/AUTO DIFF WBC: CPT | Performed by: INTERNAL MEDICINE

## 2023-06-07 PROCEDURE — 86141 C-REACTIVE PROTEIN HS: CPT | Performed by: INTERNAL MEDICINE

## 2023-06-07 PROCEDURE — 85652 RBC SED RATE AUTOMATED: CPT | Performed by: INTERNAL MEDICINE

## 2023-08-16 ENCOUNTER — LAB REQUISITION (OUTPATIENT)
Dept: LAB | Facility: HOSPITAL | Age: 78
End: 2023-08-16
Payer: MEDICARE

## 2023-08-16 DIAGNOSIS — I10 ESSENTIAL (PRIMARY) HYPERTENSION: ICD-10-CM

## 2023-08-16 LAB
ALBUMIN SERPL-MCNC: 2.6 G/DL (ref 3.4–4.8)
ALBUMIN/GLOB SERPL: 0.9 RATIO (ref 1.1–2)
ALP SERPL-CCNC: 75 UNIT/L (ref 40–150)
ALT SERPL-CCNC: 7 UNIT/L (ref 0–55)
AST SERPL-CCNC: 6 UNIT/L (ref 5–34)
BASOPHILS # BLD AUTO: 0.06 X10(3)/MCL
BASOPHILS NFR BLD AUTO: 0.5 %
BILIRUB SERPL-MCNC: 0.2 MG/DL
BUN SERPL-MCNC: 19.8 MG/DL (ref 9.8–20.1)
CALCIUM SERPL-MCNC: 8.6 MG/DL (ref 8.4–10.2)
CHLORIDE SERPL-SCNC: 106 MMOL/L (ref 98–107)
CO2 SERPL-SCNC: 26 MMOL/L (ref 23–31)
CREAT SERPL-MCNC: 0.61 MG/DL (ref 0.55–1.02)
CRP SERPL HS-MCNC: 20 MG/L
EOSINOPHIL # BLD AUTO: 0.5 X10(3)/MCL (ref 0–0.9)
EOSINOPHIL NFR BLD AUTO: 3.8 %
ERYTHROCYTE [DISTWIDTH] IN BLOOD BY AUTOMATED COUNT: 15.7 % (ref 11.5–17)
ERYTHROCYTE [SEDIMENTATION RATE] IN BLOOD: 41 MM/HR (ref 0–20)
GFR SERPLBLD CREATININE-BSD FMLA CKD-EPI: >60 MLS/MIN/1.73/M2
GLOBULIN SER-MCNC: 2.9 GM/DL (ref 2.4–3.5)
GLUCOSE SERPL-MCNC: 122 MG/DL (ref 82–115)
HCT VFR BLD AUTO: 36.6 % (ref 37–47)
HGB BLD-MCNC: 11.3 G/DL (ref 12–16)
IMM GRANULOCYTES # BLD AUTO: 0.05 X10(3)/MCL (ref 0–0.04)
IMM GRANULOCYTES NFR BLD AUTO: 0.4 %
LYMPHOCYTES # BLD AUTO: 4.47 X10(3)/MCL (ref 0.6–4.6)
LYMPHOCYTES NFR BLD AUTO: 33.5 %
MCH RBC QN AUTO: 26.8 PG (ref 27–31)
MCHC RBC AUTO-ENTMCNC: 30.9 G/DL (ref 33–36)
MCV RBC AUTO: 86.9 FL (ref 80–94)
MONOCYTES # BLD AUTO: 0.82 X10(3)/MCL (ref 0.1–1.3)
MONOCYTES NFR BLD AUTO: 6.2 %
NEUTROPHILS # BLD AUTO: 7.43 X10(3)/MCL (ref 2.1–9.2)
NEUTROPHILS NFR BLD AUTO: 55.6 %
NRBC BLD AUTO-RTO: 0 %
PLATELET # BLD AUTO: 226 X10(3)/MCL (ref 130–400)
PMV BLD AUTO: 10 FL (ref 7.4–10.4)
POTASSIUM SERPL-SCNC: 4.6 MMOL/L (ref 3.5–5.1)
PROT SERPL-MCNC: 5.5 GM/DL (ref 5.8–7.6)
RBC # BLD AUTO: 4.21 X10(6)/MCL (ref 4.2–5.4)
SODIUM SERPL-SCNC: 140 MMOL/L (ref 136–145)
WBC # SPEC AUTO: 13.33 X10(3)/MCL (ref 4.5–11.5)

## 2023-08-16 PROCEDURE — 85025 COMPLETE CBC W/AUTO DIFF WBC: CPT | Performed by: INTERNAL MEDICINE

## 2023-08-16 PROCEDURE — 80053 COMPREHEN METABOLIC PANEL: CPT | Performed by: INTERNAL MEDICINE

## 2023-08-16 PROCEDURE — 86141 C-REACTIVE PROTEIN HS: CPT | Performed by: INTERNAL MEDICINE

## 2023-08-16 PROCEDURE — 85652 RBC SED RATE AUTOMATED: CPT | Performed by: INTERNAL MEDICINE

## 2023-09-05 ENCOUNTER — LAB REQUISITION (OUTPATIENT)
Dept: LAB | Facility: HOSPITAL | Age: 78
End: 2023-09-05
Payer: MEDICARE

## 2023-09-05 DIAGNOSIS — R19.7 DIARRHEA, UNSPECIFIED: ICD-10-CM

## 2023-09-05 LAB
CLOSTRIDIUM DIFFICILE GDH ANTIGEN (OHS): NEGATIVE
CLOSTRIDIUM DIFFICILE TOXIN A/B (OHS): NEGATIVE

## 2023-09-05 PROCEDURE — 86318 IA INFECTIOUS AGENT ANTIBODY: CPT | Performed by: INTERNAL MEDICINE

## 2023-09-27 ENCOUNTER — LAB REQUISITION (OUTPATIENT)
Dept: LAB | Facility: HOSPITAL | Age: 78
End: 2023-09-27
Payer: MEDICARE

## 2023-09-27 DIAGNOSIS — E11.9 TYPE 2 DIABETES MELLITUS WITHOUT COMPLICATIONS: ICD-10-CM

## 2023-09-27 LAB
ALBUMIN SERPL-MCNC: 2.7 G/DL (ref 3.4–4.8)
ALBUMIN/GLOB SERPL: 0.9 RATIO (ref 1.1–2)
ALP SERPL-CCNC: 68 UNIT/L (ref 40–150)
ALT SERPL-CCNC: 10 UNIT/L (ref 0–55)
AST SERPL-CCNC: 9 UNIT/L (ref 5–34)
BASOPHILS # BLD AUTO: 0.06 X10(3)/MCL
BASOPHILS NFR BLD AUTO: 0.6 %
BILIRUB SERPL-MCNC: 0.3 MG/DL
BUN SERPL-MCNC: 18.2 MG/DL (ref 9.8–20.1)
CALCIUM SERPL-MCNC: 8.6 MG/DL (ref 8.4–10.2)
CHLORIDE SERPL-SCNC: 99 MMOL/L (ref 98–107)
CO2 SERPL-SCNC: 31 MMOL/L (ref 23–31)
CREAT SERPL-MCNC: 0.78 MG/DL (ref 0.55–1.02)
CRP SERPL HS-MCNC: 42.9 MG/L
EOSINOPHIL # BLD AUTO: 0.58 X10(3)/MCL (ref 0–0.9)
EOSINOPHIL NFR BLD AUTO: 5.6 %
ERYTHROCYTE [DISTWIDTH] IN BLOOD BY AUTOMATED COUNT: 16.7 % (ref 11.5–17)
ERYTHROCYTE [SEDIMENTATION RATE] IN BLOOD: 57 MM/HR (ref 0–20)
GFR SERPLBLD CREATININE-BSD FMLA CKD-EPI: >60 MLS/MIN/1.73/M2
GLOBULIN SER-MCNC: 3 GM/DL (ref 2.4–3.5)
GLUCOSE SERPL-MCNC: 191 MG/DL (ref 82–115)
HCT VFR BLD AUTO: 35.7 % (ref 37–47)
HGB BLD-MCNC: 10.7 G/DL (ref 12–16)
IMM GRANULOCYTES # BLD AUTO: 0.06 X10(3)/MCL (ref 0–0.04)
IMM GRANULOCYTES NFR BLD AUTO: 0.6 %
LYMPHOCYTES # BLD AUTO: 3.66 X10(3)/MCL (ref 0.6–4.6)
LYMPHOCYTES NFR BLD AUTO: 35.4 %
MCH RBC QN AUTO: 27.5 PG (ref 27–31)
MCHC RBC AUTO-ENTMCNC: 30 G/DL (ref 33–36)
MCV RBC AUTO: 91.8 FL (ref 80–94)
MONOCYTES # BLD AUTO: 0.62 X10(3)/MCL (ref 0.1–1.3)
MONOCYTES NFR BLD AUTO: 6 %
NEUTROPHILS # BLD AUTO: 5.36 X10(3)/MCL (ref 2.1–9.2)
NEUTROPHILS NFR BLD AUTO: 51.8 %
NRBC BLD AUTO-RTO: 0 %
PLATELET # BLD AUTO: 189 X10(3)/MCL (ref 130–400)
PMV BLD AUTO: 10.4 FL (ref 7.4–10.4)
POTASSIUM SERPL-SCNC: 4.5 MMOL/L (ref 3.5–5.1)
PROT SERPL-MCNC: 5.7 GM/DL (ref 5.8–7.6)
RBC # BLD AUTO: 3.89 X10(6)/MCL (ref 4.2–5.4)
SODIUM SERPL-SCNC: 141 MMOL/L (ref 136–145)
WBC # SPEC AUTO: 10.34 X10(3)/MCL (ref 4.5–11.5)

## 2023-09-27 PROCEDURE — 85025 COMPLETE CBC W/AUTO DIFF WBC: CPT | Performed by: INTERNAL MEDICINE

## 2023-09-27 PROCEDURE — 86141 C-REACTIVE PROTEIN HS: CPT | Performed by: INTERNAL MEDICINE

## 2023-09-27 PROCEDURE — 80053 COMPREHEN METABOLIC PANEL: CPT | Performed by: INTERNAL MEDICINE

## 2023-09-27 PROCEDURE — 85652 RBC SED RATE AUTOMATED: CPT | Performed by: INTERNAL MEDICINE

## 2023-10-05 ENCOUNTER — LAB REQUISITION (OUTPATIENT)
Dept: LAB | Facility: HOSPITAL | Age: 78
End: 2023-10-05
Payer: MEDICARE

## 2023-10-05 DIAGNOSIS — E11.9 TYPE 2 DIABETES MELLITUS WITHOUT COMPLICATIONS: ICD-10-CM

## 2023-10-05 DIAGNOSIS — I50.9 HEART FAILURE, UNSPECIFIED: ICD-10-CM

## 2023-10-05 LAB
BASOPHILS # BLD AUTO: 0.08 X10(3)/MCL
BASOPHILS NFR BLD AUTO: 0.6 %
CRP SERPL HS-MCNC: 36.17 MG/L
EOSINOPHIL # BLD AUTO: 0.32 X10(3)/MCL (ref 0–0.9)
EOSINOPHIL NFR BLD AUTO: 2.4 %
ERYTHROCYTE [DISTWIDTH] IN BLOOD BY AUTOMATED COUNT: 17.3 % (ref 11.5–17)
ERYTHROCYTE [SEDIMENTATION RATE] IN BLOOD: 74 MM/HR (ref 0–20)
HCT VFR BLD AUTO: 38.5 % (ref 37–47)
HGB BLD-MCNC: 12 G/DL (ref 12–16)
IMM GRANULOCYTES # BLD AUTO: 0.07 X10(3)/MCL (ref 0–0.04)
IMM GRANULOCYTES NFR BLD AUTO: 0.5 %
LYMPHOCYTES # BLD AUTO: 3.59 X10(3)/MCL (ref 0.6–4.6)
LYMPHOCYTES NFR BLD AUTO: 27 %
MCH RBC QN AUTO: 27.9 PG (ref 27–31)
MCHC RBC AUTO-ENTMCNC: 31.2 G/DL (ref 33–36)
MCV RBC AUTO: 89.5 FL (ref 80–94)
MONOCYTES # BLD AUTO: 0.76 X10(3)/MCL (ref 0.1–1.3)
MONOCYTES NFR BLD AUTO: 5.7 %
NEUTROPHILS # BLD AUTO: 8.46 X10(3)/MCL (ref 2.1–9.2)
NEUTROPHILS NFR BLD AUTO: 63.8 %
NRBC BLD AUTO-RTO: 0 %
PLATELET # BLD AUTO: 248 X10(3)/MCL (ref 130–400)
PMV BLD AUTO: 10.8 FL (ref 7.4–10.4)
RBC # BLD AUTO: 4.3 X10(6)/MCL (ref 4.2–5.4)
WBC # SPEC AUTO: 13.28 X10(3)/MCL (ref 4.5–11.5)

## 2023-10-05 PROCEDURE — 85025 COMPLETE CBC W/AUTO DIFF WBC: CPT | Performed by: INTERNAL MEDICINE

## 2023-10-05 PROCEDURE — 86141 C-REACTIVE PROTEIN HS: CPT | Performed by: INTERNAL MEDICINE

## 2023-10-05 PROCEDURE — 85652 RBC SED RATE AUTOMATED: CPT | Performed by: INTERNAL MEDICINE

## 2023-10-06 ENCOUNTER — LAB REQUISITION (OUTPATIENT)
Dept: LAB | Facility: HOSPITAL | Age: 78
End: 2023-10-06
Payer: MEDICARE

## 2023-10-06 DIAGNOSIS — D72.829 ELEVATED WHITE BLOOD CELL COUNT, UNSPECIFIED: ICD-10-CM

## 2023-10-06 LAB
BASOPHILS # BLD AUTO: 0.09 X10(3)/MCL
BASOPHILS NFR BLD AUTO: 0.7 %
EOSINOPHIL # BLD AUTO: 0.42 X10(3)/MCL (ref 0–0.9)
EOSINOPHIL NFR BLD AUTO: 3.2 %
ERYTHROCYTE [DISTWIDTH] IN BLOOD BY AUTOMATED COUNT: 17.2 % (ref 11.5–17)
HCT VFR BLD AUTO: 37.7 % (ref 37–47)
HGB BLD-MCNC: 11.6 G/DL (ref 12–16)
IMM GRANULOCYTES # BLD AUTO: 0.05 X10(3)/MCL (ref 0–0.04)
IMM GRANULOCYTES NFR BLD AUTO: 0.4 %
LYMPHOCYTES # BLD AUTO: 4.55 X10(3)/MCL (ref 0.6–4.6)
LYMPHOCYTES NFR BLD AUTO: 34.5 %
MCH RBC QN AUTO: 28 PG (ref 27–31)
MCHC RBC AUTO-ENTMCNC: 30.8 G/DL (ref 33–36)
MCV RBC AUTO: 90.8 FL (ref 80–94)
MONOCYTES # BLD AUTO: 0.79 X10(3)/MCL (ref 0.1–1.3)
MONOCYTES NFR BLD AUTO: 6 %
NEUTROPHILS # BLD AUTO: 7.29 X10(3)/MCL (ref 2.1–9.2)
NEUTROPHILS NFR BLD AUTO: 55.2 %
NRBC BLD AUTO-RTO: 0 %
PLATELET # BLD AUTO: 239 X10(3)/MCL (ref 130–400)
PMV BLD AUTO: 10.6 FL (ref 7.4–10.4)
RBC # BLD AUTO: 4.15 X10(6)/MCL (ref 4.2–5.4)
WBC # SPEC AUTO: 13.19 X10(3)/MCL (ref 4.5–11.5)

## 2023-10-06 PROCEDURE — 85025 COMPLETE CBC W/AUTO DIFF WBC: CPT | Performed by: INTERNAL MEDICINE

## 2023-10-09 ENCOUNTER — LAB REQUISITION (OUTPATIENT)
Dept: LAB | Facility: HOSPITAL | Age: 78
End: 2023-10-09
Payer: MEDICARE

## 2023-10-09 DIAGNOSIS — D72.829 ELEVATED WHITE BLOOD CELL COUNT, UNSPECIFIED: ICD-10-CM

## 2023-10-09 LAB
BASOPHILS # BLD AUTO: 0.1 X10(3)/MCL
BASOPHILS NFR BLD AUTO: 0.7 %
EOSINOPHIL # BLD AUTO: 0.78 X10(3)/MCL (ref 0–0.9)
EOSINOPHIL NFR BLD AUTO: 5.2 %
ERYTHROCYTE [DISTWIDTH] IN BLOOD BY AUTOMATED COUNT: 17.2 % (ref 11.5–17)
HCT VFR BLD AUTO: 44 % (ref 37–47)
HGB BLD-MCNC: 13.5 G/DL (ref 12–16)
IMM GRANULOCYTES # BLD AUTO: 0.06 X10(3)/MCL (ref 0–0.04)
IMM GRANULOCYTES NFR BLD AUTO: 0.4 %
LYMPHOCYTES # BLD AUTO: 4.24 X10(3)/MCL (ref 0.6–4.6)
LYMPHOCYTES NFR BLD AUTO: 28.5 %
MCH RBC QN AUTO: 28 PG (ref 27–31)
MCHC RBC AUTO-ENTMCNC: 30.7 G/DL (ref 33–36)
MCV RBC AUTO: 91.3 FL (ref 80–94)
MONOCYTES # BLD AUTO: 0.61 X10(3)/MCL (ref 0.1–1.3)
MONOCYTES NFR BLD AUTO: 4.1 %
NEUTROPHILS # BLD AUTO: 9.07 X10(3)/MCL (ref 2.1–9.2)
NEUTROPHILS NFR BLD AUTO: 61.1 %
NRBC BLD AUTO-RTO: 0 %
PLATELET # BLD AUTO: 289 X10(3)/MCL (ref 130–400)
PMV BLD AUTO: 10.5 FL (ref 7.4–10.4)
RBC # BLD AUTO: 4.82 X10(6)/MCL (ref 4.2–5.4)
WBC # SPEC AUTO: 14.86 X10(3)/MCL (ref 4.5–11.5)

## 2023-10-09 PROCEDURE — 85025 COMPLETE CBC W/AUTO DIFF WBC: CPT | Performed by: INTERNAL MEDICINE

## 2023-10-10 ENCOUNTER — LAB REQUISITION (OUTPATIENT)
Dept: LAB | Facility: HOSPITAL | Age: 78
End: 2023-10-10
Payer: MEDICARE

## 2023-10-10 DIAGNOSIS — Z29.9 ENCOUNTER FOR PROPHYLACTIC MEASURES, UNSPECIFIED: ICD-10-CM

## 2023-10-10 PROCEDURE — 87077 CULTURE AEROBIC IDENTIFY: CPT | Performed by: INTERNAL MEDICINE

## 2023-10-11 ENCOUNTER — LAB REQUISITION (OUTPATIENT)
Dept: LAB | Facility: HOSPITAL | Age: 78
End: 2023-10-11
Payer: MEDICARE

## 2023-10-11 DIAGNOSIS — E78.5 HYPERLIPIDEMIA, UNSPECIFIED: ICD-10-CM

## 2023-10-11 DIAGNOSIS — I10 ESSENTIAL (PRIMARY) HYPERTENSION: ICD-10-CM

## 2023-10-11 LAB
ALBUMIN SERPL-MCNC: 3.1 G/DL (ref 3.4–4.8)
ALBUMIN/GLOB SERPL: 0.9 RATIO (ref 1.1–2)
ALP SERPL-CCNC: 93 UNIT/L (ref 40–150)
ALT SERPL-CCNC: 9 UNIT/L (ref 0–55)
AST SERPL-CCNC: 8 UNIT/L (ref 5–34)
BASOPHILS # BLD AUTO: 0.07 X10(3)/MCL
BASOPHILS NFR BLD AUTO: 0.4 %
BILIRUB SERPL-MCNC: 0.3 MG/DL
BUN SERPL-MCNC: 37.3 MG/DL (ref 9.8–20.1)
CALCIUM SERPL-MCNC: 8.6 MG/DL (ref 8.4–10.2)
CHLORIDE SERPL-SCNC: 96 MMOL/L (ref 98–107)
CHOLEST SERPL-MCNC: 137 MG/DL
CHOLEST/HDLC SERPL: 4 {RATIO} (ref 0–5)
CO2 SERPL-SCNC: 30 MMOL/L (ref 23–31)
CREAT SERPL-MCNC: 0.82 MG/DL (ref 0.55–1.02)
EOSINOPHIL # BLD AUTO: 0.71 X10(3)/MCL (ref 0–0.9)
EOSINOPHIL NFR BLD AUTO: 4.1 %
ERYTHROCYTE [DISTWIDTH] IN BLOOD BY AUTOMATED COUNT: 17.3 % (ref 11.5–17)
EST. AVERAGE GLUCOSE BLD GHB EST-MCNC: 185.8 MG/DL
GFR SERPLBLD CREATININE-BSD FMLA CKD-EPI: >60 MLS/MIN/1.73/M2
GLOBULIN SER-MCNC: 3.4 GM/DL (ref 2.4–3.5)
GLUCOSE SERPL-MCNC: 266 MG/DL (ref 82–115)
HBA1C MFR BLD: 8.1 %
HCT VFR BLD AUTO: 39.1 % (ref 37–47)
HDLC SERPL-MCNC: 39 MG/DL (ref 35–60)
HGB BLD-MCNC: 12 G/DL (ref 12–16)
IMM GRANULOCYTES # BLD AUTO: 0.06 X10(3)/MCL (ref 0–0.04)
IMM GRANULOCYTES NFR BLD AUTO: 0.3 %
LDLC SERPL CALC-MCNC: 63 MG/DL (ref 50–140)
LYMPHOCYTES # BLD AUTO: 5.42 X10(3)/MCL (ref 0.6–4.6)
LYMPHOCYTES NFR BLD AUTO: 31.6 %
MCH RBC QN AUTO: 28.1 PG (ref 27–31)
MCHC RBC AUTO-ENTMCNC: 30.7 G/DL (ref 33–36)
MCV RBC AUTO: 91.6 FL (ref 80–94)
MONOCYTES # BLD AUTO: 0.84 X10(3)/MCL (ref 0.1–1.3)
MONOCYTES NFR BLD AUTO: 4.9 %
NEUTROPHILS # BLD AUTO: 10.07 X10(3)/MCL (ref 2.1–9.2)
NEUTROPHILS NFR BLD AUTO: 58.7 %
NRBC BLD AUTO-RTO: 0 %
PLATELET # BLD AUTO: 243 X10(3)/MCL (ref 130–400)
PMV BLD AUTO: 10.9 FL (ref 7.4–10.4)
POTASSIUM SERPL-SCNC: 4 MMOL/L (ref 3.5–5.1)
PROT SERPL-MCNC: 6.5 GM/DL (ref 5.8–7.6)
RBC # BLD AUTO: 4.27 X10(6)/MCL (ref 4.2–5.4)
SODIUM SERPL-SCNC: 139 MMOL/L (ref 136–145)
TRIGL SERPL-MCNC: 173 MG/DL (ref 37–140)
VLDLC SERPL CALC-MCNC: 35 MG/DL
WBC # SPEC AUTO: 17.17 X10(3)/MCL (ref 4.5–11.5)

## 2023-10-11 PROCEDURE — 85025 COMPLETE CBC W/AUTO DIFF WBC: CPT | Performed by: INTERNAL MEDICINE

## 2023-10-11 PROCEDURE — 80061 LIPID PANEL: CPT | Performed by: INTERNAL MEDICINE

## 2023-10-11 PROCEDURE — 83036 HEMOGLOBIN GLYCOSYLATED A1C: CPT | Performed by: INTERNAL MEDICINE

## 2023-10-11 PROCEDURE — 80053 COMPREHEN METABOLIC PANEL: CPT | Performed by: INTERNAL MEDICINE

## 2023-10-13 ENCOUNTER — LAB REQUISITION (OUTPATIENT)
Dept: LAB | Facility: HOSPITAL | Age: 78
End: 2023-10-13
Payer: MEDICARE

## 2023-10-13 DIAGNOSIS — I50.9 HEART FAILURE, UNSPECIFIED: ICD-10-CM

## 2023-10-13 LAB
ANION GAP SERPL CALC-SCNC: 12 MEQ/L
BACTERIA WND CULT: ABNORMAL
BACTERIA WND CULT: ABNORMAL
BASOPHILS # BLD AUTO: 0.07 X10(3)/MCL
BASOPHILS NFR BLD AUTO: 0.6 %
BUN SERPL-MCNC: 21.6 MG/DL (ref 9.8–20.1)
CALCIUM SERPL-MCNC: 8.6 MG/DL (ref 8.4–10.2)
CHLORIDE SERPL-SCNC: 99 MMOL/L (ref 98–107)
CO2 SERPL-SCNC: 31 MMOL/L (ref 23–31)
CREAT SERPL-MCNC: 0.82 MG/DL (ref 0.55–1.02)
CREAT/UREA NIT SERPL: 26
EOSINOPHIL # BLD AUTO: 0.49 X10(3)/MCL (ref 0–0.9)
EOSINOPHIL NFR BLD AUTO: 3.9 %
ERYTHROCYTE [DISTWIDTH] IN BLOOD BY AUTOMATED COUNT: 17.5 % (ref 11.5–17)
GFR SERPLBLD CREATININE-BSD FMLA CKD-EPI: >60 MLS/MIN/1.73/M2
GLUCOSE SERPL-MCNC: 241 MG/DL (ref 82–115)
HCT VFR BLD AUTO: 39.3 % (ref 37–47)
HGB BLD-MCNC: 12.1 G/DL (ref 12–16)
IMM GRANULOCYTES # BLD AUTO: 0.06 X10(3)/MCL (ref 0–0.04)
IMM GRANULOCYTES NFR BLD AUTO: 0.5 %
LYMPHOCYTES # BLD AUTO: 3.91 X10(3)/MCL (ref 0.6–4.6)
LYMPHOCYTES NFR BLD AUTO: 30.9 %
MCH RBC QN AUTO: 28.5 PG (ref 27–31)
MCHC RBC AUTO-ENTMCNC: 30.8 G/DL (ref 33–36)
MCV RBC AUTO: 92.7 FL (ref 80–94)
MONOCYTES # BLD AUTO: 0.92 X10(3)/MCL (ref 0.1–1.3)
MONOCYTES NFR BLD AUTO: 7.3 %
NEUTROPHILS # BLD AUTO: 7.22 X10(3)/MCL (ref 2.1–9.2)
NEUTROPHILS NFR BLD AUTO: 56.8 %
NRBC BLD AUTO-RTO: 0 %
PLATELET # BLD AUTO: 227 X10(3)/MCL (ref 130–400)
PMV BLD AUTO: 10.7 FL (ref 7.4–10.4)
POTASSIUM SERPL-SCNC: 3.7 MMOL/L (ref 3.5–5.1)
RBC # BLD AUTO: 4.24 X10(6)/MCL (ref 4.2–5.4)
SODIUM SERPL-SCNC: 142 MMOL/L (ref 136–145)
WBC # SPEC AUTO: 12.67 X10(3)/MCL (ref 4.5–11.5)

## 2023-10-13 PROCEDURE — 80048 BASIC METABOLIC PNL TOTAL CA: CPT | Performed by: INTERNAL MEDICINE

## 2023-10-13 PROCEDURE — 85025 COMPLETE CBC W/AUTO DIFF WBC: CPT | Performed by: INTERNAL MEDICINE

## 2024-03-18 RX ORDER — METHENAMINE HIPPURATE 1000 MG/1
1 TABLET ORAL 2 TIMES DAILY
Status: ON HOLD | COMMUNITY
End: 2024-03-26

## 2024-03-18 RX ORDER — MULTIVITAMIN
1 TABLET ORAL DAILY
Status: ON HOLD | COMMUNITY
End: 2024-03-26

## 2024-03-18 RX ORDER — ZINC GLUCONATE 50 MG
50 TABLET ORAL DAILY
Status: ON HOLD | COMMUNITY
End: 2024-03-26

## 2024-03-18 RX ORDER — LANOLIN ALCOHOL/MO/W.PET/CERES
400 CREAM (GRAM) TOPICAL DAILY
Status: ON HOLD | COMMUNITY
End: 2024-03-26

## 2024-03-18 RX ORDER — NITROGLYCERIN 0.4 MG/1
0.4 TABLET SUBLINGUAL EVERY 5 MIN PRN
Status: ON HOLD | COMMUNITY
End: 2024-03-26

## 2024-03-18 RX ORDER — ASCORBIC ACID 500 MG
500 TABLET ORAL DAILY
Status: ON HOLD | COMMUNITY
End: 2024-03-26

## 2024-03-18 RX ORDER — TIZANIDINE 2 MG/1
2 TABLET ORAL EVERY 8 HOURS PRN
Status: ON HOLD | COMMUNITY
End: 2024-03-26

## 2024-03-19 ENCOUNTER — LAB REQUISITION (OUTPATIENT)
Dept: LAB | Facility: HOSPITAL | Age: 79
End: 2024-03-19
Payer: MEDICARE

## 2024-03-19 ENCOUNTER — ANESTHESIA EVENT (OUTPATIENT)
Dept: SURGERY | Facility: HOSPITAL | Age: 79
End: 2024-03-19
Payer: MEDICARE

## 2024-03-19 DIAGNOSIS — Z01.810 ENCOUNTER FOR PREPROCEDURAL CARDIOVASCULAR EXAMINATION: ICD-10-CM

## 2024-03-19 DIAGNOSIS — N39.46 MIXED INCONTINENCE: ICD-10-CM

## 2024-03-19 LAB
ALBUMIN SERPL-MCNC: 2.9 G/DL (ref 3.4–4.8)
ALBUMIN/GLOB SERPL: 1 RATIO (ref 1.1–2)
ALP SERPL-CCNC: 69 UNIT/L (ref 40–150)
ALT SERPL-CCNC: 15 UNIT/L (ref 0–55)
AST SERPL-CCNC: 9 UNIT/L (ref 5–34)
BASOPHILS # BLD AUTO: 0.07 X10(3)/MCL
BASOPHILS NFR BLD AUTO: 0.5 %
BILIRUB SERPL-MCNC: 0.3 MG/DL
BUN SERPL-MCNC: 22 MG/DL (ref 9.8–20.1)
CALCIUM SERPL-MCNC: 8.6 MG/DL (ref 8.4–10.2)
CHLORIDE SERPL-SCNC: 105 MMOL/L (ref 98–107)
CO2 SERPL-SCNC: 24 MMOL/L (ref 23–31)
CREAT SERPL-MCNC: 0.71 MG/DL (ref 0.55–1.02)
EOSINOPHIL # BLD AUTO: 0.73 X10(3)/MCL (ref 0–0.9)
EOSINOPHIL NFR BLD AUTO: 4.7 %
ERYTHROCYTE [DISTWIDTH] IN BLOOD BY AUTOMATED COUNT: 17 % (ref 11.5–17)
GFR SERPLBLD CREATININE-BSD FMLA CKD-EPI: >60 MLS/MIN/1.73/M2
GLOBULIN SER-MCNC: 3 GM/DL (ref 2.4–3.5)
GLUCOSE SERPL-MCNC: 184 MG/DL (ref 82–115)
HCT VFR BLD AUTO: 36.7 % (ref 37–47)
HGB BLD-MCNC: 11.6 G/DL (ref 12–16)
IMM GRANULOCYTES # BLD AUTO: 0.06 X10(3)/MCL (ref 0–0.04)
IMM GRANULOCYTES NFR BLD AUTO: 0.4 %
LYMPHOCYTES # BLD AUTO: 4.13 X10(3)/MCL (ref 0.6–4.6)
LYMPHOCYTES NFR BLD AUTO: 26.9 %
MCH RBC QN AUTO: 26.9 PG (ref 27–31)
MCHC RBC AUTO-ENTMCNC: 31.6 G/DL (ref 33–36)
MCV RBC AUTO: 85.2 FL (ref 80–94)
MONOCYTES # BLD AUTO: 0.72 X10(3)/MCL (ref 0.1–1.3)
MONOCYTES NFR BLD AUTO: 4.7 %
NEUTROPHILS # BLD AUTO: 9.67 X10(3)/MCL (ref 2.1–9.2)
NEUTROPHILS NFR BLD AUTO: 62.8 %
NRBC BLD AUTO-RTO: 0 %
PLATELET # BLD AUTO: 180 X10(3)/MCL (ref 130–400)
PMV BLD AUTO: 10.2 FL (ref 7.4–10.4)
POTASSIUM SERPL-SCNC: 4.3 MMOL/L (ref 3.5–5.1)
PROT SERPL-MCNC: 5.9 GM/DL (ref 5.8–7.6)
RBC # BLD AUTO: 4.31 X10(6)/MCL (ref 4.2–5.4)
SODIUM SERPL-SCNC: 138 MMOL/L (ref 136–145)
WBC # SPEC AUTO: 15.38 X10(3)/MCL (ref 4.5–11.5)

## 2024-03-19 PROCEDURE — 80053 COMPREHEN METABOLIC PANEL: CPT | Performed by: INTERNAL MEDICINE

## 2024-03-19 PROCEDURE — 85025 COMPLETE CBC W/AUTO DIFF WBC: CPT | Performed by: INTERNAL MEDICINE

## 2024-03-19 RX ORDER — DULAGLUTIDE 1.5 MG/.5ML
1.5 INJECTION, SOLUTION SUBCUTANEOUS
Status: ON HOLD | COMMUNITY
End: 2024-03-26

## 2024-03-20 ENCOUNTER — HOSPITAL ENCOUNTER (EMERGENCY)
Facility: HOSPITAL | Age: 79
Discharge: HOME | End: 2024-03-20
Attending: STUDENT IN AN ORGANIZED HEALTH CARE EDUCATION/TRAINING PROGRAM
Payer: MEDICARE

## 2024-03-20 VITALS
DIASTOLIC BLOOD PRESSURE: 64 MMHG | TEMPERATURE: 98 F | OXYGEN SATURATION: 93 % | BODY MASS INDEX: 33.74 KG/M2 | HEART RATE: 69 BPM | WEIGHT: 150 LBS | SYSTOLIC BLOOD PRESSURE: 112 MMHG | RESPIRATION RATE: 18 BRPM | HEIGHT: 56 IN
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
## 2024-03-20 DIAGNOSIS — N30.00 ACUTE CYSTITIS WITHOUT HEMATURIA: ICD-10-CM

## 2024-03-20 DIAGNOSIS — S82.092A OTHER CLOSED FRACTURE OF LEFT PATELLA, INITIAL ENCOUNTER: Primary | ICD-10-CM

## 2024-03-20 LAB
ALBUMIN SERPL-MCNC: 3 G/DL (ref 3.4–4.8)
ALBUMIN/GLOB SERPL: 0.9 RATIO (ref 1.1–2)
ALP SERPL-CCNC: 76 UNIT/L (ref 40–150)
ALT SERPL-CCNC: 14 UNIT/L (ref 0–55)
APPEARANCE UR: ABNORMAL
AST SERPL-CCNC: 11 UNIT/L (ref 5–34)
BACTERIA #/AREA URNS AUTO: ABNORMAL /HPF
BASOPHILS # BLD AUTO: 0.05 X10(3)/MCL
BASOPHILS NFR BLD AUTO: 0.5 %
BILIRUB SERPL-MCNC: 0.2 MG/DL
BILIRUB UR QL STRIP.AUTO: NEGATIVE
BUN SERPL-MCNC: 28.1 MG/DL (ref 9.8–20.1)
CALCIUM SERPL-MCNC: 9.1 MG/DL (ref 8.4–10.2)
CHLORIDE SERPL-SCNC: 106 MMOL/L (ref 98–107)
CO2 SERPL-SCNC: 27 MMOL/L (ref 23–31)
COLOR UR AUTO: YELLOW
CREAT SERPL-MCNC: 0.7 MG/DL (ref 0.55–1.02)
EOSINOPHIL # BLD AUTO: 0.72 X10(3)/MCL (ref 0–0.9)
EOSINOPHIL NFR BLD AUTO: 7.2 %
ERYTHROCYTE [DISTWIDTH] IN BLOOD BY AUTOMATED COUNT: 17.2 % (ref 11.5–17)
GFR SERPLBLD CREATININE-BSD FMLA CKD-EPI: >60 MLS/MIN/1.73/M2
GLOBULIN SER-MCNC: 3.4 GM/DL (ref 2.4–3.5)
GLUCOSE SERPL-MCNC: 224 MG/DL (ref 82–115)
GLUCOSE UR QL STRIP.AUTO: NORMAL
HCT VFR BLD AUTO: 39.1 % (ref 37–47)
HGB BLD-MCNC: 11.7 G/DL (ref 12–16)
IMM GRANULOCYTES # BLD AUTO: 0.02 X10(3)/MCL (ref 0–0.04)
IMM GRANULOCYTES NFR BLD AUTO: 0.2 %
KETONES UR QL STRIP.AUTO: NEGATIVE
LACTATE SERPL-SCNC: 1.6 MMOL/L (ref 0.5–2.2)
LEUKOCYTE ESTERASE UR QL STRIP.AUTO: 500
LYMPHOCYTES # BLD AUTO: 3.24 X10(3)/MCL (ref 0.6–4.6)
LYMPHOCYTES NFR BLD AUTO: 32.4 %
MCH RBC QN AUTO: 26.5 PG (ref 27–31)
MCHC RBC AUTO-ENTMCNC: 29.9 G/DL (ref 33–36)
MCV RBC AUTO: 88.7 FL (ref 80–94)
MONOCYTES # BLD AUTO: 0.45 X10(3)/MCL (ref 0.1–1.3)
MONOCYTES NFR BLD AUTO: 4.5 %
MUCOUS THREADS URNS QL MICRO: ABNORMAL /LPF
NEUTROPHILS # BLD AUTO: 5.52 X10(3)/MCL (ref 2.1–9.2)
NEUTROPHILS NFR BLD AUTO: 55.2 %
NITRITE UR QL STRIP.AUTO: ABNORMAL
NRBC BLD AUTO-RTO: 0 %
PH UR STRIP.AUTO: 6.5 [PH]
PLATELET # BLD AUTO: 162 X10(3)/MCL (ref 130–400)
PMV BLD AUTO: 9.9 FL (ref 7.4–10.4)
POTASSIUM SERPL-SCNC: 4.7 MMOL/L (ref 3.5–5.1)
PROT SERPL-MCNC: 6.4 GM/DL (ref 5.8–7.6)
PROT UR QL STRIP.AUTO: NEGATIVE
RBC # BLD AUTO: 4.41 X10(6)/MCL (ref 4.2–5.4)
RBC #/AREA URNS AUTO: ABNORMAL /HPF
RBC UR QL AUTO: NEGATIVE
SODIUM SERPL-SCNC: 138 MMOL/L (ref 136–145)
SP GR UR STRIP.AUTO: 1.02 (ref 1–1.03)
SQUAMOUS #/AREA URNS LPF: ABNORMAL /HPF
UROBILINOGEN UR STRIP-ACNC: NORMAL
WBC # SPEC AUTO: 10 X10(3)/MCL (ref 4.5–11.5)
WBC #/AREA URNS AUTO: ABNORMAL /HPF

## 2024-03-20 PROCEDURE — 81001 URINALYSIS AUTO W/SCOPE: CPT

## 2024-03-20 PROCEDURE — 87086 URINE CULTURE/COLONY COUNT: CPT

## 2024-03-20 PROCEDURE — 25000003 PHARM REV CODE 250: Performed by: STUDENT IN AN ORGANIZED HEALTH CARE EDUCATION/TRAINING PROGRAM

## 2024-03-20 PROCEDURE — 87040 BLOOD CULTURE FOR BACTERIA: CPT

## 2024-03-20 PROCEDURE — 85025 COMPLETE CBC W/AUTO DIFF WBC: CPT

## 2024-03-20 PROCEDURE — 80053 COMPREHEN METABOLIC PANEL: CPT

## 2024-03-20 PROCEDURE — 83605 ASSAY OF LACTIC ACID: CPT

## 2024-03-20 PROCEDURE — 63600175 PHARM REV CODE 636 W HCPCS: Performed by: STUDENT IN AN ORGANIZED HEALTH CARE EDUCATION/TRAINING PROGRAM

## 2024-03-20 PROCEDURE — 29505 APPLICATION LONG LEG SPLINT: CPT | Mod: LT

## 2024-03-20 PROCEDURE — 96365 THER/PROPH/DIAG IV INF INIT: CPT | Mod: 59

## 2024-03-20 PROCEDURE — 99285 EMERGENCY DEPT VISIT HI MDM: CPT | Mod: 25

## 2024-03-20 RX ORDER — GABAPENTIN 300 MG/1
300 CAPSULE ORAL DAILY
Status: ON HOLD | COMMUNITY
End: 2024-03-26

## 2024-03-20 RX ORDER — DULOXETIN HYDROCHLORIDE 60 MG/1
60 CAPSULE, DELAYED RELEASE ORAL DAILY
Status: ON HOLD | COMMUNITY
End: 2024-03-26

## 2024-03-20 RX ORDER — POTASSIUM CHLORIDE 20 MEQ/1
20 TABLET, EXTENDED RELEASE ORAL NIGHTLY
Status: ON HOLD | COMMUNITY
End: 2024-03-26

## 2024-03-20 RX ORDER — CEFDINIR 300 MG/1
300 CAPSULE ORAL 2 TIMES DAILY
Qty: 20 CAPSULE | Refills: 0 | Status: ON HOLD | OUTPATIENT
Start: 2024-03-20 | End: 2024-03-26 | Stop reason: HOSPADM

## 2024-03-20 RX ADMIN — CEFTRIAXONE SODIUM 1 G: 1 INJECTION, POWDER, FOR SOLUTION INTRAMUSCULAR; INTRAVENOUS at 07:03

## 2024-03-20 NOTE — FIRST PROVIDER EVALUATION
"Medical screening examination initiated.  I have conducted a focused provider triage encounter, findings are as follows:    Brief history of present illness:  78 year old female presents to the emergency department with fall and nursing home last night.  EMS reports that patient complained of left knee pain with them.  Patient had labs drawn this morning showing elevated white blood cell count.  Unknown if patient hit her head.    Vitals:    03/20/24 1224   BP: 112/69   Pulse: 73   Resp: 20   Temp: 98.1 °F (36.7 °C)   TempSrc: Temporal   SpO2: 98%   Weight: 68 kg (150 lb)   Height: 4' 8" (1.422 m)       Pertinent physical exam:  Awake and alert, no acute distress    Brief workup plan:  Labs, urine, imaging    Preliminary workup initiated; this workup will be continued and followed by the physician or advanced practice provider that is assigned to the patient when roomed.  "

## 2024-03-20 NOTE — ED PROVIDER NOTES
Encounter Date: 3/20/2024    SCRIBE #1 NOTE: I, Ysabel Trinh, sarah scribing for, and in the presence of,  Gerson Chavez MD. I have scribed the following portions of the note - Other sections scribed: HPI, ROS, PE.       History     Chief Complaint   Patient presents with    abnormal labs     Sent from [facility]. Had fall last night per staff. C/o l knee pain. Had labs drawn this morning which showed elevated WBC. Hx of recurrent uti's.      78 year old female with a past medical history CHF, DM, HTN, TIA, and chronic UTIs presents to the ED via EMS from [facility] following a fall occurring last night and an elevated WBC. Patient's daughter in law who presents at bedside states that she hit her head during the fall. Since then, she feels that the patient's hearing has worsened. Patient is already chronically hard of hearing at baseline. Patient does have a shunt in her head that was placed years ago per daughter in law. The patient denies neck pain. When asked about head pain, she points to her forehead.    The history is provided by the patient and a relative. No  was used.     Review of patient's allergies indicates:   Allergen Reactions    Gluten      Other reaction(s): ciliac disease    Gluten protein     Ropinirole      Other reaction(s): hallucinations    Wheat      Other reaction(s): ciliac disease    Wheat containing prod     Zolpidem      hallucinates  Other reaction(s): hallucinations     Past Medical History:   Diagnosis Date    Acute cystitis     Allergic rhinitis     Anemia, unspecified     Angina pectoris     Arthritis     Back pain     Bipolar disorder, unspecified     Celiac disease     Cellulitis     CHF (congestive heart failure)     Constipation     Contracture, left ankle     Dementia     Depressive episode     Diabetes mellitus     Diabetes mellitus with ulcer of foot     Displacement of other urinary catheter, subsequent encounter      Edema     Elevated white blood cell count     Encounter for blood transfusion     Fatigue     Fluid retention     Genetic anomalies of leukocytes     GERD without esophagitis     Goiter     HLD (hyperlipidemia)     Hydrocephalus     Hypertension     Hypokalemia     Hypotension     Hypothyroidism     Magnesium deficiency     Major depression     Migraine     Mild protein-calorie malnutrition     Morbid obesity     Obstructive and reflux uropathy     Osteoarthritis     Osteoporosis     Overactive bladder     Pressure ulcer of right heel     Pressure ulcer of sacral region     Pressure ulcer of sacral region, stage 3     Pruritus     PVD (peripheral vascular disease)     Radiculopathy, lumbar region     Seizures     Sleep apnea     uses CPAP    SOB (shortness of breath)     Stroke     TIA (transient ischemic attack)     Urinary tract infection, site not specified     Vitamin deficiency     Wheelchair dependent      Past Surgical History:   Procedure Laterality Date    ABDOMINAL SURGERY      APPENDECTOMY      BACK SURGERY      BLADDER SURGERY      BRAIN SURGERY      CAROTID ENDARTERECTOMY      CHOLECYSTECTOMY      CSF SHUNT      HERNIA REPAIR      HYSTERECTOMY      TONSILLECTOMY      VASCULAR SURGERY       No family history on file.  Social History     Tobacco Use    Smoking status: Never   Substance Use Topics    Alcohol use: No    Drug use: No     Review of Systems   HENT:          Head pain   Musculoskeletal:  Negative for neck pain.       Physical Exam     Initial Vitals [03/20/24 1224]   BP Pulse Resp Temp SpO2   112/69 73 20 98.1 °F (36.7 °C) 98 %      MAP       --         Physical Exam    Nursing note and vitals reviewed.  Constitutional: She appears well-developed and well-nourished. She is not diaphoretic. No distress.   Responding appropriately to Dr. Nieto in the room  Patient appears at baseline per the daughter in law   HENT:   Head: Normocephalic and atraumatic.   Right Ear: External ear normal.   Left  Ear: External ear normal.   Nose: Nose normal.   Chronically hard of hearing  Post operative well heard surgical scar to scalp  Points to forehead as area of head pain   Eyes: Conjunctivae and EOM are normal. Pupils are equal, round, and reactive to light. Right eye exhibits no discharge. Left eye exhibits no discharge.   Cardiovascular:  Normal rate, regular rhythm, normal heart sounds and intact distal pulses.     Exam reveals no gallop and no friction rub.       No murmur heard.  Pulmonary/Chest: Breath sounds normal. No respiratory distress. She has no wheezes. She has no rhonchi. She has no rales. She exhibits no tenderness.   Abdominal: Abdomen is soft. Bowel sounds are normal. She exhibits no distension and no mass. There is no abdominal tenderness. There is no rebound and no guarding.   Musculoskeletal:         General: No edema. Normal range of motion.      Comments: Left knee has mild swelling and tenderness to palpation anteriorly      Neurological: She is alert and oriented to person, place, and time. No cranial nerve deficit or sensory deficit.   Skin: Skin is warm and dry. Capillary refill takes less than 2 seconds. No erythema. No pallor.         ED Course   Procedures  Labs Reviewed   COMPREHENSIVE METABOLIC PANEL - Abnormal; Notable for the following components:       Result Value    Glucose Level 224 (*)     Blood Urea Nitrogen 28.1 (*)     Albumin Level 3.0 (*)     Albumin/Globulin Ratio 0.9 (*)     All other components within normal limits   URINALYSIS, REFLEX TO URINE CULTURE - Abnormal; Notable for the following components:    Appearance, UA Turbid (*)     Nitrites, UA 2+ (*)     Leukocyte Esterase,  (*)     WBC, UA 11-20 (*)     Bacteria, UA Many (*)     Mucous, UA Trace (*)     All other components within normal limits   CBC WITH DIFFERENTIAL - Abnormal; Notable for the following components:    Hgb 11.7 (*)     MCH 26.5 (*)     MCHC 29.9 (*)     RDW 17.2 (*)     All other components  within normal limits   LACTIC ACID, PLASMA - Normal   BLOOD CULTURE [facility]   BLOOD CULTURE [facility]   CULTURE, URINE   CBC W/ AUTO DIFFERENTIAL    Narrative:     The following orders were created for panel order CBC auto differential.  Procedure                               Abnormality         Status                     ---------                               -----------         ------                     CBC with Differential[[id number]]       Abnormal            Final result                 Please view results for these tests on the individual orders.          Imaging Results              X-Ray Knee Complete 4 or More Views Left (Final result)  Result time 03/20/24 15:43:08      Final result by Mery Han MD (03/20/24 15:43:08)                   Impression:      1. Questionable lucency in the mid patella could represent a nondisplaced fracture.  Correlation is needed for focal tenderness.  2. Small knee joint effusion.      Electronically signed by: Mery Han  Date:    03/20/2024  Time:    15:43               Narrative:    EXAMINATION:  XR KNEE COMP 4 OR MORE VIEWS LEFT    CLINICAL HISTORY:  Unspecified fall, initial encounter    COMPARISON:  None.    FINDINGS:  There is a questionable lucency through the mid patella.  There are degenerative changes of the knee with small tricompartmental osteophytes.  There is a small knee joint effusion.                                       CT Head Without Contrast (Final result)  Result time 03/20/24 14:32:46      Final result by Mery Han MD (03/20/24 14:32:46)                   Impression:      No acute intracranial abnormality or significant interval change.      Electronically signed by: Mery Han  Date:    03/20/2024  Time:    14:32               Narrative:    EXAMINATION:  CT HEAD WITHOUT CONTRAST    CLINICAL HISTORY:  Head trauma, minor (Age >= 65y);    TECHNIQUE:  Axial scans were obtained from skull base to the vertex.    Coronal and  sagittal reconstructions obtained from the axial data.    Automatic exposure control was utilized to limit radiation dose.    Contrast: None    Radiation Dose:    Total DLP: 988 mGy*cm    COMPARISON:  CT head dated 06/26/2022    FINDINGS:  There is encephalomalacia in the right frontal lobe and left cerebellum.  There is no acute intracranial hemorrhage or edema.  A right frontal approach ventricular shunt catheter has its tip over the right lateral ventricle.  The ventricles are stable in size.  There is no mass effect or midline shift.  The basal cisterns are patent.  There is diffuse parenchymal volume loss.  The skull base is intact.  The mastoid air cells are clear.                                       Medications   cefTRIAXone (Rocephin) 1 g in dextrose 5 % in water (D5W) 100 mL IVPB (MB+) (0 g Intravenous Stopped 3/20/24 2013)     Medical Decision Making  The differential diagnosis includes, but is not limited to: Metabolic abnormality, intoxication, toxic ingestion, CVA, infection, structural (SAH, ICH, trauma, neoplastic), seizure/postictal, polypharmacy, UTI, fracture laceration abrasion contusion to knee.      Please see ED course for MDM      Amount and/or Complexity of Data Reviewed  External Data Reviewed: notes.     Details: See ED course  Labs: ordered. Decision-making details documented in ED Course.  Radiology: ordered and independent interpretation performed. Decision-making details documented in ED Course.    Risk  Prescription drug management.            Scribe Attestation:   Scribe #1: I performed the above scribed service and the documentation accurately describes the services I performed. I attest to the accuracy of the note.    Attending Attestation:           Physician Attestation for Scribe:  Physician Attestation Statement for Scribe #1: I, Gerson Chavez MD, reviewed documentation, as scribed by Ysabel Trinh in my presence, and it is both accurate and complete.             ED  Course as of 03/21/24 0000   Wed Mar 20, 2024   1502 Chart review reveals a note from outside hospital from 01/20/2024.  Ensure acute cystitis, type 2 diabetes, thyroid disease he was admitted from nursing home with altered mental status.  Ultimately her acute encephalopathy he was felt to be secondary to acute cystitis or respiratory issues. [MM]   1605 Comprehensive metabolic panel(!)  Hyperglycemia.  Mildly elevated BUN and creatinine ratio.  No other acute finding. [MM]   1605 Lactic Acid Level: 1.6 [MM]   1606 CBC auto differential(!)  No leukocytosis.  He was started 16.3.  Today 10.  No left shift.  Stable anemia. [MM]   1606 CT Head Without Contrast  Stable ventricles. [MM]   1651 Paged Dr. Ramos Beltran with orthopedic surgery, he called back immediately. [DP]   1652 Spoke with orthopedic surgeon, Dr. Beltran.  States knee immobilizer okay to follow up in office.  Okay for discharge from orthopedic perspective. [MM]   1748 Urinalysis, Reflex to Urine Culture(!)  Concern for urinary tract infection. [MM]   1959 Patient without any leukocytosis here.  Chart review does reveal that she had some yesterday.  Unsure if this is drawn after her fall maybe she had a traumatic, demargination, stress response.  No leukocytosis here.  No left shift.  Afebrile.  Does have urinary tract infection.  Very osteopenic bones but does have possibly a hairline fracture through patella.  Nondisplaced.  Is not mobile.  Sometimes transferred to a wheelchair but even at that time her leg is remains in extension at the knee.  Placed in knee immobilizer for comfort.  Discussed with Orthopedic surgery will follow up in clinic.  Otherwise patient was suitable for discharge home.  We will give Rocephin here in the emergency department.  Discharged with cefdinir.  Patient and family room comfortable with plan. Return precautions given.  Questions invited, questions answered to the best my ability.  Patient discharged home condition stable.    [MM]      ED Course User Index  [DP] Alice French  [MM] Travis Nieto MD                           Clinical Impression:  Final diagnoses:  [W19.XXXA] Fall  [S82.092A] Other closed fracture of left patella, initial encounter (Primary)  [N30.00] Acute cystitis without hematuria          ED Disposition Condition    Discharge Stable          ED Prescriptions       Medication Sig Dispense Start Date End Date Auth. Provider    cefdinir (OMNICEF) 300 MG capsule Take 1 capsule (300 mg total) by mouth 2 (two) times daily. for 10 days 20 capsule 3/20/2024 3/30/2024 Travis Nieto MD          Follow-up Information       Follow up With Specialties Details Why Contact Info    Ramos Beltran O, DO Orthopedic Surgery Call   [address]  [address]  [place] [address] [phone]               Travis Nieto MD  03/21/24 0000

## 2024-03-21 ENCOUNTER — LAB REQUISITION (OUTPATIENT)
Dept: LAB | Facility: HOSPITAL | Age: 79
End: 2024-03-21
Payer: MEDICARE

## 2024-03-21 DIAGNOSIS — I10 ESSENTIAL (PRIMARY) HYPERTENSION: ICD-10-CM

## 2024-03-21 LAB
BASOPHILS # BLD AUTO: 0.06 X10(3)/MCL
BASOPHILS NFR BLD AUTO: 0.7 %
EOSINOPHIL # BLD AUTO: 0.52 X10(3)/MCL (ref 0–0.9)
EOSINOPHIL NFR BLD AUTO: 5.7 %
ERYTHROCYTE [DISTWIDTH] IN BLOOD BY AUTOMATED COUNT: 17.2 % (ref 11.5–17)
HCT VFR BLD AUTO: 37.7 % (ref 37–47)
HGB BLD-MCNC: 11.6 G/DL (ref 12–16)
IMM GRANULOCYTES # BLD AUTO: 0.03 X10(3)/MCL (ref 0–0.04)
IMM GRANULOCYTES NFR BLD AUTO: 0.3 %
LYMPHOCYTES # BLD AUTO: 2.75 X10(3)/MCL (ref 0.6–4.6)
LYMPHOCYTES NFR BLD AUTO: 30.2 %
MCH RBC QN AUTO: 26.7 PG (ref 27–31)
MCHC RBC AUTO-ENTMCNC: 30.8 G/DL (ref 33–36)
MCV RBC AUTO: 86.7 FL (ref 80–94)
MONOCYTES # BLD AUTO: 0.41 X10(3)/MCL (ref 0.1–1.3)
MONOCYTES NFR BLD AUTO: 4.5 %
NEUTROPHILS # BLD AUTO: 5.35 X10(3)/MCL (ref 2.1–9.2)
NEUTROPHILS NFR BLD AUTO: 58.6 %
NRBC BLD AUTO-RTO: 0 %
PLATELET # BLD AUTO: 185 X10(3)/MCL (ref 130–400)
PMV BLD AUTO: 11.6 FL (ref 7.4–10.4)
RBC # BLD AUTO: 4.35 X10(6)/MCL (ref 4.2–5.4)
WBC # SPEC AUTO: 9.12 X10(3)/MCL (ref 4.5–11.5)

## 2024-03-21 PROCEDURE — 85025 COMPLETE CBC W/AUTO DIFF WBC: CPT | Performed by: INTERNAL MEDICINE

## 2024-03-21 NOTE — DISCHARGE INSTRUCTIONS
Thanks for letting us take care of you today!  It is our goal to give you courteous care and to keep you comfortable and informed, if you have any questions before you leave I will be happy to try and answer them.    Here is some advice after your visit:    Your visit in the emergency department is NOT definitive care - please follow-up with your primary care doctor and/or specialist within 1-2 days. Please return to the emergency department if you develop worsening symptoms including: fever, chills, chest pain, shortness of breath, weakness, numbness, tingling, nausea, vomiting, inability to eat, drink, or take your medication. Please return if you have any worsening in your condition or if you have any other concerns.    If you had radiology exams like an XRAY or CT in the emergency Department the interpreation on them may be preliminary - there may be less time sensitive findings on the reports please obtain these reports within 24 hours from the hospital or by using your out on your mobile phone to access records.  Bring these to your primary care doctor and/or specialist for further review of incidental findings.    Please review any LAB WORK from your visit today with your primary care physician.    Please take antibiotics as prescribed     You have been given contact information for Dr. Beltran, orthopedic surgery.  Please be sure to call for follow-up appointment.  Please start to follow up next week.

## 2024-03-24 LAB — BACTERIA UR CULT: ABNORMAL

## 2024-03-25 LAB
BACTERIA BLD CULT: NORMAL
BACTERIA BLD CULT: NORMAL

## 2024-03-25 NOTE — PRE-PROCEDURE INSTRUCTIONS
"[facility]: Outpatient Surgery  Preprocedure Instructions    Expectations: "Because of inconsistent procedure completion times, an unexpected wait may occur. The physicians would like you to be here to prepare in the event they run ahead of time. We will make you as comfortable as possible and keep you informed. We apologize in advance if this happens."     Your arrival time for your surgery or procedure is __0830____.  We ask patients to arrive about 2 hours before surgery to allow for enough time to review your health history & medications, start your IV, complete any outstanding labwork or tests, and meet your Anesthesiologist.    We are located at [facility], [address] [place] [address].    Enter through the [place] entrance next to the Emergency Room, and come to the 6th floor to the Outpatient Surgery Department.    If you are in need of a wheelchair the morning of surgery please call [phone] about 15 minutes before you arrive. Parking is available in our parking garage located off [facility], between the hospital and [facility].      Visitory Policy:  You are allowed 2 adult visitors to be with you in the hospital. Please, no switching visitors in pre-op area. All hospital visitors should be in good current health.  No small children.  We will update you and your family hourly on the progression of surgery and any unexpected delays.      What to Bring:  Please have your ID, insurance cards, and all home medication bottles with you at check in.  Bring your CPAP machine if one is used at home.     Fasting:  Nothing to eat or drink after midnight the night before your procedure. This includes no ice, gum, hard candies, and/or tobacco products.    Medications:  Follow your doctor's instructions for taking any medications on the morning of your procedure.  If no instructions for taking medications were given, do not take any medications but bring your " medications in their bottles to your procedure check in.     Follow your doctor's preoperative instructions regarding skin prep, bowel prep, bathing, or showering prior to your procedure.  If any special soaps were provided to you, please use according to your doctor's instructions. If no instructions were given from your doctor, take a good bath or shower with antibacterial soap the night before and the morning of your procedure.  On the morning of procedure, wear loose, comfortable clothing.  No lotions, makeup, perfumes, colognes, deodorant, or jewelry to your procedure.  Removable items (glasses, contact lenses, dentures, retainers, hearing aids) need to be removed for your procedure.  Bring your storage containers for these items if you wear them.     Artificial nails, body jewelry, eyelash extensions, and/or hair extensions with metal clips are not allowed during your surgery.  If you currently wear any of these items, please arrange for them to be removed prior to your arrival to the hospital.     Outpatient or Same Day Surgeries:  Any patients receiving sedation/anesthesia are advised not to drive for 24 hours after their procedure.  We do not allow patients to drive themselves home after discharge.  If you are going home after your procedure, please have someone available to drive you home from the hospital.     You may call the Outpatient Surgery Department at [phone] with any questions or concerns.  We are looking forward to meeting you and taking great care of you for your procedure.  Thank you for choosing [facility] for your surgical needs.

## 2024-03-26 ENCOUNTER — HOSPITAL ENCOUNTER (OUTPATIENT)
Facility: HOSPITAL | Age: 79
Discharge: HOME | End: 2024-03-27
Attending: UROLOGY | Admitting: UROLOGY
Payer: MEDICARE

## 2024-03-26 ENCOUNTER — ANESTHESIA (OUTPATIENT)
Dept: SURGERY | Facility: HOSPITAL | Age: 79
End: 2024-03-26
Payer: MEDICARE

## 2024-03-26 DIAGNOSIS — Z87.440 HISTORY OF RECURRENT URINARY TRACT INFECTION: ICD-10-CM

## 2024-03-26 PROBLEM — N39.0 RECURRENT URINARY TRACT INFECTION: Status: RESOLVED | Noted: 2024-03-26 | Resolved: 2024-03-26

## 2024-03-26 PROBLEM — N39.0 RECURRENT URINARY TRACT INFECTION: Status: ACTIVE | Noted: 2024-03-26

## 2024-03-26 LAB
POCT GLUCOSE: 149 MG/DL (ref 70–110)
POCT GLUCOSE: 227 MG/DL (ref 70–110)

## 2024-03-26 PROCEDURE — 63600175 PHARM REV CODE 636 W HCPCS: Mod: JZ,JG

## 2024-03-26 PROCEDURE — 25000003 PHARM REV CODE 250: Performed by: UROLOGY

## 2024-03-26 PROCEDURE — C1747 OPTIME ENDOSCOPE, SINGLE, URINARY TRACT: HCPCS | Performed by: UROLOGY

## 2024-03-26 PROCEDURE — 25000003 PHARM REV CODE 250

## 2024-03-26 PROCEDURE — D9220A PRA ANESTHESIA: Mod: ANES,,, | Performed by: ANESTHESIOLOGY

## 2024-03-26 PROCEDURE — 63600175 PHARM REV CODE 636 W HCPCS

## 2024-03-26 PROCEDURE — G0378 HOSPITAL OBSERVATION PER HR: HCPCS

## 2024-03-26 PROCEDURE — D9220A PRA ANESTHESIA: Mod: CRNA,,,

## 2024-03-26 PROCEDURE — 71000033 HC RECOVERY, INTIAL HOUR: Performed by: UROLOGY

## 2024-03-26 PROCEDURE — 51700 IRRIGATION OF BLADDER: CPT

## 2024-03-26 PROCEDURE — 71000016 HC POSTOP RECOV ADDL HR: Performed by: UROLOGY

## 2024-03-26 PROCEDURE — 96372 THER/PROPH/DIAG INJ SC/IM: CPT | Mod: 59 | Performed by: UROLOGY

## 2024-03-26 PROCEDURE — 63600175 PHARM REV CODE 636 W HCPCS: Performed by: UROLOGY

## 2024-03-26 PROCEDURE — 36000707: Performed by: UROLOGY

## 2024-03-26 PROCEDURE — 27000221 HC OXYGEN, UP TO 24 HOURS

## 2024-03-26 PROCEDURE — 25000003 PHARM REV CODE 250: Performed by: ANESTHESIOLOGY

## 2024-03-26 PROCEDURE — 37000009 HC ANESTHESIA EA ADD 15 MINS: Performed by: UROLOGY

## 2024-03-26 PROCEDURE — 37000008 HC ANESTHESIA 1ST 15 MINUTES: Performed by: UROLOGY

## 2024-03-26 PROCEDURE — 71000015 HC POSTOP RECOV 1ST HR: Performed by: UROLOGY

## 2024-03-26 PROCEDURE — 36000706: Performed by: UROLOGY

## 2024-03-26 PROCEDURE — 25000242 PHARM REV CODE 250 ALT 637 W/ HCPCS

## 2024-03-26 PROCEDURE — C2627 CATH, SUPRAPUBIC/CYSTOSCOPIC: HCPCS | Performed by: UROLOGY

## 2024-03-26 RX ORDER — AMOXICILLIN AND CLAVULANATE POTASSIUM 875; 125 MG/1; MG/1
1 TABLET, FILM COATED ORAL EVERY 12 HOURS
Status: DISCONTINUED | OUTPATIENT
Start: 2024-03-26 | End: 2024-03-27 | Stop reason: HOSPADM

## 2024-03-26 RX ORDER — OXYCODONE HYDROCHLORIDE 5 MG/1
5 TABLET ORAL
Status: DISCONTINUED | OUTPATIENT
Start: 2024-03-26 | End: 2024-03-26 | Stop reason: HOSPADM

## 2024-03-26 RX ORDER — ONDANSETRON HYDROCHLORIDE 2 MG/ML
4 INJECTION, SOLUTION INTRAVENOUS DAILY PRN
Status: DISCONTINUED | OUTPATIENT
Start: 2024-03-26 | End: 2024-03-26 | Stop reason: HOSPADM

## 2024-03-26 RX ORDER — ESMOLOL HYDROCHLORIDE 10 MG/ML
INJECTION INTRAVENOUS
Status: DISCONTINUED | OUTPATIENT
Start: 2024-03-26 | End: 2024-03-26

## 2024-03-26 RX ORDER — GABAPENTIN 300 MG/1
600 CAPSULE ORAL NIGHTLY
Status: DISCONTINUED | OUTPATIENT
Start: 2024-03-26 | End: 2024-03-27 | Stop reason: HOSPADM

## 2024-03-26 RX ORDER — ROCURONIUM BROMIDE 10 MG/ML
INJECTION, SOLUTION INTRAVENOUS
Status: DISCONTINUED | OUTPATIENT
Start: 2024-03-26 | End: 2024-03-26

## 2024-03-26 RX ORDER — HYDROCODONE BITARTRATE AND ACETAMINOPHEN 5; 325 MG/1; MG/1
1 TABLET ORAL EVERY 6 HOURS PRN
Status: DISCONTINUED | OUTPATIENT
Start: 2024-03-26 | End: 2024-03-27 | Stop reason: HOSPADM

## 2024-03-26 RX ORDER — LIDOCAINE HYDROCHLORIDE 20 MG/ML
INJECTION, SOLUTION EPIDURAL; INFILTRATION; INTRACAUDAL; PERINEURAL
Status: DISCONTINUED | OUTPATIENT
Start: 2024-03-26 | End: 2024-03-26

## 2024-03-26 RX ORDER — MEPERIDINE HYDROCHLORIDE 25 MG/ML
50 INJECTION INTRAMUSCULAR; INTRAVENOUS; SUBCUTANEOUS ONCE
Status: COMPLETED | OUTPATIENT
Start: 2024-03-26 | End: 2024-03-26

## 2024-03-26 RX ORDER — DEXAMETHASONE SODIUM PHOSPHATE 4 MG/ML
INJECTION, SOLUTION INTRA-ARTICULAR; INTRALESIONAL; INTRAMUSCULAR; INTRAVENOUS; SOFT TISSUE
Status: DISCONTINUED | OUTPATIENT
Start: 2024-03-26 | End: 2024-03-26

## 2024-03-26 RX ORDER — ALBUTEROL SULFATE 90 UG/1
AEROSOL, METERED RESPIRATORY (INHALATION)
Status: DISCONTINUED | OUTPATIENT
Start: 2024-03-26 | End: 2024-03-26

## 2024-03-26 RX ORDER — TIZANIDINE 2 MG/1
2 TABLET ORAL EVERY 8 HOURS PRN
Status: DISCONTINUED | OUTPATIENT
Start: 2024-03-26 | End: 2024-03-27 | Stop reason: HOSPADM

## 2024-03-26 RX ORDER — METFORMIN HYDROCHLORIDE 500 MG/1
1000 TABLET ORAL 2 TIMES DAILY WITH MEALS
Status: DISCONTINUED | OUTPATIENT
Start: 2024-03-26 | End: 2024-03-26

## 2024-03-26 RX ORDER — FENTANYL CITRATE 50 UG/ML
INJECTION, SOLUTION INTRAMUSCULAR; INTRAVENOUS
Status: DISCONTINUED | OUTPATIENT
Start: 2024-03-26 | End: 2024-03-26

## 2024-03-26 RX ORDER — ALPRAZOLAM 0.25 MG/1
0.25 TABLET ORAL 2 TIMES DAILY
Status: DISCONTINUED | OUTPATIENT
Start: 2024-03-26 | End: 2024-03-27 | Stop reason: HOSPADM

## 2024-03-26 RX ORDER — SODIUM CHLORIDE 450 MG/100ML
INJECTION, SOLUTION INTRAVENOUS CONTINUOUS
Status: DISCONTINUED | OUTPATIENT
Start: 2024-03-26 | End: 2024-03-27 | Stop reason: HOSPADM

## 2024-03-26 RX ORDER — TOPIRAMATE 100 MG/1
100 TABLET, FILM COATED ORAL 2 TIMES DAILY
Status: DISCONTINUED | OUTPATIENT
Start: 2024-03-26 | End: 2024-03-27 | Stop reason: HOSPADM

## 2024-03-26 RX ORDER — METOPROLOL SUCCINATE 50 MG/1
50 TABLET, EXTENDED RELEASE ORAL DAILY
Status: DISCONTINUED | OUTPATIENT
Start: 2024-03-27 | End: 2024-03-27 | Stop reason: HOSPADM

## 2024-03-26 RX ORDER — PROPOFOL 10 MG/ML
VIAL (ML) INTRAVENOUS
Status: DISCONTINUED | OUTPATIENT
Start: 2024-03-26 | End: 2024-03-26

## 2024-03-26 RX ORDER — HYDROMORPHONE HYDROCHLORIDE 2 MG/ML
0.4 INJECTION, SOLUTION INTRAMUSCULAR; INTRAVENOUS; SUBCUTANEOUS EVERY 5 MIN PRN
Status: DISCONTINUED | OUTPATIENT
Start: 2024-03-26 | End: 2024-03-26 | Stop reason: HOSPADM

## 2024-03-26 RX ORDER — DOCUSATE SODIUM 100 MG/1
100 CAPSULE, LIQUID FILLED ORAL 2 TIMES DAILY
Status: DISCONTINUED | OUTPATIENT
Start: 2024-03-26 | End: 2024-03-27 | Stop reason: HOSPADM

## 2024-03-26 RX ORDER — AMOXICILLIN AND CLAVULANATE POTASSIUM 500; 125 MG/1; MG/1
1 TABLET, FILM COATED ORAL 2 TIMES DAILY
Qty: 14 TABLET | Refills: 0 | Status: SHIPPED | OUTPATIENT
Start: 2024-03-26 | End: 2024-03-27

## 2024-03-26 RX ORDER — LANOLIN ALCOHOL/MO/W.PET/CERES
400 CREAM (GRAM) TOPICAL DAILY
Status: DISCONTINUED | OUTPATIENT
Start: 2024-03-27 | End: 2024-03-27 | Stop reason: HOSPADM

## 2024-03-26 RX ORDER — CEFTRIAXONE 1 G/1
1 INJECTION, POWDER, FOR SOLUTION INTRAMUSCULAR; INTRAVENOUS
Status: DISCONTINUED | OUTPATIENT
Start: 2024-03-26 | End: 2024-03-26 | Stop reason: HOSPADM

## 2024-03-26 RX ORDER — DIPHENHYDRAMINE HYDROCHLORIDE 50 MG/ML
25 INJECTION INTRAMUSCULAR; INTRAVENOUS EVERY 6 HOURS PRN
Status: DISCONTINUED | OUTPATIENT
Start: 2024-03-26 | End: 2024-03-26 | Stop reason: HOSPADM

## 2024-03-26 RX ORDER — ONDANSETRON HYDROCHLORIDE 2 MG/ML
INJECTION, SOLUTION INTRAMUSCULAR; INTRAVENOUS
Status: DISCONTINUED | OUTPATIENT
Start: 2024-03-26 | End: 2024-03-26

## 2024-03-26 RX ORDER — POTASSIUM CHLORIDE 20 MEQ/1
20 TABLET, EXTENDED RELEASE ORAL NIGHTLY
Status: DISCONTINUED | OUTPATIENT
Start: 2024-03-26 | End: 2024-03-27 | Stop reason: HOSPADM

## 2024-03-26 RX ORDER — GABAPENTIN 300 MG/1
300 CAPSULE ORAL DAILY
Status: DISCONTINUED | OUTPATIENT
Start: 2024-03-27 | End: 2024-03-27 | Stop reason: HOSPADM

## 2024-03-26 RX ORDER — DULOXETIN HYDROCHLORIDE 30 MG/1
60 CAPSULE, DELAYED RELEASE ORAL DAILY
Status: DISCONTINUED | OUTPATIENT
Start: 2024-03-27 | End: 2024-03-27 | Stop reason: HOSPADM

## 2024-03-26 RX ORDER — INSULIN ASPART 100 [IU]/ML
0-10 INJECTION, SOLUTION INTRAVENOUS; SUBCUTANEOUS
Status: DISCONTINUED | OUTPATIENT
Start: 2024-03-26 | End: 2024-03-27 | Stop reason: HOSPADM

## 2024-03-26 RX ORDER — LEVOTHYROXINE SODIUM 150 UG/1
150 TABLET ORAL DAILY
Status: DISCONTINUED | OUTPATIENT
Start: 2024-03-27 | End: 2024-03-27 | Stop reason: HOSPADM

## 2024-03-26 RX ORDER — LEVETIRACETAM 100 MG/ML
750 SOLUTION ORAL NIGHTLY
Status: DISCONTINUED | OUTPATIENT
Start: 2024-03-26 | End: 2024-03-27 | Stop reason: HOSPADM

## 2024-03-26 RX ORDER — LEVOTHYROXINE SODIUM 25 UG/1
25 TABLET ORAL DAILY
Status: DISCONTINUED | OUTPATIENT
Start: 2024-03-27 | End: 2024-03-27 | Stop reason: HOSPADM

## 2024-03-26 RX ORDER — NITROGLYCERIN 0.4 MG/1
0.4 TABLET SUBLINGUAL EVERY 5 MIN PRN
Status: DISCONTINUED | OUTPATIENT
Start: 2024-03-26 | End: 2024-03-27 | Stop reason: HOSPADM

## 2024-03-26 RX ADMIN — DOCUSATE SODIUM 100 MG: 100 CAPSULE, LIQUID FILLED ORAL at 09:03

## 2024-03-26 RX ADMIN — PROPOFOL 100 MG: 10 INJECTION, EMULSION INTRAVENOUS at 01:03

## 2024-03-26 RX ADMIN — FENTANYL CITRATE 50 MCG: 50 INJECTION, SOLUTION INTRAMUSCULAR; INTRAVENOUS at 01:03

## 2024-03-26 RX ADMIN — CEFTRIAXONE SODIUM 1 G: 1 INJECTION, POWDER, FOR SOLUTION INTRAMUSCULAR; INTRAVENOUS at 01:03

## 2024-03-26 RX ADMIN — DEXAMETHASONE SODIUM PHOSPHATE 4 MG: 4 INJECTION, SOLUTION INTRA-ARTICULAR; INTRALESIONAL; INTRAMUSCULAR; INTRAVENOUS; SOFT TISSUE at 01:03

## 2024-03-26 RX ADMIN — ESMOLOL HYDROCHLORIDE 10 MG: 100 INJECTION, SOLUTION INTRAVENOUS at 01:03

## 2024-03-26 RX ADMIN — LEVETIRACETAM 750 MG: 100 SOLUTION ORAL at 09:03

## 2024-03-26 RX ADMIN — ALPRAZOLAM 0.25 MG: 0.25 TABLET ORAL at 09:03

## 2024-03-26 RX ADMIN — POTASSIUM CHLORIDE 20 MEQ: 1500 TABLET, EXTENDED RELEASE ORAL at 09:03

## 2024-03-26 RX ADMIN — MEPERIDINE HYDROCHLORIDE 50 MG: 25 INJECTION INTRAMUSCULAR; INTRAVENOUS; SUBCUTANEOUS at 04:03

## 2024-03-26 RX ADMIN — ROCURONIUM BROMIDE 50 MG: 10 SOLUTION INTRAVENOUS at 01:03

## 2024-03-26 RX ADMIN — SODIUM CHLORIDE, SODIUM GLUCONATE, SODIUM ACETATE, POTASSIUM CHLORIDE AND MAGNESIUM CHLORIDE: 526; 502; 368; 37; 30 INJECTION, SOLUTION INTRAVENOUS at 01:03

## 2024-03-26 RX ADMIN — SUGAMMADEX 200 MG: 100 INJECTION, SOLUTION INTRAVENOUS at 01:03

## 2024-03-26 RX ADMIN — GABAPENTIN 600 MG: 300 CAPSULE ORAL at 09:03

## 2024-03-26 RX ADMIN — LIDOCAINE HYDROCHLORIDE 60 MG: 20 INJECTION, SOLUTION EPIDURAL; INFILTRATION; INTRACAUDAL; PERINEURAL at 01:03

## 2024-03-26 RX ADMIN — AMOXICILLIN AND CLAVULANATE POTASSIUM 1 TABLET: 875; 125 TABLET, FILM COATED ORAL at 09:03

## 2024-03-26 RX ADMIN — ALBUTEROL SULFATE 10 PUFF: 90 AEROSOL, METERED RESPIRATORY (INHALATION) at 02:03

## 2024-03-26 RX ADMIN — ONDANSETRON 4 MG: 2 INJECTION INTRAMUSCULAR; INTRAVENOUS at 01:03

## 2024-03-26 RX ADMIN — SODIUM CHLORIDE: 4.5 INJECTION, SOLUTION INTRAVENOUS at 09:03

## 2024-03-26 RX ADMIN — INSULIN ASPART 4 UNITS: 100 INJECTION, SOLUTION INTRAVENOUS; SUBCUTANEOUS at 06:03

## 2024-03-26 RX ADMIN — TOPIRAMATE 100 MG: 100 TABLET, FILM COATED ORAL at 09:03

## 2024-03-26 RX ADMIN — OXYCODONE HYDROCHLORIDE 5 MG: 5 TABLET ORAL at 03:03

## 2024-03-26 NOTE — ANESTHESIA PROCEDURE NOTES
Intubation    Date/Time: 3/26/2024 1:39 PM    Performed by: Lili Little  Authorized by: Chaitanya Rivera MD    Intubation:     Induction:  Intravenous    Intubated:  Postinduction    Mask Ventilation:  Easy mask    Attempts:  1    Attempted By:  Student    Method of Intubation:  Video laryngoscopy    Blade:  Moon 3    Laryngeal View Grade: Grade I - full view of cords      Difficult Airway Encountered?: No      Complications:  None    Airway Device:  Oral endotracheal tube    Airway Device Size:  7.0    Style/Cuff Inflation:  Cuffed (inflated to minimal occlusive pressure)    Inflation Amount (mL):  6    Tube secured:  20    Secured at:  The lips    Placement Verified By:  Capnometry    Complicating Factors:  Anterior larynx, short neck, obesity and small mouth    Findings Post-Intubation:  BS equal bilateral and atraumatic/condition of teeth unchanged

## 2024-03-26 NOTE — ANESTHESIA PREPROCEDURE EVALUATION
03/26/2024  Elisabet Choi is a 78 y.o., female.    3/20/24 ER visit: 78 year old female with a past medical history CHF, DM, HTN, TIA, and chronic UTIs presents to the ED via EMS from [facility] following a fall occurring last night and an elevated WBC. Patient's daughter in law who presents at bedside states that she hit her head during the fall. Since then, she feels that the patient's hearing has worsened. Patient is already chronically hard of hearing at baseline. Patient does have a shunt in her head that was placed years ago per daughter in law. The patient denies neck pain. When asked about head pain, she points to her forehead.     She was evaluated with head CT - no shunt issues. Fracture of leg managed non-operatively     Now here for suprapubic catheter change    Pre-op Assessment    I have reviewed the Patient Summary Reports.     I have reviewed the Nursing Notes. I have reviewed the NPO Status.   I have reviewed the Medications.     Review of Systems  Anesthesia Hx:  No problems with previous Anesthesia                Social:  Non-Smoker       Hematology/Oncology:       -- Anemia:                                  Cardiovascular:     Hypertension   CAD       CHF   PVD                              Pulmonary:      Shortness of breath  Sleep Apnea                Renal/:     Recurrent UTI's             Hepatic/GI:     GERD   Celiac disease          Musculoskeletal:  Arthritis               Neurological:  TIA, CVA, residual symptoms   Headaches Seizures    Apparently has  shunt                            Endocrine:  Diabetes Hypothyroidism        Obesity / BMI > 30  Psych:    depression                Physical Exam  General: Alert    Airway:  Mallampati: II   Mouth Opening: Small, but > 3cm  TM Distance: > 6 cm  Tongue: Normal  Neck ROM: Extension  Decreased    Dental:  Edentulous        Anesthesia Plan  Type of Anesthesia, risks & benefits discussed:    Anesthesia Type: Gen ETT  Intra-op Monitoring Plan: Standard ASA Monitors  Post Op Pain Control Plan: multimodal analgesia  Induction:  IV  Airway Plan: Video, Post-Induction  Informed Consent: Informed consent signed with the Patient representative and all parties understand the risks and agree with anesthesia plan.  All questions answered. Patient consented to blood products? Yes  ASA Score: 3  Day of Surgery Review of History & Physical: H&P Update referred to the surgeon/provider.I have interviewed and examined the patient. I have reviewed the patient's H&P dated: There are no significant changes.     Ready For Surgery From Anesthesia Perspective.     .

## 2024-03-26 NOTE — TRANSFER OF CARE
Anesthesia Transfer of Care Note    Patient: Elisabet Choi    Procedure(s) Performed: Procedure(s) (LRB):  INSERTION, SUPRAPUBIC CATHETER (N/A)    Patient location: PACU    Anesthesia Type: general    Transport from OR: Transported from OR on room air with adequate spontaneous ventilation    Post pain: adequate analgesia    Post assessment: no apparent anesthetic complications    Post vital signs: stable    Level of consciousness: awake    Nausea/Vomiting: no nausea/vomiting    Complications: none    Transfer of care protocol was followed      Last vitals: Visit Vitals  /70   Pulse 88   Temp 36.1 °C (97 °F)   Resp 20   SpO2 100%   Breastfeeding No

## 2024-03-26 NOTE — OP NOTE
Elisabet Choi   1945   [id number]     Date of Procedure: 2/26/2024              PreOP Dx:  Pre-Op Diagnosis Codes:     * Mixed incontinence urge and stress (male)(female) [N39.46]     Post Op Dx: Post-Op Diagnosis Codes:     * Mixed incontinence urge and stress (male)(female) [N39.46]       Procedure:  Procedure(s):  INSERTION, SUPRAPUBIC CATHETER    Cystoscopy        Surgeon:  Fuad Szymanski MD      EBL: none      Procedure:       After informed consent was obtained, the patient was taken into the operating room after receiving a preop dose of antibiotics.  She was given a general anesthetic and placed in the dorsal lithotomy position.  Her left leg was kept straight due to a recent patellar fracture.  Her genitals were prepped and draped sterilely.  I placed a 22 Mexican rigid scope through the urethra into the bladder.  I filled her bladder up to its capacity with normal saline.  Several cm above the pubic symphysis I palpated the bladder and I could see I was near the dome.  I placed a spinal needle successfully through the abdominal wall through the dome of the bladder.  I then made a small incision there with a 15 blade and passed a trocar through the same tract into the bladder successfully under visualization.  I placed an 18 Mexican catheter through the trocar and then removed the trocar.  I secured the Banda catheter to the abdominal wall with a 2-0 silk suture in addition to blow the balloon up with 7 cc of water.  She tolerated the procedure well there were no complications she was then extubated and brought to recovery in good condition

## 2024-03-26 NOTE — DISCHARGE SUMMARY
[facility] - Periop Services  Discharge Note  Short Stay    Procedure(s) (LRB):  INSERTION, SUPRAPUBIC CATHETER (N/A)      OUTCOME: Condition has improved and patient is now ready for discharge.    DISPOSITION: Home or Self Care    FINAL DIAGNOSIS:  Recurrent urinary tract infection    FOLLOWUP: In clinic    DISCHARGE INSTRUCTIONS:  No discharge procedures on file.     TIME SPENT ON DISCHARGE: 10 minutes

## 2024-03-27 VITALS
DIASTOLIC BLOOD PRESSURE: 67 MMHG | HEIGHT: 56 IN | SYSTOLIC BLOOD PRESSURE: 101 MMHG | TEMPERATURE: 98 F | RESPIRATION RATE: 16 BRPM | OXYGEN SATURATION: 96 % | BODY MASS INDEX: 33.73 KG/M2 | WEIGHT: 149.94 LBS | HEART RATE: 67 BPM

## 2024-03-27 LAB — POCT GLUCOSE: 172 MG/DL (ref 70–110)

## 2024-03-27 PROCEDURE — 63600175 PHARM REV CODE 636 W HCPCS: Performed by: UROLOGY

## 2024-03-27 PROCEDURE — G0378 HOSPITAL OBSERVATION PER HR: HCPCS

## 2024-03-27 PROCEDURE — 96372 THER/PROPH/DIAG INJ SC/IM: CPT | Performed by: UROLOGY

## 2024-03-27 PROCEDURE — 25000003 PHARM REV CODE 250: Performed by: UROLOGY

## 2024-03-27 RX ORDER — AMOXICILLIN AND CLAVULANATE POTASSIUM 500; 125 MG/1; MG/1
1 TABLET, FILM COATED ORAL 2 TIMES DAILY
Qty: 14 TABLET | Refills: 0 | Status: SHIPPED | OUTPATIENT
Start: 2024-03-27

## 2024-03-27 RX ADMIN — DOCUSATE SODIUM 100 MG: 100 CAPSULE, LIQUID FILLED ORAL at 08:03

## 2024-03-27 RX ADMIN — GABAPENTIN 300 MG: 300 CAPSULE ORAL at 08:03

## 2024-03-27 RX ADMIN — ALPRAZOLAM 0.25 MG: 0.25 TABLET ORAL at 08:03

## 2024-03-27 RX ADMIN — DULOXETINE HYDROCHLORIDE 60 MG: 30 CAPSULE, DELAYED RELEASE ORAL at 08:03

## 2024-03-27 RX ADMIN — INSULIN ASPART 2 UNITS: 100 INJECTION, SOLUTION INTRAVENOUS; SUBCUTANEOUS at 12:03

## 2024-03-27 RX ADMIN — TOPIRAMATE 100 MG: 100 TABLET, FILM COATED ORAL at 08:03

## 2024-03-27 RX ADMIN — AMOXICILLIN AND CLAVULANATE POTASSIUM 1 TABLET: 875; 125 TABLET, FILM COATED ORAL at 08:03

## 2024-03-27 RX ADMIN — Medication 400 MG: at 08:03

## 2024-03-27 RX ADMIN — HYDROCODONE BITARTRATE AND ACETAMINOPHEN 1 TABLET: 5; 325 TABLET ORAL at 05:03

## 2024-03-27 NOTE — PLAN OF CARE
[facility] uploaded discharge orders/AVS and clinical note to  [facility] via [employer]. A messege was [facility] notifying her of patient's orders for return.

## 2024-03-27 NOTE — PLAN OF CARE
DEVYN Oneal completed with Christine WELCH, over the phone. Email sent to [employer] for delivery.

## 2024-03-27 NOTE — NURSING
Verbal report given to Nurse Cee with [facility]     Pt bath completed and transferred to W/C with Deni per this nurse and one additional staff     Tolerated well     ABX in green bag and reported to facility employee     Pt left in stable condition

## 2024-03-27 NOTE — NURSING
Pt was brought to room 807 by PACU with prescribed discharge medication from pharmacy. Medication placed in chart with admit paperwork.    65.3

## 2024-03-27 NOTE — PLAN OF CARE
Problem: Adult Inpatient Plan of Care  Goal: Plan of Care Review  Outcome: Ongoing, Progressing  Flowsheets (Taken 3/27/2024 4462)  Plan of Care Reviewed With: patient  Goal: Optimal Comfort and Wellbeing  Outcome: Ongoing, Progressing     Problem: Impaired Wound Healing  Goal: Optimal Wound Healing  Outcome: Ongoing, Progressing

## 2024-03-27 NOTE — PROGRESS NOTES
UROLOGY  PROGRESS  NOTE    Elisabet Choi 1945  [id number]  3/27/2024    Subjective:  no problems reported overnight.  Patient resting comfortably this morning      Objective:  Wt Readings from Last 3 Encounters:   03/26/24 68 kg (149 lb 14.6 oz)   03/20/24 68 kg (150 lb)   04/22/23 79 kg (174 lb 2.6 oz)     Temp Readings from Last 3 Encounters:   03/27/24 97.4 °F (36.3 °C) (Oral)   03/20/24 98.1 °F (36.7 °C) (Temporal)   04/24/23 97.5 °F (36.4 °C) (Oral)     BP Readings from Last 3 Encounters:   03/27/24 97/62   03/20/24 112/64   04/24/23 97/66     Pulse Readings from Last 3 Encounters:   03/27/24 66   03/20/24 69   04/24/23 82       Intake/Output:  I/O this shift:  In: 200 [P.O.:200]  Out: 550 [Urine:550]  I/O last 3 completed shifts:  In: 500 [P.O.:200; IV Piggyback:300]  Out: -        Exam:    NCAT  Card RRR  Resp unlabored  Abd  soft, nontender nondistended    suprapubic tube in place draining clear urine  Extremity no C/C/E      Recent Results (from the past 24 hour(s))   POCT glucose    Collection Time: 03/26/24 10:34 AM   Result Value Ref Range    POCT Glucose 149 (H) 70 - 110 mg/dL   POCT glucose    Collection Time: 03/26/24  5:32 PM   Result Value Ref Range    POCT Glucose 227 (H) 70 - 110 mg/dL             Assessment:   Recurrent urinary tract infections -status post suprapubic tube    Admitted for observation due to requiring supplemental oxygen postoperatively      Plan:   My plan is to discharge her home  if her oxygen saturations are acceptable on room air    Fuad Szymanski MD

## 2024-03-27 NOTE — PLAN OF CARE
SSC reiterated the importance of the MOON form. Patient's family member was able to sign. A copy was given to patient and the signed original was placed in chart.

## 2024-03-27 NOTE — NURSING
Health Maintenance Due   Topic Date Due   • HPV Vaccine (1 - Male 2-dose series) 07/20/2010   • Medicare Wellness Visit  08/01/2020   • Influenza Vaccine (1) 09/01/2020   • Depression Screening  02/28/2021       Patient is due for topics as listed above but is not proceeding with Immunization(s) HPV and Influenza at this time. Education provided for Immunization(s) HPV and Influenza. Mother declines vaccines          Nurses Note -- 4 Eyes      3/26/2024   08:10 PM      Skin assessed during: Admit      [] No Altered Skin Integrity Present    []Prevention Measures Documented      [x] Yes- Altered Skin Integrity Present or Discovered   [x] LDA Added if Not in Epic (Describe Wound)   [x] New Altered Skin Integrity was Present on Admit and Documented in LDA   [x] Wound Image Taken    Wound Care Consulted? Yes    Attending Nurse:  Tiesha Carvajal RN/Staff Member:  KEITH Ojeda

## 2024-03-27 NOTE — ANESTHESIA POSTPROCEDURE EVALUATION
Anesthesia Post Evaluation    Patient: Elisabet Choi    Procedure(s) Performed: Procedure(s) (LRB):  INSERTION, SUPRAPUBIC CATHETER (N/A)    Final Anesthesia Type: general      Patient location during evaluation: PACU  Patient participation: Yes- Able to Participate  Level of consciousness: awake  Post-procedure vital signs: reviewed and stable  Pain management: adequate  Airway patency: patent  ERICA mitigation strategies: Multimodal analgesia  PONV status at discharge: No PONV  Anesthetic complications: no      Cardiovascular status: blood pressure returned to baseline  Respiratory status: unassisted  Hydration status: euvolemic  Follow-up not needed.              Vitals Value Taken Time   BP 95/62 03/26/24 1953   Temp 36.7 °C (98.1 °F) 03/26/24 1500   Pulse 76 03/26/24 1959   Resp 18 03/26/24 1624   SpO2 93 % 03/26/24 1959         Event Time   Out of Recovery 15:05:00         Pain/Berta Score: Pain Rating Prior to Med Admin: 9 (3/27/2024  5:23 AM)  Berta Score: 9 (3/26/2024  7:55 PM)  Modified Berta Score: 18 (3/26/2024  7:55 PM)

## 2024-03-27 NOTE — PROGRESS NOTES
Subjective:       Patient ID: Elisabet Choi is a 78 y.o. female.  Chief Complaint   Patient presents with    Left Knee - Injury     8 days post left patella fracture. Presents in wheelchair w/ daughter in law. States she has pain in her knee sometimes.          HPI:  Patient fell onto the left patella.  She is wheelchair bound.  Her daughter unless present.  She states she has pain in her knee.  She is on chronic pain management.  She has been nonambulatory for greater than 5 years.  No numbness no tingling.  No other areas of pain.    ROS:  Constitutional: Denies fever chills  Eyes: No change in vision  ENT: No ringing or current infections  CV: No chest pain  Resp: No labored breathing  MSK: Pain evident at site of injury located in HPI,   Integ: No signs of abrasions or lacerations  Neuro: No numbness or tingling  Lymphatic: No swelling outside the area of injury     Past Medical History:   Diagnosis Date    Acute cystitis     Allergic rhinitis     Anemia, unspecified     Angina pectoris     Arthritis     Back pain     Bipolar disorder, unspecified     Celiac disease     Cellulitis     CHF (congestive heart failure)     Constipation     Contracture, left ankle     Dementia     Depressive episode     Diabetes mellitus     Diabetes mellitus with ulcer of foot     Displacement of other urinary catheter, subsequent encounter     Edema     Elevated white blood cell count     Encounter for blood transfusion     Fatigue     Fluid retention     Genetic anomalies of leukocytes     GERD without esophagitis     Goiter     HLD (hyperlipidemia)     Hydrocephalus     Hypertension     Hypokalemia     Hypotension     Hypothyroidism     Magnesium deficiency     Major depression     Migraine     Mild protein-calorie malnutrition     Morbid obesity     Obstructive and reflux uropathy     Osteoarthritis     Osteoporosis     Overactive bladder     Pressure ulcer of right heel     Pressure ulcer of sacral region     Pressure ulcer  of sacral region, stage 3     Pruritus     PVD (peripheral vascular disease)     Radiculopathy, lumbar region     Seizures     Sleep apnea     uses CPAP    SOB (shortness of breath)     Stroke     TIA (transient ischemic attack)     Urinary tract infection, site not specified     Vitamin deficiency     Wheelchair dependent       Past Surgical History:   Procedure Laterality Date    ABDOMINAL SURGERY      APPENDECTOMY      BACK SURGERY      BLADDER SURGERY      BRAIN SURGERY      CAROTID ENDARTERECTOMY      CHOLECYSTECTOMY      CSF SHUNT      HERNIA REPAIR      HYSTERECTOMY      INSERTION, SUPRAPUBIC CATHETER N/A 3/26/2024    Procedure: INSERTION, SUPRAPUBIC CATHETER;  Surgeon: Fuad Szymanski MD;  [place] [facility] [place]  Service: Urology;  Laterality: N/A;  CYSTO W/ SUPRAPUBIC CATH INSERTION    TONSILLECTOMY      VASCULAR SURGERY        Current Outpatient Medications on File Prior to Visit   Medication Sig Dispense Refill    ALPRAZolam (XANAX) 0.25 MG tablet Take 0.25 mg by mouth 2 (two) times daily as needed for Anxiety.      amoxicillin-clavulanate 500-125mg (AUGMENTIN) 500-125 mg Tab Take 1 tablet (500 mg total) by mouth 2 (two) times daily. 14 tablet 0    DULoxetine (CYMBALTA) 60 MG capsule Take 60 mg by mouth once daily.      gabapentin (NEURONTIN) 600 MG tablet Take 600 mg by mouth 3 (three) times daily.      HYDROcodone-acetaminophen (NORCO)  mg per tablet Take 1 tablet by mouth every 8 (eight) hours as needed for Pain.      levETIRAcetam (KEPPRA) 750 MG Tab Take 750 mg by mouth nightly.      levothyroxine (SYNTHROID) 150 MCG tablet Take 150 mcg by mouth before breakfast.      lovastatin (ALTOPREV) 40 mg 24 hr tablet Take 40 mg by mouth every evening.      methenamine (HIPREX) 1 gram Tab Take 1 g by mouth 2 (two) times daily.      potassium chloride 10% (KAYCIEL) 20 mEq/15 mL oral solution Take 20 mEq by mouth once daily.      topiramate (TOPAMAX) 100 MG tablet Take 100 mg by mouth 2 (two) times daily.       vitamin D (VITAMIN D3) 1000 units Tab Take 1,000 Units by mouth once daily.      acetaminophen (TYLENOL) 500 MG tablet Take 1,000 mg by mouth every 4 (four) hours as needed for Pain.      amitriptyline (ELAVIL) 25 MG tablet Take 25 mg by mouth nightly as needed for Insomnia.      amLODIPine (NORVASC) 10 MG tablet Take 10 mg by mouth once daily.      atorvastatin (LIPITOR) 80 MG tablet Take 80 mg by mouth every evening.      cyanocobalamin (VITAMIN B-12) 1000 MCG tablet Take 100 mcg by mouth once daily.      doxazosin (CARDURA) 1 MG tablet Take 1 mg by mouth nightly.      ezetimibe (ZETIA) 10 mg tablet Take 10 mg by mouth once daily.      fluoxetine (PROZAC) 20 MG capsule Take 20 mg by mouth 3 (three) times daily.      folic acid (FOLVITE) 1 MG tablet Take 1 mg by mouth once daily.      polyethylene glycol (GLYCOLAX) 17 gram PwPk Take 17 g by mouth once daily.      solifenacin (VESICARE) 5 MG tablet Take 10 mg by mouth every evening.      [DISCONTINUED] metformin (GLUCOPHAGE) 1000 MG tablet Take 1 tablet (1,000 mg total) by mouth 2 (two) times daily with meals. 60 tablet 1    [DISCONTINUED] metoprolol succinate (TOPROL-XL) 50 MG 24 hr tablet Take 1 tablet (50 mg total) by mouth once daily. 30 tablet 1     Current Facility-Administered Medications on File Prior to Visit   Medication Dose Route Frequency Provider Last Rate Last Admin    [DISCONTINUED] 0.45% NaCl infusion   Intravenous Continuous Fuad Szymanski MD 75 mL/hr at 03/26/24 2128 New Bag at 03/26/24 2128    [DISCONTINUED] ALPRAZolam tablet 0.25 mg  0.25 mg Oral BID Fuad Szymanski MD   0.25 mg at 03/27/24 0850    [DISCONTINUED] amoxicillin-clavulanate 875-125mg per tablet 1 tablet  1 tablet Oral Q12H Fuad Szymanski MD   1 tablet at 03/27/24 0859    [DISCONTINUED] docusate sodium capsule 100 mg  100 mg Oral BID Fuad Szymanski MD   100 mg at 03/27/24 0850    [DISCONTINUED] DULoxetine DR capsule 60 mg  60 mg Oral Daily Fuad Szymanski MD   60 mg at  03/27/24 0850    [DISCONTINUED] gabapentin capsule 300 mg  300 mg Oral Daily Fuad Szymanski MD   300 mg at 03/27/24 0850    [DISCONTINUED] gabapentin capsule 600 mg  600 mg Oral QHS Fuad Szymanski MD   600 mg at 03/26/24 2129    [DISCONTINUED] HYDROcodone-acetaminophen 5-325 mg per tablet 1 tablet  1 tablet Oral Q6H PRN Fuad Szymanski MD   1 tablet at 03/27/24 0523    [DISCONTINUED] insulin aspart U-100 injection 0-10 Units  0-10 Units Subcutaneous QID (AC + HS) PRN Fuad Szymanski MD   2 Units at 03/27/24 1243    [DISCONTINUED] levetiracetam 500 mg/5 mL (5 mL) liquid Soln 750 mg  750 mg Oral Nightly Fuad Szymanski MD   750 mg at 03/26/24 2129    [DISCONTINUED] levothyroxine tablet 150 mcg  150 mcg Oral Daily Fuad Szymanski MD        [DISCONTINUED] levothyroxine tablet 25 mcg  25 mcg Oral Daily Fuad Szymanski MD        [DISCONTINUED] magnesium oxide tablet 400 mg  400 mg Oral Daily Fuad Szymanski MD   400 mg at 03/27/24 0850    [DISCONTINUED] metoprolol succinate (TOPROL-XL) 24 hr tablet 50 mg  50 mg Oral Daily Fuad Szymanski MD        [DISCONTINUED] nitroGLYCERIN SL tablet 0.4 mg  0.4 mg Sublingual Q5 Min PRN Fuad Szymanski MD        [DISCONTINUED] potassium chloride SA CR tablet 20 mEq  20 mEq Oral QHS Fuad Szymanski MD   20 mEq at 03/26/24 2129    [DISCONTINUED] tiZANidine tablet 2 mg  2 mg Oral Q8H PRN Fuad Szymanski MD        [DISCONTINUED] topiramate tablet 100 mg  100 mg Oral BID Fuad Szymanski MD   100 mg at 03/27/24 0858      No family history on file.   Social History     Tobacco Use    Smoking status: Never   Substance Use Topics    Alcohol use: No    Drug use: No      No LMP recorded. Patient has had a hysterectomy.          Objective:      /67 (BP Location: Right arm, Patient Position: Sitting, BP Method: Large (Automatic))   Pulse 105   General the patient is alert and oriented x3 no acute distress nontoxic-appearing appropriate affect.    Constitutional: Vital signs are  examined and stable.  Resp: No signs of labored breathing                 LLE: -Skin:No signs of new abrasions or lacerations, no scars, swelling to the knee but not significant no blistering           -MSK: EHL/FHL, Gastroc/Tib anterior Strength 5/5           -Neuro:  Sensation grossly intact           -Lymphatic: No signs of lymphadenopathy           -CV: Capillary refill is less than 2 seconds. DP/PT pulses 2/4. Compartments soft and compressible    There is no height or weight on file to calculate BMI.  Ideal body weight: 43.9 kg (96 lb 13.9 oz)  Lab Results   Component Value Date     03/28/2024    K 4.5 03/28/2024     (H) 01/23/2024    CO2 27 03/28/2024     (H) 06/30/2014    CALCIUM 8.3 (L) 03/28/2024    BUN 21.8 (H) 03/28/2024      Lab Results   Component Value Date    HGB 11.0 (L) 03/28/2024    HCT 35.9 (L) 03/28/2024             Assessment:         1. Closed nondisplaced fracture of left patella, unspecified fracture morphology, initial encounter  X-Ray Knee 1 or 2 View Left    CANCELED: X-Ray Knee 1 or 2 View Left              Plan:         No follow-ups on file.    Elisabet was seen today for injury.    Diagnoses and all orders for this visit:    Closed nondisplaced fracture of left patella, unspecified fracture morphology, initial encounter  -     Cancel: X-Ray Knee 1 or 2 View Left; Future  -     X-Ray Knee 1 or 2 View Left; Future          Independent Radiology ordered by other provider:  Three views left knee skeletally mature individual shows inferior pole patella fracture with minimal displacement          Pt has acute injury with risk of severe bodily function with their injury to the left knee.    Patient has a left inferior pole patella fracture.  She has been nonambulatory for greater than 5 years.  She is wheelchair bound.  She has suprapubic catheter due to multiple infections.  Patient is not a surgical candidate nor does she need the surgery due to the nondisplaced nature of  the fracture and the lack of use of her lower extremities.  Patient will receive fracture care we will see her back in 6 weeks for evaluation with x-ray.  This was discussed with the patient's family in great detail.  Patient will be weightbearing as tolerated in a knee mobilizer she decides to weightbear.    Patient is on chronic pain management        This note/OR report was created with the assistance of  voice recognition software or phone  dictation.  There may be transcription errors as a result of using this technology however minimal. Effort has been made to assure accuracy of transcription but any obvious errors or omissions should be clarified with the author of the document.     Future Appointments   Date Time Provider Department Center   5/1/2024  9:00 AM Ivana Hammonds PA-C [facility] [place] Blake Saul DO  Orthopedic Trauma Surgery  03/28/2024

## 2024-03-28 ENCOUNTER — LAB REQUISITION (OUTPATIENT)
Dept: LAB | Facility: HOSPITAL | Age: 79
End: 2024-03-28
Payer: MEDICARE

## 2024-03-28 ENCOUNTER — OFFICE VISIT (OUTPATIENT)
Dept: ORTHOPEDICS | Facility: CLINIC | Age: 79
End: 2024-03-28
Payer: MEDICARE

## 2024-03-28 ENCOUNTER — HOSPITAL ENCOUNTER (OUTPATIENT)
Dept: RADIOLOGY | Facility: CLINIC | Age: 79
Discharge: HOME | End: 2024-03-28
Attending: ORTHOPAEDIC SURGERY
Payer: MEDICARE

## 2024-03-28 VITALS — HEART RATE: 105 BPM | DIASTOLIC BLOOD PRESSURE: 67 MMHG | SYSTOLIC BLOOD PRESSURE: 105 MMHG

## 2024-03-28 DIAGNOSIS — S82.002A CLOSED NONDISPLACED FRACTURE OF LEFT PATELLA, UNSPECIFIED FRACTURE MORPHOLOGY, INITIAL ENCOUNTER: Primary | ICD-10-CM

## 2024-03-28 DIAGNOSIS — I10 ESSENTIAL (PRIMARY) HYPERTENSION: ICD-10-CM

## 2024-03-28 DIAGNOSIS — S82.002A CLOSED NONDISPLACED FRACTURE OF LEFT PATELLA, UNSPECIFIED FRACTURE MORPHOLOGY, INITIAL ENCOUNTER: ICD-10-CM

## 2024-03-28 LAB
ALBUMIN SERPL-MCNC: 3 G/DL (ref 3.4–4.8)
ALBUMIN/GLOB SERPL: 1.1 RATIO (ref 1.1–2)
ALP SERPL-CCNC: 73 UNIT/L (ref 40–150)
ALT SERPL-CCNC: 28 UNIT/L (ref 0–55)
AST SERPL-CCNC: 7 UNIT/L (ref 5–34)
BASOPHILS # BLD AUTO: 0.04 X10(3)/MCL
BASOPHILS NFR BLD AUTO: 0.3 %
BILIRUB SERPL-MCNC: 0.2 MG/DL
BUN SERPL-MCNC: 21.8 MG/DL (ref 9.8–20.1)
CALCIUM SERPL-MCNC: 8.3 MG/DL (ref 8.4–10.2)
CHLORIDE SERPL-SCNC: 104 MMOL/L (ref 98–107)
CO2 SERPL-SCNC: 27 MMOL/L (ref 23–31)
CREAT SERPL-MCNC: 0.76 MG/DL (ref 0.55–1.02)
EOSINOPHIL # BLD AUTO: 0.56 X10(3)/MCL (ref 0–0.9)
EOSINOPHIL NFR BLD AUTO: 4.4 %
ERYTHROCYTE [DISTWIDTH] IN BLOOD BY AUTOMATED COUNT: 17.6 % (ref 11.5–17)
GFR SERPLBLD CREATININE-BSD FMLA CKD-EPI: >60 MLS/MIN/1.73/M2
GLOBULIN SER-MCNC: 2.8 GM/DL (ref 2.4–3.5)
GLUCOSE SERPL-MCNC: 168 MG/DL (ref 82–115)
HCT VFR BLD AUTO: 35.9 % (ref 37–47)
HGB BLD-MCNC: 11 G/DL (ref 12–16)
IMM GRANULOCYTES # BLD AUTO: 0.04 X10(3)/MCL (ref 0–0.04)
IMM GRANULOCYTES NFR BLD AUTO: 0.3 %
LYMPHOCYTES # BLD AUTO: 5.18 X10(3)/MCL (ref 0.6–4.6)
LYMPHOCYTES NFR BLD AUTO: 41 %
MCH RBC QN AUTO: 27.1 PG (ref 27–31)
MCHC RBC AUTO-ENTMCNC: 30.6 G/DL (ref 33–36)
MCV RBC AUTO: 88.4 FL (ref 80–94)
MONOCYTES # BLD AUTO: 0.74 X10(3)/MCL (ref 0.1–1.3)
MONOCYTES NFR BLD AUTO: 5.9 %
NEUTROPHILS # BLD AUTO: 6.06 X10(3)/MCL (ref 2.1–9.2)
NEUTROPHILS NFR BLD AUTO: 48.1 %
NRBC BLD AUTO-RTO: 0 %
PLATELET # BLD AUTO: 178 X10(3)/MCL (ref 130–400)
PMV BLD AUTO: 10.4 FL (ref 7.4–10.4)
POTASSIUM SERPL-SCNC: 4.5 MMOL/L (ref 3.5–5.1)
PROT SERPL-MCNC: 5.8 GM/DL (ref 5.8–7.6)
RBC # BLD AUTO: 4.06 X10(6)/MCL (ref 4.2–5.4)
SODIUM SERPL-SCNC: 138 MMOL/L (ref 136–145)
WBC # SPEC AUTO: 12.62 X10(3)/MCL (ref 4.5–11.5)

## 2024-03-28 PROCEDURE — 73560 X-RAY EXAM OF KNEE 1 OR 2: CPT | Mod: LT,,, | Performed by: ORTHOPAEDIC SURGERY

## 2024-03-28 PROCEDURE — 80053 COMPREHEN METABOLIC PANEL: CPT | Performed by: INTERNAL MEDICINE

## 2024-03-28 PROCEDURE — 99214 OFFICE O/P EST MOD 30 MIN: CPT | Mod: 57,,, | Performed by: ORTHOPAEDIC SURGERY

## 2024-03-28 PROCEDURE — 27520 TREAT KNEECAP FRACTURE: CPT | Mod: LT,,, | Performed by: ORTHOPAEDIC SURGERY

## 2024-03-28 PROCEDURE — 85025 COMPLETE CBC W/AUTO DIFF WBC: CPT | Performed by: INTERNAL MEDICINE

## 2024-03-28 RX ORDER — TOPIRAMATE 100 MG/1
100 TABLET, FILM COATED ORAL 2 TIMES DAILY
COMMUNITY

## 2024-03-28 RX ORDER — METHENAMINE HIPPURATE 1000 MG/1
1 TABLET ORAL 2 TIMES DAILY
COMMUNITY

## 2024-03-28 RX ORDER — POTASSIUM CHLORIDE 20 MEQ/15ML
20 SOLUTION ORAL DAILY
COMMUNITY

## 2024-03-28 RX ORDER — ALPRAZOLAM 0.25 MG/1
0.25 TABLET ORAL 2 TIMES DAILY PRN
COMMUNITY

## 2024-03-28 RX ORDER — LEVOTHYROXINE SODIUM 150 UG/1
150 TABLET ORAL
COMMUNITY

## 2024-03-28 RX ORDER — LEVETIRACETAM 750 MG/1
750 TABLET ORAL NIGHTLY
COMMUNITY

## 2024-03-28 RX ORDER — DULOXETIN HYDROCHLORIDE 60 MG/1
60 CAPSULE, DELAYED RELEASE ORAL DAILY
COMMUNITY

## 2024-03-28 RX ORDER — GABAPENTIN 600 MG/1
600 TABLET ORAL 3 TIMES DAILY
COMMUNITY

## 2024-03-28 RX ORDER — HYDROCODONE BITARTRATE AND ACETAMINOPHEN 10; 325 MG/1; MG/1
1 TABLET ORAL EVERY 8 HOURS PRN
COMMUNITY

## 2024-04-10 ENCOUNTER — LAB REQUISITION (OUTPATIENT)
Dept: LAB | Facility: HOSPITAL | Age: 79
End: 2024-04-10
Payer: MEDICARE

## 2024-04-10 DIAGNOSIS — E03.9 HYPOTHYROIDISM, UNSPECIFIED: ICD-10-CM

## 2024-04-10 DIAGNOSIS — I10 ESSENTIAL (PRIMARY) HYPERTENSION: ICD-10-CM

## 2024-04-10 LAB
ALBUMIN SERPL-MCNC: 2.6 G/DL (ref 3.4–4.8)
ALBUMIN/GLOB SERPL: 0.9 RATIO (ref 1.1–2)
ALP SERPL-CCNC: 74 UNIT/L (ref 40–150)
ALT SERPL-CCNC: 22 UNIT/L (ref 0–55)
AST SERPL-CCNC: 15 UNIT/L (ref 5–34)
BASOPHILS # BLD AUTO: 0.04 X10(3)/MCL
BASOPHILS NFR BLD AUTO: 0.3 %
BILIRUB SERPL-MCNC: 0.2 MG/DL
BUN SERPL-MCNC: 21.2 MG/DL (ref 9.8–20.1)
CALCIUM SERPL-MCNC: 8.4 MG/DL (ref 8.4–10.2)
CHLORIDE SERPL-SCNC: 106 MMOL/L (ref 98–107)
CHOLEST SERPL-MCNC: 170 MG/DL
CHOLEST/HDLC SERPL: 4 {RATIO} (ref 0–5)
CO2 SERPL-SCNC: 26 MMOL/L (ref 23–31)
CREAT SERPL-MCNC: 0.75 MG/DL (ref 0.55–1.02)
EOSINOPHIL # BLD AUTO: 0.56 X10(3)/MCL (ref 0–0.9)
EOSINOPHIL NFR BLD AUTO: 3.9 %
ERYTHROCYTE [DISTWIDTH] IN BLOOD BY AUTOMATED COUNT: 17 % (ref 11.5–17)
EST. AVERAGE GLUCOSE BLD GHB EST-MCNC: 159.9 MG/DL
GFR SERPLBLD CREATININE-BSD FMLA CKD-EPI: >60 MLS/MIN/1.73/M2
GLOBULIN SER-MCNC: 2.8 GM/DL (ref 2.4–3.5)
GLUCOSE SERPL-MCNC: 274 MG/DL (ref 82–115)
HBA1C MFR BLD: 7.2 %
HCT VFR BLD AUTO: 37.4 % (ref 37–47)
HDLC SERPL-MCNC: 40 MG/DL (ref 35–60)
HGB BLD-MCNC: 11.5 G/DL (ref 12–16)
IMM GRANULOCYTES # BLD AUTO: 0.05 X10(3)/MCL (ref 0–0.04)
IMM GRANULOCYTES NFR BLD AUTO: 0.3 %
LDLC SERPL CALC-MCNC: 98 MG/DL (ref 50–140)
LYMPHOCYTES # BLD AUTO: 4.35 X10(3)/MCL (ref 0.6–4.6)
LYMPHOCYTES NFR BLD AUTO: 30 %
MCH RBC QN AUTO: 27.5 PG (ref 27–31)
MCHC RBC AUTO-ENTMCNC: 30.7 G/DL (ref 33–36)
MCV RBC AUTO: 89.5 FL (ref 80–94)
MONOCYTES # BLD AUTO: 0.7 X10(3)/MCL (ref 0.1–1.3)
MONOCYTES NFR BLD AUTO: 4.8 %
NEUTROPHILS # BLD AUTO: 8.82 X10(3)/MCL (ref 2.1–9.2)
NEUTROPHILS NFR BLD AUTO: 60.7 %
NRBC BLD AUTO-RTO: 0 %
PLATELET # BLD AUTO: 167 X10(3)/MCL (ref 130–400)
PMV BLD AUTO: 10.8 FL (ref 7.4–10.4)
POTASSIUM SERPL-SCNC: 4.2 MMOL/L (ref 3.5–5.1)
PROT SERPL-MCNC: 5.4 GM/DL (ref 5.8–7.6)
RBC # BLD AUTO: 4.18 X10(6)/MCL (ref 4.2–5.4)
SODIUM SERPL-SCNC: 141 MMOL/L (ref 136–145)
TRIGL SERPL-MCNC: 158 MG/DL (ref 37–140)
TSH SERPL-ACNC: 0.88 UIU/ML (ref 0.35–4.94)
VLDLC SERPL CALC-MCNC: 32 MG/DL
WBC # SPEC AUTO: 14.52 X10(3)/MCL (ref 4.5–11.5)

## 2024-04-10 PROCEDURE — 80053 COMPREHEN METABOLIC PANEL: CPT | Performed by: INTERNAL MEDICINE

## 2024-04-10 PROCEDURE — 80061 LIPID PANEL: CPT | Performed by: INTERNAL MEDICINE

## 2024-04-10 PROCEDURE — 84443 ASSAY THYROID STIM HORMONE: CPT | Performed by: INTERNAL MEDICINE

## 2024-04-10 PROCEDURE — 83036 HEMOGLOBIN GLYCOSYLATED A1C: CPT | Performed by: INTERNAL MEDICINE

## 2024-04-10 PROCEDURE — 85025 COMPLETE CBC W/AUTO DIFF WBC: CPT | Performed by: INTERNAL MEDICINE

## 2024-04-15 ENCOUNTER — LAB REQUISITION (OUTPATIENT)
Dept: LAB | Facility: HOSPITAL | Age: 79
End: 2024-04-15
Payer: MEDICARE

## 2024-04-15 DIAGNOSIS — I10 ESSENTIAL (PRIMARY) HYPERTENSION: ICD-10-CM

## 2024-04-15 LAB
BASOPHILS # BLD AUTO: 0.05 X10(3)/MCL
BASOPHILS NFR BLD AUTO: 0.5 %
EOSINOPHIL # BLD AUTO: 0.58 X10(3)/MCL (ref 0–0.9)
EOSINOPHIL NFR BLD AUTO: 5.6 %
ERYTHROCYTE [DISTWIDTH] IN BLOOD BY AUTOMATED COUNT: 16.9 % (ref 11.5–17)
HCT VFR BLD AUTO: 34.7 % (ref 37–47)
HGB BLD-MCNC: 10.7 G/DL (ref 12–16)
IMM GRANULOCYTES # BLD AUTO: 0.03 X10(3)/MCL (ref 0–0.04)
IMM GRANULOCYTES NFR BLD AUTO: 0.3 %
LYMPHOCYTES # BLD AUTO: 3.75 X10(3)/MCL (ref 0.6–4.6)
LYMPHOCYTES NFR BLD AUTO: 36.2 %
MCH RBC QN AUTO: 27.2 PG (ref 27–31)
MCHC RBC AUTO-ENTMCNC: 30.8 G/DL (ref 33–36)
MCV RBC AUTO: 88.1 FL (ref 80–94)
MONOCYTES # BLD AUTO: 0.69 X10(3)/MCL (ref 0.1–1.3)
MONOCYTES NFR BLD AUTO: 6.7 %
NEUTROPHILS # BLD AUTO: 5.25 X10(3)/MCL (ref 2.1–9.2)
NEUTROPHILS NFR BLD AUTO: 50.7 %
NRBC BLD AUTO-RTO: 0 %
PLATELET # BLD AUTO: 182 X10(3)/MCL (ref 130–400)
PMV BLD AUTO: 10.9 FL (ref 7.4–10.4)
RBC # BLD AUTO: 3.94 X10(6)/MCL (ref 4.2–5.4)
WBC # SPEC AUTO: 10.35 X10(3)/MCL (ref 4.5–11.5)

## 2024-04-15 PROCEDURE — 85025 COMPLETE CBC W/AUTO DIFF WBC: CPT | Performed by: INTERNAL MEDICINE

## 2024-05-01 ENCOUNTER — OFFICE VISIT (OUTPATIENT)
Dept: ORTHOPEDICS | Facility: CLINIC | Age: 79
End: 2024-05-01
Payer: MEDICARE

## 2024-05-01 ENCOUNTER — HOSPITAL ENCOUNTER (OUTPATIENT)
Dept: RADIOLOGY | Facility: CLINIC | Age: 79
Discharge: HOME | End: 2024-05-01
Attending: PHYSICIAN ASSISTANT
Payer: MEDICARE

## 2024-05-01 VITALS
HEART RATE: 105 BPM | WEIGHT: 149.94 LBS | BODY MASS INDEX: 33.73 KG/M2 | RESPIRATION RATE: 18 BRPM | HEIGHT: 56 IN | DIASTOLIC BLOOD PRESSURE: 75 MMHG | SYSTOLIC BLOOD PRESSURE: 117 MMHG

## 2024-05-01 DIAGNOSIS — S82.002D CLOSED NONDISPLACED FRACTURE OF LEFT PATELLA WITH ROUTINE HEALING, UNSPECIFIED FRACTURE MORPHOLOGY, SUBSEQUENT ENCOUNTER: Primary | ICD-10-CM

## 2024-05-01 DIAGNOSIS — S82.002D CLOSED NONDISPLACED FRACTURE OF LEFT PATELLA WITH ROUTINE HEALING, UNSPECIFIED FRACTURE MORPHOLOGY, SUBSEQUENT ENCOUNTER: ICD-10-CM

## 2024-05-01 PROCEDURE — 99024 POSTOP FOLLOW-UP VISIT: CPT | Mod: POP,,, | Performed by: PHYSICIAN ASSISTANT

## 2024-05-01 PROCEDURE — 73560 X-RAY EXAM OF KNEE 1 OR 2: CPT | Mod: LT,,, | Performed by: PHYSICIAN ASSISTANT

## 2024-05-01 NOTE — PROGRESS NOTES
Subjective:       Patient ID: Elisabet Choi is a 78 y.o. female.  Chief Complaint   Patient presents with    Left Knee - Follow-up     6 WEEK F/U LEFT PATELLA FX, IN KNEE BRACE.           HPI:  6 week follow up left patella fracture. Patient presents in wheelchair with knee immobilizer today. She is here with her daughter. She states mild pain at the knee with ROM. Does endorse chronic knee pain bilaterally. States they used to see Dr. Hanley in the past for knee injections but have not had this done in some years. She tolerates passive ROM at the knee. Facility requesting alternative vs dc brace as it slides down her leg and is causing early wound problems on the left foot. She has no numbness or tingling today. Has been non ambulatory since 2019.     ROS:  Constitutional: Denies fever chills  Eyes: No change in vision  ENT: No ringing or current infections  CV: No chest pain  Resp: No labored breathing  MSK: Pain evident at site of injury located in HPI,   Integ: No signs of abrasions or lacerations  Neuro: No numbness or tingling  Lymphatic: No swelling outside the area of injury     Past Medical History:   Diagnosis Date    Acute cystitis     Allergic rhinitis     Anemia, unspecified     Angina pectoris     Arthritis     Back pain     Bipolar disorder, unspecified     Celiac disease     Cellulitis     CHF (congestive heart failure)     Constipation     Contracture, left ankle     Dementia     Depressive episode     Diabetes mellitus     Diabetes mellitus with ulcer of foot     Displacement of other urinary catheter, subsequent encounter     Edema     Elevated white blood cell count     Encounter for blood transfusion     Fatigue     Fluid retention     Genetic anomalies of leukocytes     GERD without esophagitis     Goiter     HLD (hyperlipidemia)     Hydrocephalus     Hypertension     Hypokalemia     Hypotension     Hypothyroidism     Magnesium deficiency     Major depression     Migraine     Mild  protein-calorie malnutrition     Morbid obesity     Obstructive and reflux uropathy     Osteoarthritis     Osteoporosis     Overactive bladder     Pressure ulcer of right heel     Pressure ulcer of sacral region     Pressure ulcer of sacral region, stage 3     Pruritus     PVD (peripheral vascular disease)     Radiculopathy, lumbar region     Seizures     Sleep apnea     uses CPAP    SOB (shortness of breath)     Stroke     TIA (transient ischemic attack)     Urinary tract infection, site not specified     Vitamin deficiency     Wheelchair dependent       Past Surgical History:   Procedure Laterality Date    ABDOMINAL SURGERY      APPENDECTOMY      BACK SURGERY      BLADDER SURGERY      BRAIN SURGERY      CAROTID ENDARTERECTOMY      CHOLECYSTECTOMY      CSF SHUNT      HERNIA REPAIR      HYSTERECTOMY      INSERTION, SUPRAPUBIC CATHETER N/A 3/26/2024    Procedure: INSERTION, SUPRAPUBIC CATHETER;  Surgeon: Fuad Szymanski MD;  Location: [facility];  Service: Urology;  Laterality: N/A;  CYSTO W/ SUPRAPUBIC CATH INSERTION    TONSILLECTOMY      VASCULAR SURGERY        Current Outpatient Medications on File Prior to Visit   Medication Sig Dispense Refill    acetaminophen (TYLENOL) 500 MG tablet Take 1,000 mg by mouth every 4 (four) hours as needed for Pain.      ALPRAZolam (XANAX) 0.25 MG tablet Take 0.25 mg by mouth 2 (two) times daily as needed for Anxiety.      amitriptyline (ELAVIL) 25 MG tablet Take 25 mg by mouth nightly as needed for Insomnia.      amLODIPine (NORVASC) 10 MG tablet Take 10 mg by mouth once daily.      amoxicillin-clavulanate 500-125mg (AUGMENTIN) 500-125 mg Tab Take 1 tablet (500 mg total) by mouth 2 (two) times daily. 14 tablet 0    atorvastatin (LIPITOR) 80 MG tablet Take 80 mg by mouth every evening.      cyanocobalamin (VITAMIN B-12) 1000 MCG tablet Take 100 mcg by mouth once daily.      doxazosin (CARDURA) 1 MG tablet Take 1 mg by mouth nightly.      DULoxetine (CYMBALTA) 60 MG capsule Take 60 mg  "by mouth once daily.      ezetimibe (ZETIA) 10 mg tablet Take 10 mg by mouth once daily.      fluoxetine (PROZAC) 20 MG capsule Take 20 mg by mouth 3 (three) times daily.      folic acid (FOLVITE) 1 MG tablet Take 1 mg by mouth once daily.      gabapentin (NEURONTIN) 600 MG tablet Take 600 mg by mouth 3 (three) times daily.      HYDROcodone-acetaminophen (NORCO)  mg per tablet Take 1 tablet by mouth every 8 (eight) hours as needed for Pain.      levETIRAcetam (KEPPRA) 750 MG Tab Take 750 mg by mouth nightly.      levothyroxine (SYNTHROID) 150 MCG tablet Take 150 mcg by mouth before breakfast.      lovastatin (ALTOPREV) 40 mg 24 hr tablet Take 40 mg by mouth every evening.      methenamine (HIPREX) 1 gram Tab Take 1 g by mouth 2 (two) times daily.      polyethylene glycol (GLYCOLAX) 17 gram PwPk Take 17 g by mouth once daily.      potassium chloride 10% (KAYCIEL) 20 mEq/15 mL oral solution Take 20 mEq by mouth once daily.      solifenacin (VESICARE) 5 MG tablet Take 10 mg by mouth every evening.      topiramate (TOPAMAX) 100 MG tablet Take 100 mg by mouth 2 (two) times daily.      vitamin D (VITAMIN D3) 1000 units Tab Take 1,000 Units by mouth once daily.      [DISCONTINUED] metformin (GLUCOPHAGE) 1000 MG tablet Take 1 tablet (1,000 mg total) by mouth 2 (two) times daily with meals. 60 tablet 1    [DISCONTINUED] metoprolol succinate (TOPROL-XL) 50 MG 24 hr tablet Take 1 tablet (50 mg total) by mouth once daily. 30 tablet 1     No current facility-administered medications on file prior to visit.      No family history on file.   Social History     Tobacco Use    Smoking status: Never    Smokeless tobacco: Never   Substance Use Topics    Alcohol use: No    Drug use: No      No LMP recorded. Patient has had a hysterectomy.          Objective:      /75   Pulse 105   Resp 18   Ht 4' 8" (1.422 m)   Wt 68 kg (149 lb 14.6 oz)   BMI 33.61 kg/m²   General the patient is alert and oriented x3 no acute distress " nontoxic-appearing appropriate affect.    Constitutional: Vital signs are examined and stable.  Resp: No signs of labored breathing                 LLE: -Skin:No signs of new abrasions or lacerations, no scars, swelling to the knee but not significant no blistering; Dressing in place to the left foot, States ulcer here being treated           -MSK: EHL/FHL, Gastroc/Tib anterior Strength 5/5; full flexor and extensor mechanisms in place.            -Neuro:  Sensation grossly intact           -Lymphatic: No signs of lymphadenopathy           -CV: Capillary refill is less than 2 seconds. DP/PT pulses 2/4. Compartments soft and compressible    Body mass index is 33.61 kg/m².  Ideal body weight: 43.9 kg (96 lb 13.9 oz)  Adjusted ideal body weight: 53.6 kg (118 lb 1.4 oz)  Lab Results   Component Value Date     04/10/2024    K 4.2 04/10/2024     (H) 01/23/2024    CO2 26 04/10/2024     (H) 06/30/2014    CALCIUM 8.4 04/10/2024    BUN 21.2 (H) 04/10/2024      Lab Results   Component Value Date    HGB 10.7 (L) 04/15/2024    HCT 34.7 (L) 04/15/2024     Radiology: 3 view x ray left knee: alignment maintained as compared to previous images. No displacement.         Assessment:         1. Closed nondisplaced fracture of left patella with routine healing, unspecified fracture morphology, subsequent encounter  X-Ray Knee 1 or 2 View Left              Plan:         No follow-ups on file.    Elisabet was seen today for follow-up.    Diagnoses and all orders for this visit:    Closed nondisplaced fracture of left patella with routine healing, unspecified fracture morphology, subsequent encounter  -     X-Ray Knee 1 or 2 View Left; Future        Patient has a left inferior pole patella fracture.  She has been nonambulatory since 2019.  She is wheelchair bound.  She has suprapubic catheter due to multiple infections.  Fracture remains non displaced. We will have her remove the brace due to concern for wounds. She may  perform ROM with therapy, no forced flexion at this time. 0-90 degrees. As she is non weight bearing, she does not need brace unless she will be up to mobilize. Family states she does not move except to transfer on slide boards.  Patient is on chronic pain management. Requesting joint injections with another provider who can see her regularly for this.     Alexandra Blair Lemaire, PA-C [facility]   Orthopedic Trauma          Future Appointments   Date Time Provider Department Center   7/8/2024  9:00 AM Ramos Beltran DO [facility] [place]

## 2024-05-02 ENCOUNTER — LAB REQUISITION (OUTPATIENT)
Dept: LAB | Facility: HOSPITAL | Age: 79
End: 2024-05-02
Payer: MEDICARE

## 2024-05-02 DIAGNOSIS — D72.829 ELEVATED WHITE BLOOD CELL COUNT, UNSPECIFIED: ICD-10-CM

## 2024-05-02 LAB
BASOPHILS # BLD AUTO: 0.07 X10(3)/MCL
BASOPHILS NFR BLD AUTO: 0.5 %
EOSINOPHIL # BLD AUTO: 0.91 X10(3)/MCL (ref 0–0.9)
EOSINOPHIL NFR BLD AUTO: 7 %
ERYTHROCYTE [DISTWIDTH] IN BLOOD BY AUTOMATED COUNT: 16.1 % (ref 11.5–17)
HCT VFR BLD AUTO: 39.3 % (ref 37–47)
HGB BLD-MCNC: 12.3 G/DL (ref 12–16)
IMM GRANULOCYTES # BLD AUTO: 0.04 X10(3)/MCL (ref 0–0.04)
IMM GRANULOCYTES NFR BLD AUTO: 0.3 %
LYMPHOCYTES # BLD AUTO: 4.2 X10(3)/MCL (ref 0.6–4.6)
LYMPHOCYTES NFR BLD AUTO: 32.2 %
MCH RBC QN AUTO: 27.8 PG (ref 27–31)
MCHC RBC AUTO-ENTMCNC: 31.3 G/DL (ref 33–36)
MCV RBC AUTO: 88.9 FL (ref 80–94)
MONOCYTES # BLD AUTO: 0.84 X10(3)/MCL (ref 0.1–1.3)
MONOCYTES NFR BLD AUTO: 6.4 %
NEUTROPHILS # BLD AUTO: 7 X10(3)/MCL (ref 2.1–9.2)
NEUTROPHILS NFR BLD AUTO: 53.6 %
NRBC BLD AUTO-RTO: 0 %
PLATELET # BLD AUTO: 212 X10(3)/MCL (ref 130–400)
PMV BLD AUTO: 10.8 FL (ref 7.4–10.4)
RBC # BLD AUTO: 4.42 X10(6)/MCL (ref 4.2–5.4)
WBC # SPEC AUTO: 13.06 X10(3)/MCL (ref 4.5–11.5)

## 2024-05-02 PROCEDURE — 85025 COMPLETE CBC W/AUTO DIFF WBC: CPT | Performed by: INTERNAL MEDICINE

## 2024-05-06 ENCOUNTER — LAB REQUISITION (OUTPATIENT)
Dept: LAB | Facility: HOSPITAL | Age: 79
End: 2024-05-06
Payer: MEDICARE

## 2024-05-06 DIAGNOSIS — I10 ESSENTIAL (PRIMARY) HYPERTENSION: ICD-10-CM

## 2024-05-06 LAB
BASOPHILS # BLD AUTO: 0.07 X10(3)/MCL
BASOPHILS NFR BLD AUTO: 0.6 %
EOSINOPHIL # BLD AUTO: 0.57 X10(3)/MCL (ref 0–0.9)
EOSINOPHIL NFR BLD AUTO: 5.1 %
ERYTHROCYTE [DISTWIDTH] IN BLOOD BY AUTOMATED COUNT: 15.9 % (ref 11.5–17)
HCT VFR BLD AUTO: 38.6 % (ref 37–47)
HGB BLD-MCNC: 12.1 G/DL (ref 12–16)
IMM GRANULOCYTES # BLD AUTO: 0.03 X10(3)/MCL (ref 0–0.04)
IMM GRANULOCYTES NFR BLD AUTO: 0.3 %
LYMPHOCYTES # BLD AUTO: 4.38 X10(3)/MCL (ref 0.6–4.6)
LYMPHOCYTES NFR BLD AUTO: 39.4 %
MCH RBC QN AUTO: 27.4 PG (ref 27–31)
MCHC RBC AUTO-ENTMCNC: 31.3 G/DL (ref 33–36)
MCV RBC AUTO: 87.5 FL (ref 80–94)
MONOCYTES # BLD AUTO: 0.69 X10(3)/MCL (ref 0.1–1.3)
MONOCYTES NFR BLD AUTO: 6.2 %
NEUTROPHILS # BLD AUTO: 5.37 X10(3)/MCL (ref 2.1–9.2)
NEUTROPHILS NFR BLD AUTO: 48.4 %
NRBC BLD AUTO-RTO: 0 %
PLATELET # BLD AUTO: 188 X10(3)/MCL (ref 130–400)
PLATELETS.RETICULATED NFR BLD AUTO: 3.6 % (ref 0.9–11.2)
PMV BLD AUTO: 11.7 FL (ref 7.4–10.4)
RBC # BLD AUTO: 4.41 X10(6)/MCL (ref 4.2–5.4)
WBC # SPEC AUTO: 11.11 X10(3)/MCL (ref 4.5–11.5)

## 2024-05-06 PROCEDURE — 85025 COMPLETE CBC W/AUTO DIFF WBC: CPT | Performed by: INTERNAL MEDICINE

## 2024-05-21 ENCOUNTER — LAB REQUISITION (OUTPATIENT)
Dept: LAB | Facility: HOSPITAL | Age: 79
End: 2024-05-21
Payer: MEDICARE

## 2024-05-21 DIAGNOSIS — I10 ESSENTIAL (PRIMARY) HYPERTENSION: ICD-10-CM

## 2024-05-21 LAB
ANION GAP SERPL CALC-SCNC: 7 MEQ/L
BUN SERPL-MCNC: 20.4 MG/DL (ref 9.8–20.1)
CALCIUM SERPL-MCNC: 8.5 MG/DL (ref 8.4–10.2)
CHLORIDE SERPL-SCNC: 102 MMOL/L (ref 98–107)
CO2 SERPL-SCNC: 30 MMOL/L (ref 23–31)
CREAT SERPL-MCNC: 0.76 MG/DL (ref 0.55–1.02)
CREAT/UREA NIT SERPL: 27
GFR SERPLBLD CREATININE-BSD FMLA CKD-EPI: >60 ML/MIN/1.73/M2
GLUCOSE SERPL-MCNC: 216 MG/DL (ref 82–115)
POTASSIUM SERPL-SCNC: 4.2 MMOL/L (ref 3.5–5.1)
SODIUM SERPL-SCNC: 139 MMOL/L (ref 136–145)

## 2024-05-21 PROCEDURE — 80048 BASIC METABOLIC PNL TOTAL CA: CPT | Performed by: INTERNAL MEDICINE

## 2024-07-03 ENCOUNTER — LAB REQUISITION (OUTPATIENT)
Dept: LAB | Facility: HOSPITAL | Age: 79
End: 2024-07-03
Payer: MEDICARE

## 2024-07-03 DIAGNOSIS — E11.9 TYPE 2 DIABETES MELLITUS WITHOUT COMPLICATIONS: ICD-10-CM

## 2024-07-03 DIAGNOSIS — I50.9 HEART FAILURE, UNSPECIFIED: ICD-10-CM

## 2024-07-03 DIAGNOSIS — D69.9 HEMORRHAGIC CONDITION, UNSPECIFIED: ICD-10-CM

## 2024-07-03 LAB
ALBUMIN SERPL-MCNC: 2.8 G/DL (ref 3.4–4.8)
ALBUMIN/GLOB SERPL: 0.9 RATIO (ref 1.1–2)
ALP SERPL-CCNC: 76 UNIT/L (ref 40–150)
ALT SERPL-CCNC: 9 UNIT/L (ref 0–55)
ANION GAP SERPL CALC-SCNC: 11 MEQ/L
AST SERPL-CCNC: 7 UNIT/L (ref 5–34)
BASOPHILS # BLD AUTO: 0.07 X10(3)/MCL
BASOPHILS NFR BLD AUTO: 0.6 %
BILIRUB SERPL-MCNC: 0.3 MG/DL
BNP BLD-MCNC: 11.3 PG/ML
BUN SERPL-MCNC: 20.2 MG/DL (ref 9.8–20.1)
CALCIUM SERPL-MCNC: 8.6 MG/DL (ref 8.4–10.2)
CHLORIDE SERPL-SCNC: 96 MMOL/L (ref 98–107)
CO2 SERPL-SCNC: 31 MMOL/L (ref 23–31)
CREAT SERPL-MCNC: 0.78 MG/DL (ref 0.55–1.02)
CREAT/UREA NIT SERPL: 26
EOSINOPHIL # BLD AUTO: 0.57 X10(3)/MCL (ref 0–0.9)
EOSINOPHIL NFR BLD AUTO: 4.7 %
ERYTHROCYTE [DISTWIDTH] IN BLOOD BY AUTOMATED COUNT: 16 % (ref 11.5–17)
EST. AVERAGE GLUCOSE BLD GHB EST-MCNC: 217.3 MG/DL
GFR SERPLBLD CREATININE-BSD FMLA CKD-EPI: >60 ML/MIN/1.73/M2
GLOBULIN SER-MCNC: 3.2 GM/DL (ref 2.4–3.5)
GLUCOSE SERPL-MCNC: 261 MG/DL (ref 82–115)
HBA1C MFR BLD: 9.2 %
HCT VFR BLD AUTO: 38.6 % (ref 37–47)
HGB BLD-MCNC: 11.6 G/DL (ref 12–16)
IMM GRANULOCYTES # BLD AUTO: 0.04 X10(3)/MCL (ref 0–0.04)
IMM GRANULOCYTES NFR BLD AUTO: 0.3 %
LYMPHOCYTES # BLD AUTO: 4.63 X10(3)/MCL (ref 0.6–4.6)
LYMPHOCYTES NFR BLD AUTO: 38.2 %
MCH RBC QN AUTO: 27.5 PG (ref 27–31)
MCHC RBC AUTO-ENTMCNC: 30.1 G/DL (ref 33–36)
MCV RBC AUTO: 91.5 FL (ref 80–94)
MONOCYTES # BLD AUTO: 0.81 X10(3)/MCL (ref 0.1–1.3)
MONOCYTES NFR BLD AUTO: 6.7 %
NEUTROPHILS # BLD AUTO: 6 X10(3)/MCL (ref 2.1–9.2)
NEUTROPHILS NFR BLD AUTO: 49.5 %
NRBC BLD AUTO-RTO: 0 %
PLATELET # BLD AUTO: 184 X10(3)/MCL (ref 130–400)
PMV BLD AUTO: 10.4 FL (ref 7.4–10.4)
POTASSIUM SERPL-SCNC: 3.9 MMOL/L (ref 3.5–5.1)
PROT SERPL-MCNC: 6 GM/DL (ref 5.8–7.6)
RBC # BLD AUTO: 4.22 X10(6)/MCL (ref 4.2–5.4)
SODIUM SERPL-SCNC: 138 MMOL/L (ref 136–145)
WBC # BLD AUTO: 12.12 X10(3)/MCL (ref 4.5–11.5)

## 2024-07-03 PROCEDURE — 80053 COMPREHEN METABOLIC PANEL: CPT | Performed by: INTERNAL MEDICINE

## 2024-07-03 PROCEDURE — 83036 HEMOGLOBIN GLYCOSYLATED A1C: CPT | Performed by: INTERNAL MEDICINE

## 2024-07-03 PROCEDURE — 83880 ASSAY OF NATRIURETIC PEPTIDE: CPT | Performed by: INTERNAL MEDICINE

## 2024-07-03 PROCEDURE — 85025 COMPLETE CBC W/AUTO DIFF WBC: CPT | Performed by: INTERNAL MEDICINE

## 2024-07-08 ENCOUNTER — OFFICE VISIT (OUTPATIENT)
Dept: ORTHOPEDICS | Facility: CLINIC | Age: 79
End: 2024-07-08
Payer: MEDICARE

## 2024-07-08 ENCOUNTER — HOSPITAL ENCOUNTER (OUTPATIENT)
Dept: RADIOLOGY | Facility: CLINIC | Age: 79
Discharge: HOME | End: 2024-07-08
Attending: PHYSICIAN ASSISTANT
Payer: MEDICARE

## 2024-07-08 VITALS
DIASTOLIC BLOOD PRESSURE: 54 MMHG | HEIGHT: 56 IN | WEIGHT: 149.94 LBS | HEART RATE: 85 BPM | BODY MASS INDEX: 33.73 KG/M2 | SYSTOLIC BLOOD PRESSURE: 84 MMHG

## 2024-07-08 DIAGNOSIS — S82.002D CLOSED NONDISPLACED FRACTURE OF LEFT PATELLA WITH ROUTINE HEALING, UNSPECIFIED FRACTURE MORPHOLOGY, SUBSEQUENT ENCOUNTER: Primary | ICD-10-CM

## 2024-07-08 DIAGNOSIS — S82.002D CLOSED NONDISPLACED FRACTURE OF LEFT PATELLA WITH ROUTINE HEALING, UNSPECIFIED FRACTURE MORPHOLOGY, SUBSEQUENT ENCOUNTER: ICD-10-CM

## 2024-07-08 PROCEDURE — 99213 OFFICE O/P EST LOW 20 MIN: CPT | Mod: ,,, | Performed by: PHYSICIAN ASSISTANT

## 2024-07-08 PROCEDURE — 73560 X-RAY EXAM OF KNEE 1 OR 2: CPT | Mod: LT,,, | Performed by: PHYSICIAN ASSISTANT

## 2024-07-08 RX ORDER — CEPHALEXIN 500 MG/1
500 CAPSULE ORAL 3 TIMES DAILY
COMMUNITY
Start: 2024-05-14

## 2024-07-08 RX ORDER — DULAGLUTIDE 0.75 MG/.5ML
INJECTION, SOLUTION SUBCUTANEOUS
COMMUNITY
Start: 2024-03-29

## 2024-07-08 RX ORDER — DULAGLUTIDE 1.5 MG/.5ML
INJECTION, SOLUTION SUBCUTANEOUS
COMMUNITY
Start: 2024-04-17

## 2024-07-08 RX ORDER — DOXYCYCLINE HYCLATE 100 MG
100 TABLET ORAL 2 TIMES DAILY
COMMUNITY
Start: 2024-04-17

## 2024-07-08 RX ORDER — LANOLIN ALCOHOL/MO/W.PET/CERES
1 CREAM (GRAM) TOPICAL EVERY MORNING
COMMUNITY

## 2024-07-08 RX ORDER — FUROSEMIDE 20 MG/1
20 TABLET ORAL
COMMUNITY
Start: 2024-05-14

## 2024-07-08 RX ORDER — ZINC GLUCONATE 50 MG
1 TABLET ORAL EVERY MORNING
COMMUNITY

## 2024-07-08 RX ORDER — TIZANIDINE 2 MG/1
2 TABLET ORAL 3 TIMES DAILY
COMMUNITY
Start: 2024-04-29

## 2024-07-08 RX ORDER — TIZANIDINE HYDROCHLORIDE 2 MG/1
2 CAPSULE, GELATIN COATED ORAL 3 TIMES DAILY PRN
COMMUNITY

## 2024-07-08 RX ORDER — BIOTIN 10 MG
10 TABLET ORAL
COMMUNITY

## 2024-07-08 RX ORDER — CEFDINIR 300 MG/1
300 CAPSULE ORAL 2 TIMES DAILY
COMMUNITY
Start: 2024-07-03

## 2024-07-08 RX ORDER — ASCORBIC ACID 500 MG
1 TABLET ORAL EVERY MORNING
COMMUNITY

## 2024-07-08 RX ORDER — INSULIN DEGLUDEC 100 U/ML
INJECTION, SOLUTION SUBCUTANEOUS
COMMUNITY
Start: 2024-07-02

## 2024-07-08 RX ORDER — FLUCONAZOLE 100 MG/1
100 TABLET ORAL
COMMUNITY
Start: 2024-04-19

## 2024-07-08 RX ORDER — COLLAGENASE SANTYL 250 [ARB'U]/G
OINTMENT TOPICAL
COMMUNITY
Start: 2024-06-21

## 2024-07-08 NOTE — PROGRESS NOTES
Subjective:       Patient ID: Elisabet Choi is a 78 y.o. female.  Chief Complaint   Patient presents with    Left Ankle - Follow-up     3.5 month f/u from left patella fx. Patient is nonambulatory. No complaints of pain and discomfort.          HPI:  3.5 month follow up left patella fracture. Patient presents in wheelchair. She is non ambulatory at baseline, lives in nursing facility. She is here with her daughter. She states mild pain at the knee with ROM. States no pain at the knee today. Does not do much range of motion with therapy but tolerates passive ROM 0-60 degrees today without pain. She does have a diabetic ulcer tot he left foot well documented in her  nursing home paperwork. Appears to be improving in both size and granulation. States most of her pain is at this site. Otherwise no complaints.    ROS:  Constitutional: Denies fever chills  Eyes: No change in vision  ENT: No ringing or current infections  CV: No chest pain  Resp: No labored breathing  MSK: Pain evident at site of injury located in HPI,   Integ: No signs of abrasions or lacerations  Neuro: No numbness or tingling  Lymphatic: No swelling outside the area of injury     Past Medical History:   Diagnosis Date    Acute cystitis     Allergic rhinitis     Anemia, unspecified     Angina pectoris     Arthritis     Back pain     Bipolar disorder, unspecified     Celiac disease     Cellulitis     CHF (congestive heart failure)     Constipation     Contracture, left ankle     Dementia     Depressive episode     Diabetes mellitus     Diabetes mellitus with ulcer of foot     Displacement of other urinary catheter, subsequent encounter     Edema     Elevated white blood cell count     Encounter for blood transfusion     Fatigue     Fluid retention     Genetic anomalies of leukocytes     GERD without esophagitis     Goiter     HLD (hyperlipidemia)     Hydrocephalus     Hypertension     Hypokalemia     Hypotension     Hypothyroidism     Magnesium  deficiency     Major depression     Migraine     Mild protein-calorie malnutrition     Morbid obesity     Obstructive and reflux uropathy     Osteoarthritis     Osteoporosis     Overactive bladder     Pressure ulcer of right heel     Pressure ulcer of sacral region     Pressure ulcer of sacral region, stage 3     Pruritus     PVD (peripheral vascular disease)     Radiculopathy, lumbar region     Seizures     Sleep apnea     uses CPAP    SOB (shortness of breath)     Stroke     TIA (transient ischemic attack)     Urinary tract infection, site not specified     Vitamin deficiency     Wheelchair dependent       Past Surgical History:   Procedure Laterality Date    ABDOMINAL SURGERY      APPENDECTOMY      BACK SURGERY      BLADDER SURGERY      BRAIN SURGERY      CAROTID ENDARTERECTOMY      CHOLECYSTECTOMY      CSF SHUNT      HERNIA REPAIR      HYSTERECTOMY      INSERTION, SUPRAPUBIC CATHETER N/A 3/26/2024    Procedure: INSERTION, SUPRAPUBIC CATHETER;  Surgeon: Fuad Szymanski MD;  Location: [facility];  Service: Urology;  Laterality: N/A;  CYSTO W/ SUPRAPUBIC CATH INSERTION    TONSILLECTOMY      VASCULAR SURGERY        Current Outpatient Medications on File Prior to Visit   Medication Sig Dispense Refill    acetaminophen (TYLENOL) 500 MG tablet Take 1,000 mg by mouth every 4 (four) hours as needed for Pain.      acidophilus-pectin, citrus 100 million cell-10 mg Cap Take 10 mg by mouth.      ALPRAZolam (XANAX) 0.25 MG tablet Take 0.25 mg by mouth 2 (two) times daily as needed for Anxiety.      amitriptyline (ELAVIL) 25 MG tablet Take 25 mg by mouth nightly as needed for Insomnia.      amLODIPine (NORVASC) 10 MG tablet Take 10 mg by mouth once daily.      amoxicillin-clavulanate 500-125mg (AUGMENTIN) 500-125 mg Tab Take 1 tablet (500 mg total) by mouth 2 (two) times daily. 14 tablet 0    ascorbic acid, vitamin C, (VITAMIN C) 500 MG tablet Take 1 tablet by mouth every morning.      atorvastatin (LIPITOR) 80 MG tablet Take 80  mg by mouth every evening.      cefdinir (OMNICEF) 300 MG capsule Take 300 mg by mouth 2 (two) times daily.      cephALEXin (KEFLEX) 500 MG capsule Take 500 mg by mouth 3 (three) times daily.      cyanocobalamin (VITAMIN B-12) 1000 MCG tablet Take 100 mcg by mouth once daily.      doxazosin (CARDURA) 1 MG tablet Take 1 mg by mouth nightly.      doxycycline (VIBRA-TABS) 100 MG tablet Take 100 mg by mouth 2 (two) times daily.      DULoxetine (CYMBALTA) 60 MG capsule Take 60 mg by mouth once daily.      ezetimibe (ZETIA) 10 mg tablet Take 10 mg by mouth once daily.      fluconazole (DIFLUCAN) 100 MG tablet Take 100 mg by mouth.      fluoxetine (PROZAC) 20 MG capsule Take 20 mg by mouth 3 (three) times daily.      folic acid (FOLVITE) 1 MG tablet Take 1 mg by mouth once daily.      furosemide (LASIX) 20 MG tablet Take 20 mg by mouth.      gabapentin (NEURONTIN) 600 MG tablet Take 600 mg by mouth 3 (three) times daily.      HYDROcodone-acetaminophen (NORCO)  mg per tablet Take 1 tablet by mouth every 8 (eight) hours as needed for Pain.      insulin degludec 100 unit/mL injection Inject into the skin.      levETIRAcetam (KEPPRA) 750 MG Tab Take 750 mg by mouth nightly.      levothyroxine (SYNTHROID) 150 MCG tablet Take 150 mcg by mouth before breakfast.      lovastatin (ALTOPREV) 40 mg 24 hr tablet Take 40 mg by mouth every evening.      magnesium oxide (MAG-OX) 400 mg (241.3 mg magnesium) tablet Take 1 tablet by mouth every morning.      methenamine (HIPREX) 1 gram Tab Take 1 g by mouth 2 (two) times daily.      polyethylene glycol (GLYCOLAX) 17 gram PwPk Take 17 g by mouth once daily.      potassium chloride 10% (KAYCIEL) 20 mEq/15 mL oral solution Take 20 mEq by mouth once daily.      SANTYL ointment Apply topically.      solifenacin (VESICARE) 5 MG tablet Take 10 mg by mouth every evening.      tiZANidine (ZANAFLEX) 2 MG tablet Take 2 mg by mouth 3 (three) times daily.      tiZANidine 2 mg Cap Take 2 mg by mouth  "3 (three) times daily as needed.      topiramate (TOPAMAX) 100 MG tablet Take 100 mg by mouth 2 (two) times daily.      TRULICITY 0.75 mg/0.5 mL pen injector Inject into the skin.      TRULICITY 1.5 mg/0.5 mL pen injector Inject into the skin.      vitamin D (VITAMIN D3) 1000 units Tab Take 1,000 Units by mouth once daily.      zinc gluconate 50 mg tablet Take 1 tablet by mouth every morning.      [DISCONTINUED] metformin (GLUCOPHAGE) 1000 MG tablet Take 1 tablet (1,000 mg total) by mouth 2 (two) times daily with meals. 60 tablet 1    [DISCONTINUED] metoprolol succinate (TOPROL-XL) 50 MG 24 hr tablet Take 1 tablet (50 mg total) by mouth once daily. 30 tablet 1     No current facility-administered medications on file prior to visit.      No family history on file.   Social History     Tobacco Use    Smoking status: Never    Smokeless tobacco: Never   Substance Use Topics    Alcohol use: No    Drug use: No      No LMP recorded. Patient has had a hysterectomy.          Objective:      BP (!) 84/54   Pulse 85   Ht 4' 8" (1.422 m)   Wt 68 kg (149 lb 14.6 oz)   BMI 33.61 kg/m²   General the patient is alert and oriented x3 no acute distress nontoxic-appearing appropriate affect.    Constitutional: Vital signs are examined and stable.  Resp: No signs of labored breathing                 LLE: -Skin:No signs of new abrasions or lacerations, no scars, swelling to the knee but not significant no blistering; Dressing in place to the left foot, diabetic foot ulcer being treated with improvement.            -MSK: EHL/FHL, Gastroc/Tib anterior Strength 5/5; full flexor and extensor mechanisms in place, tolerates ROM 0-60 degrees           -Neuro:  Sensation grossly intact           -Lymphatic: No signs of lymphadenopathy           -CV: Capillary refill is less than 2 seconds. DP/PT pulses 2/4. Compartments soft and compressible    Body mass index is 33.61 kg/m².  Ideal body weight: 43.9 kg (96 lb 13.9 oz)  Adjusted ideal body " weight: 53.6 kg (118 lb 1.4 oz)  Lab Results   Component Value Date     07/03/2024    K 3.9 07/03/2024    CL 96 (L) 07/03/2024    CO2 31 07/03/2024     (H) 06/30/2014    CALCIUM 8.6 07/03/2024    BUN 20.2 (H) 07/03/2024      Lab Results   Component Value Date    HGB 11.6 (L) 07/03/2024    HCT 38.6 07/03/2024     Radiology: 3 view x ray left knee: alignment maintained as compared to previous images. No displacement. Continued consolidation consistent with routine healing.         Assessment:         1. Closed nondisplaced fracture of left patella with routine healing, unspecified fracture morphology, subsequent encounter  X-Ray Knee 1 or 2 View Left              Plan:         No follow-ups on file.    Elisabet was seen today for follow-up.    Diagnoses and all orders for this visit:    Closed nondisplaced fracture of left patella with routine healing, unspecified fracture morphology, subsequent encounter  -     X-Ray Knee 1 or 2 View Left; Future        Patient has a left inferior pole patella fracture.  Appears well healed on xray today. She is wheelchair bound.  She has suprapubic catheter due to multiple infections.  Okay for full range of motion without restriction. May discontinue brace. Okay for passive motion with therapy. Family states she does not move except to transfer on slide boards.  Patient is on chronic pain management. We will se her back as needed should she have any complaints in the future. Continue wound care and wound prevention measures to the BLE and particularly the LLE due to ongoing wound care. Will defer to ordering provider.     Alexandra Blair Lemaire, PA-C [facility]   Orthopedic Trauma          No future appointments.

## 2024-07-30 ENCOUNTER — LAB REQUISITION (OUTPATIENT)
Dept: LAB | Facility: HOSPITAL | Age: 79
End: 2024-07-30
Payer: MEDICARE

## 2024-07-30 DIAGNOSIS — I10 ESSENTIAL (PRIMARY) HYPERTENSION: ICD-10-CM

## 2024-07-30 LAB
ANION GAP SERPL CALC-SCNC: 11 MEQ/L
BUN SERPL-MCNC: 22.9 MG/DL (ref 9.8–20.1)
CALCIUM SERPL-MCNC: 8.8 MG/DL (ref 8.4–10.2)
CHLORIDE SERPL-SCNC: 96 MMOL/L (ref 98–107)
CO2 SERPL-SCNC: 31 MMOL/L (ref 23–31)
CREAT SERPL-MCNC: 0.91 MG/DL (ref 0.55–1.02)
CREAT/UREA NIT SERPL: 25
GFR SERPLBLD CREATININE-BSD FMLA CKD-EPI: >60 ML/MIN/1.73/M2
GLUCOSE SERPL-MCNC: 368 MG/DL (ref 82–115)
POTASSIUM SERPL-SCNC: 4.1 MMOL/L (ref 3.5–5.1)
SODIUM SERPL-SCNC: 138 MMOL/L (ref 136–145)

## 2024-07-30 PROCEDURE — 80048 BASIC METABOLIC PNL TOTAL CA: CPT | Performed by: INTERNAL MEDICINE

## 2024-09-25 ENCOUNTER — HOSPITAL ENCOUNTER (INPATIENT)
Facility: HOSPITAL | Age: 79
LOS: 2 days | Discharge: NURSING, REHAB OR LONG-TERM CARE | DRG: 309 | End: 2024-09-27
Attending: EMERGENCY MEDICINE | Admitting: INTERNAL MEDICINE
Payer: MEDICARE

## 2024-09-25 DIAGNOSIS — R07.89 CHEST DISCOMFORT: ICD-10-CM

## 2024-09-25 DIAGNOSIS — I48.91 NEW ONSET A-FIB: ICD-10-CM

## 2024-09-25 DIAGNOSIS — R07.9 CHEST PAIN: ICD-10-CM

## 2024-09-25 DIAGNOSIS — I48.91 NEW ONSET ATRIAL FIBRILLATION: ICD-10-CM

## 2024-09-25 DIAGNOSIS — L03.116 CELLULITIS OF LEFT LEG: Primary | ICD-10-CM

## 2024-09-25 LAB
ALBUMIN SERPL-MCNC: 2.7 G/DL (ref 3.4–4.8)
ALBUMIN/GLOB SERPL: 0.8 RATIO (ref 1.1–2)
ALP SERPL-CCNC: 76 UNIT/L (ref 40–150)
ALT SERPL-CCNC: 13 UNIT/L (ref 0–55)
ANION GAP SERPL CALC-SCNC: 8 MEQ/L
AST SERPL-CCNC: 11 UNIT/L (ref 5–34)
BASOPHILS # BLD AUTO: 0.05 X10(3)/MCL
BASOPHILS NFR BLD AUTO: 0.5 %
BILIRUB SERPL-MCNC: 0.2 MG/DL
BNP BLD-MCNC: 16.1 PG/ML
BUN SERPL-MCNC: 24.4 MG/DL (ref 9.8–20.1)
CALCIUM SERPL-MCNC: 8.5 MG/DL (ref 8.4–10.2)
CHLORIDE SERPL-SCNC: 97 MMOL/L (ref 98–107)
CO2 SERPL-SCNC: 36 MMOL/L (ref 23–31)
CREAT SERPL-MCNC: 0.96 MG/DL (ref 0.55–1.02)
CREAT/UREA NIT SERPL: 25
EOSINOPHIL # BLD AUTO: 0.69 X10(3)/MCL (ref 0–0.9)
EOSINOPHIL NFR BLD AUTO: 6.6 %
ERYTHROCYTE [DISTWIDTH] IN BLOOD BY AUTOMATED COUNT: 16 % (ref 11.5–17)
EST. AVERAGE GLUCOSE BLD GHB EST-MCNC: 211.6 MG/DL
GFR SERPLBLD CREATININE-BSD FMLA CKD-EPI: >60 ML/MIN/1.73/M2
GLOBULIN SER-MCNC: 3.5 GM/DL (ref 2.4–3.5)
GLUCOSE SERPL-MCNC: 287 MG/DL (ref 82–115)
HBA1C MFR BLD: 9 %
HCT VFR BLD AUTO: 40 % (ref 37–47)
HGB BLD-MCNC: 12.1 G/DL (ref 12–16)
IMM GRANULOCYTES # BLD AUTO: 0.05 X10(3)/MCL (ref 0–0.04)
IMM GRANULOCYTES NFR BLD AUTO: 0.5 %
INR PPP: 1
LYMPHOCYTES # BLD AUTO: 3.54 X10(3)/MCL (ref 0.6–4.6)
LYMPHOCYTES NFR BLD AUTO: 34 %
MCH RBC QN AUTO: 27.4 PG (ref 27–31)
MCHC RBC AUTO-ENTMCNC: 30.3 G/DL (ref 33–36)
MCV RBC AUTO: 90.5 FL (ref 80–94)
MONOCYTES # BLD AUTO: 0.55 X10(3)/MCL (ref 0.1–1.3)
MONOCYTES NFR BLD AUTO: 5.3 %
NEUTROPHILS # BLD AUTO: 5.52 X10(3)/MCL (ref 2.1–9.2)
NEUTROPHILS NFR BLD AUTO: 53.1 %
NRBC BLD AUTO-RTO: 0 %
PLATELET # BLD AUTO: 228 X10(3)/MCL (ref 130–400)
PMV BLD AUTO: 9.7 FL (ref 7.4–10.4)
POCT GLUCOSE: 251 MG/DL (ref 70–110)
POTASSIUM SERPL-SCNC: 4.1 MMOL/L (ref 3.5–5.1)
PROT SERPL-MCNC: 6.2 GM/DL (ref 5.8–7.6)
PROTHROMBIN TIME: 13.5 SECONDS (ref 12.5–14.5)
RBC # BLD AUTO: 4.42 X10(6)/MCL (ref 4.2–5.4)
SODIUM SERPL-SCNC: 141 MMOL/L (ref 136–145)
T3FREE SERPL-MCNC: <1.5 PG/ML (ref 1.58–3.91)
T4 SERPL-MCNC: 5.03 UG/DL (ref 4.87–11.72)
TROPONIN I SERPL-MCNC: <0.01 NG/ML (ref 0–0.04)
TROPONIN I SERPL-MCNC: <0.01 NG/ML (ref 0–0.04)
TSH SERPL-ACNC: 8.94 UIU/ML (ref 0.35–4.94)
WBC # BLD AUTO: 10.4 X10(3)/MCL (ref 4.5–11.5)

## 2024-09-25 PROCEDURE — 93005 ELECTROCARDIOGRAM TRACING: CPT

## 2024-09-25 PROCEDURE — 85610 PROTHROMBIN TIME: CPT

## 2024-09-25 PROCEDURE — 93010 ELECTROCARDIOGRAM REPORT: CPT | Mod: ,,, | Performed by: INTERNAL MEDICINE

## 2024-09-25 PROCEDURE — 25000003 PHARM REV CODE 250: Performed by: INTERNAL MEDICINE

## 2024-09-25 PROCEDURE — 84436 ASSAY OF TOTAL THYROXINE: CPT | Performed by: NURSE PRACTITIONER

## 2024-09-25 PROCEDURE — 83880 ASSAY OF NATRIURETIC PEPTIDE: CPT

## 2024-09-25 PROCEDURE — 84443 ASSAY THYROID STIM HORMONE: CPT | Performed by: NURSE PRACTITIONER

## 2024-09-25 PROCEDURE — 84481 FREE ASSAY (FT-3): CPT | Performed by: NURSE PRACTITIONER

## 2024-09-25 PROCEDURE — 11000001 HC ACUTE MED/SURG PRIVATE ROOM

## 2024-09-25 PROCEDURE — 85025 COMPLETE CBC W/AUTO DIFF WBC: CPT

## 2024-09-25 PROCEDURE — 84484 ASSAY OF TROPONIN QUANT: CPT

## 2024-09-25 PROCEDURE — 63600175 PHARM REV CODE 636 W HCPCS: Performed by: NURSE PRACTITIONER

## 2024-09-25 PROCEDURE — 84484 ASSAY OF TROPONIN QUANT: CPT | Performed by: NURSE PRACTITIONER

## 2024-09-25 PROCEDURE — 83036 HEMOGLOBIN GLYCOSYLATED A1C: CPT | Performed by: NURSE PRACTITIONER

## 2024-09-25 PROCEDURE — 36415 COLL VENOUS BLD VENIPUNCTURE: CPT | Performed by: NURSE PRACTITIONER

## 2024-09-25 PROCEDURE — 63600175 PHARM REV CODE 636 W HCPCS: Performed by: INTERNAL MEDICINE

## 2024-09-25 PROCEDURE — 87040 BLOOD CULTURE FOR BACTERIA: CPT | Performed by: NURSE PRACTITIONER

## 2024-09-25 PROCEDURE — 99285 EMERGENCY DEPT VISIT HI MDM: CPT | Mod: 25

## 2024-09-25 PROCEDURE — 80053 COMPREHEN METABOLIC PANEL: CPT

## 2024-09-25 PROCEDURE — 96372 THER/PROPH/DIAG INJ SC/IM: CPT | Performed by: NURSE PRACTITIONER

## 2024-09-25 RX ORDER — TORSEMIDE 20 MG/1
40 TABLET ORAL DAILY
COMMUNITY

## 2024-09-25 RX ORDER — IBUPROFEN 200 MG
24 TABLET ORAL
Status: DISCONTINUED | OUTPATIENT
Start: 2024-09-25 | End: 2024-09-27 | Stop reason: HOSPADM

## 2024-09-25 RX ORDER — SIMETHICONE 80 MG
1 TABLET,CHEWABLE ORAL 4 TIMES DAILY PRN
Status: DISCONTINUED | OUTPATIENT
Start: 2024-09-25 | End: 2024-09-27 | Stop reason: HOSPADM

## 2024-09-25 RX ORDER — ENOXAPARIN SODIUM 100 MG/ML
40 INJECTION SUBCUTANEOUS EVERY 24 HOURS
Status: DISCONTINUED | OUTPATIENT
Start: 2024-09-26 | End: 2024-09-26

## 2024-09-25 RX ORDER — CLINDAMYCIN HYDROCHLORIDE 300 MG/1
300 CAPSULE ORAL 3 TIMES DAILY
Status: ON HOLD | COMMUNITY
Start: 2024-09-24 | End: 2024-09-27 | Stop reason: HOSPADM

## 2024-09-25 RX ORDER — GABAPENTIN 300 MG/1
300 CAPSULE ORAL 2 TIMES DAILY
COMMUNITY

## 2024-09-25 RX ORDER — NALOXONE HCL 0.4 MG/ML
0.02 VIAL (ML) INJECTION
Status: DISCONTINUED | OUTPATIENT
Start: 2024-09-25 | End: 2024-09-27 | Stop reason: HOSPADM

## 2024-09-25 RX ORDER — ACETAMINOPHEN 325 MG/1
650 TABLET ORAL EVERY 6 HOURS PRN
Status: DISCONTINUED | OUTPATIENT
Start: 2024-09-25 | End: 2024-09-27 | Stop reason: HOSPADM

## 2024-09-25 RX ORDER — GLUCAGON 1 MG
1 KIT INJECTION
Status: DISCONTINUED | OUTPATIENT
Start: 2024-09-25 | End: 2024-09-27 | Stop reason: HOSPADM

## 2024-09-25 RX ORDER — CRANBERRY FRUIT 450 MG
TABLET ORAL
COMMUNITY

## 2024-09-25 RX ORDER — TALC
6 POWDER (GRAM) TOPICAL NIGHTLY PRN
Status: DISCONTINUED | OUTPATIENT
Start: 2024-09-25 | End: 2024-09-27 | Stop reason: HOSPADM

## 2024-09-25 RX ORDER — ENOXAPARIN SODIUM 150 MG/ML
1 INJECTION SUBCUTANEOUS
Status: COMPLETED | OUTPATIENT
Start: 2024-09-25 | End: 2024-09-25

## 2024-09-25 RX ORDER — METOPROLOL TARTRATE 1 MG/ML
5 INJECTION, SOLUTION INTRAVENOUS
Status: DISCONTINUED | OUTPATIENT
Start: 2024-09-25 | End: 2024-09-25

## 2024-09-25 RX ORDER — ONDANSETRON HYDROCHLORIDE 2 MG/ML
4 INJECTION, SOLUTION INTRAVENOUS EVERY 4 HOURS PRN
Status: DISCONTINUED | OUTPATIENT
Start: 2024-09-25 | End: 2024-09-27 | Stop reason: HOSPADM

## 2024-09-25 RX ORDER — INSULIN ASPART 100 [IU]/ML
0-10 INJECTION, SOLUTION INTRAVENOUS; SUBCUTANEOUS
Status: DISCONTINUED | OUTPATIENT
Start: 2024-09-25 | End: 2024-09-27 | Stop reason: HOSPADM

## 2024-09-25 RX ORDER — METOPROLOL TARTRATE 1 MG/ML
5 INJECTION, SOLUTION INTRAVENOUS EVERY 5 MIN PRN
Status: DISCONTINUED | OUTPATIENT
Start: 2024-09-25 | End: 2024-09-27 | Stop reason: HOSPADM

## 2024-09-25 RX ORDER — ALUMINUM HYDROXIDE, MAGNESIUM HYDROXIDE, AND SIMETHICONE 1200; 120; 1200 MG/30ML; MG/30ML; MG/30ML
30 SUSPENSION ORAL 4 TIMES DAILY PRN
Status: DISCONTINUED | OUTPATIENT
Start: 2024-09-25 | End: 2024-09-27 | Stop reason: HOSPADM

## 2024-09-25 RX ORDER — IBUPROFEN 200 MG
16 TABLET ORAL
Status: DISCONTINUED | OUTPATIENT
Start: 2024-09-25 | End: 2024-09-27 | Stop reason: HOSPADM

## 2024-09-25 RX ORDER — POLYETHYLENE GLYCOL 3350 17 G/17G
17 POWDER, FOR SOLUTION ORAL 2 TIMES DAILY PRN
Status: DISCONTINUED | OUTPATIENT
Start: 2024-09-25 | End: 2024-09-27 | Stop reason: HOSPADM

## 2024-09-25 RX ORDER — PROCHLORPERAZINE EDISYLATE 5 MG/ML
5 INJECTION INTRAMUSCULAR; INTRAVENOUS EVERY 6 HOURS PRN
Status: DISCONTINUED | OUTPATIENT
Start: 2024-09-25 | End: 2024-09-27 | Stop reason: HOSPADM

## 2024-09-25 RX ADMIN — ENOXAPARIN SODIUM 120 MG: 120 INJECTION SUBCUTANEOUS at 07:09

## 2024-09-25 RX ADMIN — INSULIN ASPART 3 UNITS: 100 INJECTION, SOLUTION INTRAVENOUS; SUBCUTANEOUS at 10:09

## 2024-09-25 RX ADMIN — VANCOMYCIN HYDROCHLORIDE 1750 MG: 500 INJECTION, POWDER, LYOPHILIZED, FOR SOLUTION INTRAVENOUS at 09:09

## 2024-09-25 RX ADMIN — PIPERACILLIN SODIUM AND TAZOBACTAM SODIUM 4.5 G: 4; .5 INJECTION, POWDER, LYOPHILIZED, FOR SOLUTION INTRAVENOUS at 09:09

## 2024-09-26 LAB
ALBUMIN SERPL-MCNC: 2.9 G/DL (ref 3.4–4.8)
ALBUMIN/GLOB SERPL: 0.8 RATIO (ref 1.1–2)
ALP SERPL-CCNC: 90 UNIT/L (ref 40–150)
ALT SERPL-CCNC: 24 UNIT/L (ref 0–55)
ANION GAP SERPL CALC-SCNC: 6 MEQ/L
AST SERPL-CCNC: 31 UNIT/L (ref 5–34)
BASOPHILS # BLD AUTO: 0.07 X10(3)/MCL
BASOPHILS NFR BLD AUTO: 0.7 %
BILIRUB SERPL-MCNC: 0.3 MG/DL
BUN SERPL-MCNC: 20.4 MG/DL (ref 9.8–20.1)
CALCIUM SERPL-MCNC: 8.8 MG/DL (ref 8.4–10.2)
CHLORIDE SERPL-SCNC: 98 MMOL/L (ref 98–107)
CO2 SERPL-SCNC: 34 MMOL/L (ref 23–31)
CREAT SERPL-MCNC: 0.9 MG/DL (ref 0.55–1.02)
CREAT/UREA NIT SERPL: 23
EOSINOPHIL # BLD AUTO: 0.77 X10(3)/MCL (ref 0–0.9)
EOSINOPHIL NFR BLD AUTO: 7.7 %
ERYTHROCYTE [DISTWIDTH] IN BLOOD BY AUTOMATED COUNT: 16.2 % (ref 11.5–17)
GFR SERPLBLD CREATININE-BSD FMLA CKD-EPI: >60 ML/MIN/1.73/M2
GLOBULIN SER-MCNC: 3.8 GM/DL (ref 2.4–3.5)
GLUCOSE SERPL-MCNC: 198 MG/DL (ref 82–115)
HCT VFR BLD AUTO: 42.2 % (ref 37–47)
HGB BLD-MCNC: 12.6 G/DL (ref 12–16)
IMM GRANULOCYTES # BLD AUTO: 0.03 X10(3)/MCL (ref 0–0.04)
IMM GRANULOCYTES NFR BLD AUTO: 0.3 %
LYMPHOCYTES # BLD AUTO: 2.44 X10(3)/MCL (ref 0.6–4.6)
LYMPHOCYTES NFR BLD AUTO: 24.4 %
MAGNESIUM SERPL-MCNC: 2.2 MG/DL (ref 1.6–2.6)
MCH RBC QN AUTO: 27.4 PG (ref 27–31)
MCHC RBC AUTO-ENTMCNC: 29.9 G/DL (ref 33–36)
MCV RBC AUTO: 91.7 FL (ref 80–94)
MONOCYTES # BLD AUTO: 0.48 X10(3)/MCL (ref 0.1–1.3)
MONOCYTES NFR BLD AUTO: 4.8 %
MRSA PCR SCRN (OHS): NOT DETECTED
NEUTROPHILS # BLD AUTO: 6.22 X10(3)/MCL (ref 2.1–9.2)
NEUTROPHILS NFR BLD AUTO: 62.1 %
NRBC BLD AUTO-RTO: 0 %
OHS QRS DURATION: 72 MS
OHS QRS DURATION: 72 MS
OHS QTC CALCULATION: 444 MS
OHS QTC CALCULATION: 444 MS
PHOSPHATE SERPL-MCNC: 3.5 MG/DL (ref 2.3–4.7)
PLATELET # BLD AUTO: 199 X10(3)/MCL (ref 130–400)
PMV BLD AUTO: 9.8 FL (ref 7.4–10.4)
POCT GLUCOSE: 187 MG/DL (ref 70–110)
POCT GLUCOSE: 216 MG/DL (ref 70–110)
POCT GLUCOSE: 216 MG/DL (ref 70–110)
POCT GLUCOSE: 226 MG/DL (ref 70–110)
POTASSIUM SERPL-SCNC: 3.7 MMOL/L (ref 3.5–5.1)
PROT SERPL-MCNC: 6.7 GM/DL (ref 5.8–7.6)
RBC # BLD AUTO: 4.6 X10(6)/MCL (ref 4.2–5.4)
SODIUM SERPL-SCNC: 138 MMOL/L (ref 136–145)
T4 FREE SERPL-MCNC: 0.79 NG/DL (ref 0.7–1.48)
WBC # BLD AUTO: 10.01 X10(3)/MCL (ref 4.5–11.5)

## 2024-09-26 PROCEDURE — 80053 COMPREHEN METABOLIC PANEL: CPT | Performed by: NURSE PRACTITIONER

## 2024-09-26 PROCEDURE — 27000221 HC OXYGEN, UP TO 24 HOURS

## 2024-09-26 PROCEDURE — 25500020 PHARM REV CODE 255: Performed by: INTERNAL MEDICINE

## 2024-09-26 PROCEDURE — 87641 MR-STAPH DNA AMP PROBE: CPT | Performed by: INTERNAL MEDICINE

## 2024-09-26 PROCEDURE — 25000003 PHARM REV CODE 250: Performed by: INTERNAL MEDICINE

## 2024-09-26 PROCEDURE — 21400001 HC TELEMETRY ROOM

## 2024-09-26 PROCEDURE — 63600175 PHARM REV CODE 636 W HCPCS: Performed by: INTERNAL MEDICINE

## 2024-09-26 PROCEDURE — 83735 ASSAY OF MAGNESIUM: CPT | Performed by: NURSE PRACTITIONER

## 2024-09-26 PROCEDURE — 85025 COMPLETE CBC W/AUTO DIFF WBC: CPT | Performed by: NURSE PRACTITIONER

## 2024-09-26 PROCEDURE — 63600175 PHARM REV CODE 636 W HCPCS: Performed by: NURSE PRACTITIONER

## 2024-09-26 PROCEDURE — 25000003 PHARM REV CODE 250

## 2024-09-26 PROCEDURE — 25000003 PHARM REV CODE 250: Performed by: NURSE PRACTITIONER

## 2024-09-26 PROCEDURE — 84439 ASSAY OF FREE THYROXINE: CPT | Performed by: INTERNAL MEDICINE

## 2024-09-26 PROCEDURE — 25000003 PHARM REV CODE 250: Performed by: STUDENT IN AN ORGANIZED HEALTH CARE EDUCATION/TRAINING PROGRAM

## 2024-09-26 PROCEDURE — 93010 ELECTROCARDIOGRAM REPORT: CPT | Mod: ,,, | Performed by: INTERNAL MEDICINE

## 2024-09-26 PROCEDURE — 84100 ASSAY OF PHOSPHORUS: CPT | Performed by: NURSE PRACTITIONER

## 2024-09-26 PROCEDURE — 93005 ELECTROCARDIOGRAM TRACING: CPT

## 2024-09-26 RX ORDER — TOPIRAMATE 100 MG/1
100 TABLET, FILM COATED ORAL 2 TIMES DAILY
Status: DISCONTINUED | OUTPATIENT
Start: 2024-09-26 | End: 2024-09-27 | Stop reason: HOSPADM

## 2024-09-26 RX ORDER — ASCORBIC ACID 250 MG
500 TABLET ORAL EVERY MORNING
Status: DISCONTINUED | OUTPATIENT
Start: 2024-09-26 | End: 2024-09-27 | Stop reason: HOSPADM

## 2024-09-26 RX ORDER — INSULIN GLARGINE 100 [IU]/ML
20 INJECTION, SOLUTION SUBCUTANEOUS DAILY
Status: DISCONTINUED | OUTPATIENT
Start: 2024-09-26 | End: 2024-09-27 | Stop reason: HOSPADM

## 2024-09-26 RX ORDER — LISINOPRIL 5 MG/1
5 TABLET ORAL DAILY
Status: DISCONTINUED | OUTPATIENT
Start: 2024-09-26 | End: 2024-09-27 | Stop reason: HOSPADM

## 2024-09-26 RX ORDER — TIZANIDINE 2 MG/1
2 TABLET ORAL EVERY 8 HOURS PRN
Status: DISCONTINUED | OUTPATIENT
Start: 2024-09-26 | End: 2024-09-27 | Stop reason: HOSPADM

## 2024-09-26 RX ORDER — METOPROLOL TARTRATE 50 MG/1
50 TABLET ORAL 2 TIMES DAILY
Status: DISCONTINUED | OUTPATIENT
Start: 2024-09-26 | End: 2024-09-27 | Stop reason: HOSPADM

## 2024-09-26 RX ORDER — LEVOTHYROXINE SODIUM 150 UG/1
150 TABLET ORAL
Status: DISCONTINUED | OUTPATIENT
Start: 2024-09-26 | End: 2024-09-26

## 2024-09-26 RX ORDER — DULOXETIN HYDROCHLORIDE 30 MG/1
60 CAPSULE, DELAYED RELEASE ORAL DAILY
Status: DISCONTINUED | OUTPATIENT
Start: 2024-09-26 | End: 2024-09-27 | Stop reason: HOSPADM

## 2024-09-26 RX ORDER — HYDROCODONE BITARTRATE AND ACETAMINOPHEN 10; 325 MG/1; MG/1
1 TABLET ORAL 3 TIMES DAILY
Status: DISCONTINUED | OUTPATIENT
Start: 2024-09-26 | End: 2024-09-27 | Stop reason: HOSPADM

## 2024-09-26 RX ORDER — ALPRAZOLAM 0.25 MG/1
0.25 TABLET ORAL 2 TIMES DAILY PRN
Status: DISCONTINUED | OUTPATIENT
Start: 2024-09-26 | End: 2024-09-27 | Stop reason: HOSPADM

## 2024-09-26 RX ORDER — CLINDAMYCIN HYDROCHLORIDE 150 MG/1
450 CAPSULE ORAL EVERY 8 HOURS
Status: DISCONTINUED | OUTPATIENT
Start: 2024-09-27 | End: 2024-09-27 | Stop reason: HOSPADM

## 2024-09-26 RX ORDER — FOLIC ACID 1 MG/1
1 TABLET ORAL DAILY
Status: DISCONTINUED | OUTPATIENT
Start: 2024-09-27 | End: 2024-09-27 | Stop reason: HOSPADM

## 2024-09-26 RX ORDER — METOPROLOL TARTRATE 25 MG/1
12.5 TABLET ORAL 2 TIMES DAILY
Status: DISCONTINUED | OUTPATIENT
Start: 2024-09-26 | End: 2024-09-26

## 2024-09-26 RX ORDER — AMITRIPTYLINE HYDROCHLORIDE 25 MG/1
25 TABLET, FILM COATED ORAL NIGHTLY PRN
Status: DISCONTINUED | OUTPATIENT
Start: 2024-09-26 | End: 2024-09-27 | Stop reason: HOSPADM

## 2024-09-26 RX ORDER — GABAPENTIN 300 MG/1
300 CAPSULE ORAL 2 TIMES DAILY
Status: DISCONTINUED | OUTPATIENT
Start: 2024-09-26 | End: 2024-09-27 | Stop reason: HOSPADM

## 2024-09-26 RX ORDER — LEVETIRACETAM 100 MG/ML
750 SOLUTION ORAL NIGHTLY
Status: DISCONTINUED | OUTPATIENT
Start: 2024-09-26 | End: 2024-09-27 | Stop reason: HOSPADM

## 2024-09-26 RX ORDER — ATORVASTATIN CALCIUM 40 MG/1
80 TABLET, FILM COATED ORAL NIGHTLY
Status: DISCONTINUED | OUTPATIENT
Start: 2024-09-26 | End: 2024-09-27 | Stop reason: HOSPADM

## 2024-09-26 RX ORDER — TORSEMIDE 20 MG/1
40 TABLET ORAL DAILY
Status: DISCONTINUED | OUTPATIENT
Start: 2024-09-27 | End: 2024-09-27 | Stop reason: HOSPADM

## 2024-09-26 RX ADMIN — HYDROCODONE BITARTRATE AND ACETAMINOPHEN 1 TABLET: 10; 325 TABLET ORAL at 04:09

## 2024-09-26 RX ADMIN — PERFLUTREN 1.3 ML: 6.52 INJECTION, SUSPENSION INTRAVENOUS at 03:09

## 2024-09-26 RX ADMIN — LEVETIRACETAM 750 MG: 500 SOLUTION ORAL at 03:09

## 2024-09-26 RX ADMIN — INSULIN ASPART 4 UNITS: 100 INJECTION, SOLUTION INTRAVENOUS; SUBCUTANEOUS at 12:09

## 2024-09-26 RX ADMIN — HYDROCODONE BITARTRATE AND ACETAMINOPHEN 1 TABLET: 10; 325 TABLET ORAL at 08:09

## 2024-09-26 RX ADMIN — PIPERACILLIN SODIUM AND TAZOBACTAM SODIUM 4.5 G: 4; .5 INJECTION, POWDER, LYOPHILIZED, FOR SOLUTION INTRAVENOUS at 12:09

## 2024-09-26 RX ADMIN — METOPROLOL TARTRATE 12.5 MG: 25 TABLET, FILM COATED ORAL at 12:09

## 2024-09-26 RX ADMIN — TOPIRAMATE 100 MG: 100 TABLET, FILM COATED ORAL at 08:09

## 2024-09-26 RX ADMIN — INSULIN ASPART 4 UNITS: 100 INJECTION, SOLUTION INTRAVENOUS; SUBCUTANEOUS at 06:09

## 2024-09-26 RX ADMIN — LEVETIRACETAM 750 MG: 500 SOLUTION ORAL at 08:09

## 2024-09-26 RX ADMIN — GABAPENTIN 300 MG: 300 CAPSULE ORAL at 08:09

## 2024-09-26 RX ADMIN — ATORVASTATIN CALCIUM 80 MG: 40 TABLET, FILM COATED ORAL at 09:09

## 2024-09-26 RX ADMIN — APIXABAN 5 MG: 5 TABLET, FILM COATED ORAL at 08:09

## 2024-09-26 RX ADMIN — INSULIN ASPART 4 UNITS: 100 INJECTION, SOLUTION INTRAVENOUS; SUBCUTANEOUS at 05:09

## 2024-09-26 RX ADMIN — PIPERACILLIN SODIUM AND TAZOBACTAM SODIUM 4.5 G: 4; .5 INJECTION, POWDER, LYOPHILIZED, FOR SOLUTION INTRAVENOUS at 08:09

## 2024-09-26 RX ADMIN — LEVOTHYROXINE SODIUM 150 MCG: 150 TABLET ORAL at 06:09

## 2024-09-26 RX ADMIN — DULOXETINE HYDROCHLORIDE 60 MG: 30 CAPSULE, DELAYED RELEASE ORAL at 08:09

## 2024-09-26 RX ADMIN — APIXABAN 5 MG: 5 TABLET, FILM COATED ORAL at 12:09

## 2024-09-26 RX ADMIN — Medication 500 MG: at 08:09

## 2024-09-26 RX ADMIN — METOPROLOL TARTRATE 50 MG: 50 TABLET, FILM COATED ORAL at 08:09

## 2024-09-26 RX ADMIN — INSULIN GLARGINE 20 UNITS: 100 INJECTION, SOLUTION SUBCUTANEOUS at 08:09

## 2024-09-26 RX ADMIN — PIPERACILLIN SODIUM AND TAZOBACTAM SODIUM 4.5 G: 4; .5 INJECTION, POWDER, LYOPHILIZED, FOR SOLUTION INTRAVENOUS at 04:09

## 2024-09-27 VITALS
DIASTOLIC BLOOD PRESSURE: 63 MMHG | TEMPERATURE: 99 F | RESPIRATION RATE: 17 BRPM | SYSTOLIC BLOOD PRESSURE: 100 MMHG | HEART RATE: 68 BPM | HEIGHT: 56 IN | OXYGEN SATURATION: 96 % | WEIGHT: 225.75 LBS | BODY MASS INDEX: 50.78 KG/M2

## 2024-09-27 LAB
ANION GAP SERPL CALC-SCNC: 6 MEQ/L
APICAL FOUR CHAMBER EJECTION FRACTION: 59 %
AV INDEX (PROSTH): 0.88
AV MEAN GRADIENT: 3 MMHG
AV PEAK GRADIENT: 5 MMHG
AV VALVE AREA BY VELOCITY RATIO: 3.25 CM²
AV VALVE AREA: 3.34 CM²
AV VELOCITY RATIO: 0.86
BSA FOR ECHO PROCEDURE: 2.12 M2
BUN SERPL-MCNC: 20.2 MG/DL (ref 9.8–20.1)
CALCIUM SERPL-MCNC: 8.6 MG/DL (ref 8.4–10.2)
CHLORIDE SERPL-SCNC: 97 MMOL/L (ref 98–107)
CO2 SERPL-SCNC: 34 MMOL/L (ref 23–31)
CREAT SERPL-MCNC: 0.8 MG/DL (ref 0.55–1.02)
CREAT/UREA NIT SERPL: 25
DOP CALC AO PEAK VEL: 1.11 M/S
DOP CALC AO VTI: 18 CM
DOP CALC LVOT AREA: 3.8 CM2
DOP CALC LVOT DIAMETER: 2.2 CM
DOP CALC LVOT PEAK VEL: 0.95 M/S
DOP CALC LVOT STROKE VOLUME: 60.03 CM3
DOP CALC MV VTI: 17.4 CM
DOP CALCLVOT PEAK VEL VTI: 15.8 CM
E WAVE DECELERATION TIME: 166 MSEC
E/A RATIO: 0.64
E/E' RATIO: 14.75 M/S
ERYTHROCYTE [DISTWIDTH] IN BLOOD BY AUTOMATED COUNT: 16 % (ref 11.5–17)
GFR SERPLBLD CREATININE-BSD FMLA CKD-EPI: >60 ML/MIN/1.73/M2
GLUCOSE SERPL-MCNC: 126 MG/DL (ref 82–115)
HCT VFR BLD AUTO: 40.1 % (ref 37–47)
HGB BLD-MCNC: 12.2 G/DL (ref 12–16)
LEFT ATRIUM AREA SYSTOLIC (APICAL 4 CHAMBER): 12.2 CM2
LEFT ATRIUM SIZE: 4.5 CM
LEFT VENTRICLE END DIASTOLIC VOLUME APICAL 4 CHAMBER: 61.5 ML
LEFT VENTRICLE END SYSTOLIC VOLUME APICAL 4 CHAMBER: 26.3 ML
LV LATERAL E/E' RATIO: 14.75 M/S
LV SEPTAL E/E' RATIO: 14.75 M/S
LVOT MG: 2 MMHG
LVOT MV: 0.6 CM/S
MCH RBC QN AUTO: 27.4 PG (ref 27–31)
MCHC RBC AUTO-ENTMCNC: 30.4 G/DL (ref 33–36)
MCV RBC AUTO: 89.9 FL (ref 80–94)
MV MEAN GRADIENT: 2 MMHG
MV PEAK A VEL: 0.92 M/S
MV PEAK E VEL: 0.59 M/S
MV PEAK GRADIENT: 4 MMHG
MV STENOSIS PRESSURE HALF TIME: 39 MS
MV VALVE AREA BY CONTINUITY EQUATION: 3.45 CM2
MV VALVE AREA P 1/2 METHOD: 5.64 CM2
NRBC BLD AUTO-RTO: 0 %
PLATELET # BLD AUTO: 237 X10(3)/MCL (ref 130–400)
PMV BLD AUTO: 10 FL (ref 7.4–10.4)
POCT GLUCOSE: 175 MG/DL (ref 70–110)
POTASSIUM SERPL-SCNC: 4 MMOL/L (ref 3.5–5.1)
PV PEAK GRADIENT: 3 MMHG
PV PEAK VELOCITY: 0.83 M/S
RBC # BLD AUTO: 4.46 X10(6)/MCL (ref 4.2–5.4)
SODIUM SERPL-SCNC: 137 MMOL/L (ref 136–145)
TDI LATERAL: 0.04 M/S
TDI SEPTAL: 0.04 M/S
TDI: 0.04 M/S
TRICUSPID ANNULAR PLANE SYSTOLIC EXCURSION: 1.63 CM
WBC # BLD AUTO: 9.31 X10(3)/MCL (ref 4.5–11.5)

## 2024-09-27 PROCEDURE — 80048 BASIC METABOLIC PNL TOTAL CA: CPT | Performed by: STUDENT IN AN ORGANIZED HEALTH CARE EDUCATION/TRAINING PROGRAM

## 2024-09-27 PROCEDURE — 25000003 PHARM REV CODE 250: Performed by: INTERNAL MEDICINE

## 2024-09-27 PROCEDURE — 63600175 PHARM REV CODE 636 W HCPCS: Performed by: NURSE PRACTITIONER

## 2024-09-27 PROCEDURE — 25000003 PHARM REV CODE 250

## 2024-09-27 PROCEDURE — 85027 COMPLETE CBC AUTOMATED: CPT | Performed by: STUDENT IN AN ORGANIZED HEALTH CARE EDUCATION/TRAINING PROGRAM

## 2024-09-27 PROCEDURE — 36415 COLL VENOUS BLD VENIPUNCTURE: CPT | Performed by: STUDENT IN AN ORGANIZED HEALTH CARE EDUCATION/TRAINING PROGRAM

## 2024-09-27 PROCEDURE — 25000003 PHARM REV CODE 250: Performed by: STUDENT IN AN ORGANIZED HEALTH CARE EDUCATION/TRAINING PROGRAM

## 2024-09-27 PROCEDURE — 25000003 PHARM REV CODE 250: Performed by: NURSE PRACTITIONER

## 2024-09-27 RX ORDER — METOPROLOL TARTRATE 50 MG/1
50 TABLET ORAL 2 TIMES DAILY
Qty: 180 TABLET | Refills: 3 | Status: SHIPPED | OUTPATIENT
Start: 2024-09-27 | End: 2025-09-27

## 2024-09-27 RX ORDER — LEVOTHYROXINE SODIUM 175 UG/1
175 TABLET ORAL
Qty: 30 TABLET | Refills: 11 | Status: SHIPPED | OUTPATIENT
Start: 2024-09-28 | End: 2025-09-28

## 2024-09-27 RX ORDER — LISINOPRIL 5 MG/1
5 TABLET ORAL DAILY
Qty: 90 TABLET | Refills: 3 | Status: SHIPPED | OUTPATIENT
Start: 2024-09-28 | End: 2025-09-28

## 2024-09-27 RX ORDER — CLINDAMYCIN HYDROCHLORIDE 300 MG/1
300 CAPSULE ORAL 3 TIMES DAILY
Start: 2024-09-27 | End: 2024-10-02

## 2024-09-27 RX ADMIN — INSULIN GLARGINE 20 UNITS: 100 INJECTION, SOLUTION SUBCUTANEOUS at 09:09

## 2024-09-27 RX ADMIN — HYDROCODONE BITARTRATE AND ACETAMINOPHEN 1 TABLET: 10; 325 TABLET ORAL at 09:09

## 2024-09-27 RX ADMIN — TOPIRAMATE 100 MG: 100 TABLET, FILM COATED ORAL at 09:09

## 2024-09-27 RX ADMIN — TORSEMIDE 40 MG: 20 TABLET ORAL at 09:09

## 2024-09-27 RX ADMIN — INSULIN ASPART 2 UNITS: 100 INJECTION, SOLUTION INTRAVENOUS; SUBCUTANEOUS at 12:09

## 2024-09-27 RX ADMIN — FOLIC ACID 1 MG: 1 TABLET ORAL at 09:09

## 2024-09-27 RX ADMIN — DULOXETINE HYDROCHLORIDE 60 MG: 30 CAPSULE, DELAYED RELEASE ORAL at 09:09

## 2024-09-27 RX ADMIN — Medication 500 MG: at 10:09

## 2024-09-27 RX ADMIN — APIXABAN 5 MG: 5 TABLET, FILM COATED ORAL at 09:09

## 2024-09-27 RX ADMIN — LEVOTHYROXINE SODIUM 175 MCG: 150 TABLET ORAL at 05:09

## 2024-09-27 RX ADMIN — METOPROLOL TARTRATE 50 MG: 50 TABLET, FILM COATED ORAL at 09:09

## 2024-09-27 RX ADMIN — LISINOPRIL 5 MG: 5 TABLET ORAL at 09:09

## 2024-09-27 RX ADMIN — GABAPENTIN 300 MG: 300 CAPSULE ORAL at 09:09

## 2024-09-27 RX ADMIN — CLINDAMYCIN HYDROCHLORIDE 450 MG: 150 CAPSULE ORAL at 05:09

## 2024-09-30 LAB
BACTERIA BLD CULT: NORMAL
BACTERIA BLD CULT: NORMAL

## 2024-10-14 ENCOUNTER — LAB REQUISITION (OUTPATIENT)
Dept: LAB | Facility: HOSPITAL | Age: 79
End: 2024-10-14
Payer: MEDICARE

## 2024-10-14 DIAGNOSIS — I10 ESSENTIAL (PRIMARY) HYPERTENSION: ICD-10-CM

## 2024-10-14 DIAGNOSIS — E11.9 TYPE 2 DIABETES MELLITUS WITHOUT COMPLICATIONS: ICD-10-CM

## 2024-10-14 LAB
ALBUMIN SERPL-MCNC: 2.7 G/DL (ref 3.4–4.8)
ALBUMIN/GLOB SERPL: 0.9 RATIO (ref 1.1–2)
ALP SERPL-CCNC: 69 UNIT/L (ref 40–150)
ALT SERPL-CCNC: 13 UNIT/L (ref 0–55)
ANION GAP SERPL CALC-SCNC: 7 MEQ/L
AST SERPL-CCNC: 8 UNIT/L (ref 5–34)
BASOPHILS # BLD AUTO: 0.06 X10(3)/MCL
BASOPHILS NFR BLD AUTO: 0.6 %
BILIRUB SERPL-MCNC: 0.2 MG/DL
BUN SERPL-MCNC: 32.2 MG/DL (ref 9.8–20.1)
CALCIUM SERPL-MCNC: 8.5 MG/DL (ref 8.4–10.2)
CHLORIDE SERPL-SCNC: 98 MMOL/L (ref 98–107)
CHOLEST SERPL-MCNC: 160 MG/DL
CHOLEST/HDLC SERPL: 5 {RATIO} (ref 0–5)
CO2 SERPL-SCNC: 31 MMOL/L (ref 23–31)
CREAT SERPL-MCNC: 0.84 MG/DL (ref 0.55–1.02)
CREAT/UREA NIT SERPL: 38
EOSINOPHIL # BLD AUTO: 0.51 X10(3)/MCL (ref 0–0.9)
EOSINOPHIL NFR BLD AUTO: 4.7 %
ERYTHROCYTE [DISTWIDTH] IN BLOOD BY AUTOMATED COUNT: 16.8 % (ref 11.5–17)
EST. AVERAGE GLUCOSE BLD GHB EST-MCNC: 194.4 MG/DL
GFR SERPLBLD CREATININE-BSD FMLA CKD-EPI: >60 ML/MIN/1.73/M2
GLOBULIN SER-MCNC: 3 GM/DL (ref 2.4–3.5)
GLUCOSE SERPL-MCNC: 232 MG/DL (ref 82–115)
HBA1C MFR BLD: 8.4 %
HCT VFR BLD AUTO: 35.7 % (ref 37–47)
HDLC SERPL-MCNC: 33 MG/DL (ref 35–60)
HGB BLD-MCNC: 11 G/DL (ref 12–16)
IMM GRANULOCYTES # BLD AUTO: 0.05 X10(3)/MCL (ref 0–0.04)
IMM GRANULOCYTES NFR BLD AUTO: 0.5 %
LDLC SERPL CALC-MCNC: 93 MG/DL (ref 50–140)
LYMPHOCYTES # BLD AUTO: 3.87 X10(3)/MCL (ref 0.6–4.6)
LYMPHOCYTES NFR BLD AUTO: 35.8 %
MCH RBC QN AUTO: 28 PG (ref 27–31)
MCHC RBC AUTO-ENTMCNC: 30.8 G/DL (ref 33–36)
MCV RBC AUTO: 90.8 FL (ref 80–94)
MONOCYTES # BLD AUTO: 0.68 X10(3)/MCL (ref 0.1–1.3)
MONOCYTES NFR BLD AUTO: 6.3 %
NEUTROPHILS # BLD AUTO: 5.64 X10(3)/MCL (ref 2.1–9.2)
NEUTROPHILS NFR BLD AUTO: 52.1 %
NRBC BLD AUTO-RTO: 0 %
PLATELET # BLD AUTO: 160 X10(3)/MCL (ref 130–400)
PMV BLD AUTO: 10.4 FL (ref 7.4–10.4)
POTASSIUM SERPL-SCNC: 4.4 MMOL/L (ref 3.5–5.1)
PROT SERPL-MCNC: 5.7 GM/DL (ref 5.8–7.6)
RBC # BLD AUTO: 3.93 X10(6)/MCL (ref 4.2–5.4)
SODIUM SERPL-SCNC: 136 MMOL/L (ref 136–145)
TRIGL SERPL-MCNC: 169 MG/DL (ref 37–140)
VLDLC SERPL CALC-MCNC: 34 MG/DL
WBC # BLD AUTO: 10.81 X10(3)/MCL (ref 4.5–11.5)

## 2024-10-14 PROCEDURE — 83036 HEMOGLOBIN GLYCOSYLATED A1C: CPT | Performed by: INTERNAL MEDICINE

## 2024-10-14 PROCEDURE — 80053 COMPREHEN METABOLIC PANEL: CPT | Performed by: INTERNAL MEDICINE

## 2024-10-14 PROCEDURE — 80061 LIPID PANEL: CPT | Performed by: INTERNAL MEDICINE

## 2024-10-14 PROCEDURE — 85025 COMPLETE CBC W/AUTO DIFF WBC: CPT | Performed by: INTERNAL MEDICINE

## 2024-11-05 ENCOUNTER — LAB REQUISITION (OUTPATIENT)
Dept: LAB | Facility: HOSPITAL | Age: 79
End: 2024-11-05
Payer: MEDICARE

## 2024-11-05 DIAGNOSIS — I10 ESSENTIAL (PRIMARY) HYPERTENSION: ICD-10-CM

## 2024-11-05 LAB
ALBUMIN SERPL-MCNC: 3 G/DL (ref 3.4–4.8)
ALBUMIN/GLOB SERPL: 0.7 RATIO (ref 1.1–2)
ALP SERPL-CCNC: 90 UNIT/L (ref 40–150)
ALT SERPL-CCNC: 10 UNIT/L (ref 0–55)
ANION GAP SERPL CALC-SCNC: 7 MEQ/L
AST SERPL-CCNC: 14 UNIT/L (ref 5–34)
BASOPHILS # BLD AUTO: 0.05 X10(3)/MCL
BASOPHILS NFR BLD AUTO: 0.4 %
BILIRUB SERPL-MCNC: 0.2 MG/DL
BUN SERPL-MCNC: 23.2 MG/DL (ref 9.8–20.1)
CALCIUM SERPL-MCNC: 9.1 MG/DL (ref 8.4–10.2)
CHLORIDE SERPL-SCNC: 94 MMOL/L (ref 98–107)
CO2 SERPL-SCNC: 34 MMOL/L (ref 23–31)
CREAT SERPL-MCNC: 0.91 MG/DL (ref 0.55–1.02)
CREAT/UREA NIT SERPL: 25
EOSINOPHIL # BLD AUTO: 0.51 X10(3)/MCL (ref 0–0.9)
EOSINOPHIL NFR BLD AUTO: 3.8 %
ERYTHROCYTE [DISTWIDTH] IN BLOOD BY AUTOMATED COUNT: 16.9 % (ref 11.5–17)
GFR SERPLBLD CREATININE-BSD FMLA CKD-EPI: >60 ML/MIN/1.73/M2
GLOBULIN SER-MCNC: 4.5 GM/DL (ref 2.4–3.5)
GLUCOSE SERPL-MCNC: 347 MG/DL (ref 82–115)
HCT VFR BLD AUTO: 42.2 % (ref 37–47)
HGB BLD-MCNC: 13 G/DL (ref 12–16)
IMM GRANULOCYTES # BLD AUTO: 0.07 X10(3)/MCL (ref 0–0.04)
IMM GRANULOCYTES NFR BLD AUTO: 0.5 %
LYMPHOCYTES # BLD AUTO: 3.6 X10(3)/MCL (ref 0.6–4.6)
LYMPHOCYTES NFR BLD AUTO: 26.5 %
MCH RBC QN AUTO: 28.1 PG (ref 27–31)
MCHC RBC AUTO-ENTMCNC: 30.8 G/DL (ref 33–36)
MCV RBC AUTO: 91.1 FL (ref 80–94)
MONOCYTES # BLD AUTO: 0.68 X10(3)/MCL (ref 0.1–1.3)
MONOCYTES NFR BLD AUTO: 5 %
NEUTROPHILS # BLD AUTO: 8.67 X10(3)/MCL (ref 2.1–9.2)
NEUTROPHILS NFR BLD AUTO: 63.8 %
NRBC BLD AUTO-RTO: 0 %
PLATELET # BLD AUTO: 225 X10(3)/MCL (ref 130–400)
PMV BLD AUTO: 9.8 FL (ref 7.4–10.4)
POTASSIUM SERPL-SCNC: 4.4 MMOL/L (ref 3.5–5.1)
PROT SERPL-MCNC: 7.5 GM/DL (ref 5.8–7.6)
RBC # BLD AUTO: 4.63 X10(6)/MCL (ref 4.2–5.4)
SODIUM SERPL-SCNC: 135 MMOL/L (ref 136–145)
WBC # BLD AUTO: 13.58 X10(3)/MCL (ref 4.5–11.5)

## 2024-11-05 PROCEDURE — 80053 COMPREHEN METABOLIC PANEL: CPT | Performed by: INTERNAL MEDICINE

## 2024-11-05 PROCEDURE — 85025 COMPLETE CBC W/AUTO DIFF WBC: CPT | Performed by: INTERNAL MEDICINE

## 2024-11-06 ENCOUNTER — LAB REQUISITION (OUTPATIENT)
Dept: LAB | Facility: HOSPITAL | Age: 79
End: 2024-11-06
Payer: MEDICARE

## 2024-11-06 DIAGNOSIS — Z29.9 ENCOUNTER FOR PROPHYLACTIC MEASURES, UNSPECIFIED: ICD-10-CM

## 2024-11-06 LAB
BACTERIA #/AREA URNS AUTO: ABNORMAL /HPF
BILIRUB UR QL STRIP.AUTO: NEGATIVE
CLARITY UR: ABNORMAL
COLOR UR AUTO: ABNORMAL
GLUCOSE UR QL STRIP: NEGATIVE
HGB UR QL STRIP: ABNORMAL
KETONES UR QL STRIP: NEGATIVE
LEUKOCYTE ESTERASE UR QL STRIP: ABNORMAL
NITRITE UR QL STRIP: NEGATIVE
PH UR STRIP: 6 [PH]
PROT UR QL STRIP: >=300
RBC #/AREA URNS AUTO: ABNORMAL /HPF
SP GR UR STRIP.AUTO: 1.02 (ref 1–1.03)
SQUAMOUS #/AREA URNS AUTO: ABNORMAL /HPF
UROBILINOGEN UR STRIP-ACNC: 0.2
WBC #/AREA URNS AUTO: >100 /HPF

## 2024-11-06 PROCEDURE — 87186 SC STD MICRODIL/AGAR DIL: CPT | Performed by: INTERNAL MEDICINE

## 2024-11-06 PROCEDURE — 81003 URINALYSIS AUTO W/O SCOPE: CPT | Performed by: INTERNAL MEDICINE

## 2024-11-07 ENCOUNTER — LAB REQUISITION (OUTPATIENT)
Dept: LAB | Facility: HOSPITAL | Age: 79
End: 2024-11-07
Payer: MEDICARE

## 2024-11-07 DIAGNOSIS — I10 ESSENTIAL (PRIMARY) HYPERTENSION: ICD-10-CM

## 2024-11-07 LAB
ANION GAP SERPL CALC-SCNC: 9 MEQ/L
BASOPHILS # BLD AUTO: 0.04 X10(3)/MCL
BASOPHILS NFR BLD AUTO: 0.3 %
BUN SERPL-MCNC: 28.5 MG/DL (ref 9.8–20.1)
CALCIUM SERPL-MCNC: 8.2 MG/DL (ref 8.4–10.2)
CHLORIDE SERPL-SCNC: 99 MMOL/L (ref 98–107)
CO2 SERPL-SCNC: 30 MMOL/L (ref 23–31)
CREAT SERPL-MCNC: 0.78 MG/DL (ref 0.55–1.02)
CREAT/UREA NIT SERPL: 37
EOSINOPHIL # BLD AUTO: 0.38 X10(3)/MCL (ref 0–0.9)
EOSINOPHIL NFR BLD AUTO: 3.3 %
ERYTHROCYTE [DISTWIDTH] IN BLOOD BY AUTOMATED COUNT: 17.2 % (ref 11.5–17)
GFR SERPLBLD CREATININE-BSD FMLA CKD-EPI: >60 ML/MIN/1.73/M2
GLUCOSE SERPL-MCNC: 256 MG/DL (ref 82–115)
HCT VFR BLD AUTO: 36 % (ref 37–47)
HGB BLD-MCNC: 11 G/DL (ref 12–16)
IMM GRANULOCYTES # BLD AUTO: 0.05 X10(3)/MCL (ref 0–0.04)
IMM GRANULOCYTES NFR BLD AUTO: 0.4 %
LYMPHOCYTES # BLD AUTO: 4.01 X10(3)/MCL (ref 0.6–4.6)
LYMPHOCYTES NFR BLD AUTO: 35 %
MCH RBC QN AUTO: 27.8 PG (ref 27–31)
MCHC RBC AUTO-ENTMCNC: 30.6 G/DL (ref 33–36)
MCV RBC AUTO: 91.1 FL (ref 80–94)
MONOCYTES # BLD AUTO: 0.7 X10(3)/MCL (ref 0.1–1.3)
MONOCYTES NFR BLD AUTO: 6.1 %
NEUTROPHILS # BLD AUTO: 6.29 X10(3)/MCL (ref 2.1–9.2)
NEUTROPHILS NFR BLD AUTO: 54.9 %
NRBC BLD AUTO-RTO: 0 %
PLATELET # BLD AUTO: 180 X10(3)/MCL (ref 130–400)
PMV BLD AUTO: 10.3 FL (ref 7.4–10.4)
POTASSIUM SERPL-SCNC: 4 MMOL/L (ref 3.5–5.1)
RBC # BLD AUTO: 3.95 X10(6)/MCL (ref 4.2–5.4)
SODIUM SERPL-SCNC: 138 MMOL/L (ref 136–145)
WBC # BLD AUTO: 11.47 X10(3)/MCL (ref 4.5–11.5)

## 2024-11-07 PROCEDURE — 80048 BASIC METABOLIC PNL TOTAL CA: CPT | Performed by: INTERNAL MEDICINE

## 2024-11-07 PROCEDURE — 85025 COMPLETE CBC W/AUTO DIFF WBC: CPT | Performed by: INTERNAL MEDICINE

## 2024-11-08 LAB — BACTERIA UR CULT: ABNORMAL

## 2024-11-21 ENCOUNTER — HOSPITAL ENCOUNTER (INPATIENT)
Facility: HOSPITAL | Age: 79
LOS: 4 days | Discharge: HOME | DRG: 871 | End: 2024-11-26
Attending: EMERGENCY MEDICINE | Admitting: INTERNAL MEDICINE
Payer: MEDICARE

## 2024-11-21 DIAGNOSIS — R07.9 CHEST PAIN: ICD-10-CM

## 2024-11-21 DIAGNOSIS — J18.9 COMMUNITY ACQUIRED PNEUMONIA, UNSPECIFIED LATERALITY: Primary | ICD-10-CM

## 2024-11-21 DIAGNOSIS — R06.02 SOB (SHORTNESS OF BREATH): ICD-10-CM

## 2024-11-21 DIAGNOSIS — A41.9 SEPSIS: ICD-10-CM

## 2024-11-21 LAB
ALBUMIN SERPL-MCNC: 2.7 G/DL (ref 3.4–4.8)
ALBUMIN/GLOB SERPL: 0.6 RATIO (ref 1.1–2)
ALP SERPL-CCNC: 95 UNIT/L (ref 40–150)
ALT SERPL-CCNC: 19 UNIT/L (ref 0–55)
ANION GAP SERPL CALC-SCNC: 10 MEQ/L
AST SERPL-CCNC: 27 UNIT/L (ref 5–34)
BACTERIA #/AREA URNS AUTO: ABNORMAL /HPF
BASOPHILS # BLD AUTO: 0.06 X10(3)/MCL
BASOPHILS NFR BLD AUTO: 0.3 %
BILIRUB SERPL-MCNC: 0.2 MG/DL
BILIRUB UR QL STRIP.AUTO: NEGATIVE
BNP BLD-MCNC: 57 PG/ML
BUN SERPL-MCNC: 28.9 MG/DL (ref 9.8–20.1)
CALCIUM SERPL-MCNC: 8.8 MG/DL (ref 8.4–10.2)
CHLORIDE SERPL-SCNC: 101 MMOL/L (ref 98–107)
CLARITY UR: CLEAR
CO2 SERPL-SCNC: 25 MMOL/L (ref 23–31)
COLOR UR AUTO: YELLOW
CREAT SERPL-MCNC: 0.83 MG/DL (ref 0.55–1.02)
CREAT/UREA NIT SERPL: 35
EOSINOPHIL # BLD AUTO: 0.19 X10(3)/MCL (ref 0–0.9)
EOSINOPHIL NFR BLD AUTO: 1 %
ERYTHROCYTE [DISTWIDTH] IN BLOOD BY AUTOMATED COUNT: 16.7 % (ref 11.5–17)
FLUAV AG UPPER RESP QL IA.RAPID: NOT DETECTED
FLUBV AG UPPER RESP QL IA.RAPID: NOT DETECTED
GFR SERPLBLD CREATININE-BSD FMLA CKD-EPI: >60 ML/MIN/1.73/M2
GLOBULIN SER-MCNC: 4.2 GM/DL (ref 2.4–3.5)
GLUCOSE SERPL-MCNC: 309 MG/DL (ref 82–115)
GLUCOSE UR QL STRIP: NORMAL
HCT VFR BLD AUTO: 41.2 % (ref 37–47)
HGB BLD-MCNC: 13 G/DL (ref 12–16)
HGB UR QL STRIP: NEGATIVE
IMM GRANULOCYTES # BLD AUTO: 0.1 X10(3)/MCL (ref 0–0.04)
IMM GRANULOCYTES NFR BLD AUTO: 0.5 %
KETONES UR QL STRIP: NEGATIVE
LACTATE SERPL-SCNC: 2.6 MMOL/L (ref 0.5–2.2)
LEUKOCYTE ESTERASE UR QL STRIP: 250
LYMPHOCYTES # BLD AUTO: 2.08 X10(3)/MCL (ref 0.6–4.6)
LYMPHOCYTES NFR BLD AUTO: 11 %
MCH RBC QN AUTO: 28.4 PG (ref 27–31)
MCHC RBC AUTO-ENTMCNC: 31.6 G/DL (ref 33–36)
MCV RBC AUTO: 90.2 FL (ref 80–94)
MONOCYTES # BLD AUTO: 0.57 X10(3)/MCL (ref 0.1–1.3)
MONOCYTES NFR BLD AUTO: 3 %
MRSA PCR SCRN (OHS): NOT DETECTED
MUCOUS THREADS URNS QL MICRO: ABNORMAL /LPF
NEUTROPHILS # BLD AUTO: 15.94 X10(3)/MCL (ref 2.1–9.2)
NEUTROPHILS NFR BLD AUTO: 84.2 %
NITRITE UR QL STRIP: NEGATIVE
NRBC BLD AUTO-RTO: 0 %
PH UR STRIP: 7 [PH]
PLATELET # BLD AUTO: 196 X10(3)/MCL (ref 130–400)
PMV BLD AUTO: 10.1 FL (ref 7.4–10.4)
POTASSIUM SERPL-SCNC: 4.5 MMOL/L (ref 3.5–5.1)
PROT SERPL-MCNC: 6.9 GM/DL (ref 5.8–7.6)
PROT UR QL STRIP: ABNORMAL
RBC # BLD AUTO: 4.57 X10(6)/MCL (ref 4.2–5.4)
RBC #/AREA URNS AUTO: ABNORMAL /HPF
SARS-COV-2 RNA RESP QL NAA+PROBE: NOT DETECTED
SODIUM SERPL-SCNC: 136 MMOL/L (ref 136–145)
SP GR UR STRIP.AUTO: 1.02 (ref 1–1.03)
SQUAMOUS #/AREA URNS LPF: ABNORMAL /HPF
UROBILINOGEN UR STRIP-ACNC: NORMAL
WBC # BLD AUTO: 18.94 X10(3)/MCL (ref 4.5–11.5)
WBC #/AREA URNS AUTO: ABNORMAL /HPF

## 2024-11-21 PROCEDURE — 96365 THER/PROPH/DIAG IV INF INIT: CPT

## 2024-11-21 PROCEDURE — 25000003 PHARM REV CODE 250: Performed by: EMERGENCY MEDICINE

## 2024-11-21 PROCEDURE — 87086 URINE CULTURE/COLONY COUNT: CPT | Performed by: EMERGENCY MEDICINE

## 2024-11-21 PROCEDURE — 0240U COVID/FLU A&B PCR: CPT | Performed by: EMERGENCY MEDICINE

## 2024-11-21 PROCEDURE — 63600175 PHARM REV CODE 636 W HCPCS: Performed by: EMERGENCY MEDICINE

## 2024-11-21 PROCEDURE — 85025 COMPLETE CBC W/AUTO DIFF WBC: CPT | Performed by: EMERGENCY MEDICINE

## 2024-11-21 PROCEDURE — 99285 EMERGENCY DEPT VISIT HI MDM: CPT | Mod: 25

## 2024-11-21 PROCEDURE — 83880 ASSAY OF NATRIURETIC PEPTIDE: CPT | Performed by: EMERGENCY MEDICINE

## 2024-11-21 PROCEDURE — 81001 URINALYSIS AUTO W/SCOPE: CPT | Performed by: EMERGENCY MEDICINE

## 2024-11-21 PROCEDURE — 93005 ELECTROCARDIOGRAM TRACING: CPT

## 2024-11-21 PROCEDURE — 83605 ASSAY OF LACTIC ACID: CPT | Performed by: EMERGENCY MEDICINE

## 2024-11-21 PROCEDURE — 80053 COMPREHEN METABOLIC PANEL: CPT | Performed by: EMERGENCY MEDICINE

## 2024-11-21 PROCEDURE — 87040 BLOOD CULTURE FOR BACTERIA: CPT | Performed by: EMERGENCY MEDICINE

## 2024-11-21 PROCEDURE — 93010 ELECTROCARDIOGRAM REPORT: CPT | Mod: ,,, | Performed by: INTERNAL MEDICINE

## 2024-11-21 PROCEDURE — 87641 MR-STAPH DNA AMP PROBE: CPT | Performed by: EMERGENCY MEDICINE

## 2024-11-21 PROCEDURE — 96375 TX/PRO/DX INJ NEW DRUG ADDON: CPT

## 2024-11-21 RX ORDER — ACETAMINOPHEN 10 MG/ML
1000 INJECTION, SOLUTION INTRAVENOUS ONCE
Status: COMPLETED | OUTPATIENT
Start: 2024-11-21 | End: 2024-11-21

## 2024-11-21 RX ADMIN — ACETAMINOPHEN 1000 MG: 10 INJECTION, SOLUTION INTRAVENOUS at 10:11

## 2024-11-21 RX ADMIN — SODIUM CHLORIDE, POTASSIUM CHLORIDE, SODIUM LACTATE AND CALCIUM CHLORIDE 1317 ML: 600; 310; 30; 20 INJECTION, SOLUTION INTRAVENOUS at 11:11

## 2024-11-21 RX ADMIN — PIPERACILLIN AND TAZOBACTAM 4.5 G: 4; .5 INJECTION, POWDER, LYOPHILIZED, FOR SOLUTION INTRAVENOUS; PARENTERAL at 11:11

## 2024-11-22 LAB
ALBUMIN SERPL-MCNC: 2.3 G/DL (ref 3.4–4.8)
ALBUMIN/GLOB SERPL: 0.7 RATIO (ref 1.1–2)
ALP SERPL-CCNC: 76 UNIT/L (ref 40–150)
ALT SERPL-CCNC: 18 UNIT/L (ref 0–55)
ANION GAP SERPL CALC-SCNC: 6 MEQ/L
AST SERPL-CCNC: 17 UNIT/L (ref 5–34)
BASOPHILS # BLD AUTO: 0.06 X10(3)/MCL
BASOPHILS NFR BLD AUTO: 0.3 %
BILIRUB SERPL-MCNC: 0.3 MG/DL
BUN SERPL-MCNC: 27.5 MG/DL (ref 9.8–20.1)
CALCIUM SERPL-MCNC: 8.4 MG/DL (ref 8.4–10.2)
CHLORIDE SERPL-SCNC: 103 MMOL/L (ref 98–107)
CO2 SERPL-SCNC: 28 MMOL/L (ref 23–31)
CREAT SERPL-MCNC: 0.86 MG/DL (ref 0.55–1.02)
CREAT/UREA NIT SERPL: 32
EOSINOPHIL # BLD AUTO: 0.22 X10(3)/MCL (ref 0–0.9)
EOSINOPHIL NFR BLD AUTO: 1.2 %
ERYTHROCYTE [DISTWIDTH] IN BLOOD BY AUTOMATED COUNT: 16.8 % (ref 11.5–17)
GFR SERPLBLD CREATININE-BSD FMLA CKD-EPI: >60 ML/MIN/1.73/M2
GLOBULIN SER-MCNC: 3.2 GM/DL (ref 2.4–3.5)
GLUCOSE SERPL-MCNC: 237 MG/DL (ref 82–115)
HCT VFR BLD AUTO: 34.7 % (ref 37–47)
HGB BLD-MCNC: 10.8 G/DL (ref 12–16)
IMM GRANULOCYTES # BLD AUTO: 0.1 X10(3)/MCL (ref 0–0.04)
IMM GRANULOCYTES NFR BLD AUTO: 0.6 %
LACTATE SERPL-SCNC: 2.5 MMOL/L (ref 0.5–2.2)
LYMPHOCYTES # BLD AUTO: 4.35 X10(3)/MCL (ref 0.6–4.6)
LYMPHOCYTES NFR BLD AUTO: 24.1 %
MAGNESIUM SERPL-MCNC: 2 MG/DL (ref 1.6–2.6)
MCH RBC QN AUTO: 27.7 PG (ref 27–31)
MCHC RBC AUTO-ENTMCNC: 31.1 G/DL (ref 33–36)
MCV RBC AUTO: 89 FL (ref 80–94)
MONOCYTES # BLD AUTO: 0.85 X10(3)/MCL (ref 0.1–1.3)
MONOCYTES NFR BLD AUTO: 4.7 %
NEUTROPHILS # BLD AUTO: 12.5 X10(3)/MCL (ref 2.1–9.2)
NEUTROPHILS NFR BLD AUTO: 69.1 %
NRBC BLD AUTO-RTO: 0 %
OHS QRS DURATION: 68 MS
OHS QTC CALCULATION: 424 MS
PHOSPHATE SERPL-MCNC: 3.7 MG/DL (ref 2.3–4.7)
PLATELET # BLD AUTO: 141 X10(3)/MCL (ref 130–400)
PMV BLD AUTO: 9.8 FL (ref 7.4–10.4)
POCT GLUCOSE: 176 MG/DL (ref 70–110)
POCT GLUCOSE: 204 MG/DL (ref 70–110)
POCT GLUCOSE: 219 MG/DL (ref 70–110)
POCT GLUCOSE: 225 MG/DL (ref 70–110)
POCT GLUCOSE: 248 MG/DL (ref 70–110)
POCT GLUCOSE: 285 MG/DL (ref 70–110)
POTASSIUM SERPL-SCNC: 4 MMOL/L (ref 3.5–5.1)
PROT SERPL-MCNC: 5.5 GM/DL (ref 5.8–7.6)
RBC # BLD AUTO: 3.9 X10(6)/MCL (ref 4.2–5.4)
SODIUM SERPL-SCNC: 137 MMOL/L (ref 136–145)
WBC # BLD AUTO: 18.08 X10(3)/MCL (ref 4.5–11.5)

## 2024-11-22 PROCEDURE — 82962 GLUCOSE BLOOD TEST: CPT

## 2024-11-22 PROCEDURE — 63600175 PHARM REV CODE 636 W HCPCS: Performed by: EMERGENCY MEDICINE

## 2024-11-22 PROCEDURE — 85025 COMPLETE CBC W/AUTO DIFF WBC: CPT | Performed by: NURSE PRACTITIONER

## 2024-11-22 PROCEDURE — 25000003 PHARM REV CODE 250: Performed by: INTERNAL MEDICINE

## 2024-11-22 PROCEDURE — 25000003 PHARM REV CODE 250: Performed by: EMERGENCY MEDICINE

## 2024-11-22 PROCEDURE — 96366 THER/PROPH/DIAG IV INF ADDON: CPT

## 2024-11-22 PROCEDURE — 21400001 HC TELEMETRY ROOM

## 2024-11-22 PROCEDURE — 83735 ASSAY OF MAGNESIUM: CPT | Performed by: NURSE PRACTITIONER

## 2024-11-22 PROCEDURE — 80053 COMPREHEN METABOLIC PANEL: CPT | Performed by: NURSE PRACTITIONER

## 2024-11-22 PROCEDURE — 25000003 PHARM REV CODE 250: Performed by: NURSE PRACTITIONER

## 2024-11-22 PROCEDURE — 84100 ASSAY OF PHOSPHORUS: CPT | Performed by: NURSE PRACTITIONER

## 2024-11-22 PROCEDURE — 36415 COLL VENOUS BLD VENIPUNCTURE: CPT | Performed by: NURSE PRACTITIONER

## 2024-11-22 PROCEDURE — 96367 TX/PROPH/DG ADDL SEQ IV INF: CPT

## 2024-11-22 PROCEDURE — 63600175 PHARM REV CODE 636 W HCPCS: Performed by: NURSE PRACTITIONER

## 2024-11-22 RX ORDER — SELENIUM 50 MCG
1 TABLET ORAL 2 TIMES DAILY WITH MEALS
Status: DISCONTINUED | OUTPATIENT
Start: 2024-11-22 | End: 2024-11-26 | Stop reason: HOSPADM

## 2024-11-22 RX ORDER — ENOXAPARIN SODIUM 100 MG/ML
40 INJECTION SUBCUTANEOUS EVERY 24 HOURS
Status: DISCONTINUED | OUTPATIENT
Start: 2024-11-22 | End: 2024-11-26 | Stop reason: HOSPADM

## 2024-11-22 RX ORDER — HYDROCODONE BITARTRATE AND ACETAMINOPHEN 10; 325 MG/1; MG/1
1 TABLET ORAL 3 TIMES DAILY
Status: DISCONTINUED | OUTPATIENT
Start: 2024-11-22 | End: 2024-11-26 | Stop reason: HOSPADM

## 2024-11-22 RX ORDER — SIMETHICONE 80 MG
1 TABLET,CHEWABLE ORAL 4 TIMES DAILY PRN
Status: DISCONTINUED | OUTPATIENT
Start: 2024-11-22 | End: 2024-11-26 | Stop reason: HOSPADM

## 2024-11-22 RX ORDER — NALOXONE HCL 0.4 MG/ML
0.02 VIAL (ML) INJECTION
Status: DISCONTINUED | OUTPATIENT
Start: 2024-11-22 | End: 2024-11-26 | Stop reason: HOSPADM

## 2024-11-22 RX ORDER — TOPIRAMATE 25 MG/1
100 TABLET ORAL 2 TIMES DAILY
Status: DISCONTINUED | OUTPATIENT
Start: 2024-11-22 | End: 2024-11-26 | Stop reason: HOSPADM

## 2024-11-22 RX ORDER — GLUCAGON 1 MG
1 KIT INJECTION
Status: DISCONTINUED | OUTPATIENT
Start: 2024-11-22 | End: 2024-11-26 | Stop reason: HOSPADM

## 2024-11-22 RX ORDER — POLYETHYLENE GLYCOL 3350 17 G/17G
17 POWDER, FOR SOLUTION ORAL 2 TIMES DAILY PRN
Status: DISCONTINUED | OUTPATIENT
Start: 2024-11-22 | End: 2024-11-26 | Stop reason: HOSPADM

## 2024-11-22 RX ORDER — ONDANSETRON HYDROCHLORIDE 2 MG/ML
4 INJECTION, SOLUTION INTRAVENOUS EVERY 4 HOURS PRN
Status: DISCONTINUED | OUTPATIENT
Start: 2024-11-22 | End: 2024-11-26 | Stop reason: HOSPADM

## 2024-11-22 RX ORDER — MICONAZOLE NITRATE 2 G/100G
POWDER TOPICAL 2 TIMES DAILY
Status: DISCONTINUED | OUTPATIENT
Start: 2024-11-22 | End: 2024-11-26 | Stop reason: HOSPADM

## 2024-11-22 RX ORDER — TALC
6 POWDER (GRAM) TOPICAL NIGHTLY PRN
Status: DISCONTINUED | OUTPATIENT
Start: 2024-11-22 | End: 2024-11-26 | Stop reason: HOSPADM

## 2024-11-22 RX ORDER — PROCHLORPERAZINE EDISYLATE 5 MG/ML
5 INJECTION INTRAMUSCULAR; INTRAVENOUS EVERY 6 HOURS PRN
Status: DISCONTINUED | OUTPATIENT
Start: 2024-11-22 | End: 2024-11-26 | Stop reason: HOSPADM

## 2024-11-22 RX ORDER — LEVETIRACETAM 100 MG/ML
750 SOLUTION ORAL NIGHTLY
Status: DISCONTINUED | OUTPATIENT
Start: 2024-11-22 | End: 2024-11-26 | Stop reason: HOSPADM

## 2024-11-22 RX ORDER — CEFTRIAXONE 1 G/1
1 INJECTION, POWDER, FOR SOLUTION INTRAMUSCULAR; INTRAVENOUS
Status: DISCONTINUED | OUTPATIENT
Start: 2024-11-22 | End: 2024-11-26 | Stop reason: HOSPADM

## 2024-11-22 RX ORDER — GABAPENTIN 300 MG/1
300 CAPSULE ORAL 2 TIMES DAILY
Status: DISCONTINUED | OUTPATIENT
Start: 2024-11-22 | End: 2024-11-26 | Stop reason: HOSPADM

## 2024-11-22 RX ORDER — ACETAMINOPHEN 325 MG/1
650 TABLET ORAL EVERY 6 HOURS PRN
Status: DISCONTINUED | OUTPATIENT
Start: 2024-11-22 | End: 2024-11-26 | Stop reason: HOSPADM

## 2024-11-22 RX ORDER — IBUPROFEN 200 MG
16 TABLET ORAL
Status: DISCONTINUED | OUTPATIENT
Start: 2024-11-22 | End: 2024-11-26 | Stop reason: HOSPADM

## 2024-11-22 RX ORDER — INSULIN ASPART 100 [IU]/ML
0-10 INJECTION, SOLUTION INTRAVENOUS; SUBCUTANEOUS
Status: DISCONTINUED | OUTPATIENT
Start: 2024-11-22 | End: 2024-11-26 | Stop reason: HOSPADM

## 2024-11-22 RX ORDER — IBUPROFEN 200 MG
24 TABLET ORAL
Status: DISCONTINUED | OUTPATIENT
Start: 2024-11-22 | End: 2024-11-26 | Stop reason: HOSPADM

## 2024-11-22 RX ORDER — DULOXETIN HYDROCHLORIDE 30 MG/1
60 CAPSULE, DELAYED RELEASE ORAL DAILY
Status: DISCONTINUED | OUTPATIENT
Start: 2024-11-23 | End: 2024-11-26 | Stop reason: HOSPADM

## 2024-11-22 RX ORDER — EZETIMIBE 10 MG/1
10 TABLET ORAL DAILY
Status: DISCONTINUED | OUTPATIENT
Start: 2024-11-23 | End: 2024-11-26 | Stop reason: HOSPADM

## 2024-11-22 RX ORDER — ALUMINUM HYDROXIDE, MAGNESIUM HYDROXIDE, AND SIMETHICONE 1200; 120; 1200 MG/30ML; MG/30ML; MG/30ML
30 SUSPENSION ORAL 4 TIMES DAILY PRN
Status: DISCONTINUED | OUTPATIENT
Start: 2024-11-22 | End: 2024-11-26 | Stop reason: HOSPADM

## 2024-11-22 RX ORDER — PRAVASTATIN SODIUM 10 MG/1
20 TABLET ORAL NIGHTLY
Status: DISCONTINUED | OUTPATIENT
Start: 2024-11-22 | End: 2024-11-26 | Stop reason: HOSPADM

## 2024-11-22 RX ORDER — ALPRAZOLAM 0.25 MG/1
0.25 TABLET ORAL 2 TIMES DAILY PRN
Status: DISCONTINUED | OUTPATIENT
Start: 2024-11-22 | End: 2024-11-26 | Stop reason: HOSPADM

## 2024-11-22 RX ADMIN — CEFTRIAXONE SODIUM 1 G: 1 INJECTION, POWDER, FOR SOLUTION INTRAMUSCULAR; INTRAVENOUS at 05:11

## 2024-11-22 RX ADMIN — INSULIN ASPART 2 UNITS: 100 INJECTION, SOLUTION INTRAVENOUS; SUBCUTANEOUS at 11:11

## 2024-11-22 RX ADMIN — ALPRAZOLAM 0.25 MG: 0.25 TABLET ORAL at 10:11

## 2024-11-22 RX ADMIN — GABAPENTIN 300 MG: 300 CAPSULE ORAL at 08:11

## 2024-11-22 RX ADMIN — INSULIN ASPART 4 UNITS: 100 INJECTION, SOLUTION INTRAVENOUS; SUBCUTANEOUS at 06:11

## 2024-11-22 RX ADMIN — MICONAZOLE NITRATE: 20 POWDER TOPICAL at 10:11

## 2024-11-22 RX ADMIN — TOPIRAMATE 100 MG: 25 TABLET, FILM COATED ORAL at 08:11

## 2024-11-22 RX ADMIN — Medication 1 CAPSULE: at 08:11

## 2024-11-22 RX ADMIN — MICONAZOLE NITRATE: 20 POWDER TOPICAL at 08:11

## 2024-11-22 RX ADMIN — LEVETIRACETAM 750 MG: 500 SOLUTION ORAL at 08:11

## 2024-11-22 RX ADMIN — HYDROCODONE BITARTRATE AND ACETAMINOPHEN 1 TABLET: 10; 325 TABLET ORAL at 08:11

## 2024-11-22 RX ADMIN — ENOXAPARIN SODIUM 40 MG: 40 INJECTION SUBCUTANEOUS at 05:11

## 2024-11-22 RX ADMIN — INSULIN ASPART 2 UNITS: 100 INJECTION, SOLUTION INTRAVENOUS; SUBCUTANEOUS at 09:11

## 2024-11-22 RX ADMIN — AZITHROMYCIN MONOHYDRATE 500 MG: 500 INJECTION, POWDER, LYOPHILIZED, FOR SOLUTION INTRAVENOUS at 04:11

## 2024-11-22 RX ADMIN — DEXTROSE MONOHYDRATE 1000 MG: 50 INJECTION, SOLUTION INTRAVENOUS at 12:11

## 2024-11-22 RX ADMIN — PRAVASTATIN SODIUM 20 MG: 10 TABLET ORAL at 08:11

## 2024-11-22 NOTE — AI DETERIORATION ALERT
Artificial Intelligence Notification  [facility]  [address]  [place] [address]  Phone: [phone]    This documentation was triggered by an Artificial Intelligence Notification:    Admit Date: 2024   LOS: 0  Code Status: Full Code  : 1945  Age: 79 y.o.  Weight:   Wt Readings from Last 1 Encounters:   24 102.4 kg (225 lb 12 oz)        Sex: female  Bed: 405/405 A  MRN: [id number]  Attending Physician: Boubacar Ramírez MD     Date of Alert: 2024  Time AI Alert Received: 0333            Vitals:    24 0329   BP: (!) 78/49   Pulse: 64   Resp: (!) 22   Temp: 98 °F (36.7 °C)     SpO2: 100 %      Artificial Intelligence alert discussed with Provider:     Name: KEITH Garcia   Date/Time of Provider Notification: 356      Patient Condition: Spoke with nurse, rechecked BP, 103/68. No other concerns at this time per nurse.

## 2024-11-22 NOTE — ED PROVIDER NOTES
Encounter Date: 11/21/2024    SCRIBE #1 NOTE: ICarl, sarah scribing for, and in the presence of,  German Paris MD. I have scribed the following portions of the note - Other sections scribed: HPI, ROS, PE.       History     Chief Complaint   Patient presents with    Altered Mental Status     Pt arrives via [facility] unit 44 for shortness of breath and altered mental status, pt did not have O2 on, pt is supposed to be on 4L nc all the time. GCS 10 at this time.  and febrile at this time.      79 year old female with pmhx of stroke, DM, hydrocephalus, CHF, dementia, HTN presents to ED via EMS for AMS and SOB onset tonight. Per report givent o nurse, pt was is on 4 liters at baseline was found altered with no supplemental O2, febrile, GCS 10, with elevated blood glucose.     The history is provided by medical records and the EMS personnel. No  was used.     Review of patient's allergies indicates:   Allergen Reactions    Gluten      Other reaction(s): ciliac disease    Gluten protein     Ropinirole      Other reaction(s): hallucinations    Wheat      Other reaction(s): ciliac disease    Wheat containing prod     Zolpidem      hallucinates  Other reaction(s): hallucinations     Past Medical History:   Diagnosis Date    Acute cystitis     Allergic rhinitis     Anemia, unspecified     Angina pectoris     Arthritis     Back pain     Bipolar disorder, unspecified     Celiac disease     Cellulitis     CHF (congestive heart failure)     Constipation     Contracture, left ankle     Dementia     Depressive episode     Diabetes mellitus     Diabetes mellitus with ulcer of foot     Displacement of other urinary catheter, subsequent encounter     Edema     Elevated white blood cell count     Encounter for blood transfusion     Fatigue     Fluid retention     Genetic anomalies of leukocytes     GERD without esophagitis     Goiter     HLD (hyperlipidemia)     Hydrocephalus     Hypertension      Hypokalemia     Hypotension     Hypothyroidism     Magnesium deficiency     Major depression     Migraine     Mild protein-calorie malnutrition     Morbid obesity     Obstructive and reflux uropathy     Osteoarthritis     Osteoporosis     Overactive bladder     Pressure ulcer of right heel     Pressure ulcer of sacral region     Pressure ulcer of sacral region, stage 3     Pruritus     PVD (peripheral vascular disease)     Radiculopathy, lumbar region     Seizures     Sleep apnea     uses CPAP    SOB (shortness of breath)     Stroke     TIA (transient ischemic attack)     Urinary tract infection, site not specified     Vitamin deficiency     Wheelchair dependent      Past Surgical History:   Procedure Laterality Date    ABDOMINAL SURGERY      APPENDECTOMY      BACK SURGERY      BLADDER SURGERY      BRAIN SURGERY      CAROTID ENDARTERECTOMY      CHOLECYSTECTOMY      CSF SHUNT      HERNIA REPAIR      HYSTERECTOMY      INSERTION, SUPRAPUBIC CATHETER N/A 3/26/2024    Procedure: INSERTION, SUPRAPUBIC CATHETER;  Surgeon: Fuad Szymanski MD;  Location: [facility];  Service: Urology;  Laterality: N/A;  CYSTO W/ SUPRAPUBIC CATH INSERTION    TONSILLECTOMY      VASCULAR SURGERY       No family history on file.  Social History     Tobacco Use    Smoking status: Never    Smokeless tobacco: Never   Substance Use Topics    Alcohol use: No    Drug use: No     Review of Systems   Unable to perform ROS: Mental status change   Respiratory:  Positive for shortness of breath.        Physical Exam     Initial Vitals   BP Pulse Resp Temp SpO2   11/21/24 2108 11/21/24 2108 11/21/24 2108 11/21/24 2106 11/21/24 2108   127/69 110 (!) 35 (!) 102.9 °F (39.4 °C) 95 %      MAP       --                Physical Exam    HENT:   Head: Normocephalic.   Scar to left frontotemporal scalp    Eyes: EOM are normal. Right eye exhibits no discharge. Left eye exhibits no discharge. No scleral icterus.   Neck: Neck supple.   Cardiovascular:  Normal rate,  regular rhythm and normal heart sounds.           Pulmonary/Chest: Breath sounds normal. No stridor. No respiratory distress. She has no wheezes. She has no rhonchi. She has no rales.   Abdominal: Abdomen is soft. She exhibits no distension. There is no abdominal tenderness.   Suprapubic catheter with 800 ml of clear yellow urine in bag There is no rebound and no guarding.   Musculoskeletal:         General: No tenderness. Normal range of motion.      Cervical back: Neck supple.      Right lower leg: Edema present.      Left lower leg: Edema present.      Comments: Left ankle wrapped in gauze      Neurological: She is alert. She has normal strength.   Does not open eyes to voice, will open eyes when taped on the shoulder and look at me   Skin: Skin is dry. No rash noted. No erythema. No pallor.   Psychiatric: She has a normal mood and affect. Her behavior is normal. Judgment and thought content normal.         ED Course   Procedures  Labs Reviewed   COMPREHENSIVE METABOLIC PANEL - Abnormal       Result Value    Sodium 136      Potassium 4.5      Chloride 101      CO2 25      Glucose 309 (*)     Blood Urea Nitrogen 28.9 (*)     Creatinine 0.83      Calcium 8.8      Protein Total 6.9      Albumin 2.7 (*)     Globulin 4.2 (*)     Albumin/Globulin Ratio 0.6 (*)     Bilirubin Total 0.2      ALP 95      ALT 19      AST 27      eGFR >60      Anion Gap 10.0      BUN/Creatinine Ratio 35     LACTIC ACID, PLASMA - Abnormal    Lactic Acid Level 2.6 (*)    URINALYSIS, REFLEX TO URINE CULTURE - Abnormal    Color, UA Yellow      Appearance, UA Clear      Specific Gravity, UA 1.022      pH, UA 7.0      Protein, UA Trace (*)     Glucose, UA Normal      Ketones, UA Negative      Blood, UA Negative      Bilirubin, UA Negative      Urobilinogen, UA Normal      Nitrites, UA Negative      Leukocyte Esterase,  (*)     RBC, UA 0-5      WBC, UA 21-50 (*)     Bacteria, UA Trace      Squamous Epithelial Cells, UA None Seen      Mucous,  UA Trace (*)    CBC WITH DIFFERENTIAL - Abnormal    WBC 18.94 (*)     RBC 4.57      Hgb 13.0      Hct 41.2      MCV 90.2      MCH 28.4      MCHC 31.6 (*)     RDW 16.7      Platelet 196      MPV 10.1      Neut % 84.2      Lymph % 11.0      Mono % 3.0      Eos % 1.0      Basophil % 0.3      Lymph # 2.08      Neut # 15.94 (*)     Mono # 0.57      Eos # 0.19      Baso # 0.06      IG# 0.10 (*)     IG% 0.5      NRBC% 0.0     LACTIC ACID, PLASMA - Abnormal    Lactic Acid Level 2.5 (*)    COVID/FLU A&B PCR - Normal    Influenza A PCR Not Detected      Influenza B PCR Not Detected      SARS-CoV-2 PCR Not Detected      Narrative:     The Xpert Xpress SARS-CoV-2/FLU/RSV plus is a rapid, multiplexed real-time PCR test intended for the simultaneous qualitative detection and differentiation of SARS-CoV-2, Influenza A, Influenza B, and respiratory syncytial virus (RSV) viral RNA in either nasopharyngeal swab or nasal swab specimens.         MRSA PCR - Normal    MRSA PCR Screen Not Detected      Narrative:     The Xpert MRSA Assay utilizes automated real-time polymerase chain reaction (PCR) to detect MRSA DNA.  A positive test result does not necessarily indicate the presence of viable organism.  It is however, presumptive for the presence of MRSA.   B-TYPE NATRIURETIC PEPTIDE - Normal    Natriuretic Peptide 57.0     BLOOD CULTURE OLG   BLOOD CULTURE OLG   CULTURE, URINE   CBC W/ AUTO DIFFERENTIAL    Narrative:     The following orders were created for panel order CBC auto differential.  Procedure                               Abnormality         Status                     ---------                               -----------         ------                     CBC with Differential[[id number]]       Abnormal            Final result                 Please view results for these tests on the individual orders.     EKG Readings: (Independently Interpreted)   Initial Reading: No STEMI. Rhythm: Normal Sinus Rhythm. Ectopy: No Ectopy.  Conduction: Normal.   116 bpm       Imaging Results              CT Head Without Contrast (Preliminary result)  Result time 11/21/24 22:50:04      Preliminary result by Antolin Campa MD (11/21/24 22:50:04)                   Narrative:    START OF REPORT:  Technique: CT of the head was performed without intravenous contrast with axial as well as coronal and sagittal images.    Comparison: Comparison is with study [id number] 14:15:17.    Dosage Information: Automated Exposure Control was utilized 980.30 mGy.cm.    Clinical history: Altered Mental Status (Pt arrives via [facility] unit 44 for shortness of breath and altered mental status, pt did not have O2 on, pt is supposed to be on 4L nc all the time. GCS 10 at this time.  and febrile at this time. ).    Findings:  Hemorrhage: No acute intracranial hemorrhage is seen.  CSF spaces: The ventricles, sulci and basal cisterns all appear moderately prominent and stable consistent with global cerebral atrophy. There is a ventricular drain through a right frontal approach with its tip in the right lateral ventricle and stable ventricular size.  Brain parenchyma: There is preservation of the grey white junction throughout. No acute infarct is identified. Mild microvascular change is seen in portions of the periventricular and deep white matter tracts.  Cerebellum: There is a chronic infarct with changes of encephalomalacia in left cerebellar hemisphere. The rest of the cerebellum appears normal.  Sella and skull base: The sella appears to be within normal limits for age.  Cerebellopontine angles: Within normal limits.  Herniation: None.  Intracranial calcifications: Incidental note is made of bilateral choroid plexus calcification. Incidental note is made of some pineal region calcification. Incidental note is made of some calcification of the falx.  Calvarium: No acute linear or depressed skull fracture is seen.    Maxillofacial Structures:  Paranasal sinuses: The  visualized paranasal sinuses appear clear with no mucoperiosteal thickening or air fluid levels identified.  Orbits: The orbits appear unremarkable.  Zygomatic arches: The zygomatic arches are intact and unremarkable.  Temporal bones and mastoids: The temporal bones and mastoids appear unremarkable.  TMJ: The mandibular condyles appear normally placed with respect to the mandibular fossa.      Impression:  1. No acute intracranial process identified. Details and other findings as noted above.                                         X-Ray Chest AP Portable (In process)                      Medications   lactated ringers bolus 1,317 mL (0 mLs Intravenous Stopped 11/22/24 0015)   piperacillin-tazobactam (ZOSYN) 4.5 g in D5W 100 mL IVPB (MB+) (0 g Intravenous Stopped 11/22/24 0002)   acetaminophen 1,000 mg/100 mL (10 mg/mL) injection 1,000 mg (0 mg Intravenous Stopped 11/21/24 2230)   vancomycin (VANCOCIN) 1,000 mg in D5W 250 mL IVPB (admixture device) (1,000 mg Intravenous New Bag 11/22/24 0012)     Medical Decision Making  The differential diagnosis includes, but is not limited to, covid, flu, pneumonia, UTI, COPD, exacerbation     Amount and/or Complexity of Data Reviewed  Labs: ordered.  Radiology: ordered.    Risk  Prescription drug management.            Scribe Attestation:   Scribe #1: I performed the above scribed service and the documentation accurately describes the services I performed. I attest to the accuracy of the note.    Attending Attestation:           Physician Attestation for Scribe:  Physician Attestation Statement for Scribe #1: I, German Paris MD, reviewed documentation, as scribed by Carl Medina in my presence, and it is both accurate and complete.             ED Course as of 11/22/24 0155   Fri Nov 22, 2024   0124 [facility] medicine paged [BG]      ED Course User Index  [BG] Carl Medina                           Clinical Impression:  Final diagnoses:  [R06.02] SOB (shortness  of breath)  [A41.9] Sepsis          ED Disposition Condition    Admit                 German Paris MD  11/22/24 0154       German Paris MD  11/22/24 0155

## 2024-11-22 NOTE — PROGRESS NOTES
[facility] - 4th Floor Medical Telemetry  Wound Care    Patient Name:  Elisabet Choi   MRN:  [id number]  Date: 11/22/2024  Diagnosis: <principal problem not specified>    History:     Past Medical History:   Diagnosis Date    Acute cystitis     Allergic rhinitis     Anemia, unspecified     Angina pectoris     Arthritis     Back pain     Bipolar disorder, unspecified     Celiac disease     Cellulitis     CHF (congestive heart failure)     Constipation     Contracture, left ankle     Dementia     Depressive episode     Diabetes mellitus     Diabetes mellitus with ulcer of foot     Displacement of other urinary catheter, subsequent encounter     Edema     Elevated white blood cell count     Encounter for blood transfusion     Fatigue     Fluid retention     Genetic anomalies of leukocytes     GERD without esophagitis     Goiter     HLD (hyperlipidemia)     Hydrocephalus     Hypertension     Hypokalemia     Hypotension     Hypothyroidism     Magnesium deficiency     Major depression     Migraine     Mild protein-calorie malnutrition     Morbid obesity     Obstructive and reflux uropathy     Osteoarthritis     Osteoporosis     Overactive bladder     Pressure ulcer of right heel     Pressure ulcer of sacral region     Pressure ulcer of sacral region, stage 3     Pruritus     PVD (peripheral vascular disease)     Radiculopathy, lumbar region     Seizures     Sleep apnea     uses CPAP    SOB (shortness of breath)     Stroke     TIA (transient ischemic attack)     Urinary tract infection, site not specified     Vitamin deficiency     Wheelchair dependent        Social History     Socioeconomic History    Marital status:    Tobacco Use    Smoking status: Never    Smokeless tobacco: Never   Substance and Sexual Activity    Alcohol use: No    Drug use: No     Social Drivers of Health     Financial Resource Strain: Low Risk  (9/26/2024)    Overall Financial Resource Strain (CARDIA)     Difficulty of Paying  Living Expenses: Not hard at all   Food Insecurity: No Food Insecurity (9/26/2024)    Hunger Vital Sign     Worried About Running Out of Food in the Last Year: Never true     Ran Out of Food in the Last Year: Never true   Transportation Needs: No Transportation Needs (9/26/2024)    TRANSPORTATION NEEDS     Transportation : No   Physical Activity: Inactive (9/26/2024)    Exercise Vital Sign     Days of Exercise per Week: 0 days     Minutes of Exercise per Session: 0 min   Stress: No Stress Concern Present (9/26/2024)    [employer] - Occupational Stress Questionnaire     Feeling of Stress : Only a little   Housing Stability: Low Risk  (9/26/2024)    Housing Stability Vital Sign     Unable to Pay for Housing in the Last Year: No     Homeless in the Last Year: No       Precautions:     Allergies as of 11/21/2024 - Reviewed 11/21/2024   Allergen Reaction Noted    Gluten  12/10/2019    Gluten protein  06/29/2014    Ropinirole  07/24/2022    Wheat  12/10/2019    Wheat containing prod  06/29/2014    Zolpidem  06/29/2014       WO Assessment Details/Treatment      11/22/24 1049   [facility] Assessment   Visit Date 11/22/24   Visit Time 1049   Consult Type New   [facility] Speciality Wound   Intervention chart review;assessed;changed;applied;orders   Teaching on-going        Altered Skin Integrity 03/26/24 2010 Sacral spine Intact skin with non-blanchable redness of localized area   Date First Assessed/Time First Assessed: 03/26/24 2010   Altered Skin Integrity Present on Admission - Did Patient arrive to the hospital with altered skin?: yes  Location: Sacral spine  Description of Altered Skin Integrity: Intact skin with non-blan...   Wound Image     Dressing Appearance Dry;Clean;Intact;Open to air   Drainage Amount None   Appearance Pink;Dry;Red   Tissue loss description Not applicable   Black (%), Wound Tissue Color 0 %   Red (%), Wound Tissue Color 100 %   Yellow (%), Wound Tissue Color 0 %   Periwound  Area Dry;Intact;Pink   Care Cleansed with:;Other (see comments)  (remedy skin cleanser, applied zinc oxide, abd pad, tape)        Altered Skin Integrity 03/26/24 2010 Left Heel Pressure Injury Partial thickness tissue loss. Shallow open ulcer with a red or pink wound bed, without slough. Intact or Open/Ruptured Serum-filled blister.   Date First Assessed/Time First Assessed: 03/26/24 2010   Altered Skin Integrity Present on Admission - Did Patient arrive to the hospital with altered skin?: yes  Side: Left  Location: Heel  Primary Wound Type: Pressure Injury  Description of Altered ...   Wound Image    Dressing Appearance Clean;Intact;Dried drainage   Drainage Amount Small   Drainage Characteristics/Odor Serosanguineous   Appearance Pink;Red;Moist;Yellow   Tissue loss description Full thickness   Black (%), Wound Tissue Color 0 %   Red (%), Wound Tissue Color 95 %   Yellow (%), Wound Tissue Color 5 %   Periwound Area Macerated;Moist;Pink;Redness   Wound Edges Irregular   Wound Length (cm) 7.3 cm   Wound Width (cm) 3.1 cm   Wound Depth (cm) 0.1 cm   Wound Volume (cm^3) 2.263 cm^3   Wound Surface Area (cm^2) 22.63 cm^2   Care Cleansed with:;Antimicrobial agent   Dressing Applied;Other (comment)  (Aquacel AG , gauze, kerlix, tape)        Wound 11/22/24 0300 Other (comment) anterior;lower Abdomen   Date First Assessed/Time First Assessed: 11/22/24 0300   Present on Original Admission: Yes  Primary Wound Type: Other (comment)  Orientation: anterior;lower  Location: Abdomen   Wound Image     Dressing Appearance Open to air;other (see comments)  (moist)   Drainage Amount None   Appearance Pink;Smooth;Moist   Tissue loss description Not applicable   Black (%), Wound Tissue Color 0 %   Red (%), Wound Tissue Color 100 %   Yellow (%), Wound Tissue Color 0 %   Periwound Area Dry;Intact;Pink   Care Cleansed with:;Other (see comments)  (remedy skin cleanser, dried well, applied zeasorb powder to folds.)     WOCN new consult for  multiple skin concerns. Pt was admitted to hospital from a long term care facility. Introduced myself and nurse robb Larsen to patient. Explained reason for visit; pt agreed to be seen. Pt mobility is limited at this time and requires assistance x 2 for turning and repositioning. Assessed buttocks area. No dressing in place. At present skin intact and dry. Pt is at risk for moisture associated dermatitis due to incontinence and immobility. Prevention measures taken. Treatment recommendations include: cleansing well with remedy skin cleanser. Drying well. Applying zinc oxide, cover with abd pad and secure with tape BID and prn. Assessed groin area and abdominal folds. Area is moist , no presence of yeast rash at this time. Treatment recommendations : cleanse with remedy skin cleanser. Dry well. Apply Zeasorb powder to folds BID and prn. Insert Exudry into folds to assist with moisture control. Left heel assessed. Removed dressing. Cleansed well with Vashe. Measurements obtained, photos taken. Treatment recommendations include: Cleanse with Vashe. Apply Aquacel AG to heel, cover with 4x4 gauze, kerlix, tape BID. Nursing to continue with treatment recommendations. Pup pack in use. Pt is on MEME mattress. Will follow up.  11/22/2024

## 2024-11-22 NOTE — PROGRESS NOTES
Inpatient Nutrition Assessment    Admit Date: 11/21/2024   Total duration of encounter: 1 day   Patient Age: 79 y.o.    Nutrition Recommendation/Prescription     Continue low sodium, diabetic diet; add gluten free; consistency per home diet or SLP.  Add Boost Glucose Control (provides 190 kcal, 16 g protein per serving) and Alex (provides 90 kcal, 2.5 g protein per serving) BID for wound healing.    Communication of Recommendations: reviewed with nurse    Nutrition Assessment     Malnutrition Assessment/Nutrition-Focused Physical Exam     Does not meet criteria                                                           A minimum of two characteristics is recommended for diagnosis of either severe or non-severe malnutrition.    Chart Review    Reason Seen: malnutrition screening tool (MST)    Malnutrition Screening Tool Results   Have you recently lost weight without trying?: Unsure (unable to answer)  Have you been eating poorly because of a decreased appetite?: No   MST Score: 2   Diagnosis: sepsis, shortness of breath    Relevant Medical History: stroke, DM, hydrocephalus, CHF, dementia, HTN     Scheduled Medications:  azithromycin, 500 mg, Q24H  cefTRIAXone (Rocephin) IV (PEDS and ADULTS), 1 g, Q24H  enoxparin, 40 mg, Daily  miconazole NITRATE 2 %, , BID    Continuous Infusions: none       PRN Medications:   acetaminophen, 650 mg, Q6H PRN  aluminum-magnesium hydroxide-simethicone, 30 mL, QID PRN  dextrose 10%, 12.5 g, PRN  dextrose 10%, 25 g, PRN  glucagon (human recombinant), 1 mg, PRN  glucose, 16 g, PRN  glucose, 24 g, PRN  insulin aspart U-100, 0-10 Units, QID (AC + HS) PRN  melatonin, 6 mg, Nightly PRN  naloxone, 0.02 mg, PRN  ondansetron, 4 mg, Q4H PRN  polyethylene glycol, 17 g, BID PRN  prochlorperazine, 5 mg, Q6H PRN  simethicone, 1 tablet, QID PRN    Calorie Containing IV Medications: no significant kcals from medications at this time    Recent Labs   Lab 11/21/24 2200 11/22/24  0520    137   K  "4.5 4.0   CALCIUM 8.8 8.4   PHOS  --  3.7   MG  --  2.00    103   CO2 25 28   BUN 28.9* 27.5*   CREATININE 0.83 0.86   EGFRNORACEVR >60 >60   GLUCOSE 309* 237*   BILITOT 0.2 0.3   ALKPHOS 95 76   ALT 19 18   AST 27 17   ALBUMIN 2.7* 2.3*   WBC 18.94* 18.08*   HGB 13.0 10.8*   HCT 41.2 34.7*     Nutrition Orders:  Diet Heart Healthy Diabetic, Soft & Bite Sized (IDDSI Level 6); 2000 Calories (up to 75 gm per meal)      Appetite/Oral Intake: good/% of meals  Factors Affecting Nutritional Intake: none identified  Social Needs Impacting Access to Food: none identified  Food/Protestant/Cultural Preferences: unable to obtain  Food Allergies: wheat  Last Bowel Movement:  (unknown)  Wound(s):     Altered Skin Integrity 03/26/24 2010 Sacral spine Intact skin with non-blanchable redness of localized area-Tissue loss description: Not applicable       Altered Skin Integrity 03/26/24 2010 Left Heel Pressure Injury Partial thickness tissue loss. Shallow open ulcer with a red or pink wound bed, without slough. Intact or Open/Ruptured Serum-filled blister.-Tissue loss description: Full thickness       Wound 11/22/24 0300 Other (comment) anterior;lower Abdomen-Tissue loss description: Not applicablepartial thickness to foot    Comments    11/22/24 Good intake reported by nursing, celiac disease noted in allergy list, will add gluten free diet; add Boost Glucose Control and Alex for healing. Patient unable to provide information.    Anthropometrics    Height: 4' 8" (142.2 cm), Height Method: Estimated  Last Weight: 102.4 kg (225 lb 12 oz) (11/21/24 2108), Weight Method: Estimated  BMI (Calculated): 50.6  BMI Classification: obese grade III (BMI >/=40)     Ideal Body Weight (IBW), Female: 80 lb     % Ideal Body Weight, Female (lb): 282.19 %                             Usual Weight Provided By: unable to obtain usual weight    Wt Readings from Last 5 Encounters:   11/21/24 102.4 kg (225 lb 12 oz)   09/27/24 102.4 kg (225 lb " 12 oz)   07/08/24 68 kg (149 lb 14.6 oz)   05/01/24 68 kg (149 lb 14.6 oz)   03/26/24 68 kg (149 lb 14.6 oz)     Weight Change(s) Since Admission: new admit, question accuracy of weight  Wt Readings from Last 1 Encounters:   11/21/24 2108 102.4 kg (225 lb 12 oz)   Admit Weight: 102.4 kg (225 lb 12 oz) (11/21/24 2108), Weight Method: Estimated    Estimated Needs    Weight Used For Calorie Calculations: 68 kg (150 lb)  Energy Calorie Requirements (kcal): 1128-2122, 1.2-1.4 stress factor  Energy Need Method: [place]  Weight Used For Protein Calculations: 68 kg (150 lb)  Protein Requirements:  g, 1.2-1.5 g/kg  Fluid Requirements (mL): 1400, 1 ml/kcal  CHO Requirement: 137-160 g, 45% of kcal     Enteral Nutrition Patient not receiving enteral nutrition at this time.    Parenteral Nutrition Patient not receiving parenteral nutrition support at this time.    Evaluation of Received Nutrient Intake    Calories: meeting estimated needs  Protein: meeting estimated needs    Patient Education Not applicable.    Nutrition Diagnosis     PES: Inadequate energy intake related to inability to consume sufficient nutrients as evidenced by less than 80% needs met. (new)    Nutrition Interventions     Intervention(s): general/healthful diet, commercial beverage, and collaboration with other providers  Goal: Meet greater than 80% of nutritional needs by follow-up. (new)    Nutrition Goals & Monitoring     Dietitian will monitor: food and beverage intake  Discharge planning: resume home regimen  Nutrition Risk/Follow-Up: moderate (follow-up in 3-5 days)   Please consult if re-assessment needed sooner.

## 2024-11-22 NOTE — NURSING
Pt arrived to the unit, verbally non responsive but opens her eyes when spoken to and can follow easy commands like to open her mouth, blood sugar 285mg/dl but arm band will not scan, first BP taken 78/49, pt is assisted to bed and made comfortable. Multiple skin excoriations and wound to the right heel. Pictures of skin disorders taken. Second BP taken 103/68. . Waiting for admission orders,

## 2024-11-22 NOTE — PLAN OF CARE
11/22/24 1002   Discharge Assessment   Assessment Type Discharge Planning Assessment   Confirmed/corrected address, phone number and insurance Yes   Confirmed Demographics Correct on Facesheet   Source of Information patient   If unable to respond/provide information was family/caregiver contacted? Yes   Contact Name/Number Christine Choi [phone]   Communicated JOSÉ MIGUEL with patient/caregiver Date not available/Unable to determine   Reason For Admission SOB   People in Home facility resident   Facility Arrived From: [facility]   Do you expect to return to your current living situation? Yes   Do you have help at home or someone to help you manage your care at home? Yes   Who are your caregiver(s) and their phone number(s)? [facility]   Prior to hospitilization cognitive status: Not Oriented to Place;Not Oriented to Time   Walking or Climbing Stairs Difficulty yes   Walking or Climbing Stairs   (Not ambulatory since 2019 per [facility])   Mobility Management duke lift used at nursing home   Dressing/Bathing Difficulty yes   Dressing/Bathing dressing difficulty, dependent   Patient currently being followed by outpatient case management? No   Do you take prescription medications? Yes   Do you have prescription coverage? Yes   Do you have any problems affording any of your prescribed medications? No   Is the patient taking medications as prescribed? yes   Who is going to help you get home at discharge? facility transport   How do you get to doctors appointments? other (see comments)  (facility transport)   Are you on dialysis? No   Do you take coumadin? No   Discharge Plan A Return to nursing home   Discharge Plan B Return to Nursing Home   DME Needed Upon Discharge    (TBD)   Discharge Plan discussed with: POA   Name(s) and Number(s) Christine Jimbo   OTHER   Name(s) of People in Home [facility] resident     Assessment taken by Christine FIELDS, over phone since per nurse pt is unable to provide info. Pt is a resident  of [facility] and will return there at discharge. Christine said both her and her  are POA for pt. I requested a copy of POA to keep on pt's chart. Christine said she'll bring when they come to visit the patient.

## 2024-11-22 NOTE — H&P
[facility] Hospital Medicine History & Physical Examination       Patient Name: Elisabet Choi  MRN: [id number]  Patient Class: IP- Inpatient   Admission Date: 11/21/2024   Admitting Physician: RASHMI Service   Length of Stay: 0  Attending Physician: Dr Rodriguez Peters  [facility]  Face-to-Face encounter date: 11/22/2024  Code Status: Full code  Chief Complaint: Altered Mental Status (Pt arrives via [facility] unit 44 for shortness of breath and altered mental status, pt did not have O2 on, pt is supposed to be on 4L nc all the time. GCS 10 at this time.  and febrile at this time. )        Screening for Social Drivers for health:  Patient screened for food insecurity, housing instability, transportation needs, utility difficulties, and interpersonal safety (select all that apply as identified as concern)  []Housing or Food  []Transportation Needs  []Utility Difficulties  []Interpersonal safety  [x]None    Patient information was obtained from patient, patient's family, past medical records and/or ER records.     HISTORY OF PRESENT ILLNESS:   Elisabet Choi is a 79 y.o. female who  has a past medical history of Acute cystitis, Allergic rhinitis, Anemia, unspecified, Angina pectoris, Arthritis, Back pain, Bipolar disorder, unspecified, Celiac disease, Cellulitis, CHF (congestive heart failure), Constipation, Contracture, left ankle, Dementia, Depressive episode, Diabetes mellitus, Diabetes mellitus with ulcer of foot, Displacement of other urinary catheter, subsequent encounter, Edema, Elevated white blood cell count, Encounter for blood transfusion, Fatigue, Fluid retention, Genetic anomalies of leukocytes, GERD without esophagitis, Goiter, HLD (hyperlipidemia), Hydrocephalus, Hypertension, Hypokalemia, Hypotension, Hypothyroidism, Magnesium deficiency, Major depression, Migraine, Mild protein-calorie malnutrition, Morbid obesity, Obstructive and reflux uropathy,  Osteoarthritis, Osteoporosis, Overactive bladder, Pressure ulcer of right heel, Pressure ulcer of sacral region, Pressure ulcer of sacral region, stage 3, Pruritus, PVD (peripheral vascular disease), Radiculopathy, lumbar region, Seizures, Sleep apnea, SOB (shortness of breath), Stroke, TIA (transient ischemic attack), Urinary tract infection, site not specified, Vitamin deficiency, and Wheelchair dependent.. The patient presented to [facility] on [facility]/21/2024 with a primary complaint of altered mental status with associated shortness a breath.  Patient is on chronic oxygen therapy however it was reported his oxygen was not on.  He was placed on oxygen with EMS.  Patient has a history of dementia and can not provide any historical information at this time.  Majority of the information is obtained from the medical records and provider reports.  No reports of any new foods or medications.  Lab work reviewed demonstrated WBCs 18.94, glucose 309, lactic acid 2.6 will repeat values 2.5; other indices unremarkable.  CT of head without contrast impression reviewed demonstrated no acute intracranial abnormality or significant interval change ventricles are stable in size  shunt is in place there was no mass effect or midline shift basal cisterns are patent.  Chest x-ray impression reviewed demonstrated confluent opacities in the left retrocardiac region infiltrate can not be completely excluded otherwise no active pulmonary disease and significant change.  Initial vital signs /69 pulse 110 respirations 35 temperature 103° F O2 saturation 95% on 2 L per nasal cannula.  Patient did have a brief period of hypotension with blood pressure 70/40s which patient was given IV hydration with improvement in hemodynamics.  Urine and blood cultures were collected.  Patient was given IV hydration.  Patient was started on broad-spectrum IV antibiotic therapy.  Patient is admitted to hospital medicine services for further management.       PAST MEDICAL HISTORY:     Past Medical History:   Diagnosis Date    Acute cystitis     Allergic rhinitis     Anemia, unspecified     Angina pectoris     Arthritis     Back pain     Bipolar disorder, unspecified     Celiac disease     Cellulitis     CHF (congestive heart failure)     Constipation     Contracture, left ankle     Dementia     Depressive episode     Diabetes mellitus     Diabetes mellitus with ulcer of foot     Displacement of other urinary catheter, subsequent encounter     Edema     Elevated white blood cell count     Encounter for blood transfusion     Fatigue     Fluid retention     Genetic anomalies of leukocytes     GERD without esophagitis     Goiter     HLD (hyperlipidemia)     Hydrocephalus     Hypertension     Hypokalemia     Hypotension     Hypothyroidism     Magnesium deficiency     Major depression     Migraine     Mild protein-calorie malnutrition     Morbid obesity     Obstructive and reflux uropathy     Osteoarthritis     Osteoporosis     Overactive bladder     Pressure ulcer of right heel     Pressure ulcer of sacral region     Pressure ulcer of sacral region, stage 3     Pruritus     PVD (peripheral vascular disease)     Radiculopathy, lumbar region     Seizures     Sleep apnea     uses CPAP    SOB (shortness of breath)     Stroke     TIA (transient ischemic attack)     Urinary tract infection, site not specified     Vitamin deficiency     Wheelchair dependent        PAST SURGICAL HISTORY:     Past Surgical History:   Procedure Laterality Date    ABDOMINAL SURGERY      APPENDECTOMY      BACK SURGERY      BLADDER SURGERY      BRAIN SURGERY      CAROTID ENDARTERECTOMY      CHOLECYSTECTOMY      CSF SHUNT      HERNIA REPAIR      HYSTERECTOMY      INSERTION, SUPRAPUBIC CATHETER N/A 3/26/2024    Procedure: INSERTION, SUPRAPUBIC CATHETER;  Surgeon: Fuad Szymanski MD;  Location: [facility];  Service: Urology;  Laterality: N/A;  CYSTO W/ SUPRAPUBIC CATH INSERTION    TONSILLECTOMY       VASCULAR SURGERY         ALLERGIES:   Gluten, Gluten protein, Ropinirole, Wheat, Wheat containing prod, and Zolpidem    FAMILY HISTORY:   Reviewed and negative    SOCIAL HISTORY:   No reports of alcohol, tobacco, or illicit drug use   Lives with family     HOME MEDICATIONS:   As documented  Prior to Admission medications    Medication Sig Start Date End Date Taking? Authorizing Provider   acetaminophen (TYLENOL) 500 MG tablet Take 1,000 mg by mouth every 4 (four) hours as needed for Pain.    Provider, Historical   acidophilus-pectin, citrus 100 million cell-10 mg Cap Take 10 mg by mouth.    Provider, Historical   ALPRAZolam (XANAX) 0.25 MG tablet Take 0.25 mg by mouth 2 (two) times daily as needed for Anxiety.    Provider, Historical   amitriptyline (ELAVIL) 25 MG tablet Take 25 mg by mouth nightly as needed for Insomnia.    Provider, Historical   ascorbic acid, vitamin C, (VITAMIN C) 500 MG tablet Take 1 tablet by mouth every morning.    Provider, Historical   cranberry fruit (CRANBERRY) 450 mg Tab Take by mouth.    Provider, Historical   cyanocobalamin (VITAMIN B-12) 1000 MCG tablet Take 100 mcg by mouth once daily.    Provider, Historical   doxazosin (CARDURA) 1 MG tablet Take 1 mg by mouth nightly.    Provider, Historical   dulaglutide (TRULICITY) 3 mg/0.5 mL pen injector Inject 3 mg into the skin every 7 days. wednesdays 3/29/24   Provider, Historical   DULoxetine (CYMBALTA) 60 MG capsule Take 60 mg by mouth once daily.    Provider, Historical   ezetimibe (ZETIA) 10 mg tablet Take 10 mg by mouth once daily.    Provider, Historical   folic acid (FOLVITE) 1 MG tablet Take 1 mg by mouth once daily.    Provider, Historical   furosemide (LASIX) 20 MG tablet Take 20 mg by mouth. 5/14/24   Provider, Historical   gabapentin (NEURONTIN) 300 MG capsule Take 300 mg by mouth 2 (two) times daily.    Provider, Historical   gabapentin (NEURONTIN) 600 MG tablet Take 600 mg by mouth once daily.    Provider, Historical    HYDROcodone-acetaminophen (NORCO)  mg per tablet Take 1 tablet by mouth 3 (three) times daily.    Provider, Historical   insulin degludec 100 unit/mL injection Inject 20 Units into the skin once daily. 7/2/24   Provider, Historical   levETIRAcetam (KEPPRA) 750 MG Tab Take 750 mg by mouth nightly.    Provider, Historical   levothyroxine (SYNTHROID, LEVOTHROID) 175 MCG tablet Take 1 tablet (175 mcg total) by mouth before breakfast. 9/28/24 9/28/25  Nayla Camara MD   lisinopriL (PRINIVIL,ZESTRIL) 5 MG tablet Take 1 tablet (5 mg total) by mouth once daily. 9/28/24 9/28/25  Nayla Camara MD   lovastatin (ALTOPREV) 40 mg 24 hr tablet Take 40 mg by mouth every evening.    Provider, Historical   magnesium oxide (MAG-OX) 400 mg (241.3 mg magnesium) tablet Take 1 tablet by mouth every morning.    Provider, Historical   methenamine (HIPREX) 1 gram Tab Take 1 g by mouth 2 (two) times daily.    Provider, Historical   metoprolol tartrate (LOPRESSOR) 50 MG tablet Take 1 tablet (50 mg total) by mouth 2 (two) times daily. 9/27/24 9/27/25  Nayla Camara MD   polyethylene glycol (GLYCOLAX) 17 gram PwPk Take 17 g by mouth once daily.    Provider, Historical   POTASSIUM CHLORIDE ORAL Take 20 mEq by mouth once daily.    Provider, Historical   SANTYL ointment Apply topically. 6/21/24   Provider, Historical   solifenacin (VESICARE) 5 MG tablet Take 10 mg by mouth every evening.    Provider, Historical   tiZANidine 2 mg Cap Take 2 mg by mouth 3 (three) times daily as needed.    Provider, Historical   topiramate (TOPAMAX) 100 MG tablet Take 100 mg by mouth 2 (two) times daily.    Provider, Historical   torsemide (DEMADEX) 20 MG Tab Take 40 mg by mouth once daily.    Provider, Historical   TRULICITY 1.5 mg/0.5 mL pen injector Inject into the skin. 4/17/24   Provider, Historical   vitamin D (VITAMIN D3) 1000 units Tab Take 1,000 Units by mouth once daily.    Provider, Historical   zinc gluconate 50 mg tablet Take 1 tablet by  mouth every morning.    Provider, Historical   metformin (GLUCOPHAGE) 1000 MG tablet Take 1 tablet (1,000 mg total) by mouth 2 (two) times daily with meals. 6/30/14 7/2/22  Anca Sharma, NP   metoprolol succinate (TOPROL-XL) 50 MG 24 hr tablet Take 1 tablet (50 mg total) by mouth once daily. 6/30/14 7/2/22  Anca Sharma, NP       REVIEW OF SYSTEMS:   Except as documented, all other systems reviewed and negative     PHYSICAL EXAM:     VITAL SIGNS: 24 HRS MIN & MAX LAST   Temp  Min: 97.6 °F (36.4 °C)  Max: 103 °F (39.4 °C) 97.8 °F (36.6 °C)   BP  Min: 78/48  Max: 137/76 122/83   Pulse  Min: 64  Max: 114  72   Resp  Min: 14  Max: 35 18   SpO2  Min: 91 %  Max: 100 % 95 %       General appearance:  Chronically ill-appearing elderly female; ill-appearing, nontoxic, chronically confused secondary to dementia  HENT: Atraumatic head. Moist mucous membranes of oral cavity.  Healed left frontal temporal scalp incision  Eyes:  PERRL  Neck: Supple.   Lungs:  Diminished bilaterally, mildly labored respirations symmetrical expansion. No wheezing present.  O2 saturation per nasal cannula   Heart: Regular rate and rhythm. S1 and S2 present capillary refill brisk  edema lower extremities   Abdomen: Soft, obese, on-distended, non-tender.  Bowel sounds are normal.  Suprapubic catheter to gravity drainage draining clear yellow urine catheter site intact  Extremities: No cyanosis, clubbing, edema lower extremities  Skin:  Warm and dry; erythema to sacral area, left heel foot with wound; groin erythema; see media  Neuro:  Awake alert chronically confused; no acute focal deficits   Psych/mental status:  Flat affect cooperative.     LABS AND IMAGING:     Recent Labs   Lab 11/21/24 2207 11/22/24  0520   WBC 18.94* 18.08*   RBC 4.57 3.90*   HGB 13.0 10.8*   HCT 41.2 34.7*   MCV 90.2 89.0   MCH 28.4 27.7   MCHC 31.6* 31.1*   RDW 16.7 16.8    141   MPV 10.1 9.8       Recent Labs   Lab 11/21/24 2207 11/22/24  0520    137    K 4.5 4.0    103   CO2 25 28   BUN 28.9* 27.5*   CREATININE 0.83 0.86   CALCIUM 8.8 8.4   MG  --  2.00   ALBUMIN 2.7* 2.3*   ALKPHOS 95 76   ALT 19 18   AST 27 17   BILITOT 0.2 0.3       Microbiology Results (last 7 days)       Procedure Component Value Units Date/Time    Blood culture x two cultures. Draw prior to antibiotics. [[id number]] Collected: 11/21/24 2249    Order Status: Resulted Specimen: Blood Updated: 11/21/24 2252    Urine culture [[id number]] Collected: 11/21/24 2212    Order Status: Sent Specimen: Urine Updated: 11/21/24 2224    Blood culture x two cultures. Draw prior to antibiotics. [[id number]] Collected: 11/21/24 2207    Order Status: Resulted Specimen: Blood Updated: 11/21/24 2212             X-Ray Chest AP Portable  Narrative: EXAMINATION:  XR CHEST AP PORTABLE    CLINICAL HISTORY:  Sepsis;    TECHNIQUE:  Single frontal view of the chest was performed.    COMPARISON:  September 25, 2024    FINDINGS:  Examination reveals mediastinal silhouette to be within normal limits cardiac silhouette is at the upper limits of normal.    Confluent airspace opacities in the left retrocardiac region early infiltrate cannot be completely excluded.    No other focal consolidative changes.    A ventriculoperitoneal shunt catheter is identified  Impression: Some confluent opacities in the left retrocardiac region infiltrate cannot be completely excluded.    Otherwise no active pulmonary disease and no significant change    Electronically signed by: Evert Villareal  Date:    11/22/2024  Time:    08:50  CT Head Without Contrast  Narrative: EXAMINATION:  CT HEAD WITHOUT CONTRAST    CLINICAL HISTORY:  Mental status change, unknown cause;    TECHNIQUE:  Axial scans were obtained from skull base to the vertex.    Coronal and sagittal reconstructions obtained from the axial data.    Automatic exposure control was utilized to limit radiation dose.    Contrast: None    Radiation Dose:    Total DLP: 980  mGy*cm    COMPARISON:  CT head dated 03/20/2024    FINDINGS:  There is no acute intracranial hemorrhage or edema.  There is encephalomalacia in the right frontal lobe and left cerebellum.    Right frontal approach ventricular shunt catheter is in place with tip over the right lateral ventricle.  The ventricles are stable in size.  There is no mass effect or midline shift.  The basal cisterns are patent.  There is no abnormal extra-axial fluid collection.  Carotid artery calcifications are noted.    There are postoperative changes from prior right-sided craniectomy..  The visualized paranasal sinuses and the mastoid air cells are clear.  Impression: No acute intracranial abnormality or significant interval change.    No significant change from the [facility] interpretation    Electronically signed by: Mery Han  Date:    11/22/2024  Time:    07:08        ASSESSMENT & PLAN:   ASSESSMENT:  Acute respiratory failure with hypoxia-on chronic supplemental oxygen therapy-POA   Pneumonia-suspected left, bacterial-POA  Sepsis-suspect secondary to pneumonia-POA  Hypovolemic hypotension-suspect secondary to sepsis-POA  Fever-secondary to sepsis-POA   Acute on chronic encephalopathy-metabolic secondary to respiratory failure and sepsis-POA   Leukocytosis-suspect secondary to sepsis-POA   Dm type 2 with hyperglycemia-POA   Left foot/heel wound-POA  Chronic dementia with chronic encephalopathy worsening symptoms on presentation-POA   Hydrocephalus-status post  shunt placement-POA   Weakness-POA     Hx of Recurrent cystitis with neurogenic bladder status post suprapubic catheter placement, anemia, chronic arthritis, bipolar disorder, CHF, GERD, hypothyroidism, HLD, anxiety, depression, chronic osteoarthritis, CVA/TIA, seizures, ERICA, wheelchair dependent      PLAN:  Consult wound care services appreciate assistance and recommendations   Follow cultures and sensitivities   Continue with broad-spectrum IV antibiotic therapy    Trend out lactic acid levels   Consult PT OT services   Neurochecks q.4 hours times 24 hours   Seizure precautions   Fall precautions   Accu-Cheks and sliding scale coverage   Resume home medication as deemed necessary   Turn q.2 hours   Offload bony prominences   Accurate I&O   Daily weights   Repeat lab work in a.m.      VTE Prophylaxis: Eliquis for DVT prophylaxis and will be advised to be as mobile as possible and sit in a chair as tolerated    Patient condition:  Stable  __________________________________________________________________________  INPATIENT LIST OF MEDICATIONS     Scheduled Meds:   azithromycin  500 mg Intravenous Q24H    cefTRIAXone (Rocephin) IV (PEDS and ADULTS)  1 g Intravenous Q24H    enoxparin  40 mg Subcutaneous Daily    miconazole NITRATE 2 %   Topical (Top) BID     Continuous Infusions:  PRN Meds:.  Current Facility-Administered Medications:     acetaminophen, 650 mg, Oral, Q6H PRN    aluminum-magnesium hydroxide-simethicone, 30 mL, Oral, QID PRN    dextrose 10%, 12.5 g, Intravenous, PRN    dextrose 10%, 25 g, Intravenous, PRN    glucagon (human recombinant), 1 mg, Intramuscular, PRN    glucose, 16 g, Oral, PRN    glucose, 24 g, Oral, PRN    insulin aspart U-100, 0-10 Units, Subcutaneous, QID (AC + HS) PRN    melatonin, 6 mg, Oral, Nightly PRN    naloxone, 0.02 mg, Intravenous, PRN    ondansetron, 4 mg, Intravenous, Q4H PRN    polyethylene glycol, 17 g, Oral, BID PRN    prochlorperazine, 5 mg, Intravenous, Q6H PRN    simethicone, 1 tablet, Oral, QID PRN      Carmen BARTON FNP have reviewed and discussed the case with Dr. Rodriguez Peters.  Please see the following addendum for further assessment and plan from their attending MD.  YECENIA Jones   11/22/2024    ________________________________________________________________________________    MD Addendum:  Dr. ORALIA ---assumed care of this patient today at ---am/pm  For the patient encounter, I performed the substantive  portion of the visit, I reviewed the NP/PA documentation, treatment plan, and medical decision making.  I had face to face time with this patient     A. History:    B. Physical exam:    C. Medical decision making:    Discharge Planning and Disposition: No mobility needs. Ambulating well. Good social support system.   Anticipated discharge    All diagnosis and differential diagnosis have been reviewed; assessment and plan has been documented; I have personally reviewed the labs and test results that are presently available; I have reviewed the patients medication list; I have reviewed the consulting providers response and recommendations. I have reviewed or attempted to review medical records based upon their availability.    All of the patient and family questions have been addressed and answered. Patient's is agreeable to the above stated plan. I will continue to monitor closely and make adjustments to medical management as needed.

## 2024-11-23 LAB
BACTERIA UR CULT: ABNORMAL
BACTERIA UR CULT: ABNORMAL
POCT GLUCOSE: 148 MG/DL (ref 70–110)
POCT GLUCOSE: 181 MG/DL (ref 70–110)
POCT GLUCOSE: 201 MG/DL (ref 70–110)
POCT GLUCOSE: 209 MG/DL (ref 70–110)

## 2024-11-23 PROCEDURE — 21400001 HC TELEMETRY ROOM

## 2024-11-23 PROCEDURE — 99900031 HC PATIENT EDUCATION (STAT)

## 2024-11-23 PROCEDURE — 63600175 PHARM REV CODE 636 W HCPCS: Performed by: NURSE PRACTITIONER

## 2024-11-23 PROCEDURE — 63600175 PHARM REV CODE 636 W HCPCS: Performed by: INTERNAL MEDICINE

## 2024-11-23 PROCEDURE — 25000003 PHARM REV CODE 250: Performed by: INTERNAL MEDICINE

## 2024-11-23 PROCEDURE — 25000003 PHARM REV CODE 250: Performed by: NURSE PRACTITIONER

## 2024-11-23 PROCEDURE — 99900035 HC TECH TIME PER 15 MIN (STAT)

## 2024-11-23 PROCEDURE — 27000221 HC OXYGEN, UP TO 24 HOURS

## 2024-11-23 RX ORDER — FUROSEMIDE 10 MG/ML
40 INJECTION INTRAMUSCULAR; INTRAVENOUS ONCE
Status: COMPLETED | OUTPATIENT
Start: 2024-11-23 | End: 2024-11-23

## 2024-11-23 RX ORDER — FUROSEMIDE 20 MG/1
20 TABLET ORAL DAILY
Status: DISCONTINUED | OUTPATIENT
Start: 2024-11-24 | End: 2024-11-24

## 2024-11-23 RX ADMIN — EZETIMIBE 10 MG: 10 TABLET ORAL at 09:11

## 2024-11-23 RX ADMIN — FUROSEMIDE 40 MG: 10 INJECTION, SOLUTION INTRAMUSCULAR; INTRAVENOUS at 01:11

## 2024-11-23 RX ADMIN — LEVETIRACETAM 750 MG: 500 SOLUTION ORAL at 08:11

## 2024-11-23 RX ADMIN — INSULIN ASPART 2 UNITS: 100 INJECTION, SOLUTION INTRAVENOUS; SUBCUTANEOUS at 04:11

## 2024-11-23 RX ADMIN — DULOXETINE HYDROCHLORIDE 60 MG: 30 CAPSULE, DELAYED RELEASE ORAL at 09:11

## 2024-11-23 RX ADMIN — Medication 6 MG: at 12:11

## 2024-11-23 RX ADMIN — Medication 1 CAPSULE: at 09:11

## 2024-11-23 RX ADMIN — ENOXAPARIN SODIUM 40 MG: 40 INJECTION SUBCUTANEOUS at 04:11

## 2024-11-23 RX ADMIN — INSULIN ASPART 2 UNITS: 100 INJECTION, SOLUTION INTRAVENOUS; SUBCUTANEOUS at 01:11

## 2024-11-23 RX ADMIN — MICONAZOLE NITRATE: 20 POWDER TOPICAL at 09:11

## 2024-11-23 RX ADMIN — TOPIRAMATE 100 MG: 25 TABLET, FILM COATED ORAL at 09:11

## 2024-11-23 RX ADMIN — HYDROCODONE BITARTRATE AND ACETAMINOPHEN 1 TABLET: 10; 325 TABLET ORAL at 04:11

## 2024-11-23 RX ADMIN — INSULIN ASPART 2 UNITS: 100 INJECTION, SOLUTION INTRAVENOUS; SUBCUTANEOUS at 08:11

## 2024-11-23 RX ADMIN — PRAVASTATIN SODIUM 20 MG: 10 TABLET ORAL at 08:11

## 2024-11-23 RX ADMIN — Medication 6 MG: at 08:11

## 2024-11-23 RX ADMIN — CEFTRIAXONE SODIUM 1 G: 1 INJECTION, POWDER, FOR SOLUTION INTRAMUSCULAR; INTRAVENOUS at 05:11

## 2024-11-23 RX ADMIN — AZITHROMYCIN MONOHYDRATE 500 MG: 500 INJECTION, POWDER, LYOPHILIZED, FOR SOLUTION INTRAVENOUS at 04:11

## 2024-11-23 RX ADMIN — HYDROCODONE BITARTRATE AND ACETAMINOPHEN 1 TABLET: 10; 325 TABLET ORAL at 09:11

## 2024-11-23 RX ADMIN — GABAPENTIN 300 MG: 300 CAPSULE ORAL at 08:11

## 2024-11-23 RX ADMIN — GABAPENTIN 300 MG: 300 CAPSULE ORAL at 09:11

## 2024-11-23 RX ADMIN — LEVOTHYROXINE SODIUM 175 MCG: 150 TABLET ORAL at 05:11

## 2024-11-23 RX ADMIN — Medication 1 CAPSULE: at 04:11

## 2024-11-23 RX ADMIN — HYDROCODONE BITARTRATE AND ACETAMINOPHEN 1 TABLET: 10; 325 TABLET ORAL at 08:11

## 2024-11-23 RX ADMIN — TOPIRAMATE 100 MG: 25 TABLET, FILM COATED ORAL at 08:11

## 2024-11-23 NOTE — PROGRESS NOTES
Hospital Medicine  Progress Note    Patient Name: Elisabet Choi  MRN: [id number]  Status: IP- Inpatient   Admission Date: 11/21/2024  Length of Stay: 1  Date of Service: 11/23/2024       CC: hospital follow-up for pneumonia       SUBJECTIVE    79 y.o. female with extensive medical comorbidities including DM, dementia and wheelchair bound, presented to ED 11/21 with altered mental status and shortness a breath.  Patient is on chronic oxygen therapy however it was reported patient was not wearing.  Workup in ED noted patient febrile up to 103° F, though only requiring 2L supplemental oxygen, and HDS.  Lab work demonstrated a white count of 18,900, lactic acid 2.6.  CT of head was without acute intracranial abnormality, noted no significant interval change, ventricles stable in size,  shunt is in place.  Chest x-ray however noted confluent opacities in the left retrocardiac region.  Patient was started on broad-spectrum IV antibiotics, and admitted to hospital medicine services for further management.      Today: Patient seen and examined at bedside, and chart reviewed.  Patient now afebrile since initial fevers on admit.  Oxygen requirements are up slightly to 4L, though white count is stable.      MEDICATIONS   Scheduled   azithromycin  500 mg Intravenous Q24H    cefTRIAXone (Rocephin) IV (PEDS and ADULTS)  1 g Intravenous Q24H    DULoxetine  60 mg Oral Daily    enoxparin  40 mg Subcutaneous Daily    ezetimibe  10 mg Oral Daily    furosemide  40 mg Intravenous Once    [START ON 11/24/2024] furosemide  20 mg Oral Daily    gabapentin  300 mg Oral BID    HYDROcodone-acetaminophen  1 tablet Oral TID    Lactobacillus acidophilus  1 capsule Oral BID WM    levetiracetam  750 mg Oral Nightly    levothyroxine  175 mcg Oral Before breakfast    miconazole NITRATE 2 %   Topical (Top) BID    pravastatin  20 mg Oral QHS    topiramate  100 mg Oral BID     Continuous Infusions  None      PHYSICAL EXAM   VITALS: T 97.6 °F (36.4 °C)    BP (!) 91/57   P 84   RR 18   O2 98 %    GENERAL: Awake and in NAD  LUNGS: Course in the bases, worse on the left  CVS: Normal rate  GI/: Soft, NT, bowel sounds positive.  EXTREMITIES: No LE edema  NEURO: AAOx3  PSYCH: Cooperative      LABS   CBC  Recent Labs     11/21/24 2207 11/22/24  0520   WBC 18.94* 18.08*   RBC 4.57 3.90*   HGB 13.0 10.8*   HCT 41.2 34.7*   MCV 90.2 89.0   MCH 28.4 27.7   MCHC 31.6* 31.1*   RDW 16.7 16.8    141     CHEM  Recent Labs     11/21/24 2207 11/22/24  0520    137   K 4.5 4.0   CO2 25 28   BUN 28.9* 27.5*   CREATININE 0.83 0.86   GLUCOSE 309* 237*   CALCIUM 8.8 8.4   MG  --  2.00   PHOS  --  3.7   ALBUMIN 2.7* 2.3*   GLOBULIN 4.2* 3.2   ALKPHOS 95 76   ALT 19 18   AST 27 17   BILITOT 0.2 0.3         MICROBIOLOGY     Microbiology Results (last 7 days)       Procedure Component Value Units Date/Time    Urine culture [[id number]]  (Abnormal) Collected: 11/21/24 2212    Order Status: Completed Specimen: Urine Updated: 11/23/24 1039     Urine Culture Multiple organisms present indicate probable contamination. Recommend recollection.      25,000-50,000 colonies/ml Gram-positive Cocci    Narrative:        Gram Negative Rods Isolated Two Different Species    We have not further evaluated these organisms because three or more species compatible with normal genital del usually represents specimen contamination from the time of collection, rather than infection. If clinical circumstances warrant further workup of these organisms, please contact the Microbiology dept at [phone]; otherwise please have the patient submit another specimen.    Blood culture x two cultures. Draw prior to antibiotics. [[id number]]  (Normal) Collected: 11/21/24 2207    Order Status: Completed Specimen: Blood Updated: 11/23/24 0006     Blood Culture No Growth At 24 Hours    Blood culture x two cultures. Draw prior to antibiotics. [[id number]]  (Normal) Collected: 11/21/24 2249    Order Status:  Completed Specimen: Blood Updated: 11/23/24 0006     Blood Culture No Growth At 24 Hours              ASSESSMENT   LLL CAP  Sepsis s/t above  Acute on chronic hypoxic respiratory failure  Diabetes mellitus type II  Chronic left heel wound   Baseline dementia   h/o hydrocephalus s/p  shunt  h/o bipolar disorder   h/o seizures  h/o CVA  Hypothyroidism  Wheelchair dependent    PLAN   Continue current IV antibiotics, will follow up final blood cultures  Will resume home Lasix  Continue supplemental oxygen wean as tolerated back towards baseline to saturations > 92%  Otherwise continue current management and monitoring in the interim  Repeat labs in AM to include BMP/Mg, CBC      Prophylaxis:  SCD Lovenox        Rodriguez Peters MD  [facility]

## 2024-11-24 LAB
ANION GAP SERPL CALC-SCNC: 6 MEQ/L
BASOPHILS # BLD AUTO: 0.05 X10(3)/MCL
BASOPHILS NFR BLD AUTO: 0.6 %
BUN SERPL-MCNC: 18.9 MG/DL (ref 9.8–20.1)
CALCIUM SERPL-MCNC: 8.7 MG/DL (ref 8.4–10.2)
CHLORIDE SERPL-SCNC: 103 MMOL/L (ref 98–107)
CO2 SERPL-SCNC: 33 MMOL/L (ref 23–31)
CREAT SERPL-MCNC: 0.69 MG/DL (ref 0.55–1.02)
CREAT/UREA NIT SERPL: 27
EOSINOPHIL # BLD AUTO: 0.55 X10(3)/MCL (ref 0–0.9)
EOSINOPHIL NFR BLD AUTO: 6.3 %
ERYTHROCYTE [DISTWIDTH] IN BLOOD BY AUTOMATED COUNT: 16.4 % (ref 11.5–17)
GFR SERPLBLD CREATININE-BSD FMLA CKD-EPI: >60 ML/MIN/1.73/M2
GLUCOSE SERPL-MCNC: 152 MG/DL (ref 82–115)
HCT VFR BLD AUTO: 32.6 % (ref 37–47)
HGB BLD-MCNC: 10.2 G/DL (ref 12–16)
IMM GRANULOCYTES # BLD AUTO: 0.03 X10(3)/MCL (ref 0–0.04)
IMM GRANULOCYTES NFR BLD AUTO: 0.3 %
LYMPHOCYTES # BLD AUTO: 3.65 X10(3)/MCL (ref 0.6–4.6)
LYMPHOCYTES NFR BLD AUTO: 41.5 %
MAGNESIUM SERPL-MCNC: 2.1 MG/DL (ref 1.6–2.6)
MCH RBC QN AUTO: 28 PG (ref 27–31)
MCHC RBC AUTO-ENTMCNC: 31.3 G/DL (ref 33–36)
MCV RBC AUTO: 89.6 FL (ref 80–94)
MONOCYTES # BLD AUTO: 0.44 X10(3)/MCL (ref 0.1–1.3)
MONOCYTES NFR BLD AUTO: 5 %
NEUTROPHILS # BLD AUTO: 4.07 X10(3)/MCL (ref 2.1–9.2)
NEUTROPHILS NFR BLD AUTO: 46.3 %
NRBC BLD AUTO-RTO: 0 %
PLATELET # BLD AUTO: 156 X10(3)/MCL (ref 130–400)
PMV BLD AUTO: 9.7 FL (ref 7.4–10.4)
POCT GLUCOSE: 159 MG/DL (ref 70–110)
POCT GLUCOSE: 182 MG/DL (ref 70–110)
POCT GLUCOSE: 187 MG/DL (ref 70–110)
POCT GLUCOSE: 192 MG/DL (ref 70–110)
POTASSIUM SERPL-SCNC: 3.9 MMOL/L (ref 3.5–5.1)
RBC # BLD AUTO: 3.64 X10(6)/MCL (ref 4.2–5.4)
SODIUM SERPL-SCNC: 142 MMOL/L (ref 136–145)
WBC # BLD AUTO: 8.79 X10(3)/MCL (ref 4.5–11.5)

## 2024-11-24 PROCEDURE — 85025 COMPLETE CBC W/AUTO DIFF WBC: CPT | Performed by: INTERNAL MEDICINE

## 2024-11-24 PROCEDURE — 25000003 PHARM REV CODE 250: Performed by: NURSE PRACTITIONER

## 2024-11-24 PROCEDURE — 25000003 PHARM REV CODE 250: Performed by: INTERNAL MEDICINE

## 2024-11-24 PROCEDURE — 21400001 HC TELEMETRY ROOM

## 2024-11-24 PROCEDURE — 63600175 PHARM REV CODE 636 W HCPCS: Performed by: NURSE PRACTITIONER

## 2024-11-24 PROCEDURE — 83735 ASSAY OF MAGNESIUM: CPT | Performed by: INTERNAL MEDICINE

## 2024-11-24 PROCEDURE — 80048 BASIC METABOLIC PNL TOTAL CA: CPT | Performed by: INTERNAL MEDICINE

## 2024-11-24 PROCEDURE — 36415 COLL VENOUS BLD VENIPUNCTURE: CPT | Performed by: INTERNAL MEDICINE

## 2024-11-24 RX ADMIN — HYDROCODONE BITARTRATE AND ACETAMINOPHEN 1 TABLET: 10; 325 TABLET ORAL at 08:11

## 2024-11-24 RX ADMIN — TOPIRAMATE 100 MG: 25 TABLET, FILM COATED ORAL at 08:11

## 2024-11-24 RX ADMIN — PRAVASTATIN SODIUM 20 MG: 10 TABLET ORAL at 08:11

## 2024-11-24 RX ADMIN — LEVOTHYROXINE SODIUM 175 MCG: 150 TABLET ORAL at 06:11

## 2024-11-24 RX ADMIN — DULOXETINE HYDROCHLORIDE 60 MG: 30 CAPSULE, DELAYED RELEASE ORAL at 08:11

## 2024-11-24 RX ADMIN — Medication 1 CAPSULE: at 04:11

## 2024-11-24 RX ADMIN — FUROSEMIDE 20 MG: 20 TABLET ORAL at 08:11

## 2024-11-24 RX ADMIN — CEFTRIAXONE SODIUM 1 G: 1 INJECTION, POWDER, FOR SOLUTION INTRAMUSCULAR; INTRAVENOUS at 06:11

## 2024-11-24 RX ADMIN — EZETIMIBE 10 MG: 10 TABLET ORAL at 08:11

## 2024-11-24 RX ADMIN — AZITHROMYCIN MONOHYDRATE 500 MG: 500 INJECTION, POWDER, LYOPHILIZED, FOR SOLUTION INTRAVENOUS at 04:11

## 2024-11-24 RX ADMIN — INSULIN ASPART 1 UNITS: 100 INJECTION, SOLUTION INTRAVENOUS; SUBCUTANEOUS at 08:11

## 2024-11-24 RX ADMIN — INSULIN ASPART 2 UNITS: 100 INJECTION, SOLUTION INTRAVENOUS; SUBCUTANEOUS at 05:11

## 2024-11-24 RX ADMIN — GABAPENTIN 300 MG: 300 CAPSULE ORAL at 08:11

## 2024-11-24 RX ADMIN — MICONAZOLE NITRATE: 20 POWDER TOPICAL at 08:11

## 2024-11-24 RX ADMIN — INSULIN ASPART 2 UNITS: 100 INJECTION, SOLUTION INTRAVENOUS; SUBCUTANEOUS at 12:11

## 2024-11-24 RX ADMIN — ACETAMINOPHEN 650 MG: 325 TABLET ORAL at 04:11

## 2024-11-24 RX ADMIN — HYDROCODONE BITARTRATE AND ACETAMINOPHEN 1 TABLET: 10; 325 TABLET ORAL at 04:11

## 2024-11-24 RX ADMIN — ENOXAPARIN SODIUM 40 MG: 40 INJECTION SUBCUTANEOUS at 04:11

## 2024-11-24 RX ADMIN — MICONAZOLE NITRATE: 20 POWDER TOPICAL at 09:11

## 2024-11-24 RX ADMIN — LEVETIRACETAM 750 MG: 500 SOLUTION ORAL at 08:11

## 2024-11-24 RX ADMIN — Medication 1 CAPSULE: at 08:11

## 2024-11-24 NOTE — PROGRESS NOTES
Hospital Medicine  Progress Note    Patient Name: Elisabet Choi  MRN: [id number]  Status: IP- Inpatient   Admission Date: 11/21/2024  Length of Stay: 2  Date of Service: 11/24/2024       CC: hospital follow-up for pneumonia       SUBJECTIVE    79 y.o. female with extensive medical comorbidities including DM, dementia and wheelchair bound, presented to ED 11/21 with altered mental status and shortness a breath.  Patient is on chronic oxygen therapy however it was reported patient was not wearing.  Workup in ED noted patient febrile up to 103° F, though only requiring 2L supplemental oxygen, and HDS.  Lab work demonstrated a white count of 18,900, lactic acid 2.6.  CT of head was without acute intracranial abnormality, noted no significant interval change, ventricles stable in size,  shunt is in place.  Chest x-ray however noted confluent opacities in the left retrocardiac region.  Patient was started on broad-spectrum IV antibiotics, and admitted to hospital medicine services for further management.      Today: Patient seen and examined at bedside, and chart reviewed.  Remains afebrile, stable on 4 L.  White count has trended back down to normal.  Patient continues to complain of productive cough.      MEDICATIONS   Scheduled   azithromycin  500 mg Intravenous Q24H    cefTRIAXone (Rocephin) IV (PEDS and ADULTS)  1 g Intravenous Q24H    DULoxetine  60 mg Oral Daily    enoxparin  40 mg Subcutaneous Daily    ezetimibe  10 mg Oral Daily    furosemide  20 mg Oral Daily    gabapentin  300 mg Oral BID    HYDROcodone-acetaminophen  1 tablet Oral TID    Lactobacillus acidophilus  1 capsule Oral BID WM    levetiracetam  750 mg Oral Nightly    levothyroxine  175 mcg Oral Before breakfast    miconazole NITRATE 2 %   Topical (Top) BID    pravastatin  20 mg Oral QHS    topiramate  100 mg Oral BID     Continuous Infusions  None      PHYSICAL EXAM   VITALS: T 97.7 °F (36.5 °C)   BP (!) 97/57   P 73   RR 18   O2 100 %    GENERAL:  Awake and in NAD  LUNGS: Course in the bases, worse on the left  CVS: Normal rate  GI/: Soft, NT, bowel sounds positive.  EXTREMITIES: No LE edema  NEURO: AAOx3  PSYCH: Cooperative      LABS   CBC  Recent Labs     11/22/24 0520 11/24/24  0306   WBC 18.08* 8.79   RBC 3.90* 3.64*   HGB 10.8* 10.2*   HCT 34.7* 32.6*   MCV 89.0 89.6   MCH 27.7 28.0   MCHC 31.1* 31.3*   RDW 16.8 16.4    156     CHEM  Recent Labs     11/21/24 2207 11/22/24 0520 11/24/24  0306    137 142   K 4.5 4.0 3.9   CO2 25 28 33*   BUN 28.9* 27.5* 18.9   CREATININE 0.83 0.86 0.69   GLUCOSE 309* 237* 152*   CALCIUM 8.8 8.4 8.7   MG  --  2.00 2.10   PHOS  --  3.7  --    ALBUMIN 2.7* 2.3*  --    GLOBULIN 4.2* 3.2  --    ALKPHOS 95 76  --    ALT 19 18  --    AST 27 17  --    BILITOT 0.2 0.3  --          MICROBIOLOGY     Microbiology Results (last 7 days)       Procedure Component Value Units Date/Time    Blood culture x two cultures. Draw prior to antibiotics. [[id number]]  (Normal) Collected: 11/21/24 2207    Order Status: Completed Specimen: Blood Updated: 11/24/24 0000     Blood Culture No Growth At 48 Hours    Blood culture x two cultures. Draw prior to antibiotics. [[id number]]  (Normal) Collected: 11/21/24 2249    Order Status: Completed Specimen: Blood Updated: 11/24/24 0000     Blood Culture No Growth At 48 Hours    Urine culture [[id number]]  (Abnormal) Collected: 11/21/24 2212    Order Status: Completed Specimen: Urine Updated: 11/23/24 1039     Urine Culture Multiple organisms present indicate probable contamination. Recommend recollection.      25,000-50,000 colonies/ml Gram-positive Cocci    Narrative:        Gram Negative Rods Isolated Two Different Species    We have not further evaluated these organisms because three or more species compatible with normal genital del usually represents specimen contamination from the time of collection, rather than infection. If clinical circumstances warrant further workup of these  organisms, please contact the Microbiology dept at [phone]; otherwise please have the patient submit another specimen.              ASSESSMENT   LLL CAP  Sepsis s/t above  Acute on chronic hypoxic respiratory failure  Diabetes mellitus type II  Chronic left heel wound   Baseline dementia   h/o hydrocephalus s/p  shunt  h/o bipolar disorder   h/o seizures  h/o CVA  Hypothyroidism  Wheelchair dependent    PLAN   Continue current IV antibiotics, will follow up final blood cultures  Pressures borderline low, will hold home Lasix  Continue supplemental oxygen wean as tolerated back towards baseline to saturations > 92%  Otherwise continue current management and monitoring in the interim        Prophylaxis:  SCD Lovenox        Rodriguez Peters MD  [facility]

## 2024-11-25 LAB
POCT GLUCOSE: 182 MG/DL (ref 70–110)
POCT GLUCOSE: 207 MG/DL (ref 70–110)
POCT GLUCOSE: 214 MG/DL (ref 70–110)

## 2024-11-25 PROCEDURE — 25000003 PHARM REV CODE 250: Performed by: NURSE PRACTITIONER

## 2024-11-25 PROCEDURE — 27000221 HC OXYGEN, UP TO 24 HOURS

## 2024-11-25 PROCEDURE — 99900035 HC TECH TIME PER 15 MIN (STAT)

## 2024-11-25 PROCEDURE — 63600175 PHARM REV CODE 636 W HCPCS: Performed by: NURSE PRACTITIONER

## 2024-11-25 PROCEDURE — 21400001 HC TELEMETRY ROOM

## 2024-11-25 PROCEDURE — 94760 N-INVAS EAR/PLS OXIMETRY 1: CPT

## 2024-11-25 PROCEDURE — 25000003 PHARM REV CODE 250: Performed by: INTERNAL MEDICINE

## 2024-11-25 PROCEDURE — 27100171 HC OXYGEN HIGH FLOW UP TO 24 HOURS

## 2024-11-25 RX ORDER — AZITHROMYCIN 250 MG/1
250 TABLET, FILM COATED ORAL DAILY
Status: COMPLETED | OUTPATIENT
Start: 2024-11-26 | End: 2024-11-26

## 2024-11-25 RX ORDER — FUROSEMIDE 20 MG/1
20 TABLET ORAL DAILY
Status: DISCONTINUED | OUTPATIENT
Start: 2024-11-26 | End: 2024-11-26 | Stop reason: HOSPADM

## 2024-11-25 RX ADMIN — CEFTRIAXONE SODIUM 1 G: 1 INJECTION, POWDER, FOR SOLUTION INTRAMUSCULAR; INTRAVENOUS at 05:11

## 2024-11-25 RX ADMIN — HYDROCODONE BITARTRATE AND ACETAMINOPHEN 1 TABLET: 10; 325 TABLET ORAL at 09:11

## 2024-11-25 RX ADMIN — TOPIRAMATE 100 MG: 25 TABLET, FILM COATED ORAL at 08:11

## 2024-11-25 RX ADMIN — POLYETHYLENE GLYCOL 3350 17 G: 17 POWDER, FOR SOLUTION ORAL at 09:11

## 2024-11-25 RX ADMIN — Medication 1 CAPSULE: at 04:11

## 2024-11-25 RX ADMIN — HYDROCODONE BITARTRATE AND ACETAMINOPHEN 1 TABLET: 10; 325 TABLET ORAL at 08:11

## 2024-11-25 RX ADMIN — EZETIMIBE 10 MG: 10 TABLET ORAL at 08:11

## 2024-11-25 RX ADMIN — AZITHROMYCIN MONOHYDRATE 500 MG: 500 INJECTION, POWDER, LYOPHILIZED, FOR SOLUTION INTRAVENOUS at 06:11

## 2024-11-25 RX ADMIN — DULOXETINE HYDROCHLORIDE 60 MG: 30 CAPSULE, DELAYED RELEASE ORAL at 08:11

## 2024-11-25 RX ADMIN — POLYETHYLENE GLYCOL 3350 17 G: 17 POWDER, FOR SOLUTION ORAL at 08:11

## 2024-11-25 RX ADMIN — Medication 1 CAPSULE: at 08:11

## 2024-11-25 RX ADMIN — GABAPENTIN 300 MG: 300 CAPSULE ORAL at 08:11

## 2024-11-25 RX ADMIN — TOPIRAMATE 100 MG: 25 TABLET, FILM COATED ORAL at 09:11

## 2024-11-25 RX ADMIN — INSULIN ASPART 4 UNITS: 100 INJECTION, SOLUTION INTRAVENOUS; SUBCUTANEOUS at 04:11

## 2024-11-25 RX ADMIN — INSULIN ASPART 2 UNITS: 100 INJECTION, SOLUTION INTRAVENOUS; SUBCUTANEOUS at 11:11

## 2024-11-25 RX ADMIN — MICONAZOLE NITRATE: 20 POWDER TOPICAL at 09:11

## 2024-11-25 RX ADMIN — HYDROCODONE BITARTRATE AND ACETAMINOPHEN 1 TABLET: 10; 325 TABLET ORAL at 04:11

## 2024-11-25 RX ADMIN — PRAVASTATIN SODIUM 20 MG: 10 TABLET ORAL at 09:11

## 2024-11-25 RX ADMIN — GABAPENTIN 300 MG: 300 CAPSULE ORAL at 09:11

## 2024-11-25 RX ADMIN — ENOXAPARIN SODIUM 40 MG: 40 INJECTION SUBCUTANEOUS at 04:11

## 2024-11-25 RX ADMIN — LEVOTHYROXINE SODIUM 175 MCG: 150 TABLET ORAL at 05:11

## 2024-11-25 RX ADMIN — LEVETIRACETAM 750 MG: 500 SOLUTION ORAL at 09:11

## 2024-11-25 RX ADMIN — MICONAZOLE NITRATE: 20 POWDER TOPICAL at 08:11

## 2024-11-25 NOTE — PROGRESS NOTES
Hospital Medicine  Progress Note    Patient Name: Elisabet Choi  MRN: [id number]  Status: IP- Inpatient   Admission Date: 11/21/2024  Length of Stay: 3  Date of Service: 11/25/2024       CC: hospital follow-up for pneumonia       SUBJECTIVE    79 y.o. female with extensive medical comorbidities including DM, dementia and wheelchair bound, presented to ED 11/21 with altered mental status and shortness a breath.  Patient is on chronic oxygen therapy however it was reported patient was not wearing.  Workup in ED noted patient febrile up to 103° F, though only requiring 2L supplemental oxygen, and HDS.  Lab work demonstrated a white count of 18,900, lactic acid 2.6.  CT of head was without acute intracranial abnormality, noted no significant interval change, ventricles stable in size,  shunt is in place.  Chest x-ray however noted confluent opacities in the left retrocardiac region.  Patient was started on broad-spectrum IV antibiotics, and admitted to hospital medicine services for further management.      Today: Patient seen and examined at bedside, and chart reviewed.  Remains afebrile, stable on 4 L.  Reports feeling better.  No new issues or events overnight.      MEDICATIONS   Scheduled   azithromycin  500 mg Intravenous Q24H    cefTRIAXone (Rocephin) IV (PEDS and ADULTS)  1 g Intravenous Q24H    DULoxetine  60 mg Oral Daily    enoxparin  40 mg Subcutaneous Daily    ezetimibe  10 mg Oral Daily    gabapentin  300 mg Oral BID    HYDROcodone-acetaminophen  1 tablet Oral TID    Lactobacillus acidophilus  1 capsule Oral BID WM    levetiracetam  750 mg Oral Nightly    levothyroxine  175 mcg Oral Before breakfast    miconazole NITRATE 2 %   Topical (Top) BID    pravastatin  20 mg Oral QHS    topiramate  100 mg Oral BID     Continuous Infusions  None      PHYSICAL EXAM   VITALS: T 97.8 °F (36.6 °C)   /66   P 77   RR 18   O2 98 %    GENERAL: Awake and in NAD  LUNGS: Course in the bases, worse on the left  CVS: Normal  rate  GI/: Soft, NT, bowel sounds positive.  EXTREMITIES: No LE edema  NEURO: AAOx3  PSYCH: Cooperative      LABS   CBC  Recent Labs     11/24/24  0306   WBC 8.79   RBC 3.64*   HGB 10.2*   HCT 32.6*   MCV 89.6   MCH 28.0   MCHC 31.3*   RDW 16.4        CHEM  Recent Labs     11/24/24  0306      K 3.9   CO2 33*   BUN 18.9   CREATININE 0.69   GLUCOSE 152*   CALCIUM 8.7   MG 2.10         MICROBIOLOGY     Microbiology Results (last 7 days)       Procedure Component Value Units Date/Time    Blood culture x two cultures. Draw prior to antibiotics. [[id number]]  (Normal) Collected: 11/21/24 2207    Order Status: Completed Specimen: Blood Updated: 11/25/24 0000     Blood Culture No Growth At 72 Hours    Blood culture x two cultures. Draw prior to antibiotics. [[id number]]  (Normal) Collected: 11/21/24 2249    Order Status: Completed Specimen: Blood Updated: 11/25/24 0000     Blood Culture No Growth At 72 Hours    Urine culture [[id number]]  (Abnormal) Collected: 11/21/24 2212    Order Status: Completed Specimen: Urine Updated: 11/23/24 1039     Urine Culture Multiple organisms present indicate probable contamination. Recommend recollection.      25,000-50,000 colonies/ml Gram-positive Cocci    Narrative:        Gram Negative Rods Isolated Two Different Species    We have not further evaluated these organisms because three or more species compatible with normal genital del usually represents specimen contamination from the time of collection, rather than infection. If clinical circumstances warrant further workup of these organisms, please contact the Microbiology dept at [phone]; otherwise please have the patient submit another specimen.              ASSESSMENT   LLL CAP  Sepsis s/t above  Acute on chronic hypoxic respiratory failure  Diabetes mellitus type II  Chronic left heel wound   Baseline dementia   h/o hydrocephalus s/p  shunt  h/o bipolar disorder   h/o seizures  h/o  CVA  Hypothyroidism  Wheelchair dependent    PLAN   Continue current antibiotics antibiotics, can begin transitioning to oral  Continue supplemental oxygen as needed, wean as tolerated to baseline, to saturations > 92%  Will repeat labs in AM  Otherwise continue current management and monitoring in the interim  If continued improvement, could consider discharge in the next 24-48hrs.      Prophylaxis:  SCD Lovenox        Rodriguez Peters MD  [facility]

## 2024-11-25 NOTE — PLAN OF CARE
Problem: Adult Inpatient Plan of Care  Goal: Plan of Care Review  Outcome: Progressing  Goal: Patient-Specific Goal (Individualized)  Outcome: Progressing  Goal: Absence of Hospital-Acquired Illness or Injury  Outcome: Progressing  Goal: Optimal Comfort and Wellbeing  Outcome: Progressing  Goal: Readiness for Transition of Care  Outcome: Progressing     Problem: Bariatric Environmental Safety  Goal: Safety Maintained with Care  Outcome: Progressing     Problem: Diabetes Comorbidity  Goal: Blood Glucose Level Within Targeted Range  Outcome: Progressing     Problem: Wound  Goal: Optimal Coping  Outcome: Progressing  Goal: Optimal Functional Ability  Outcome: Progressing  Intervention: Optimize Functional Ability  Flowsheets (Taken 11/25/2024 0534)  Activity Management: Arm raise - L1  Activity Assistance Provided: assistance, 2 people  Goal: Absence of Infection Signs and Symptoms  Outcome: Progressing  Intervention: Prevent or Manage Infection  Flowsheets (Taken 11/25/2024 0534)  Infection Management: aseptic technique maintained  Goal: Improved Oral Intake  Outcome: Progressing  Intervention: Promote and Optimize Oral Intake  Flowsheets (Taken 11/25/2024 0534)  Nutrition Interventions: food preferences provided  Goal: Optimal Pain Control and Function  Outcome: Progressing  Intervention: Prevent or Manage Pain  Flowsheets (Taken 11/25/2024 0534)  Pain Management Interventions: pain management plan reviewed with patient/caregiver  Goal: Skin Health and Integrity  Outcome: Progressing  Intervention: Optimize Skin Protection  Flowsheets (Taken 11/25/2024 0534)  Pressure Reduction Techniques: frequent weight shift encouraged  Activity Management: Arm raise - L1  Head of Bed (HOB) Positioning: HOB at 30-45 degrees  Goal: Optimal Wound Healing  Outcome: Progressing     Problem: Skin Injury Risk Increased  Goal: Skin Health and Integrity  Outcome: Progressing  Intervention: Optimize Skin Protection  Flowsheets (Taken  11/25/2024 0534)  Pressure Reduction Techniques: frequent weight shift encouraged  Activity Management: Arm raise - L1  Head of Bed (HOB) Positioning: HOB at 30-45 degrees  Intervention: Promote and Optimize Oral Intake  Flowsheets (Taken 11/25/2024 0534)  Nutrition Interventions: food preferences provided

## 2024-11-26 VITALS
SYSTOLIC BLOOD PRESSURE: 98 MMHG | HEIGHT: 56 IN | OXYGEN SATURATION: 99 % | HEART RATE: 78 BPM | RESPIRATION RATE: 18 BRPM | WEIGHT: 225.75 LBS | BODY MASS INDEX: 50.78 KG/M2 | DIASTOLIC BLOOD PRESSURE: 58 MMHG | TEMPERATURE: 98 F

## 2024-11-26 PROBLEM — J18.9 CAP (COMMUNITY ACQUIRED PNEUMONIA): Status: ACTIVE | Noted: 2024-11-26

## 2024-11-26 LAB
ANION GAP SERPL CALC-SCNC: 6 MEQ/L
BASOPHILS # BLD AUTO: 0.05 X10(3)/MCL
BASOPHILS NFR BLD AUTO: 0.6 %
BUN SERPL-MCNC: 18 MG/DL (ref 9.8–20.1)
CALCIUM SERPL-MCNC: 8.7 MG/DL (ref 8.4–10.2)
CHLORIDE SERPL-SCNC: 101 MMOL/L (ref 98–107)
CO2 SERPL-SCNC: 32 MMOL/L (ref 23–31)
CREAT SERPL-MCNC: 0.69 MG/DL (ref 0.55–1.02)
CREAT/UREA NIT SERPL: 26
EOSINOPHIL # BLD AUTO: 0.43 X10(3)/MCL (ref 0–0.9)
EOSINOPHIL NFR BLD AUTO: 5.1 %
ERYTHROCYTE [DISTWIDTH] IN BLOOD BY AUTOMATED COUNT: 16 % (ref 11.5–17)
GFR SERPLBLD CREATININE-BSD FMLA CKD-EPI: >60 ML/MIN/1.73/M2
GLUCOSE SERPL-MCNC: 174 MG/DL (ref 82–115)
HCT VFR BLD AUTO: 32.9 % (ref 37–47)
HGB BLD-MCNC: 10 G/DL (ref 12–16)
IMM GRANULOCYTES # BLD AUTO: 0.02 X10(3)/MCL (ref 0–0.04)
IMM GRANULOCYTES NFR BLD AUTO: 0.2 %
LYMPHOCYTES # BLD AUTO: 3.45 X10(3)/MCL (ref 0.6–4.6)
LYMPHOCYTES NFR BLD AUTO: 41 %
MCH RBC QN AUTO: 27.8 PG (ref 27–31)
MCHC RBC AUTO-ENTMCNC: 30.4 G/DL (ref 33–36)
MCV RBC AUTO: 91.4 FL (ref 80–94)
MONOCYTES # BLD AUTO: 0.5 X10(3)/MCL (ref 0.1–1.3)
MONOCYTES NFR BLD AUTO: 5.9 %
NEUTROPHILS # BLD AUTO: 3.96 X10(3)/MCL (ref 2.1–9.2)
NEUTROPHILS NFR BLD AUTO: 47.2 %
NRBC BLD AUTO-RTO: 0 %
PLATELET # BLD AUTO: 153 X10(3)/MCL (ref 130–400)
PMV BLD AUTO: 10.1 FL (ref 7.4–10.4)
POCT GLUCOSE: 196 MG/DL (ref 70–110)
POTASSIUM SERPL-SCNC: 4.1 MMOL/L (ref 3.5–5.1)
RBC # BLD AUTO: 3.6 X10(6)/MCL (ref 4.2–5.4)
SODIUM SERPL-SCNC: 139 MMOL/L (ref 136–145)
WBC # BLD AUTO: 8.41 X10(3)/MCL (ref 4.5–11.5)

## 2024-11-26 PROCEDURE — 63700000 PHARM REV CODE 250 ALT 637 W/O HCPCS: Performed by: INTERNAL MEDICINE

## 2024-11-26 PROCEDURE — 80048 BASIC METABOLIC PNL TOTAL CA: CPT | Performed by: INTERNAL MEDICINE

## 2024-11-26 PROCEDURE — 94761 N-INVAS EAR/PLS OXIMETRY MLT: CPT

## 2024-11-26 PROCEDURE — 25000003 PHARM REV CODE 250: Performed by: INTERNAL MEDICINE

## 2024-11-26 PROCEDURE — 36415 COLL VENOUS BLD VENIPUNCTURE: CPT | Performed by: INTERNAL MEDICINE

## 2024-11-26 PROCEDURE — 99900035 HC TECH TIME PER 15 MIN (STAT)

## 2024-11-26 PROCEDURE — 85025 COMPLETE CBC W/AUTO DIFF WBC: CPT | Performed by: INTERNAL MEDICINE

## 2024-11-26 PROCEDURE — 63600175 PHARM REV CODE 636 W HCPCS: Performed by: NURSE PRACTITIONER

## 2024-11-26 PROCEDURE — 94760 N-INVAS EAR/PLS OXIMETRY 1: CPT

## 2024-11-26 PROCEDURE — 27000221 HC OXYGEN, UP TO 24 HOURS

## 2024-11-26 RX ORDER — CEFDINIR 300 MG/1
300 CAPSULE ORAL 2 TIMES DAILY
Qty: 8 CAPSULE | Refills: 0 | Status: SHIPPED | OUTPATIENT
Start: 2024-11-26 | End: 2024-11-30

## 2024-11-26 RX ADMIN — GABAPENTIN 300 MG: 300 CAPSULE ORAL at 08:11

## 2024-11-26 RX ADMIN — EZETIMIBE 10 MG: 10 TABLET ORAL at 08:11

## 2024-11-26 RX ADMIN — TOPIRAMATE 100 MG: 25 TABLET, FILM COATED ORAL at 08:11

## 2024-11-26 RX ADMIN — AZITHROMYCIN DIHYDRATE 250 MG: 250 TABLET ORAL at 08:11

## 2024-11-26 RX ADMIN — Medication 1 CAPSULE: at 08:11

## 2024-11-26 RX ADMIN — CEFTRIAXONE SODIUM 1 G: 1 INJECTION, POWDER, FOR SOLUTION INTRAMUSCULAR; INTRAVENOUS at 06:11

## 2024-11-26 RX ADMIN — DULOXETINE HYDROCHLORIDE 60 MG: 30 CAPSULE, DELAYED RELEASE ORAL at 08:11

## 2024-11-26 RX ADMIN — MICONAZOLE NITRATE: 20 POWDER TOPICAL at 08:11

## 2024-11-26 RX ADMIN — LEVOTHYROXINE SODIUM 175 MCG: 150 TABLET ORAL at 06:11

## 2024-11-26 RX ADMIN — INSULIN ASPART 2 UNITS: 100 INJECTION, SOLUTION INTRAVENOUS; SUBCUTANEOUS at 11:11

## 2024-11-26 RX ADMIN — INSULIN ASPART 2 UNITS: 100 INJECTION, SOLUTION INTRAVENOUS; SUBCUTANEOUS at 06:11

## 2024-11-26 RX ADMIN — FUROSEMIDE 20 MG: 20 TABLET ORAL at 08:11

## 2024-11-26 RX ADMIN — HYDROCODONE BITARTRATE AND ACETAMINOPHEN 1 TABLET: 10; 325 TABLET ORAL at 08:11

## 2024-11-26 NOTE — PLAN OF CARE
Problem: Adult Inpatient Plan of Care  Goal: Plan of Care Review  Outcome: Progressing  Goal: Patient-Specific Goal (Individualized)  Outcome: Progressing  Goal: Absence of Hospital-Acquired Illness or Injury  Outcome: Progressing  Goal: Optimal Comfort and Wellbeing  Outcome: Progressing  Goal: Readiness for Transition of Care  Outcome: Progressing     Problem: Wound  Goal: Optimal Coping  Outcome: Progressing  Goal: Skin Health and Integrity  Outcome: Progressing     Problem: Wound  Goal: Skin Health and Integrity  Outcome: Progressing

## 2024-11-26 NOTE — PLAN OF CARE
11/26/24 1040   Final Note   Assessment Type Final Discharge Note   Anticipated Discharge Disposition [facility]   Post-Acute Status   Post-Acute Authorization Placement   Post-Acute Placement Status Set-up Complete/Auth obtained   Discharge Delays None known at this time     Patient will discharge back to [facility] today.

## 2024-11-26 NOTE — PLAN OF CARE
[facility] sent discharge information to Cindy Reeves, awaiting nurse to call report to

## 2024-11-26 NOTE — DISCHARGE SUMMARY
Hospital Medicine  Discharge Summary    Patient Name: Elisabet Choi  MRN: [id number]  Admit Date: 11/21/2024  Discharge Date: 11/26/2024   Status: IP- Inpatient   Length of Stay: 4      PHYSICIANS   Admitting Physician: Boubacar Ramírez MD  Discharging Physician: Rodriguez Peters MD.  Primary Care Physician: [facility]  Consults: None      DISCHARGE DIAGNOSES   LLL CAP  Sepsis s/t above  Acute on chronic hypoxic respiratory failure  Diabetes mellitus type II  Chronic left heel wound   Baseline dementia   h/o hydrocephalus s/p  shunt  h/o bipolar disorder   h/o seizures  h/o CVA  Hypothyroidism  Wheelchair dependent      PROCEDURES   None      HOSPITAL COURSE     79 y.o. female with extensive medical comorbidities including DM, dementia and wheelchair bound, presented to ED 11/21 with altered mental status and shortness a breath.  Patient is on chronic oxygen therapy however it was reported patient was not wearing.  Workup in ED noted patient febrile up to 103° F, though only requiring 2L supplemental oxygen, and HDS.  Lab work demonstrated a white count of 18,900, lactic acid 2.6.  CT of head was without acute intracranial abnormality, noted no significant interval change, ventricles stable in size,  shunt is in place.  Chest x-ray however noted confluent opacities in the left retrocardiac region.  Patient was started on broad-spectrum IV antibiotics, and admitted to hospital medicine services for further management.  Patient subsequently remained afebrile, with white count eventually trending back down to normal.  Supplemental oxygen was weaned slowly down to 3 L.  labs remained stable, and patient was otherwise stable for discharge back to nursing home to complete course of antibiotics orally.    STATUS  Improved    DISPOSITION  Discharge back to NH    DIET  Diabetic    ACTIVITY  As tolerated      FOLLOW-UP      Clinic, [facility]. Schedule an appointment as soon as possible for a visit in 1  week(s).    Contact information:  [address] [place] [id number]  [phone]                 DISCHARGE MEDICATION RECONCILIATION     PRESCRIBED     cefdinir 300 MG capsule  Commonly known as: OMNICEF  Take 1 capsule (300 mg total) by mouth 2 (two) times daily. for 4 days            CONTINUE     dulaglutide 3 mg/0.5 mL pen injector  Commonly known as: TRULICITY     gabapentin 300 MG capsule  Commonly known as: NEURONTIN       acetaminophen 500 MG tablet  Commonly known as: TYLENOL     acidophilus-pectin, citrus 100 million cell-10 mg Cap     ALPRAZolam 0.25 MG tablet  Commonly known as: XANAX     amitriptyline 25 MG tablet  Commonly known as: ELAVIL     ascorbic acid (vitamin C) 500 MG tablet  Commonly known as: VITAMIN C     CRANBERRY 450 mg Tab  Generic drug: cranberry fruit     cyanocobalamin 1000 MCG tablet  Commonly known as: VITAMIN B-12     doxazosin 1 MG tablet  Commonly known as: CARDURA     DULoxetine 60 MG capsule  Commonly known as: CYMBALTA     ezetimibe 10 mg tablet  Commonly known as: ZETIA     folic acid 1 MG tablet  Commonly known as: FOLVITE     HYDROcodone-acetaminophen  mg per tablet  Commonly known as: NORCO     insulin degludec 100 unit/mL injection     levETIRAcetam 750 MG Tab  Commonly known as: KEPPRA     levothyroxine 175 MCG tablet  Commonly known as: SYNTHROID, LEVOTHROID  Take 1 tablet (175 mcg total) by mouth before breakfast.     lovastatin 40 mg 24 hr tablet  Commonly known as: ALTOPREV     magnesium oxide 400 mg (241.3 mg magnesium) tablet  Commonly known as: MAG-OX     methenamine 1 gram Tab  Commonly known as: HIPREX     metoprolol tartrate 50 MG tablet  Commonly known as: LOPRESSOR  Take 1 tablet (50 mg total) by mouth 2 (two) times daily.     polyethylene glycol 17 gram Pwpk  Commonly known as: GLYCOLAX     POTASSIUM CHLORIDE ORAL     SantyL ointment  Generic drug: collagenase     solifenacin 5 MG tablet  Commonly known as: VESICARE     tiZANidine 2 mg Cap      topiramate 100 MG tablet  Commonly known as: TOPAMAX     torsemide 20 MG Tab  Commonly known as: DEMADEX     vitamin D 1000 units Tab  Commonly known as: VITAMIN D3     zinc gluconate 50 mg tablet            DISCONTINUE      furosemide 20 MG tablet  Commonly known as: LASIX     lisinopriL 5 MG tablet  Commonly known as: PRINIVIL,ZESTRIL            These medications were sent to   [facility] of [address] [address]      Phone: [phone]   cefdinir 300 MG capsule         PHYSICAL EXAM   VITALS: T 97.8 °F (36.6 °C)   BP (!) 98/58   P 78   RR 18   O2 99 %    GENERAL: Awake and in NAD  LUNGS: Course in the bases, worse on the left  CVS: Normal rate  GI/: Soft, NT, bowel sounds positive.  EXTREMITIES: No LE edema  NEURO: AAOx3  PSYCH: Cooperative      Discharge time: 33 minutes     Rodriguez Peters MD  [facility]       DIAGNOSITCS   CBC:   Recent Labs   Lab 11/22/24  0520 11/24/24  0306 11/26/24  0423   WBC 18.08* 8.79 8.41   HGB 10.8* 10.2* 10.0*   HCT 34.7* 32.6* 32.9*    156 153       CMP:   Recent Labs   Lab 11/21/24 2207 11/22/24  0520 11/24/24  0306 11/26/24  0423   CALCIUM 8.8 8.4 8.7 8.7   ALBUMIN 2.7* 2.3*  --   --     137 142 139   K 4.5 4.0 3.9 4.1   CO2 25 28 33* 32*    103 103 101   BUN 28.9* 27.5* 18.9 18.0   CREATININE 0.83 0.86 0.69 0.69   ALKPHOS 95 76  --   --    ALT 19 18  --   --    AST 27 17  --   --    BILITOT 0.2 0.3  --   --    MG  --  2.00 2.10  --    PHOS  --  3.7  --   --      Estimated Creatinine Clearance: 70.2 mL/min (based on SCr of 0.69 mg/dL).        Recent Labs     11/24/24  1635 11/24/24  2039 11/25/24  1046 11/25/24  1614 11/25/24  2123 11/26/24  1040   POCTGLUCOSE 187* 192* 182* 214* 207* 196*        Microbiology Results (last 7 days)       Procedure Component Value Units Date/Time    Blood culture x two cultures. Draw prior to antibiotics. [[id number]]  (Normal) Collected: 11/21/24 2207    Order Status: Completed  Specimen: Blood Updated: 11/26/24 0003     Blood Culture No Growth At 96 Hours    Blood culture x two cultures. Draw prior to antibiotics. [[id number]]  (Normal) Collected: 11/21/24 2249    Order Status: Completed Specimen: Blood Updated: 11/26/24 0003     Blood Culture No Growth At 96 Hours    Urine culture [[id number]]  (Abnormal) Collected: 11/21/24 2212    Order Status: Completed Specimen: Urine Updated: 11/23/24 1039     Urine Culture Multiple organisms present indicate probable contamination. Recommend recollection.      25,000-50,000 colonies/ml Gram-positive Cocci    Narrative:        Gram Negative Rods Isolated Two Different Species    We have not further evaluated these organisms because three or more species compatible with normal genital del usually represents specimen contamination from the time of collection, rather than infection. If clinical circumstances warrant further workup of these organisms, please contact the Microbiology dept at [phone]; otherwise please have the patient submit another specimen.                  X-Ray Chest AP Portable  Result Date: 11/22/2024  EXAMINATION: XR CHEST AP PORTABLE CLINICAL HISTORY: Sepsis; TECHNIQUE: Single frontal view of the chest was performed. COMPARISON: September 25, 2024 FINDINGS: Examination reveals mediastinal silhouette to be within normal limits cardiac silhouette is at the upper limits of normal. Confluent airspace opacities in the left retrocardiac region early infiltrate cannot be completely excluded. No other focal consolidative changes. A ventriculoperitoneal shunt catheter is identified   Impression:  Some confluent opacities in the left retrocardiac region infiltrate cannot be completely excluded. Otherwise no active pulmonary disease and no significant change   Electronically signed by: Evert Villareal Date:    11/22/2024 Time:    08:50    CT Head Without Contrast  Result Date: 11/22/2024  EXAMINATION: CT HEAD WITHOUT CONTRAST CLINICAL  HISTORY: Mental status change, unknown cause; TECHNIQUE: Axial scans were obtained from skull base to the vertex. Coronal and sagittal reconstructions obtained from the axial data. Automatic exposure control was utilized to limit radiation dose. Contrast: None Radiation Dose: Total DLP: 980 mGy*cm COMPARISON: CT head dated 03/20/2024 FINDINGS: There is no acute intracranial hemorrhage or edema.  There is encephalomalacia in the right frontal lobe and left cerebellum. Right frontal approach ventricular shunt catheter is in place with tip over the right lateral ventricle.  The ventricles are stable in size.  There is no mass effect or midline shift.  The basal cisterns are patent.  There is no abnormal extra-axial fluid collection.  Carotid artery calcifications are noted. There are postoperative changes from prior right-sided craniectomy..  The visualized paranasal sinuses and the mastoid air cells are clear.   Impression:  No acute intracranial abnormality or significant interval change. No significant change from the [facility] interpretation   Electronically signed by: Mery Han Date:    11/22/2024 Time:    07:08

## 2024-11-27 ENCOUNTER — LAB REQUISITION (OUTPATIENT)
Dept: LAB | Facility: HOSPITAL | Age: 79
End: 2024-11-27
Payer: MEDICARE

## 2024-11-27 DIAGNOSIS — D64.9 ANEMIA, UNSPECIFIED: ICD-10-CM

## 2024-11-27 DIAGNOSIS — I95.9 HYPOTENSION, UNSPECIFIED: ICD-10-CM

## 2024-11-27 DIAGNOSIS — D72.829 ELEVATED WHITE BLOOD CELL COUNT, UNSPECIFIED: ICD-10-CM

## 2024-11-27 LAB
ALBUMIN SERPL-MCNC: 2.7 G/DL (ref 3.4–4.8)
ALBUMIN/GLOB SERPL: 0.8 RATIO (ref 1.1–2)
ALP SERPL-CCNC: 71 UNIT/L (ref 40–150)
ALT SERPL-CCNC: 19 UNIT/L (ref 0–55)
ANION GAP SERPL CALC-SCNC: 10 MEQ/L
AST SERPL-CCNC: 24 UNIT/L (ref 5–34)
BACTERIA BLD CULT: NORMAL
BACTERIA BLD CULT: NORMAL
BASOPHILS # BLD AUTO: 0.06 X10(3)/MCL
BASOPHILS NFR BLD AUTO: 0.7 %
BILIRUB SERPL-MCNC: 0.2 MG/DL
BUN SERPL-MCNC: 20.3 MG/DL (ref 9.8–20.1)
CALCIUM SERPL-MCNC: 8.7 MG/DL (ref 8.4–10.2)
CHLORIDE SERPL-SCNC: 102 MMOL/L (ref 98–107)
CO2 SERPL-SCNC: 28 MMOL/L (ref 23–31)
CREAT SERPL-MCNC: 0.66 MG/DL (ref 0.55–1.02)
CREAT/UREA NIT SERPL: 31
EOSINOPHIL # BLD AUTO: 0.33 X10(3)/MCL (ref 0–0.9)
EOSINOPHIL NFR BLD AUTO: 3.9 %
ERYTHROCYTE [DISTWIDTH] IN BLOOD BY AUTOMATED COUNT: 16 % (ref 11.5–17)
GFR SERPLBLD CREATININE-BSD FMLA CKD-EPI: >60 ML/MIN/1.73/M2
GLOBULIN SER-MCNC: 3.3 GM/DL (ref 2.4–3.5)
GLUCOSE SERPL-MCNC: 216 MG/DL (ref 82–115)
HCT VFR BLD AUTO: 35.2 % (ref 37–47)
HGB BLD-MCNC: 10.8 G/DL (ref 12–16)
IMM GRANULOCYTES # BLD AUTO: 0.07 X10(3)/MCL (ref 0–0.04)
IMM GRANULOCYTES NFR BLD AUTO: 0.8 %
LYMPHOCYTES # BLD AUTO: 2.61 X10(3)/MCL (ref 0.6–4.6)
LYMPHOCYTES NFR BLD AUTO: 31 %
MCH RBC QN AUTO: 28.3 PG (ref 27–31)
MCHC RBC AUTO-ENTMCNC: 30.7 G/DL (ref 33–36)
MCV RBC AUTO: 92.1 FL (ref 80–94)
MONOCYTES # BLD AUTO: 0.49 X10(3)/MCL (ref 0.1–1.3)
MONOCYTES NFR BLD AUTO: 5.8 %
NEUTROPHILS # BLD AUTO: 4.87 X10(3)/MCL (ref 2.1–9.2)
NEUTROPHILS NFR BLD AUTO: 57.8 %
NRBC BLD AUTO-RTO: 0 %
PLATELET # BLD AUTO: 148 X10(3)/MCL (ref 130–400)
PMV BLD AUTO: 10.3 FL (ref 7.4–10.4)
POTASSIUM SERPL-SCNC: 4.4 MMOL/L (ref 3.5–5.1)
PROT SERPL-MCNC: 6 GM/DL (ref 5.8–7.6)
RBC # BLD AUTO: 3.82 X10(6)/MCL (ref 4.2–5.4)
SODIUM SERPL-SCNC: 140 MMOL/L (ref 136–145)
WBC # BLD AUTO: 8.43 X10(3)/MCL (ref 4.5–11.5)

## 2024-11-27 PROCEDURE — 85025 COMPLETE CBC W/AUTO DIFF WBC: CPT | Performed by: INTERNAL MEDICINE

## 2024-11-27 PROCEDURE — 80053 COMPREHEN METABOLIC PANEL: CPT | Performed by: INTERNAL MEDICINE

## 2024-12-02 ENCOUNTER — HOSPITAL ENCOUNTER (INPATIENT)
Facility: HOSPITAL | Age: 79
LOS: 11 days | DRG: 871 | End: 2024-12-14
Attending: STUDENT IN AN ORGANIZED HEALTH CARE EDUCATION/TRAINING PROGRAM | Admitting: HOSPITALIST
Payer: MEDICARE

## 2024-12-02 ENCOUNTER — PATIENT OUTREACH (OUTPATIENT)
Dept: ADMINISTRATIVE | Facility: CLINIC | Age: 79
End: 2024-12-02
Payer: MEDICARE

## 2024-12-02 DIAGNOSIS — L03.90 CELLULITIS, UNSPECIFIED CELLULITIS SITE: ICD-10-CM

## 2024-12-02 DIAGNOSIS — R78.81 BACTEREMIA: ICD-10-CM

## 2024-12-02 DIAGNOSIS — R41.82 AMS (ALTERED MENTAL STATUS): ICD-10-CM

## 2024-12-02 DIAGNOSIS — M79.89 LEG SWELLING: ICD-10-CM

## 2024-12-02 DIAGNOSIS — J18.9 PNEUMONIA DUE TO INFECTIOUS ORGANISM, UNSPECIFIED LATERALITY, UNSPECIFIED PART OF LUNG: ICD-10-CM

## 2024-12-02 DIAGNOSIS — M79.89 ARM SWELLING: ICD-10-CM

## 2024-12-02 DIAGNOSIS — A41.9 SEPSIS, DUE TO UNSPECIFIED ORGANISM, UNSPECIFIED WHETHER ACUTE ORGAN DYSFUNCTION PRESENT: Primary | ICD-10-CM

## 2024-12-02 DIAGNOSIS — R13.12 OROPHARYNGEAL DYSPHAGIA: ICD-10-CM

## 2024-12-02 LAB
ALBUMIN SERPL-MCNC: 2.9 G/DL (ref 3.4–4.8)
ALBUMIN/GLOB SERPL: 0.7 RATIO (ref 1.1–2)
ALP SERPL-CCNC: 93 UNIT/L (ref 40–150)
ALT SERPL-CCNC: 29 UNIT/L (ref 0–55)
ANION GAP SERPL CALC-SCNC: 8 MEQ/L
AST SERPL-CCNC: 21 UNIT/L (ref 5–34)
BASOPHILS # BLD AUTO: 0.04 X10(3)/MCL
BASOPHILS NFR BLD AUTO: 0.2 %
BILIRUB SERPL-MCNC: 0.3 MG/DL
BUN SERPL-MCNC: 28.9 MG/DL (ref 9.8–20.1)
CALCIUM SERPL-MCNC: 9.3 MG/DL (ref 8.4–10.2)
CHLORIDE SERPL-SCNC: 100 MMOL/L (ref 98–107)
CO2 SERPL-SCNC: 33 MMOL/L (ref 23–31)
CREAT SERPL-MCNC: 0.88 MG/DL (ref 0.55–1.02)
CREAT/UREA NIT SERPL: 33
EOSINOPHIL # BLD AUTO: 0.14 X10(3)/MCL (ref 0–0.9)
EOSINOPHIL NFR BLD AUTO: 0.8 %
ERYTHROCYTE [DISTWIDTH] IN BLOOD BY AUTOMATED COUNT: 16.4 % (ref 11.5–17)
GFR SERPLBLD CREATININE-BSD FMLA CKD-EPI: >60 ML/MIN/1.73/M2
GLOBULIN SER-MCNC: 4.1 GM/DL (ref 2.4–3.5)
GLUCOSE SERPL-MCNC: 227 MG/DL (ref 82–115)
HCT VFR BLD AUTO: 39.2 % (ref 37–47)
HGB BLD-MCNC: 12.2 G/DL (ref 12–16)
IMM GRANULOCYTES # BLD AUTO: 0.06 X10(3)/MCL (ref 0–0.04)
IMM GRANULOCYTES NFR BLD AUTO: 0.3 %
LYMPHOCYTES # BLD AUTO: 3.13 X10(3)/MCL (ref 0.6–4.6)
LYMPHOCYTES NFR BLD AUTO: 17.7 %
MAGNESIUM SERPL-MCNC: 2.4 MG/DL (ref 1.6–2.6)
MCH RBC QN AUTO: 28.4 PG (ref 27–31)
MCHC RBC AUTO-ENTMCNC: 31.1 G/DL (ref 33–36)
MCV RBC AUTO: 91.2 FL (ref 80–94)
MONOCYTES # BLD AUTO: 0.52 X10(3)/MCL (ref 0.1–1.3)
MONOCYTES NFR BLD AUTO: 2.9 %
NEUTROPHILS # BLD AUTO: 13.76 X10(3)/MCL (ref 2.1–9.2)
NEUTROPHILS NFR BLD AUTO: 78.1 %
NRBC BLD AUTO-RTO: 0 %
PLATELET # BLD AUTO: 211 X10(3)/MCL (ref 130–400)
PMV BLD AUTO: 10.6 FL (ref 7.4–10.4)
POTASSIUM SERPL-SCNC: 4.6 MMOL/L (ref 3.5–5.1)
PROT SERPL-MCNC: 7 GM/DL (ref 5.8–7.6)
RBC # BLD AUTO: 4.3 X10(6)/MCL (ref 4.2–5.4)
SODIUM SERPL-SCNC: 141 MMOL/L (ref 136–145)
WBC # BLD AUTO: 17.65 X10(3)/MCL (ref 4.5–11.5)

## 2024-12-02 PROCEDURE — 84100 ASSAY OF PHOSPHORUS: CPT

## 2024-12-02 PROCEDURE — 85025 COMPLETE CBC W/AUTO DIFF WBC: CPT | Performed by: STUDENT IN AN ORGANIZED HEALTH CARE EDUCATION/TRAINING PROGRAM

## 2024-12-02 PROCEDURE — 93005 ELECTROCARDIOGRAM TRACING: CPT

## 2024-12-02 PROCEDURE — 63600175 PHARM REV CODE 636 W HCPCS: Performed by: STUDENT IN AN ORGANIZED HEALTH CARE EDUCATION/TRAINING PROGRAM

## 2024-12-02 PROCEDURE — 80053 COMPREHEN METABOLIC PANEL: CPT | Performed by: STUDENT IN AN ORGANIZED HEALTH CARE EDUCATION/TRAINING PROGRAM

## 2024-12-02 PROCEDURE — 84443 ASSAY THYROID STIM HORMONE: CPT

## 2024-12-02 PROCEDURE — 83735 ASSAY OF MAGNESIUM: CPT | Performed by: STUDENT IN AN ORGANIZED HEALTH CARE EDUCATION/TRAINING PROGRAM

## 2024-12-02 PROCEDURE — 93010 ELECTROCARDIOGRAM REPORT: CPT | Mod: ,,, | Performed by: STUDENT IN AN ORGANIZED HEALTH CARE EDUCATION/TRAINING PROGRAM

## 2024-12-02 PROCEDURE — 83605 ASSAY OF LACTIC ACID: CPT | Performed by: STUDENT IN AN ORGANIZED HEALTH CARE EDUCATION/TRAINING PROGRAM

## 2024-12-02 PROCEDURE — 86140 C-REACTIVE PROTEIN: CPT | Performed by: STUDENT IN AN ORGANIZED HEALTH CARE EDUCATION/TRAINING PROGRAM

## 2024-12-02 PROCEDURE — 84484 ASSAY OF TROPONIN QUANT: CPT | Performed by: STUDENT IN AN ORGANIZED HEALTH CARE EDUCATION/TRAINING PROGRAM

## 2024-12-02 PROCEDURE — 83880 ASSAY OF NATRIURETIC PEPTIDE: CPT | Performed by: STUDENT IN AN ORGANIZED HEALTH CARE EDUCATION/TRAINING PROGRAM

## 2024-12-02 PROCEDURE — 96361 HYDRATE IV INFUSION ADD-ON: CPT

## 2024-12-02 PROCEDURE — 25500020 PHARM REV CODE 255: Performed by: STUDENT IN AN ORGANIZED HEALTH CARE EDUCATION/TRAINING PROGRAM

## 2024-12-02 RX ADMIN — IOHEXOL 100 ML: 350 INJECTION, SOLUTION INTRAVENOUS at 11:12

## 2024-12-02 RX ADMIN — SODIUM CHLORIDE, POTASSIUM CHLORIDE, SODIUM LACTATE AND CALCIUM CHLORIDE 1000 ML: 600; 310; 30; 20 INJECTION, SOLUTION INTRAVENOUS at 11:12

## 2024-12-02 NOTE — Clinical Note
Diagnosis: Sepsis, due to unspecified organism, unspecified whether acute organ dysfunction present [[id number]]   Admit to which facility:: [facility] [[id number]]   Reason for IP Medical Treatment  (Clinical interventions that can only be accomplished in the IP setting? ) :: Requiring vasopressor   Plans for Post-Acute care--if anticipated (pick the single best option):: A. No post acute care anticipated at this time   Special Needs:: No Special Needs [1]

## 2024-12-03 LAB
ACB COMPLEX DNA BLD POS QL NAA+NON-PROBE: NOT DETECTED
ALLENS TEST BLOOD GAS (OHS): YES
AMPHET UR QL SCN: NEGATIVE
B FRAGILIS DNA BLD POS QL NAA+PROBE: NOT DETECTED
B PERT.PT PRMT NPH QL NAA+NON-PROBE: NOT DETECTED
BACTERIA #/AREA URNS AUTO: ABNORMAL /HPF
BARBITURATE SCN PRESENT UR: NEGATIVE
BASE EXCESS BLD CALC-SCNC: 6.2 MMOL/L (ref -2–2)
BENZODIAZ UR QL SCN: POSITIVE
BILIRUB UR QL STRIP.AUTO: NEGATIVE
BLOOD GAS SAMPLE TYPE (OHS): ABNORMAL
BNP BLD-MCNC: 39 PG/ML
C ALBICANS DNA BLD POS QL NAA+PROBE: NOT DETECTED
C AURIS DNA BLD POS QL NAA+NON-PROBE: NOT DETECTED
C GATTII+NEOFOR DNA CSF QL NAA+NON-PROBE: NOT DETECTED
C GLABRATA DNA BLD POS QL NAA+PROBE: NOT DETECTED
C KRUSEI DNA BLD POS QL NAA+PROBE: NOT DETECTED
C PARAP DNA BLD POS QL NAA+PROBE: NOT DETECTED
C PNEUM DNA NPH QL NAA+NON-PROBE: NOT DETECTED
C TROPICLS DNA BLD POS QL NAA+PROBE: NOT DETECTED
CA-I BLD-SCNC: 1.14 MMOL/L (ref 1.12–1.23)
CANNABINOIDS UR QL SCN: NEGATIVE
CLARITY UR: CLEAR
CO2 BLDA-SCNC: 33.3 MMOL/L
COCAINE UR QL SCN: NEGATIVE
COHGB MFR BLDA: 1.9 % (ref 0.5–1.5)
COLISTIN RES MCR-1 ISLT/SPM QL: ABNORMAL
COLOR UR AUTO: ABNORMAL
CRP SERPL-MCNC: 55 MG/L
DRAWN BY BLOOD GAS (OHS): ABNORMAL
E CLOAC COMP DNA BLD POS QL NAA+PROBE: NOT DETECTED
E COLI DNA BLD POS QL NAA+PROBE: NOT DETECTED
E FAECALIS+OTHR E SP RRNA BLD POS FISH: NOT DETECTED
E FAECIUM HSP60 BLD POS QL PROBE: NOT DETECTED
ENTEROBACTERALES DNA BLD POS NAA+N-PRB: NOT DETECTED
ERYTHROCYTE [SEDIMENTATION RATE] IN BLOOD: 53 MM/HR (ref 0–20)
ESBL CFT TO CFT-CLAV IC RTO BD POS IMP: ABNORMAL
FENTANYL UR QL SCN: NEGATIVE
GLUCOSE UR QL STRIP: NORMAL
GP B STREP DNA CSF QL NAA+NON-PROBE: NOT DETECTED
HADV DNA NPH QL NAA+NON-PROBE: NOT DETECTED
HAEM INFLU DNA CSF QL NAA+NON-PROBE: NOT DETECTED
HCO3 BLDA-SCNC: 31.8 MMOL/L (ref 22–26)
HCOV 229E RNA NPH QL NAA+NON-PROBE: NOT DETECTED
HCOV HKU1 RNA NPH QL NAA+NON-PROBE: NOT DETECTED
HCOV NL63 RNA NPH QL NAA+NON-PROBE: NOT DETECTED
HCOV OC43 RNA NPH QL NAA+NON-PROBE: NOT DETECTED
HGB UR QL STRIP: NEGATIVE
HMPV RNA NPH QL NAA+NON-PROBE: NOT DETECTED
HPIV1 RNA NPH QL NAA+NON-PROBE: NOT DETECTED
HPIV2 RNA NPH QL NAA+NON-PROBE: NOT DETECTED
HPIV3 RNA NPH QL NAA+NON-PROBE: NOT DETECTED
HPIV4 RNA NPH QL NAA+NON-PROBE: NOT DETECTED
IMP CARBAPENEMASE ISLT QL IA.RAPID: ABNORMAL
INHALED O2 CONCENTRATION: 28 %
K OXYTOCA OMPA BLD POS QL PROBE: NOT DETECTED
KETONES UR QL STRIP: NEGATIVE
KLEBSIELLA SP DNA BLD POS QL NAA+NON-PRB: NOT DETECTED
KLEBSIELLA SP DNA BLD POS QL NAA+NON-PRB: NOT DETECTED
KPC CARBAPENEMASE ISLT QL IA.RAPID: ABNORMAL
L MONOCYTOG DNA CSF QL NAA+NON-PROBE: NOT DETECTED
LACTATE SERPL-SCNC: 1.9 MMOL/L (ref 0.5–2.2)
LACTATE SERPL-SCNC: 2 MMOL/L (ref 0.5–2.2)
LACTATE SERPL-SCNC: 2.4 MMOL/L (ref 0.5–2.2)
LACTATE SERPL-SCNC: 3 MMOL/L (ref 0.5–2.2)
LEUKOCYTE ESTERASE UR QL STRIP: NEGATIVE
LPM (OHS): 2
M PNEUMO DNA NPH QL NAA+NON-PROBE: NOT DETECTED
MDMA UR QL SCN: NEGATIVE
MECA+MECC NOSE QL NAA+PROBE: DETECTED
MECA+MECC+MREJ ISLT/SPM QL: ABNORMAL
METHGB MFR BLDA: 0.6 % (ref 0.4–1.5)
MRSA PCR SCRN (OHS): NOT DETECTED
MUCOUS THREADS URNS QL MICRO: ABNORMAL /LPF
N MEN DNA CSF QL NAA+NON-PROBE: NOT DETECTED
NDM CARBAPENEMASE ISLT QL IA.RAPID: ABNORMAL
NITRITE UR QL STRIP: NEGATIVE
O2 HB BLOOD GAS (OHS): 96.9 % (ref 94–97)
OHS QRS DURATION: 72 MS
OHS QTC CALCULATION: 434 MS
OPIATES UR QL SCN: POSITIVE
OXA-48-LIKE CRBPNASE ISLT QL IA.RAPID: ABNORMAL
OXYHGB MFR BLDA: 12.2 G/DL (ref 12–16)
P AERUGINOSA DNA BLD POS QL NAA+PROBE: NOT DETECTED
PCO2 BLDA: 49 MMHG (ref 35–45)
PCP UR QL: NEGATIVE
PH BLDA: 7.42 [PH] (ref 7.35–7.45)
PH UR STRIP: 8.5 [PH]
PH UR: 8.5 [PH] (ref 3–11)
PHOSPHATE SERPL-MCNC: 3.3 MG/DL (ref 2.3–4.7)
PO2 BLDA: 155 MMHG (ref 80–100)
POCT GLUCOSE: 181 MG/DL (ref 70–110)
POCT GLUCOSE: 243 MG/DL (ref 70–110)
POTASSIUM BLOOD GAS (OHS): 3.9 MMOL/L (ref 3.5–5)
PREALB SERPL-MCNC: 14.8 MG/DL (ref 14–37)
PROT UR QL STRIP: ABNORMAL
PROTEUS SP DNA BLD POS QL NAA+PROBE: NOT DETECTED
RBC #/AREA URNS AUTO: ABNORMAL /HPF
RSV RNA NPH QL NAA+NON-PROBE: NOT DETECTED
RV+EV RNA NPH QL NAA+NON-PROBE: NOT DETECTED
S AUREUS DNA BLD POS QL NAA+PROBE: NOT DETECTED
S ENT+BONG DNA STL QL NAA+NON-PROBE: NOT DETECTED
S EPIDERMIDIS HSP60 BLD POS QL PROBE: DETECTED
S LUGDUNENSIS SODA BLD POS QL PROBE: NOT DETECTED
S MALTOPH DNA BLD POS QL NAA+PROBE: NOT DETECTED
S MARCESCENS DNA BLD POS QL NAA+PROBE: NOT DETECTED
S PNEUM DNA CSF QL NAA+NON-PROBE: NOT DETECTED
S PYOGENES HSP60 BLD POS QL PROBE: NOT DETECTED
SAMPLE SITE BLOOD GAS (OHS): ABNORMAL
SAO2 % BLDA: 99.4 %
SODIUM BLOOD GAS (OHS): 131 MMOL/L (ref 137–145)
SP GR UR STRIP.AUTO: >1.05 (ref 1–1.03)
SPECIFIC GRAVITY, URINE AUTO (.000) (OHS): >1.05 (ref 1–1.03)
SQUAMOUS #/AREA URNS LPF: ABNORMAL /HPF
STAPH SP TUF BLD POS QL PROBE: DETECTED
STREPTOCOCCUS SP TUF BLD POS QL PROBE: NOT DETECTED
TROPONIN I SERPL-MCNC: <0.01 NG/ML (ref 0–0.04)
TSH SERPL-ACNC: 2.27 UIU/ML (ref 0.35–4.94)
UROBILINOGEN UR STRIP-ACNC: NORMAL
VAN(A+B+C1+C2) GENES ISLT/SPM: ABNORMAL
VIM CARBAPENEMASE ISLT QL IA.RAPID: ABNORMAL
WBC #/AREA URNS AUTO: ABNORMAL /HPF

## 2024-12-03 PROCEDURE — 99900035 HC TECH TIME PER 15 MIN (STAT)

## 2024-12-03 PROCEDURE — 81001 URINALYSIS AUTO W/SCOPE: CPT | Performed by: STUDENT IN AN ORGANIZED HEALTH CARE EDUCATION/TRAINING PROGRAM

## 2024-12-03 PROCEDURE — 82803 BLOOD GASES ANY COMBINATION: CPT

## 2024-12-03 PROCEDURE — 94640 AIRWAY INHALATION TREATMENT: CPT

## 2024-12-03 PROCEDURE — 83605 ASSAY OF LACTIC ACID: CPT | Performed by: STUDENT IN AN ORGANIZED HEALTH CARE EDUCATION/TRAINING PROGRAM

## 2024-12-03 PROCEDURE — 63600175 PHARM REV CODE 636 W HCPCS

## 2024-12-03 PROCEDURE — 83605 ASSAY OF LACTIC ACID: CPT

## 2024-12-03 PROCEDURE — 94761 N-INVAS EAR/PLS OXIMETRY MLT: CPT | Mod: XB

## 2024-12-03 PROCEDURE — 25000242 PHARM REV CODE 250 ALT 637 W/ HCPCS

## 2024-12-03 PROCEDURE — 96368 THER/DIAG CONCURRENT INF: CPT

## 2024-12-03 PROCEDURE — 20000000 HC ICU ROOM

## 2024-12-03 PROCEDURE — 96375 TX/PRO/DX INJ NEW DRUG ADDON: CPT

## 2024-12-03 PROCEDURE — 94799 UNLISTED PULMONARY SVC/PX: CPT

## 2024-12-03 PROCEDURE — 96367 TX/PROPH/DG ADDL SEQ IV INF: CPT

## 2024-12-03 PROCEDURE — 25000003 PHARM REV CODE 250: Performed by: STUDENT IN AN ORGANIZED HEALTH CARE EDUCATION/TRAINING PROGRAM

## 2024-12-03 PROCEDURE — 96361 HYDRATE IV INFUSION ADD-ON: CPT

## 2024-12-03 PROCEDURE — 36600 WITHDRAWAL OF ARTERIAL BLOOD: CPT

## 2024-12-03 PROCEDURE — 25000003 PHARM REV CODE 250

## 2024-12-03 PROCEDURE — 25000003 PHARM REV CODE 250: Performed by: HOSPITALIST

## 2024-12-03 PROCEDURE — 36415 COLL VENOUS BLD VENIPUNCTURE: CPT

## 2024-12-03 PROCEDURE — 87641 MR-STAPH DNA AMP PROBE: CPT

## 2024-12-03 PROCEDURE — 84134 ASSAY OF PREALBUMIN: CPT

## 2024-12-03 PROCEDURE — 87040 BLOOD CULTURE FOR BACTERIA: CPT | Performed by: STUDENT IN AN ORGANIZED HEALTH CARE EDUCATION/TRAINING PROGRAM

## 2024-12-03 PROCEDURE — 94760 N-INVAS EAR/PLS OXIMETRY 1: CPT | Mod: XB

## 2024-12-03 PROCEDURE — 63600175 PHARM REV CODE 636 W HCPCS: Performed by: STUDENT IN AN ORGANIZED HEALTH CARE EDUCATION/TRAINING PROGRAM

## 2024-12-03 PROCEDURE — 87798 DETECT AGENT NOS DNA AMP: CPT

## 2024-12-03 PROCEDURE — 87154 CUL TYP ID BLD PTHGN 6+ TRGT: CPT | Performed by: STUDENT IN AN ORGANIZED HEALTH CARE EDUCATION/TRAINING PROGRAM

## 2024-12-03 PROCEDURE — 99900031 HC PATIENT EDUCATION (STAT)

## 2024-12-03 PROCEDURE — 27000221 HC OXYGEN, UP TO 24 HOURS

## 2024-12-03 PROCEDURE — 99285 EMERGENCY DEPT VISIT HI MDM: CPT | Mod: 25

## 2024-12-03 PROCEDURE — 96365 THER/PROPH/DIAG IV INF INIT: CPT | Mod: 59

## 2024-12-03 PROCEDURE — 85652 RBC SED RATE AUTOMATED: CPT | Performed by: STUDENT IN AN ORGANIZED HEALTH CARE EDUCATION/TRAINING PROGRAM

## 2024-12-03 PROCEDURE — 82962 GLUCOSE BLOOD TEST: CPT

## 2024-12-03 PROCEDURE — 80307 DRUG TEST PRSMV CHEM ANLYZR: CPT

## 2024-12-03 RX ORDER — SODIUM CHLORIDE 0.9 % (FLUSH) 0.9 %
10 SYRINGE (ML) INJECTION
Status: DISCONTINUED | OUTPATIENT
Start: 2024-12-03 | End: 2024-12-14 | Stop reason: HOSPADM

## 2024-12-03 RX ORDER — ACETAMINOPHEN 10 MG/ML
1000 INJECTION, SOLUTION INTRAVENOUS ONCE
Status: COMPLETED | OUTPATIENT
Start: 2024-12-03 | End: 2024-12-03

## 2024-12-03 RX ORDER — GLUCAGON 1 MG
1 KIT INJECTION
Status: DISCONTINUED | OUTPATIENT
Start: 2024-12-03 | End: 2024-12-14 | Stop reason: HOSPADM

## 2024-12-03 RX ORDER — MUPIROCIN 20 MG/G
OINTMENT TOPICAL 2 TIMES DAILY
Status: COMPLETED | OUTPATIENT
Start: 2024-12-05 | End: 2024-12-09

## 2024-12-03 RX ORDER — IPRATROPIUM BROMIDE AND ALBUTEROL SULFATE 2.5; .5 MG/3ML; MG/3ML
3 SOLUTION RESPIRATORY (INHALATION)
Status: DISCONTINUED | OUTPATIENT
Start: 2024-12-03 | End: 2024-12-14 | Stop reason: HOSPADM

## 2024-12-03 RX ORDER — SODIUM CHLORIDE FOR INHALATION 3 %
4 VIAL, NEBULIZER (ML) INHALATION ONCE
Status: COMPLETED | OUTPATIENT
Start: 2024-12-03 | End: 2024-12-03

## 2024-12-03 RX ORDER — ENOXAPARIN SODIUM 100 MG/ML
40 INJECTION SUBCUTANEOUS EVERY 12 HOURS
Status: DISCONTINUED | OUTPATIENT
Start: 2024-12-03 | End: 2024-12-12

## 2024-12-03 RX ORDER — NOREPINEPHRINE BITARTRATE/D5W 8 MG/250ML
0-3 PLASTIC BAG, INJECTION (ML) INTRAVENOUS CONTINUOUS
Status: DISCONTINUED | OUTPATIENT
Start: 2024-12-03 | End: 2024-12-04

## 2024-12-03 RX ORDER — PANTOPRAZOLE SODIUM 40 MG/10ML
40 INJECTION, POWDER, LYOPHILIZED, FOR SOLUTION INTRAVENOUS DAILY
Status: DISCONTINUED | OUTPATIENT
Start: 2024-12-03 | End: 2024-12-14 | Stop reason: HOSPADM

## 2024-12-03 RX ORDER — KETOROLAC TROMETHAMINE 30 MG/ML
15 INJECTION, SOLUTION INTRAMUSCULAR; INTRAVENOUS EVERY 8 HOURS PRN
Status: DISPENSED | OUTPATIENT
Start: 2024-12-03 | End: 2024-12-05

## 2024-12-03 RX ORDER — INSULIN GLARGINE 100 [IU]/ML
10 INJECTION, SOLUTION SUBCUTANEOUS NIGHTLY
Status: DISCONTINUED | OUTPATIENT
Start: 2024-12-03 | End: 2024-12-14 | Stop reason: HOSPADM

## 2024-12-03 RX ORDER — INSULIN ASPART 100 [IU]/ML
0-10 INJECTION, SOLUTION INTRAVENOUS; SUBCUTANEOUS EVERY 6 HOURS PRN
Status: DISCONTINUED | OUTPATIENT
Start: 2024-12-03 | End: 2024-12-14 | Stop reason: HOSPADM

## 2024-12-03 RX ORDER — PANTOPRAZOLE SODIUM 40 MG/10ML
40 INJECTION, POWDER, LYOPHILIZED, FOR SOLUTION INTRAVENOUS DAILY
Status: DISCONTINUED | OUTPATIENT
Start: 2024-12-03 | End: 2024-12-03

## 2024-12-03 RX ADMIN — Medication: at 08:12

## 2024-12-03 RX ADMIN — IPRATROPIUM BROMIDE AND ALBUTEROL SULFATE 3 ML: 2.5; .5 SOLUTION RESPIRATORY (INHALATION) at 12:12

## 2024-12-03 RX ADMIN — NOREPINEPHRINE BITARTRATE 0.05 MCG/KG/MIN: 8 INJECTION, SOLUTION INTRAVENOUS at 03:12

## 2024-12-03 RX ADMIN — PIPERACILLIN AND TAZOBACTAM 4.5 G: 4; .5 INJECTION, POWDER, LYOPHILIZED, FOR SOLUTION INTRAVENOUS; PARENTERAL at 12:12

## 2024-12-03 RX ADMIN — VANCOMYCIN HYDROCHLORIDE 1250 MG: 1.25 INJECTION, POWDER, LYOPHILIZED, FOR SOLUTION INTRAVENOUS at 12:12

## 2024-12-03 RX ADMIN — Medication: at 03:12

## 2024-12-03 RX ADMIN — VANCOMYCIN HYDROCHLORIDE 1000 MG: 1 INJECTION, POWDER, LYOPHILIZED, FOR SOLUTION INTRAVENOUS at 02:12

## 2024-12-03 RX ADMIN — INSULIN GLARGINE 10 UNITS: 100 INJECTION, SOLUTION SUBCUTANEOUS at 08:12

## 2024-12-03 RX ADMIN — ENOXAPARIN SODIUM 40 MG: 40 INJECTION SUBCUTANEOUS at 08:12

## 2024-12-03 RX ADMIN — IPRATROPIUM BROMIDE AND ALBUTEROL SULFATE 3 ML: 2.5; .5 SOLUTION RESPIRATORY (INHALATION) at 08:12

## 2024-12-03 RX ADMIN — PIPERACILLIN SODIUM AND TAZOBACTAM SODIUM 4.5 G: 4; .5 INJECTION, POWDER, LYOPHILIZED, FOR SOLUTION INTRAVENOUS at 05:12

## 2024-12-03 RX ADMIN — SODIUM CHLORIDE, POTASSIUM CHLORIDE, SODIUM LACTATE AND CALCIUM CHLORIDE 1000 ML: 600; 310; 30; 20 INJECTION, SOLUTION INTRAVENOUS at 01:12

## 2024-12-03 RX ADMIN — INSULIN ASPART 2 UNITS: 100 INJECTION, SOLUTION INTRAVENOUS; SUBCUTANEOUS at 05:12

## 2024-12-03 RX ADMIN — PIPERACILLIN SODIUM AND TAZOBACTAM SODIUM 4.5 G: 4; .5 INJECTION, POWDER, LYOPHILIZED, FOR SOLUTION INTRAVENOUS at 08:12

## 2024-12-03 RX ADMIN — ACETAMINOPHEN 1000 MG: 10 INJECTION, SOLUTION INTRAVENOUS at 12:12

## 2024-12-03 RX ADMIN — PANTOPRAZOLE SODIUM 40 MG: 40 INJECTION, POWDER, FOR SOLUTION INTRAVENOUS at 08:12

## 2024-12-03 RX ADMIN — KETOROLAC TROMETHAMINE 15 MG: 30 INJECTION, SOLUTION INTRAMUSCULAR; INTRAVENOUS at 09:12

## 2024-12-03 RX ADMIN — SODIUM CHLORIDE SOLN NEBU 3% 4 ML: 3 NEBU SOLN at 05:12

## 2024-12-03 NOTE — PROGRESS NOTES
[facility] - 7th Floor ICU  Wound Care    Patient Name:  Elisabet Choi   MRN:  [id number]  Date: 12/3/2024  Diagnosis: <principal problem not specified>    History:     Past Medical History:   Diagnosis Date    Acute cystitis     Allergic rhinitis     Anemia, unspecified     Angina pectoris     Arthritis     Back pain     Bipolar disorder, unspecified     Celiac disease     Cellulitis     CHF (congestive heart failure)     Constipation     Contracture, left ankle     Dementia     Depressive episode     Diabetes mellitus     Diabetes mellitus with ulcer of foot     Displacement of other urinary catheter, subsequent encounter     Edema     Elevated white blood cell count     Encounter for blood transfusion     Fatigue     Fluid retention     Genetic anomalies of leukocytes     GERD without esophagitis     Goiter     HLD (hyperlipidemia)     Hydrocephalus     Hypertension     Hypokalemia     Hypotension     Hypothyroidism     Magnesium deficiency     Major depression     Migraine     Mild protein-calorie malnutrition     Morbid obesity     Obstructive and reflux uropathy     Osteoarthritis     Osteoporosis     Overactive bladder     Pressure ulcer of right heel     Pressure ulcer of sacral region     Pressure ulcer of sacral region, stage 3     Pruritus     PVD (peripheral vascular disease)     Radiculopathy, lumbar region     Seizures     Sleep apnea     uses CPAP    SOB (shortness of breath)     Stroke     TIA (transient ischemic attack)     Urinary tract infection, site not specified     Vitamin deficiency     Wheelchair dependent        Social History     Socioeconomic History    Marital status:    Tobacco Use    Smoking status: Never    Smokeless tobacco: Never   Substance and Sexual Activity    Alcohol use: No    Drug use: No     Social Drivers of Health     Financial Resource Strain: Low Risk  (9/26/2024)    Overall Financial Resource Strain (CARDIA)     Difficulty of Paying Living Expenses:  Not hard at all   Food Insecurity: No Food Insecurity (9/26/2024)    Hunger Vital Sign     Worried About Running Out of Food in the Last Year: Never true     Ran Out of Food in the Last Year: Never true   Transportation Needs: No Transportation Needs (9/26/2024)    TRANSPORTATION NEEDS     Transportation : No   Physical Activity: Inactive (9/26/2024)    Exercise Vital Sign     Days of Exercise per Week: 0 days     Minutes of Exercise per Session: 0 min   Stress: No Stress Concern Present (9/26/2024)    Citizen of [place] [employer] - Occupational Stress Questionnaire     Feeling of Stress : Only a little   Housing Stability: Low Risk  (9/26/2024)    Housing Stability Vital Sign     Unable to Pay for Housing in the Last Year: No     Homeless in the Last Year: No       Precautions:     Allergies as of 12/02/2024 - Reviewed 12/02/2024   Allergen Reaction Noted    Gluten  12/10/2019    Gluten protein  06/29/2014    Ropinirole  07/24/2022    Wheat  12/10/2019    Wheat containing prod  06/29/2014    Zolpidem  06/29/2014       WOC Assessment Details/Treatment     Initial visit regarding affected areas over left heel, perineum and right upper back POA . Patient is currently resting on a low air loss total care bed in ICU. Left heel wound cleansed and assessed and redressed , noting full thickness wound appearing chronic to plantar heel and maroon blistered ecchymoitic periwound areas extending  laterally. Patient tolerated procedure fair , complaining with mobilization of limb for assessment and care. Discussed status and plan of care with assigned nurse , patient family at bedside awaiting opportunity to speak with nurse. Will revisit for further skin assessment.    12/03/24 0930        Wound 12/03/24 0500 Pressure Injury Left Heel   Date First Assessed/Time First Assessed: 12/03/24 0500   Present on Original Admission: Yes  Primary Wound Type: Pressure Injury  Side: Left  Location: Heel   Wound Image     Pressure Injury Stage 3   Dressing Appearance Dried drainage   Drainage Amount Scant   Drainage Characteristics/Odor Serosanguineous   Appearance Red;Maroon   Tissue loss description Full thickness   Red (%), Wound Tissue Color 50 %   Periwound Area Blistered;Maroon;Indurated;Macerated   Wound Edges Jagged;Undefined   Care Antimicrobial agent   Dressing Changed;Gauze;Rolled gauze;Non-adherent   Off Loading   (heel lift boots)   Dressing Change Due 12/04/24       Recommendations made to primary team for local wound care to left heel . Orders placed.     12/03/2024

## 2024-12-03 NOTE — PT/OT/SLP PROGRESS
Consult received.  OT reached out to Cindy Reeves to determine PLOF.  Per nursing staff at the NH, pt is total care.  Pt is duke lifted at baseline and requires assist for all ADLs.  OT signing off at this time.  Please re-consult if needed.  Thank you-

## 2024-12-03 NOTE — ED PROVIDER NOTES
Encounter Date: 12/2/2024    SCRIBE #1 NOTE: I, Evelio Napier, am scribing for, and in the presence of,  Shilo Dawson MD. I have scribed the following portions of the note - Other sections scribed: HPI,ROS,PE.       History     Chief Complaint   Patient presents with    Altered Mental Status     Pt arrives via [facility], EMS reports pt is coming from [facility], Nurse checked on pt approx 1 hr ago and pt was unresponsive, no facial asymmetry noted , pt is following command, equal  strength. EMS      80 y/o female with PMHx of anemia, bipolar disorder, celiac disease, CHF, dementia, DM, GERD, hydrocephalus, HTN, HLD, hypothyroidism, obesity, PVD, CVA, and seizures presents to ED via EMS for AMS. Per triage note, EMS reports pt is coming from [facility], Nurse checked on pt approx 1 hr ago and pt was unresponsive, no facial asymmetry noted. EMS     History and ROS limited due to condition of pt.     The history is provided by the EMS personnel and medical records. The history is limited by the condition of the patient. No  was used.     Review of patient's allergies indicates:   Allergen Reactions    Gluten      Other reaction(s): ciliac disease    Gluten protein     Ropinirole      Other reaction(s): hallucinations    Wheat      Other reaction(s): ciliac disease    Wheat containing prod     Zolpidem      hallucinates  Other reaction(s): hallucinations     Past Medical History:   Diagnosis Date    Acute cystitis     Allergic rhinitis     Anemia, unspecified     Angina pectoris     Arthritis     Back pain     Bipolar disorder, unspecified     Celiac disease     Cellulitis     CHF (congestive heart failure)     Constipation     Contracture, left ankle     Dementia     Depressive episode     Diabetes mellitus     Diabetes mellitus with ulcer of foot     Displacement of other urinary catheter, subsequent encounter     Edema     Elevated white blood cell count      Encounter for blood transfusion     Fatigue     Fluid retention     Genetic anomalies of leukocytes     GERD without esophagitis     Goiter     HLD (hyperlipidemia)     Hydrocephalus     Hypertension     Hypokalemia     Hypotension     Hypothyroidism     Magnesium deficiency     Major depression     Migraine     Mild protein-calorie malnutrition     Morbid obesity     Obstructive and reflux uropathy     Osteoarthritis     Osteoporosis     Overactive bladder     Pressure ulcer of right heel     Pressure ulcer of sacral region     Pressure ulcer of sacral region, stage 3     Pruritus     PVD (peripheral vascular disease)     Radiculopathy, lumbar region     Seizures     Sleep apnea     uses CPAP    SOB (shortness of breath)     Stroke     TIA (transient ischemic attack)     Urinary tract infection, site not specified     Vitamin deficiency     Wheelchair dependent      Past Surgical History:   Procedure Laterality Date    ABDOMINAL SURGERY      APPENDECTOMY      BACK SURGERY      BLADDER SURGERY      BRAIN SURGERY      CAROTID ENDARTERECTOMY      CHOLECYSTECTOMY      CSF SHUNT      HERNIA REPAIR      HYSTERECTOMY      INSERTION, SUPRAPUBIC CATHETER N/A 3/26/2024    Procedure: INSERTION, SUPRAPUBIC CATHETER;  Surgeon: Fuad Szymanski MD;  Location: [facility];  Service: Urology;  Laterality: N/A;  CYSTO W/ SUPRAPUBIC CATH INSERTION    TONSILLECTOMY      VASCULAR SURGERY       No family history on file.  Social History     Tobacco Use    Smoking status: Never    Smokeless tobacco: Never   Substance Use Topics    Alcohol use: No    Drug use: No     Review of Systems   Unable to perform ROS: Acuity of condition       Physical Exam     Initial Vitals [12/02/24 2220]   BP Pulse Resp Temp SpO2   (!) 91/52 (!) 120 20 100.3 °F (37.9 °C) (!) 94 %      MAP       --         Physical Exam    Nursing note and vitals reviewed.  Constitutional: She appears well-developed and well-nourished. She is not diaphoretic.   Somnolent and  drowsy.    HENT:   Head: Normocephalic and atraumatic.   Nose: Nose normal. Mouth/Throat: Oropharynx is clear and moist.   Cardiovascular:  Regular rhythm.   Tachycardia present.         No murmur heard.  Pulmonary/Chest: No respiratory distress. She has no wheezes. She has no rales.   Coarse breath sounds bilaterally.    Abdominal: Abdomen is soft. She exhibits distension.   Musculoskeletal:         General: Edema (Anasarca edema to BLE) present.      Comments: Wound to left leg.   Erythema and warmth to BLE.        Neurological: No cranial nerve deficit or sensory deficit. GCS eye subscore is 2. GCS verbal subscore is 2. GCS motor subscore is 5.   somnolent and drowsy.   GCS 9    Skin: Skin is warm. Capillary refill takes less than 2 seconds. No rash noted.         ED Course   Critical Care    Date/Time: 12/2/2024 10:41 PM    Performed by: Shilo Dawson MD  Authorized by: Shilo Dawson MD  Direct patient critical care time: 45 minutes  Total critical care time (exclusive of procedural time) : 45 minutes  Critical care time was exclusive of separately billable procedures and treating other patients.  Critical care was necessary to treat or prevent imminent or life-threatening deterioration of the following conditions: sepsis, shock and toxidrome.  Critical care was time spent personally by me on the following activities: development of treatment plan with patient or surrogate, discussions with consultants, interpretation of cardiac output measurements, evaluation of patient's response to treatment, obtaining history from patient or surrogate, ordering and performing treatments and interventions, ordering and review of laboratory studies, examination of patient, ordering and review of radiographic studies, pulse oximetry, re-evaluation of patient's condition and review of old charts.        Labs Reviewed   COMPREHENSIVE METABOLIC PANEL - Abnormal       Result Value    Sodium 141      Potassium 4.6       Chloride 100      CO2 33 (*)     Glucose 227 (*)     Blood Urea Nitrogen 28.9 (*)     Creatinine 0.88      Calcium 9.3      Protein Total 7.0      Albumin 2.9 (*)     Globulin 4.1 (*)     Albumin/Globulin Ratio 0.7 (*)     Bilirubin Total 0.3      ALP 93      ALT 29      AST 21      eGFR >60      Anion Gap 8.0      BUN/Creatinine Ratio 33     URINALYSIS, REFLEX TO URINE CULTURE - Abnormal    Color, UA Light-Yellow      Appearance, UA Clear      Specific Gravity, UA >1.050 (*)     pH, UA 8.5      Protein, UA Trace (*)     Glucose, UA Normal      Ketones, UA Negative      Blood, UA Negative      Bilirubin, UA Negative      Urobilinogen, UA Normal      Nitrites, UA Negative      Leukocyte Esterase, UA Negative      RBC, UA 0-5      WBC, UA 0-5      Bacteria, UA None Seen      Squamous Epithelial Cells, UA None Seen      Mucous, UA Trace (*)    CBC WITH DIFFERENTIAL - Abnormal    WBC 17.65 (*)     RBC 4.30      Hgb 12.2      Hct 39.2      MCV 91.2      MCH 28.4      MCHC 31.1 (*)     RDW 16.4      Platelet 211      MPV 10.6 (*)     Neut % 78.1      Lymph % 17.7      Mono % 2.9      Eos % 0.8      Basophil % 0.2      Lymph # 3.13      Neut # 13.76 (*)     Mono # 0.52      Eos # 0.14      Baso # 0.04      IG# 0.06 (*)     IG% 0.3      NRBC% 0.0     LACTIC ACID, PLASMA - Abnormal    Lactic Acid Level 2.4 (*)    LACTIC ACID, PLASMA - Abnormal    Lactic Acid Level 3.0 (*)    C-REACTIVE PROTEIN - Abnormal    CRP 55.00 (*)    SEDIMENTATION RATE - Abnormal    Sed Rate 53 (*)    POCT GLUCOSE - Abnormal    POCT Glucose 243 (*)    MAGNESIUM - Normal    Magnesium Level 2.40     TROPONIN I - Normal    Troponin-I <0.010     B-TYPE NATRIURETIC PEPTIDE - Normal    Natriuretic Peptide 39.0     BLOOD CULTURE [facility]   BLOOD CULTURE [facility]   RESPIRATORY CULTURE ([facility])   CBC W/ AUTO DIFFERENTIAL    Narrative:     The following orders were created for panel order CBC auto differential.  Procedure                               Abnormality          Status                     ---------                               -----------         ------                     CBC with Differential[[id number]]       Abnormal            Final result                 Please view results for these tests on the individual orders.   PHOSPHORUS   BLOOD GAS   RESPIRATORY PANEL   MRSA PCR   TSH   DRUG SCREEN, URINE (BEAKER)   POCT GLUCOSE MONITORING CONTINUOUS   POCT GLUCOSE MONITORING CONTINUOUS          Imaging Results              CT Chest Abdomen Pelvis With IV Contrast (XPD) NO Oral Contrast (Preliminary result)  Result time 12/03/24 00:15:16      Preliminary result by Vance Guzman Jr., MD (12/03/24 00:15:16)                   Narrative:    START OF REPORT:  Technique: CT Scan of the chest abdomen and pelvis was performed with intravenous contrast with axial as well as sagittal and, coronal images.    Dosage Information: Automated Exposure Control was utilized 1445.92 mGy.cm.    Comparison: Comparison is with study dated 2023-04-21 15:16:39.    Clinical History: SEPSIS.    Findings:  Artifact: Mild motion artifact is seen.  Soft Tissues: Unremarkable.  Lines and Tubes: A drainage catheter is seen coursing along the right cervical region, hemithorax and hemiabdomen with distal tip in the anterior pelvic cavity.  Neck: The visualized soft tissues of the neck appear unremarkable.  Mediastinum: Multiple subcentimeter mediastinal lymph nodes are seen paratracheal and subcarinal spaces . This is consistent with reactive lymphadenopathy.  Heart: Mild cardiomegaly is seen.  Aorta: No aortic dissection or aneurysm is seen. Mild aortic calcification is seen in the thoracic aorta.  Pulmonary Arteries: No filling defects are seen in the pulmonary arteries to suggest pulmonary embolus to the sensitivity of the study.  Lungs: There is a small possibly loculated right sided pleural effusion and a trace left sided pleural effusion with adjacent consolidation atelectasis and ground glass  opacity. A superimposed pneumonia at the right lung base is not excluded.  Bony Structures:  Spine: Moderate spondylolytic changes are seen in the thoracic spine with vertebral fusion.  Ribs: The ribs appear unremarkable.  Abdomen:  Abdominal Wall: There is extensive stranding of the subcutaneous fat suggesting an element of anasarca edema of uncertain etiology.  Liver: The liver appears unremarkable.  Biliary System: The extra hepatic biliary system appears prominent which may reflect prior obstructive physiology in this patient status post cholecystectomy.  Gallbladder: The gallbladder is not identified in the gallbladder fossa which may reflect prior cholecystectomy correlate with surgical history and visual inspection.  Pancreas: There is mild fatty infiltration of the pancreas.  Spleen: The spleen appears unremarkable.  Adrenals: The adrenal glands appear unremarkable.  Kidneys: The kidneys appear unremarkable with no stones cysts masses or hydronephrosis.  Aorta: There is mild calcification of the abdominal aorta and its branches.  IVC: Unremarkable.  Bowel:  Esophagus: The visualized esophagus appears unremarkable.  Stomach: The stomach appears unremarkable.  Duodenum: Unremarkable appearing duodenum.  Small Bowel: The small bowel appears unremarkable.  Colon: There is moderate stool in the colon which could reflect an element of constipation. There is mild thickening at the distal rectum through anorectal verge which may represent proctocolitis.  Appendix: The appendix is not identified but no inflammatory changes are seen in the right lower quadrant to suggest appendicitis.  Peritoneum: No intraperitoneal free air or ascites is seen.    Pelvis:  Bladder: The bladder is nondistended and cannot be definitively evaluated. A suprapubic catheter is in place.  Female:  Uterus: The uterus is not identified.  Ovaries: No adnexal masses are seen.    Bony structures:  Dorsal Spine: There is moderate spondylosis of  the visualized dorsal spine. There is non acute severe compression deformity at L1 with bone cement. Spinal fixating device is seen at T12 through L2.  Bony Pelvis: There is mild to moderate degenerative change of the bilateral hip.      Impression:  1. There is a small possibly loculated right sided pleural effusion and a trace left sided pleural effusion with adjacent consolidation atelectasis and ground glass opacity. A superimposed pneumonia at the right lung base is not excluded.  2. There is mild thickening at the distal rectum through anorectal verge which may represent proctocolitis.  3. Details and other findings as discussed above.                                         CT head (if fall & altered, LOC>2, on Warfarin or other anticoagulants) (Preliminary result)  Result time 12/03/24 00:05:28      Preliminary result by Vance Guzman Jr., MD (12/03/24 00:05:28)                   Narrative:    START OF REPORT:  Technique: CT of the head was performed without intravenous contrast with axial as well as coronal and sagittal images.    Comparison: Comparison is with study dated 2024-03-20 14:15:17.    Dosage Information: Automated Exposure Control was utilized 993.50 mGy.cm. Number of irradiation events 2.    Clinical history: Altered Mental Status (Pt arrives via [facility], EMS reports pt is coming from [facility], Nurse checked on pt approx 1 hr ago and pt was unresponsive, no facial asymmetry noted , pt is following command, equal  strength. EMS ).    Findings:  Hemorrhage: No acute intracranial hemorrhage is seen.  CSF spaces: The ventricles, sulci and basal cisterns all appear mildly prominent suggesting an element of global cerebral atrophy. This is stable since the prior study.  Brain parenchyma: No acute infarct is identified. A stable appearing right-sided ventriculostomy is again seen with unchanged hypodensities around the tube in the right frontal lobe.  Cerebellum:  Unremarkable.  Sella and skull base: The sella appears to be within normal limits for age.  Intracranial calcifications: Incidental note is made of bilateral choroid plexus calcification. Incidental note is made of some pineal region calcification.  Calvarium: Stable right craniectomy changes are again seen.    Maxillofacial Structures:  Paranasal sinuses: The visualized paranasal sinuses appear clear with no significant mucoperiosteal thickening or air fluid levels identified.  Orbits: The orbits appear unremarkable.  Zygomatic arches: The zygomatic arches are intact and unremarkable.  Temporal bones and mastoids: The temporal bones and mastoids appear unremarkable.  TMJ: The mandibular condyles appear normally placed with respect to the mandibular fossa.      Impression:  1. A stable appearing right-sided ventriculostomy is again seen with unchanged hypodensities around the tube in the right frontal lobe.  2. No acute intracranial process identified. Details and other findings as noted above.                                         X-Ray Chest 1 View (In process)                      Medications   NORepinephrine 8 mg in dextrose 5% 250 mL infusion (0.04 mcg/kg/min × 102.1 kg Intravenous Rate/Dose Change 12/3/24 3216)   sodium chloride 0.9% flush 10 mL (has no administration in time range)   enoxaparin injection 40 mg (has no administration in time range)   sodium chloride 3% nebulizer solution 4 mL (has no administration in time range)   vancomycin - pharmacy to dose (has no administration in time range)   piperacillin-tazobactam (ZOSYN) 4.5 g in D5W 100 mL IVPB (MB+) (has no administration in time range)   insulin glargine U-100 (Lantus) injection 10 Units (has no administration in time range)   glucagon (human recombinant) injection 1 mg (has no administration in time range)   insulin aspart U-100 injection 0-10 Units (has no administration in time range)   dextrose 10% bolus 125 mL 125 mL (has no administration  in time range)   dextrose 10% bolus 250 mL 250 mL (has no administration in time range)   piperacillin-tazobactam (ZOSYN) 4.5 g in D5W 100 mL IVPB (MB+) (0 g Intravenous Stopped 12/3/24 0035)   lactated ringers bolus 1,000 mL (0 mLs Intravenous Stopped 12/3/24 0044)   vancomycin 1,250 mg in D5W 250 mL IVPB (admixture device) (0 mg Intravenous Stopped 12/3/24 0208)     And   vancomycin (VANCOCIN) 1,000 mg in D5W 250 mL IVPB (admixture device) (0 mg Intravenous Stopped 12/3/24 0352)   iohexoL (OMNIPAQUE 350) injection 100 mL (100 mLs Intravenous Given 12/2/24 2349)   acetaminophen 1,000 mg/100 mL (10 mg/mL) injection 1,000 mg (0 mg Intravenous Stopped 12/3/24 0048)   lactated ringers bolus 1,000 mL (0 mLs Intravenous Stopped 12/3/24 0253)     Medical Decision Making  Problems Addressed:  AMS (altered mental status): acute illness or injury  Cellulitis, unspecified cellulitis site: acute illness or injury  Pneumonia due to infectious organism, unspecified laterality, unspecified part of lung: acute illness or injury  Sepsis, due to unspecified organism, unspecified whether acute organ dysfunction present: acute illness or injury    Amount and/or Complexity of Data Reviewed  Labs: ordered. Decision-making details documented in ED Course.  Radiology: ordered. Decision-making details documented in ED Course.  ECG/medicine tests: ordered and independent interpretation performed. Decision-making details documented in ED Course.    Risk  Prescription drug management.  Decision regarding hospitalization.    Differential diagnosis (includes but is not limited to):   ICH, CVA, TIA, electrolyte abnormalities, dehydration, kidney injury, ACS, arrhythmia, volume overload, infection, sepsis, cellulitis, pneumonia, urinary infection    MDM Narrative  79-year-old female presents for evaluation of altered mental status after reported unresponsive episode at home.  Patient arrives febrile, tachycardic, tachypneic, hypotensive.  Sepsis  "workup initiated including blood cultures, lactic acid, broad-spectrum antibiotics, IV fluid resuscitation.  30 cc/kilos IV fluid bolus based on ideal body weight ordered.  Labs reviewed, leukocytosis 17.65 noted with a left shift.  Chemistry reviewed.  Troponin negative.  Urine with no evidence of infection.  Initial lactic acid mildly elevated 2.4, will continue to trend until resolved.  CT of the head unremarkable.  CT of the chest abdomen pelvis with evidence of a pneumonia and effusions with possible loculations noted.  Patient initially fluid responsive however has had worsening of her blood pressures despite aggressive IV fluid resuscitation.  Patient is now on Levophed for vasopressor support with significant improvement in blood pressures.  Discussed with ICU who has evaluated the patient at bedside.  Will admit to the ICU for further care.    Dispo: Admit    My independent radiology interpretation:  as above  Point of care US (independently performed and interpreted):   Decision rules/clinical scoring:     Sepsis Perfusion Assessment: "I attest a sepsis perfusion exam was performed within 6 hours of sepsis, severe sepsis, or septic shock presentation, following fluid resuscitation."     Amount and/or Complexity of Data Reviewed  Independent historian: EMS   Summary of history: report received  External data reviewed: notes from previous ED visits, notes from clinic visits, and nursing home records  Summary of data reviewed: Prior records reviewed  Risk and benefits of testing: discussed   Labs: ordered and reviewed  Radiology: ordered and independent interpretation performed (see above or ED course)  ECG/medicine tests: ordered and independent interpretation performed (see above or ED course)  Discussion of management or test interpretation with external provider(s): discussed with ICU consultant   Summary of discussion: as above    Risk  Parenteral controlled substances   Drug therapy requiring intense " monitoring for toxicity   Decision regarding hospitalization  Shared decision making     Critical Care  30-74 minutes     Data Reviewed/Counseling: I have personally reviewed the patient's vital signs, nursing notes, and other relevant tests, information, and imaging. I had a detailed discussion regarding the historical points, exam findings, and any diagnostic results supporting the discharge diagnosis. I personally performed the history, PE, MDM and procedures as documented above and agree with the scribe's documentation.    Portions of this note were dictated using voice recognition software. Although it was reviewed for accuracy, some inherent voice recognition errors may have occurred and may be present in this document.          Scribe Attestation:   Scribe #1: I performed the above scribed service and the documentation accurately describes the services I performed. I attest to the accuracy of the note.    Attending Attestation:           Physician Attestation for Scribe:  Physician Attestation Statement for Scribe #1: I, Shilo Dawson MD, reviewed documentation, as scribed by Evelio Napier in my presence, and it is both accurate and complete.             ED Course as of 12/03/24 0431   Mon Dec 02, 2024   2246 LaPOST: Full Code [MC]   2254 EKG independently interpreted by me.  EKG: ST @ 118, no STEMI, Qtc 434 [MC]   2303 X-Ray Chest 1 View  Independently visualized/reviewed by me during the ED visit.  - Cardiomegaly, effusions, bilateral consolidations [MC]   2329 Temp(!): 102.9 °F (39.4 °C)  Sepsis workup initiated including Blood Cx X2, lactic acid, broad spectrum abx, ivf bolus. Limited IVF bolus of 1L ordered due to concern for volume overload with her anasarca edema and hypoxia.  Will consider ordering additional fluids based on hemodynamic and respiratory status. [MC]   2339 WBC(!): 17.65 [MC]   Tue Dec 03, 2024   0313 Despite antipyretics, IV fluid resuscitation of 30 cc/kg IBW, antibiotics,  patient continues with hypotension.  In addition, lactic up trending.  Will initiate vasopressor support with Levophed.  Continue fluids and antibiotics. []   0335 Discussed with the ICU team, will evaluate patient. []      ED Course User Index  [] Shilo Dawson MD                           Clinical Impression:  Final diagnoses:  [R41.82] AMS (altered mental status)  [A41.9] Sepsis, due to unspecified organism, unspecified whether acute organ dysfunction present (Primary)  [J18.9] Pneumonia due to infectious organism, unspecified laterality, unspecified part of lung  [L03.90] Cellulitis, unspecified cellulitis site          ED Disposition Condition    Admit Stable                Shilo Dawson MD  12/03/24 3398

## 2024-12-03 NOTE — PROGRESS NOTES
[facility] - 7th Floor ICU  Wound Care    Patient Name:  Elisabet Choi   MRN:  [id number]  Date: 12/3/2024  Diagnosis: <principal problem not specified>    History:     Past Medical History:   Diagnosis Date    Acute cystitis     Allergic rhinitis     Anemia, unspecified     Angina pectoris     Arthritis     Back pain     Bipolar disorder, unspecified     Celiac disease     Cellulitis     CHF (congestive heart failure)     Constipation     Contracture, left ankle     Dementia     Depressive episode     Diabetes mellitus     Diabetes mellitus with ulcer of foot     Displacement of other urinary catheter, subsequent encounter     Edema     Elevated white blood cell count     Encounter for blood transfusion     Fatigue     Fluid retention     Genetic anomalies of leukocytes     GERD without esophagitis     Goiter     HLD (hyperlipidemia)     Hydrocephalus     Hypertension     Hypokalemia     Hypotension     Hypothyroidism     Magnesium deficiency     Major depression     Migraine     Mild protein-calorie malnutrition     Morbid obesity     Obstructive and reflux uropathy     Osteoarthritis     Osteoporosis     Overactive bladder     Pressure ulcer of right heel     Pressure ulcer of sacral region     Pressure ulcer of sacral region, stage 3     Pruritus     PVD (peripheral vascular disease)     Radiculopathy, lumbar region     Seizures     Sleep apnea     uses CPAP    SOB (shortness of breath)     Stroke     TIA (transient ischemic attack)     Urinary tract infection, site not specified     Vitamin deficiency     Wheelchair dependent        Social History     Socioeconomic History    Marital status:    Tobacco Use    Smoking status: Never    Smokeless tobacco: Never   Substance and Sexual Activity    Alcohol use: No    Drug use: No     Social Drivers of Health     Financial Resource Strain: Patient Unable To Answer (12/3/2024)    Overall Financial Resource Strain (CARDIA)     Difficulty of Paying  Living Expenses: Patient unable to answer   Food Insecurity: Patient Unable To Answer (12/3/2024)    Hunger Vital Sign     Worried About Running Out of Food in the Last Year: Patient unable to answer     Ran Out of Food in the Last Year: Patient unable to answer   Transportation Needs: Patient Unable To Answer (12/3/2024)    TRANSPORTATION NEEDS     Transportation : Patient unable to answer   Physical Activity: Inactive (9/26/2024)    Exercise Vital Sign     Days of Exercise per Week: 0 days     Minutes of Exercise per Session: 0 min   Stress: Patient Unable To Answer (12/3/2024)    [employer] - Occupational Stress Questionnaire     Feeling of Stress : Patient unable to answer   Housing Stability: Patient Unable To Answer (12/3/2024)    Housing Stability Vital Sign     Unable to Pay for Housing in the Last Year: Patient unable to answer     Homeless in the Last Year: Patient unable to answer       Precautions:     Allergies as of 12/02/2024 - Reviewed 12/02/2024   Allergen Reaction Noted    Gluten  12/10/2019    Gluten protein  06/29/2014    Ropinirole  07/24/2022    Wheat  12/10/2019    Wheat containing prod  06/29/2014    Zolpidem  06/29/2014       WO Assessment Details/Treatment     Return visit to follow up , assessed affected areas over perineum and buttocks and right lateral back. Assessed areas and redressed denuded area at right lateral back with bordered foam dressing. Patient remains resting on a low air loss bed with pressure injury prevention measures in place.        12/03/24 1330        Altered Skin Integrity 03/26/24 2010 Sacral spine Intact skin with non-blanchable redness of localized area   Date First Assessed/Time First Assessed: 03/26/24 2010   Altered Skin Integrity Present on Admission - Did Patient arrive to the hospital with altered skin?: yes  Location: Sacral spine  Description of Altered Skin Integrity: Intact skin with non-blan...   Wound Image     Description of Altered Skin Integrity   (vulnerable blanchable erythema)   Dressing Appearance Open to air   Drainage Amount None   Appearance Pink;Red   Tissue loss description Not applicable   Wound Edges Undefined        Wound 12/03/24 1330 Blister(s) Right lateral Back   Date First Assessed/Time First Assessed: 12/03/24 1330   Present on Original Admission: Yes  Primary Wound Type: Blister(s)  Side: Right  Orientation: lateral  Location: Back   Wound Image    Dressing Appearance Dried drainage   Drainage Amount Scant   Drainage Characteristics/Odor Serosanguineous   Appearance Red;Moist  (denuded area)   Tissue loss description Partial thickness   Periwound Area Intact;Pale white   Wound Edges Defined   Wound Length (cm) 1 cm   Wound Width (cm) 4 cm   Wound Surface Area (cm^2) 4 cm^2   Care Sterile normal saline   Dressing Changed;Foam   Dressing Change Due 12/05/24   Skin Interventions   Pressure Reduction Devices specialty bed utilized  (low air loss total care bed)   Pressure Reduction Techniques   (turning q 2 hours with a wedge pillow.)         Recommendations made to primary team for local wound care measures and pressure injury prevention measures . Orders placed.     12/03/2024

## 2024-12-03 NOTE — CARE UPDATE
I spoke with the patient's son at bedside.  He has power-of-.  We discussed code status per his request.  His desires at that time would be to have his mother intubated if necessary.  However he would wished this only be temporary and would not want long-term ventilator support.    He also requested no chest compressions or electrical cardioversion.    We will attempt to place the above order through epic.

## 2024-12-03 NOTE — PT/OT/SLP PROGRESS
Orders received, chart reviewed, POC discussed with nursing. SLP attempted to see patient for bedside swallow evaluation, however patient with increased lethargy. Pt would wake up to verbal and tactile stimulation, but would fall right back to sleep. Not currently appropriate for PO intake. SLP to follow up as appropriate.

## 2024-12-03 NOTE — PT/OT/SLP PROGRESS
Physical Therapy      Patient Name:  Elisabet Choi   MRN:  [id number]    Patient not seen today for PT eval. Pt is a nursing home resident. Call made to her facility. Per nurse at [facility], pt is duke lifted at baseline- does not ever get EOB and is totA for all ADLs. Pt is not appropriate for acute, skilled PT services. PT to sign off.

## 2024-12-03 NOTE — PROGRESS NOTES
Pharmacokinetic Initial Assessment: IV Vancomycin    Assessment/Plan:    Initiate intravenous vancomycin with loading dose of 2250 mg once followed by a maintenance dose of vancomycin 2250 mg IV every 24 hours  Desired empiric serum trough concentration is 15 to 20 mcg/mL  Draw vancomycin trough level 60 min prior to third dose on 12/05 at approximately 0000  Pharmacy will continue to follow and monitor vancomycin.      Please contact pharmacy at extension [phone] with any questions regarding this assessment.     Thank you for the consult,   Gucci Laytonlly       Patient brief summary:  Elisabet Choi is a 79 y.o. female initiated on antimicrobial therapy with IV Vancomycin for treatment of suspected sepsis    Drug Allergies:   Review of patient's allergies indicates:   Allergen Reactions    Gluten      Other reaction(s): ciliac disease    Gluten protein     Ropinirole      Other reaction(s): hallucinations    Wheat      Other reaction(s): ciliac disease    Wheat containing prod     Zolpidem      hallucinates  Other reaction(s): hallucinations       Actual Body Weight:   102.1kg    Renal Function:   Estimated Creatinine Clearance: 55 mL/min (based on SCr of 0.88 mg/dL).,     Dialysis Method (if applicable):  N/A    CBC (last 72 hours):  Recent Labs   Lab Result Units 12/02/24  2327   WBC x10(3)/mcL 17.65*   Hgb g/dL 12.2   Hct % 39.2   Platelet x10(3)/mcL 211   Mono % % 2.9   Eos % % 0.8   Basophil % % 0.2       Metabolic Panel (last 72 hours):  Recent Labs   Lab Result Units 12/02/24  2327 12/03/24  0025   Sodium mmol/L 141  --    Potassium mmol/L 4.6  --    Chloride mmol/L 100  --    CO2 mmol/L 33*  --    Glucose mg/dL 227*  --    Glucose, UA   --  Normal   Blood Urea Nitrogen mg/dL 28.9*  --    Creatinine mg/dL 0.88  --    Albumin g/dL 2.9*  --    Bilirubin Total mg/dL 0.3  --    ALP unit/L 93  --    AST unit/L 21  --    ALT unit/L 29  --    Magnesium Level mg/dL 2.40  --        Drug levels (last 3 results):  No  "results for input(s): "VANCOMYCINRA", "VANCORANDOM", "VANCOMYCINPE", "VANCOPEAK", "VANCOMYCINTR", "VANCOTROUGH" in the last 72 hours.    Microbiologic Results:  Microbiology Results (last 7 days)       Procedure Component Value Units Date/Time    Respiratory Culture [[id number]]     Order Status: Sent Specimen: Sputum, Induced     Blood Culture #1 **CANNOT BE ORDERED STAT** [[id number]] Collected: 12/03/24 0006    Order Status: Resulted Specimen: Blood from Antecubital, Left Updated: 12/03/24 0029    Blood Culture #1 **CANNOT BE ORDERED STAT** [[id number]] Collected: 12/03/24 0006    Order Status: Resulted Specimen: Blood from Antecubital, Left Updated: 12/03/24 0029            "

## 2024-12-03 NOTE — H&P
[facility] - Emergency Dept  Pulmonary Critical Care Note    Patient Name: Elisabet Choi  MRN: [id number]  Admission Date: 12/2/2024  Hospital Length of Stay: 0 days  Code Status: Full Code  Attending Provider: Donald Mireles MD  Primary Care Provider: Clinic, [facility]     Subjective:     HPI: 80yo female with PMH of Speech and Language Deficit s/p TIA/CVA ([id number]), Dementia, Hydrocephalus s/p  shunt placement, B12 deficiency, pAfib (on Eliquis), ERICA on CPAP, DMII, PVD, HLD, Hypothyroidism, Depression/Bipolar Disorder, Seizure on Keppra, recurrent UTI, LLE cellulitis superimposed on BLE lymphedema and venous stasis dermatitis, Sacral decubitus ulcer who came from [facility] and presented to [facility] ED with c/o 1hr hx of altered mental status. History provided by EMS and EMR. Patient was obtunded and hard to arouse without new neurological deficit. Per NH, baseline GCS 14, however, GCS 9 noted in ED and GCS 8 on my presentation. Febrile Tmax 102.9, tachycardic, tachypneic. Hypotensive but initially did respond to IVF resuscitation but later still require pressor. Leukocytosis 17.65. LA elevated at 2.4. Elevated Sed rate/CRP. Metabolic alkalosis with bicarb of 33. . UA not consistent with UTI. CXR and CT chest abd/pelvis concerning for multifocal pneumonia concerning for possible aspiration, with bilateral pleural effusion and possible proctocolitis with moderate stool burden. No PE identified on this exam but no CTPE performed. EKG showed sinus tachycardia but cardiac monitoring shows paroxysmal afib with rate controlled. Given 1x Zosyn 4.5g, 1x Vancomycin, 2x 1L LR bolus in ED.    Of note, patient did have h/o Staph hominis bacteremia, unsure if this is Methicillin resistant or not but was later thought to be a contaminant in 2020. Patient also noted to have h/o unspecified MRSA infection. Patient also had a recent ECHO 09/2024 with no reduced EF but unable to evaluate diastolic  function d/t Afib.      24 Hour Interval History:  Pending      Past Medical History:   Diagnosis Date    Acute cystitis     Allergic rhinitis     Anemia, unspecified     Angina pectoris     Arthritis     Back pain     Bipolar disorder, unspecified     Celiac disease     Cellulitis     CHF (congestive heart failure)     Constipation     Contracture, left ankle     Dementia     Depressive episode     Diabetes mellitus     Diabetes mellitus with ulcer of foot     Displacement of other urinary catheter, subsequent encounter     Edema     Elevated white blood cell count     Encounter for blood transfusion     Fatigue     Fluid retention     Genetic anomalies of leukocytes     GERD without esophagitis     Goiter     HLD (hyperlipidemia)     Hydrocephalus     Hypertension     Hypokalemia     Hypotension     Hypothyroidism     Magnesium deficiency     Major depression     Migraine     Mild protein-calorie malnutrition     Morbid obesity     Obstructive and reflux uropathy     Osteoarthritis     Osteoporosis     Overactive bladder     Pressure ulcer of right heel     Pressure ulcer of sacral region     Pressure ulcer of sacral region, stage 3     Pruritus     PVD (peripheral vascular disease)     Radiculopathy, lumbar region     Seizures     Sleep apnea     uses CPAP    SOB (shortness of breath)     Stroke     TIA (transient ischemic attack)     Urinary tract infection, site not specified     Vitamin deficiency     Wheelchair dependent        Past Surgical History:   Procedure Laterality Date    ABDOMINAL SURGERY      APPENDECTOMY      BACK SURGERY      BLADDER SURGERY      BRAIN SURGERY      CAROTID ENDARTERECTOMY      CHOLECYSTECTOMY      CSF SHUNT      HERNIA REPAIR      HYSTERECTOMY      INSERTION, SUPRAPUBIC CATHETER N/A 3/26/2024    Procedure: INSERTION, SUPRAPUBIC CATHETER;  Surgeon: Fuad Szymanski MD;  Location: [facility];  Service: Urology;  Laterality: N/A;  CYSTO W/ SUPRAPUBIC CATH INSERTION    TONSILLECTOMY       VASCULAR SURGERY         Social History     Socioeconomic History    Marital status:    Tobacco Use    Smoking status: Never    Smokeless tobacco: Never   Substance and Sexual Activity    Alcohol use: No    Drug use: No     Social Drivers of Health     Financial Resource Strain: Low Risk  (9/26/2024)    Overall Financial Resource Strain (CARDIA)     Difficulty of Paying Living Expenses: Not hard at all   Food Insecurity: No Food Insecurity (9/26/2024)    Hunger Vital Sign     Worried About Running Out of Food in the Last Year: Never true     Ran Out of Food in the Last Year: Never true   Transportation Needs: No Transportation Needs (9/26/2024)    TRANSPORTATION NEEDS     Transportation : No   Physical Activity: Inactive (9/26/2024)    Exercise Vital Sign     Days of Exercise per Week: 0 days     Minutes of Exercise per Session: 0 min   Stress: No Stress Concern Present (9/26/2024)    [employer] - Occupational Stress Questionnaire     Feeling of Stress : Only a little   Housing Stability: Low Risk  (9/26/2024)    Housing Stability Vital Sign     Unable to Pay for Housing in the Last Year: No     Homeless in the Last Year: No           Objective:     Current Outpatient Medications   Medication Instructions    acetaminophen (TYLENOL) 1,000 mg, Oral, Every 4 hours PRN    acidophilus-pectin, citrus 100 million cell-10 mg Cap 10 mg, Oral    ALPRAZolam (XANAX) 0.25 mg, Oral, 2 times daily PRN    amitriptyline (ELAVIL) 25 mg, Oral, Nightly PRN    ascorbic acid, vitamin C, (VITAMIN C) 500 MG tablet 1 tablet, Oral, Every morning    cranberry fruit (CRANBERRY) 450 mg Tab Oral    cyanocobalamin (VITAMIN B-12) 100 mcg, Oral, Daily    doxazosin (CARDURA) 1 mg, Oral, Nightly    dulaglutide (TRULICITY) 3 mg, Subcutaneous, Every 7 days, wednesdays    DULoxetine (CYMBALTA) 60 mg, Oral, Daily    ezetimibe (ZETIA) 10 mg, Oral, Daily    folic acid (FOLVITE) 1 mg, Oral, Daily    gabapentin  (NEURONTIN) 300 mg, Oral, 2 times daily    HYDROcodone-acetaminophen (NORCO)  mg per tablet 1 tablet, Oral, 3 times daily    insulin degludec 20 Units, Subcutaneous, Daily    levETIRAcetam (KEPPRA) 750 mg, Oral, Nightly    levothyroxine (SYNTHROID, LEVOTHROID) 175 mcg, Oral, Before breakfast    lovastatin (ALTOPREV) 40 mg, Oral, Nightly    magnesium oxide (MAG-OX) 400 mg (241.3 mg magnesium) tablet 1 tablet, Oral, Every morning    methenamine (HIPREX) 1 g, Oral, 2 times daily    metoprolol tartrate (LOPRESSOR) 50 mg, Oral, 2 times daily    polyethylene glycol (GLYCOLAX) 17 g, Oral, Daily    POTASSIUM CHLORIDE ORAL 20 mEq, Oral, Daily    SANTYL ointment Topical (Top)    solifenacin (VESICARE) 10 mg, Oral, Nightly    tiZANidine 2 mg, Oral, 3 times daily PRN    topiramate (TOPAMAX) 100 mg, Oral, 2 times daily    torsemide (DEMADEX) 40 mg, Oral, Daily    vitamin D (VITAMIN D3) 1,000 Units, Oral, Daily    zinc gluconate 50 mg tablet 1 tablet, Oral, Every morning       Current Inpatient Medications   albuterol-ipratropium  3 mL Nebulization Q4H WAKE    enoxparin  40 mg Subcutaneous Q12H    insulin glargine U-100  10 Units Subcutaneous QHS    [START ON 12/5/2024] mupirocin   Nasal BID    piperacillin-tazobactam (Zosyn) IV (PEDS and ADULTS) (extended infusion is not appropriate)  4.5 g Intravenous Q8H    [START ON 12/4/2024] vancomycin 1,250 mg in D5W 250 mL IVPB (admixture device)  1,250 mg Intravenous Q24H    And    [START ON 12/4/2024] vancomycin (VANCOCIN) 1,000 mg in D5W 250 mL IVPB (admixture device)  1,000 mg Intravenous Q24H         No intake or output data in the 24 hours ending 12/03/24 0608    Review of Systems   Unable to perform ROS: Acuity of condition        Vital Signs (Most Recent):  Temp: 99.9 °F (37.7 °C) (12/03/24 0140)  Pulse: 60 (12/03/24 0515)  Resp: 12 (12/03/24 0515)  BP: (!) 149/69 (12/03/24 0515)  SpO2: (!) 93 % (12/03/24 0515)  Body mass index is 50.44 kg/m².  Weight: 102.1 kg (225 lb)  Vital Signs (24h Range):  Temp:  [99.9 °F (37.7 °C)-102.9 °F (39.4 °C)] 99.9 °F (37.7 °C)  Pulse:  [] 60  Resp:  [10-23] 12  SpO2:  [93 %-100 %] 93 %  BP: ()/(36-73) 149/69     Physical Exam  Constitutional:       Appearance: She is ill-appearing.   HENT:      Head: Normocephalic and atraumatic.   Eyes:      Pupils: Pupils are equal, round, and reactive to light.   Pulmonary:      Effort: No respiratory distress.      Breath sounds: Rhonchi present. No wheezing or rales.   Abdominal:      General: Bowel sounds are normal. There is distension.   Musculoskeletal:      Right lower le+ Pitting Edema present.      Left lower le+ Pitting Edema present.   Neurological:      Mental Status: She is lethargic.      Comments: Neurological status unable to fully access due to patient being obtunded and minimally respond to sternal sub  Withdraw to pain  GCS 8 on presentation           Mechanical ventilation support:       Lines/Drains/Airways       Drain  Duration                  Suprapubic Catheter 24 1351 18 Fr. 251 days              Peripheral Intravenous Line  Duration                  Peripheral IV - Single Lumen 24 2300 20 G Left Antecubital <1 day         Peripheral IV - Single Lumen 24 2318 22 G Posterior;Right Hand <1 day                    Significant Labs:    Lab Results   Component Value Date    WBC 17.65 (H) 2024    HGB 12.2 2024    HCT 39.2 2024    MCV 91.2 2024     2024         BMP  Lab Results   Component Value Date     2024    K 4.6 2024     2024    CO2 33 (H) 2024    BUN 28.9 (H) 2024    CREATININE 0.88 2024    CALCIUM 9.3 2024    ANIONGAP 8 2024    ESTGFRAFRICA 94 2024    EGFRNONAA >60 2022       ABG  Recent Labs   Lab 24  0519   PH 7.420   PO2 155.0*   PCO2 49.0*   HCO3 31.8*           Significant Imaging:  I have reviewed all pertinent imaging within the  past 24 hours.        Assessment/Plan:     Assessment  Altered mental status  Septic shock d/t possible aspiration pneumonia vs. CAP and possible proctocolitis  SIRS 4/4  Lactic Acid 2.4-->3.0  BLE Chronic lymphadema/venous stasis  ERICA/possible OHS with Hypercarbia on home CPAP  Metabolic Alkalosis d/t compensation and home lasix use  pAfib on Eliquis  Hyperglycemia with DMII  Hydrocephalus s/p  shunt  Speech and Language deficit s/p TIA/CVA ([id number])  Dementia  Concern for polypharmacy    Plan  Continue with ICU course  Patient has low threshold for intubation given GCS 8 during encounter for airway protection  Wean oxygen as tolerated. Can consider BIPAP if patient becomes more altered or acidotic  Trend LA q4h. Bcx x2, Resp panel/culture, MRSA PCR, UDS added to workup  Continue with broad-spectrum Abx IV Zosyn 4.5g q8h and pharmacy-dosed Vancomycin - de-escalate with culture and sensitivity  Titrate vasopressors with MAP goal >65  Given 2x 1L LR bolus in ED. Hold off on starting Lasix d/t current alkalosis status  Continue with Douneb and inhalation treatment q4h wake  Continue with Lantus 10u qhs and SSI in place  Consider restarting home PO meds with successful swallow eval  PT/OT, Speech eval    DVT Prophylaxis: Lovenox  GI Prophylaxis:           Samreen Wakefield DO  Pulmonary Critical Care Medicine  [facility] - Emergency Dept

## 2024-12-04 LAB
ALBUMIN SERPL-MCNC: 2.5 G/DL (ref 3.4–4.8)
ALBUMIN/GLOB SERPL: 0.7 RATIO (ref 1.1–2)
ALP SERPL-CCNC: 68 UNIT/L (ref 40–150)
ALT SERPL-CCNC: 12 UNIT/L (ref 0–55)
ANION GAP SERPL CALC-SCNC: 6 MEQ/L
AST SERPL-CCNC: 10 UNIT/L (ref 5–34)
BASOPHILS # BLD AUTO: 0.05 X10(3)/MCL
BASOPHILS NFR BLD AUTO: 0.6 %
BILIRUB SERPL-MCNC: 0.4 MG/DL
BUN SERPL-MCNC: 20.5 MG/DL (ref 9.8–20.1)
CALCIUM SERPL-MCNC: 8.6 MG/DL (ref 8.4–10.2)
CHLORIDE SERPL-SCNC: 102 MMOL/L (ref 98–107)
CO2 SERPL-SCNC: 30 MMOL/L (ref 23–31)
CREAT SERPL-MCNC: 0.74 MG/DL (ref 0.55–1.02)
CREAT/UREA NIT SERPL: 28
EOSINOPHIL # BLD AUTO: 0.39 X10(3)/MCL (ref 0–0.9)
EOSINOPHIL NFR BLD AUTO: 4.5 %
ERYTHROCYTE [DISTWIDTH] IN BLOOD BY AUTOMATED COUNT: 16.1 % (ref 11.5–17)
GFR SERPLBLD CREATININE-BSD FMLA CKD-EPI: >60 ML/MIN/1.73/M2
GLOBULIN SER-MCNC: 3.5 GM/DL (ref 2.4–3.5)
GLUCOSE SERPL-MCNC: 146 MG/DL (ref 82–115)
HCT VFR BLD AUTO: 34.6 % (ref 37–47)
HGB BLD-MCNC: 10.3 G/DL (ref 12–16)
IMM GRANULOCYTES # BLD AUTO: 0.02 X10(3)/MCL (ref 0–0.04)
IMM GRANULOCYTES NFR BLD AUTO: 0.2 %
LYMPHOCYTES # BLD AUTO: 3.03 X10(3)/MCL (ref 0.6–4.6)
LYMPHOCYTES NFR BLD AUTO: 34.9 %
MAGNESIUM SERPL-MCNC: 2.2 MG/DL (ref 1.6–2.6)
MCH RBC QN AUTO: 28 PG (ref 27–31)
MCHC RBC AUTO-ENTMCNC: 29.8 G/DL (ref 33–36)
MCV RBC AUTO: 94 FL (ref 80–94)
MONOCYTES # BLD AUTO: 0.4 X10(3)/MCL (ref 0.1–1.3)
MONOCYTES NFR BLD AUTO: 4.6 %
NEUTROPHILS # BLD AUTO: 4.79 X10(3)/MCL (ref 2.1–9.2)
NEUTROPHILS NFR BLD AUTO: 55.2 %
NRBC BLD AUTO-RTO: 0 %
PHOSPHATE SERPL-MCNC: 3 MG/DL (ref 2.3–4.7)
PLATELET # BLD AUTO: 157 X10(3)/MCL (ref 130–400)
PMV BLD AUTO: 10.1 FL (ref 7.4–10.4)
POCT GLUCOSE: 148 MG/DL (ref 70–110)
POCT GLUCOSE: 156 MG/DL (ref 70–110)
POCT GLUCOSE: 171 MG/DL (ref 70–110)
POCT GLUCOSE: 176 MG/DL (ref 70–110)
POTASSIUM SERPL-SCNC: 3.6 MMOL/L (ref 3.5–5.1)
PROT SERPL-MCNC: 6 GM/DL (ref 5.8–7.6)
RBC # BLD AUTO: 3.68 X10(6)/MCL (ref 4.2–5.4)
SODIUM SERPL-SCNC: 138 MMOL/L (ref 136–145)
WBC # BLD AUTO: 8.68 X10(3)/MCL (ref 4.5–11.5)

## 2024-12-04 PROCEDURE — 5A09557 ASSISTANCE WITH RESPIRATORY VENTILATION, GREATER THAN 96 CONSECUTIVE HOURS, CONTINUOUS POSITIVE AIRWAY PRESSURE: ICD-10-PCS | Performed by: STUDENT IN AN ORGANIZED HEALTH CARE EDUCATION/TRAINING PROGRAM

## 2024-12-04 PROCEDURE — 99900035 HC TECH TIME PER 15 MIN (STAT)

## 2024-12-04 PROCEDURE — 11000001 HC ACUTE MED/SURG PRIVATE ROOM

## 2024-12-04 PROCEDURE — 27000221 HC OXYGEN, UP TO 24 HOURS

## 2024-12-04 PROCEDURE — 25000003 PHARM REV CODE 250

## 2024-12-04 PROCEDURE — 94640 AIRWAY INHALATION TREATMENT: CPT

## 2024-12-04 PROCEDURE — 83735 ASSAY OF MAGNESIUM: CPT

## 2024-12-04 PROCEDURE — 99900031 HC PATIENT EDUCATION (STAT)

## 2024-12-04 PROCEDURE — 36415 COLL VENOUS BLD VENIPUNCTURE: CPT

## 2024-12-04 PROCEDURE — 94760 N-INVAS EAR/PLS OXIMETRY 1: CPT

## 2024-12-04 PROCEDURE — 80053 COMPREHEN METABOLIC PANEL: CPT

## 2024-12-04 PROCEDURE — 25000242 PHARM REV CODE 250 ALT 637 W/ HCPCS

## 2024-12-04 PROCEDURE — 87040 BLOOD CULTURE FOR BACTERIA: CPT

## 2024-12-04 PROCEDURE — 63600175 PHARM REV CODE 636 W HCPCS

## 2024-12-04 PROCEDURE — 84100 ASSAY OF PHOSPHORUS: CPT

## 2024-12-04 PROCEDURE — 92611 MOTION FLUOROSCOPY/SWALLOW: CPT

## 2024-12-04 PROCEDURE — 85025 COMPLETE CBC W/AUTO DIFF WBC: CPT

## 2024-12-04 PROCEDURE — 92610 EVALUATE SWALLOWING FUNCTION: CPT

## 2024-12-04 RX ORDER — LEVETIRACETAM 500 MG/5ML
750 INJECTION, SOLUTION, CONCENTRATE INTRAVENOUS EVERY 24 HOURS
Status: DISCONTINUED | OUTPATIENT
Start: 2024-12-04 | End: 2024-12-14 | Stop reason: HOSPADM

## 2024-12-04 RX ADMIN — ENOXAPARIN SODIUM 40 MG: 40 INJECTION SUBCUTANEOUS at 08:12

## 2024-12-04 RX ADMIN — PIPERACILLIN SODIUM AND TAZOBACTAM SODIUM 4.5 G: 4; .5 INJECTION, POWDER, LYOPHILIZED, FOR SOLUTION INTRAVENOUS at 12:12

## 2024-12-04 RX ADMIN — IPRATROPIUM BROMIDE AND ALBUTEROL SULFATE 3 ML: 2.5; .5 SOLUTION RESPIRATORY (INHALATION) at 07:12

## 2024-12-04 RX ADMIN — SODIUM CHLORIDE 1000 ML: 9 INJECTION, SOLUTION INTRAVENOUS at 06:12

## 2024-12-04 RX ADMIN — ENOXAPARIN SODIUM 40 MG: 40 INJECTION SUBCUTANEOUS at 09:12

## 2024-12-04 RX ADMIN — IPRATROPIUM BROMIDE AND ALBUTEROL SULFATE 3 ML: 2.5; .5 SOLUTION RESPIRATORY (INHALATION) at 08:12

## 2024-12-04 RX ADMIN — PIPERACILLIN SODIUM AND TAZOBACTAM SODIUM 4.5 G: 4; .5 INJECTION, POWDER, LYOPHILIZED, FOR SOLUTION INTRAVENOUS at 08:12

## 2024-12-04 RX ADMIN — PANTOPRAZOLE SODIUM 40 MG: 40 INJECTION, POWDER, FOR SOLUTION INTRAVENOUS at 08:12

## 2024-12-04 RX ADMIN — Medication: at 08:12

## 2024-12-04 RX ADMIN — PIPERACILLIN SODIUM AND TAZOBACTAM SODIUM 4.5 G: 4; .5 INJECTION, POWDER, LYOPHILIZED, FOR SOLUTION INTRAVENOUS at 04:12

## 2024-12-04 RX ADMIN — VANCOMYCIN HYDROCHLORIDE 1000 MG: 1 INJECTION, POWDER, LYOPHILIZED, FOR SOLUTION INTRAVENOUS at 04:12

## 2024-12-04 RX ADMIN — INSULIN GLARGINE 10 UNITS: 100 INJECTION, SOLUTION SUBCUTANEOUS at 09:12

## 2024-12-04 RX ADMIN — LEVETIRACETAM 750 MG: 100 INJECTION, SOLUTION INTRAVENOUS at 09:12

## 2024-12-04 NOTE — PLAN OF CARE
Problem: Adult Inpatient Plan of Care  Goal: Plan of Care Review  Outcome: Progressing  Goal: Patient-Specific Goal (Individualized)  Outcome: Progressing  Goal: Absence of Hospital-Acquired Illness or Injury  Outcome: Progressing  Goal: Optimal Comfort and Wellbeing  Outcome: Progressing  Goal: Readiness for Transition of Care  Outcome: Progressing     Problem: Bariatric Environmental Safety  Goal: Safety Maintained with Care  Outcome: Progressing     Problem: Infection  Goal: Absence of Infection Signs and Symptoms  Outcome: Progressing     Problem: Diabetes Comorbidity  Goal: Blood Glucose Level Within Targeted Range  Outcome: Progressing     Problem: Pneumonia  Goal: Fluid Balance  Outcome: Progressing  Goal: Resolution of Infection Signs and Symptoms  Outcome: Progressing

## 2024-12-04 NOTE — PLAN OF CARE
Problem: SLP  Goal: SLP Goal  Description: LTG: Pt will tolerate least restrictive diet with no clinical signs/sx of aspiration.    ST.  Thermal stimulation with 100% swallow initiation  2.  Repeat MBS once appropriate  Outcome: Progressing     MBS completed, goals created

## 2024-12-04 NOTE — PT/OT/SLP EVAL
Consultation - Vascular Surgery   Yohana Ramos 78 y o  male MRN: [id number]  Unit/Bed#: ED 17 Encounter: [id number]    Assessment/Plan      Assessment:  79 James Villafana DO  Consult ordered by: Wilfredo Neal [facility]  Speech Language Pathology Department  Clinical Swallow Evaluation    Patient Name:  Elisabet Choi   MRN:  [id number]    Recommendations     General recommendations:  Modified Barium Swallow Study  Solid texture recommendation:  NPO  Liquid consistency recommendation: NPO   Medications: NPO  Precautions: aspiration    History     Elisabet Choi is a/n 79 y.o. female admitted for AMS. Chest imaging revealed multifocal pna.    Past Medical History:   Diagnosis Date    Acute cystitis     Allergic rhinitis     Anemia, unspecified     Angina pectoris     Arthritis     Back pain     Bipolar disorder, unspecified     Celiac disease     Cellulitis     CHF (congestive heart failure)     Constipation     Contracture, left ankle     Dementia     Depressive episode     Diabetes mellitus     Diabetes mellitus with ulcer of foot     Displacement of other urinary catheter, subsequent encounter     Edema     Elevated white blood cell count     Encounter for blood transfusion     Fatigue     Fluid retention     Genetic anomalies of leukocytes     GERD without esophagitis     Goiter     HLD (hyperlipidemia)     Hydrocephalus     Hypertension     Hypokalemia     Hypotension     Hypothyroidism     Magnesium deficiency     Major depression     Migraine     Mild protein-calorie malnutrition     Morbid obesity     Obstructive and reflux uropathy     Osteoarthritis     Osteoporosis     Overactive bladder     Pressure ulcer of right heel     Pressure ulcer of sacral region     Pressure ulcer of sacral region, stage 3     Pruritus     PVD (peripheral vascular disease)     Radiculopathy, lumbar region     Seizures     Sleep apnea     uses CPAP    SOB (shortness of breath)     Stroke     TIA (transient ischemic attack)     Urinary tract infection, site not specified     Vitamin deficiency     Wheelchair dependent      Past Surgical History:   Procedure Laterality Date    ABDOMINAL SURGERY      APPENDECTOMY       BACK SURGERY      BLADDER SURGERY      BRAIN SURGERY      CAROTID ENDARTERECTOMY      CHOLECYSTECTOMY      CSF SHUNT      HERNIA REPAIR      HYSTERECTOMY      INSERTION, SUPRAPUBIC CATHETER N/A 3/26/2024    Procedure: INSERTION, SUPRAPUBIC CATHETER;  Surgeon: Fuad Szymanski MD;  Location: [facility];  Service: Urology;  Laterality: N/A;  CYSTO W/ SUPRAPUBIC CATH INSERTION    TONSILLECTOMY      VASCULAR SURGERY       Home diet texture/consistency: unknown  Current method of nutrition: NPO    Imaging   Results for orders placed during the hospital encounter of 12/02/24    X-Ray Chest 1 View    Narrative  EXAMINATION:  XR CHEST 1 VIEW    CLINICAL HISTORY:  AMS;    COMPARISON:  11/21/2024    FINDINGS:  Single view of the chest shows small right pleural effusion with bibasilar atelectasis.  No pneumothorax.  Heart is enlarged.    Impression  Small right effusion      Electronically signed by: Doug Haley MD  Date:    12/03/2024  Time:    08:38    No results found for this or any previous visit.    No results found for this or any previous visit.    Subjective     Patient awake and cooperative.    Patient goals: to eat/drink   Spiritual/Cultural/Adventism Beliefs/Practices that affect care: no    Pain/Comfort: Pain Rating 1: 0/10    Respiratory Status:  room air    Restraints/positioning devices: none    Objective     ORAL MUSCULATURE  Secretion Management: adequate  Mucosal Quality: good  Facial Movement: WFL  Buccal Strength & Mobility: WFL  Mandibular Strength & Mobility: WFL  Oral Labial Strength & Mobility: WFL  Lingual Strength & Mobility: WFL  Vocal Quality: adequate    PO TRIALS  Consistency Fed By Oral Symptoms Pharyngeal Symptoms   Ice chips SLP None None   Thin liquid by cup Self None Throat clear after swallow     Family reports coughing with liquids at nursing home.     Assessment     MBS warranted to further assess swallow.    Outcome Measures     Functional Oral Intake Scale: 1 - Nothing by  WAQAS          Review of Systems   Constitutional: Negative for chills and fever  HENT: Negative  Eyes: Positive for visual disturbance (blind)  Respiratory: Negative for shortness of breath  Cardiovascular: Negative for chest pain  Gastrointestinal: Negative for abdominal pain  Endocrine: Negative  Genitourinary: Negative  Musculoskeletal: Positive for back pain  Skin: Negative  Allergic/Immunologic: Negative  Neurological: Negative for facial asymmetry, weakness and numbness  Hematological: Negative  Psychiatric/Behavioral: Positive for behavioral problems          Dementia         Historical Information   Past Medical History:   Diagnosis Date    Anxiety     Blind     Chest pain     [facility]     Dementia, old age, with behavioral disturbance [facility] [address] [facility]     Depression     [facility]     mouth    Education     Patient provided with verbal education regarding POC.  Understanding was verbalized.    Plan     SLP Follow-Up:  Yes   Plan of Care reviewed with:  patient, daughter     Time Tracking     SLP Treatment Date:   12/04/24  Speech Start Time:  0935  Speech Stop Time:  0950     Speech Total Time (min):  15 min    Billable minutes:  Swallow and Oral Function Evaluation, 15 minutes     12/04/2024            

## 2024-12-04 NOTE — PLAN OF CARE
12/04/24 1015   Discharge Assessment   Assessment Type Discharge Planning Assessment   Confirmed/corrected address, phone number and insurance Yes   Confirmed Demographics Correct on Facesheet   Source of Information family;patient   Communicated JOSÉ MIGUEL with patient/caregiver Date not available/Unable to determine   Reason For Admission Cellulitis, Pneumonia   People in Home facility resident   Facility Arrived From: [facility] resident   Do you expect to return to your current living situation? Yes   Do you have help at home or someone to help you manage your care at home? Yes   Who are your caregiver(s) and their phone number(s)? facility staff   Prior to hospitilization cognitive status: Alert/Oriented   Current cognitive status: Alert/Oriented  ([place])   Walking or Climbing Stairs Difficulty yes   Walking or Climbing Stairs transferring difficulty, dependent   Mobility Management Deni lift and wheelchair   Dressing/Bathing Difficulty yes   Dressing/Bathing dressing difficulty, dependent;bathing difficulty, dependent   Dressing/Bathing Management facility staff   Home Accessibility wheelchair accessible   Equipment Currently Used at Home wheelchair;lift device;hospital bed   Do you take prescription medications? Yes   Do you have prescription coverage? Yes   Coverage medicaid   Who is going to help you get home at discharge? NH van transportation   How do you get to doctors appointments? health plan transportation   Are you on dialysis? No   Do you take coumadin? No   Discharge Plan A Return to nursing home   Discharge Plan B Return to Nursing Home   DME Needed Upon Discharge  none   Discharge Plan discussed with: Adult children;Patient   Transition of Care Barriers None   OTHER   Name(s) of People in Home NH     Patient's son and daughter in law at bedside. Patient able to answer but  [place]  Patient is resident at [facility]. Plan is to return by NH van transportation

## 2024-12-04 NOTE — PROGRESS NOTES
[facility] - Emergency Dept  Pulmonary Critical Care Note    Patient Name: Elisabet Choi  MRN: [id number]  Admission Date: 12/2/2024  Hospital Length of Stay: 1 days  Code Status: Partial Code  Attending Provider: Donald Mireles MD  Primary Care Provider: Clinic, [facility]     Subjective:     HPI: 80yo female with PMH of Speech and Language Deficit s/p TIA/CVA ([id number]), Dementia, Hydrocephalus s/p  shunt placement, B12 deficiency, pAfib (on Eliquis), ERICA on CPAP, DMII, PVD, HLD, Hypothyroidism, Depression/Bipolar Disorder, Seizure on Keppra, recurrent UTI, LLE cellulitis superimposed on BLE lymphedema and venous stasis dermatitis, Sacral decubitus ulcer who came from [facility] and presented to [facility] ED with c/o 1hr hx of altered mental status. History provided by EMS and EMR. Patient was obtunded and hard to arouse without new neurological deficit. Per NH, baseline GCS 14, however, GCS 9 noted in ED and GCS 8 on my presentation. Febrile Tmax 102.9, tachycardic, tachypneic. Hypotensive but initially did respond to IVF resuscitation but later still require pressor. Leukocytosis 17.65. LA elevated at 2.4. Elevated Sed rate/CRP. Metabolic alkalosis with bicarb of 33. . UA not consistent with UTI. CXR and CT chest abd/pelvis concerning for multifocal pneumonia concerning for possible aspiration, with bilateral pleural effusion and possible proctocolitis with moderate stool burden. No PE identified on this exam but no CTPE performed. EKG showed sinus tachycardia but cardiac monitoring shows paroxysmal afib with rate controlled. Given 1x Zosyn 4.5g, 1x Vancomycin, 2x 1L LR bolus in ED.    Of note, patient did have h/o Staph hominis bacteremia, unsure if this is Methicillin resistant or not but was later thought to be a contaminant in 2020. Patient also noted to have h/o unspecified MRSA infection. Patient also had a recent ECHO 09/2024 with no reduced EF but unable to evaluate  diastolic function d/t Afib.      24 Hour Interval History:  Blood culture x2 positive for staph epi    This morning she is awake and alert.  She has been off vasopressor support now for nearly 24 hours.  She is on nasal cannula oxygen.  Bili complaint is that her left leg is occasionally numb which is a chronic condition for her.      Past Medical History:   Diagnosis Date    Acute cystitis     Allergic rhinitis     Anemia, unspecified     Angina pectoris     Arthritis     Back pain     Bipolar disorder, unspecified     Celiac disease     Cellulitis     CHF (congestive heart failure)     Constipation     Contracture, left ankle     Dementia     Depressive episode     Diabetes mellitus     Diabetes mellitus with ulcer of foot     Displacement of other urinary catheter, subsequent encounter     Edema     Elevated white blood cell count     Encounter for blood transfusion     Fatigue     Fluid retention     Genetic anomalies of leukocytes     GERD without esophagitis     Goiter     HLD (hyperlipidemia)     Hydrocephalus     Hypertension     Hypokalemia     Hypotension     Hypothyroidism     Magnesium deficiency     Major depression     Migraine     Mild protein-calorie malnutrition     Morbid obesity     Obstructive and reflux uropathy     Osteoarthritis     Osteoporosis     Overactive bladder     Pressure ulcer of right heel     Pressure ulcer of sacral region     Pressure ulcer of sacral region, stage 3     Pruritus     PVD (peripheral vascular disease)     Radiculopathy, lumbar region     Seizures     Sleep apnea     uses CPAP    SOB (shortness of breath)     Stroke     TIA (transient ischemic attack)     Urinary tract infection, site not specified     Vitamin deficiency     Wheelchair dependent        Past Surgical History:   Procedure Laterality Date    ABDOMINAL SURGERY      APPENDECTOMY      BACK SURGERY      BLADDER SURGERY      BRAIN SURGERY      CAROTID ENDARTERECTOMY      CHOLECYSTECTOMY      CSF SHUNT       HERNIA REPAIR      HYSTERECTOMY      INSERTION, SUPRAPUBIC CATHETER N/A 3/26/2024    Procedure: INSERTION, SUPRAPUBIC CATHETER;  Surgeon: Fuad Szymanski MD;  Location: [facility];  Service: Urology;  Laterality: N/A;  CYSTO W/ SUPRAPUBIC CATH INSERTION    TONSILLECTOMY      VASCULAR SURGERY         Social History     Socioeconomic History    Marital status:    Tobacco Use    Smoking status: Never    Smokeless tobacco: Never   Substance and Sexual Activity    Alcohol use: No    Drug use: No     Social Drivers of Health     Financial Resource Strain: Patient Unable To Answer (12/3/2024)    Overall Financial Resource Strain (CARDIA)     Difficulty of Paying Living Expenses: Patient unable to answer   Food Insecurity: Patient Unable To Answer (12/3/2024)    Hunger Vital Sign     Worried About Running Out of Food in the Last Year: Patient unable to answer     Ran Out of Food in the Last Year: Patient unable to answer   Transportation Needs: Patient Unable To Answer (12/3/2024)    TRANSPORTATION NEEDS     Transportation : Patient unable to answer   Physical Activity: Inactive (9/26/2024)    Exercise Vital Sign     Days of Exercise per Week: 0 days     Minutes of Exercise per Session: 0 min   Stress: Patient Unable To Answer (12/3/2024)    [employer] - Occupational Stress Questionnaire     Feeling of Stress : Patient unable to answer   Housing Stability: Patient Unable To Answer (12/3/2024)    Housing Stability Vital Sign     Unable to Pay for Housing in the Last Year: Patient unable to answer     Homeless in the Last Year: Patient unable to answer           Objective:     Current Outpatient Medications   Medication Instructions    acetaminophen (TYLENOL) 1,000 mg, Oral, Every 4 hours PRN    acidophilus-pectin, citrus 100 million cell-10 mg Cap 10 mg, Oral    ALPRAZolam (XANAX) 0.25 mg, Oral, 2 times daily PRN    amitriptyline (ELAVIL) 25 mg, Oral, Nightly PRN    ascorbic acid, vitamin  C, (VITAMIN C) 500 MG tablet 1 tablet, Oral, Every morning    cranberry fruit (CRANBERRY) 450 mg Tab Oral    cyanocobalamin (VITAMIN B-12) 100 mcg, Oral, Daily    doxazosin (CARDURA) 1 mg, Oral, Nightly    dulaglutide (TRULICITY) 3 mg, Subcutaneous, Every 7 days, wednesdays    DULoxetine (CYMBALTA) 60 mg, Oral, Daily    ezetimibe (ZETIA) 10 mg, Oral, Daily    folic acid (FOLVITE) 1 mg, Oral, Daily    gabapentin (NEURONTIN) 300 mg, Oral, 2 times daily    HYDROcodone-acetaminophen (NORCO)  mg per tablet 1 tablet, Oral, 3 times daily    insulin degludec 20 Units, Subcutaneous, Daily    levETIRAcetam (KEPPRA) 750 mg, Oral, Nightly    levothyroxine (SYNTHROID, LEVOTHROID) 175 mcg, Oral, Before breakfast    lovastatin (ALTOPREV) 40 mg, Oral, Nightly    magnesium oxide (MAG-OX) 400 mg (241.3 mg magnesium) tablet 1 tablet, Oral, Every morning    methenamine (HIPREX) 1 g, Oral, 2 times daily    metoprolol tartrate (LOPRESSOR) 50 mg, Oral, 2 times daily    polyethylene glycol (GLYCOLAX) 17 g, Oral, Daily    POTASSIUM CHLORIDE ORAL 20 mEq, Oral, Daily    SANTYL ointment Topical (Top)    solifenacin (VESICARE) 10 mg, Oral, Nightly    tiZANidine 2 mg, Oral, 3 times daily PRN    topiramate (TOPAMAX) 100 mg, Oral, 2 times daily    torsemide (DEMADEX) 40 mg, Oral, Daily    vitamin D (VITAMIN D3) 1,000 Units, Oral, Daily    zinc gluconate 50 mg tablet 1 tablet, Oral, Every morning       Current Inpatient Medications   albuterol-ipratropium  3 mL Nebulization Q4H WAKE    enoxparin  40 mg Subcutaneous Q12H    insulin glargine U-100  10 Units Subcutaneous QHS    [START ON 12/5/2024] mupirocin   Nasal BID    pantoprazole  40 mg Intravenous Daily    piperacillin-tazobactam (Zosyn) IV (PEDS and ADULTS) (extended infusion is not appropriate)  4.5 g Intravenous Q8H    zinc oxide-cod liver oil   Topical (Top) TID           Intake/Output Summary (Last 24 hours) at 12/4/2024 0920  Last data filed at 12/4/2024 0800  Gross per 24 hour    Intake 1215.73 ml   Output 925 ml   Net 290.73 ml       Review of Systems   Unable to perform ROS: Acuity of condition        Vital Signs (Most Recent):  Temp: 97.8 °F (36.6 °C) (12/04/24 0800)  Pulse: 72 (12/04/24 0834)  Resp: 20 (12/04/24 0834)  BP: (!) 95/44 (12/04/24 0600)  SpO2: 99 % (12/04/24 0834)  Body mass index is 50.66 kg/m².  Weight: 102.5 kg (225 lb 15.5 oz) Vital Signs (24h Range):  Temp:  [97.5 °F (36.4 °C)-97.9 °F (36.6 °C)] 97.8 °F (36.6 °C)  Pulse:  [59-85] 72  Resp:  [10-23] 20  SpO2:  [89 %-100 %] 99 %  BP: ()/(44-95) 95/44     Physical Exam  HENT:      Head: Normocephalic and atraumatic.   Eyes:      Pupils: Pupils are equal, round, and reactive to light.   Pulmonary:      Effort: No respiratory distress.      Breath sounds: Rhonchi present. No wheezing or rales.   Abdominal:      General: Bowel sounds are normal. There is distension.   Musculoskeletal:      Right lower leg: Edema present.      Left lower leg: Edema present.   Neurological:      Mental Status: She is lethargic.      Comments: Left leg is flaccid.  There is a ulcer on her heel.           Mechanical ventilation support:       Lines/Drains/Airways       Drain  Duration                  Suprapubic Catheter 03/26/24 1351 18 Fr. 252 days              Peripheral Intravenous Line  Duration                  Peripheral IV - Single Lumen 12/02/24 2300 20 G Left Antecubital 1 day         Peripheral IV - Single Lumen 12/03/24 0600 22 G Anterior;Distal;Left Forearm 1 day                    Significant Labs:    Lab Results   Component Value Date    WBC 8.68 12/04/2024    HGB 10.3 (L) 12/04/2024    HCT 34.6 (L) 12/04/2024    MCV 94.0 12/04/2024     12/04/2024         BMP  Lab Results   Component Value Date     12/04/2024    K 3.6 12/04/2024     12/04/2024    CO2 30 12/04/2024    BUN 20.5 (H) 12/04/2024    CREATININE 0.74 12/04/2024    CALCIUM 8.6 12/04/2024    ANIONGAP 8 01/23/2024    ESTGFRAFRICA 94 01/23/2024     EGFRNONAA >60 07/13/2022       ABG  Recent Labs   Lab 12/03/24  0519   PH 7.420   PO2 155.0*   PCO2 49.0*   HCO3 31.8*           Significant Imaging:  I have reviewed all pertinent imaging within the past 24 hours.  No new imaging      Assessment/Plan:     Assessment  Altered mental status  Septic shock d/t possible aspiration pneumonia vs. CAP and possible proctocolitis.  Blood culture positive for staph epi x2  SIRS 4/4  Lactic Acid 2.4-->3.0  BLE Chronic lymphadema/venous stasis  ERICA/possible OHS with Hypercarbia on home CPAP  Metabolic Alkalosis d/t compensation and home lasix use  pAfib on Eliquis  Hyperglycemia with DMII  Hydrocephalus s/p  shunt  Speech and Language deficit s/p TIA/CVA ([id number])  Dementia  Concern for polypharmacy    Plan  Patient is stable to downgrade out of ICU  Wean oxygen as tolerated.   Continue with broad-spectrum Abx IV Zosyn 4.5g  de-escalate with culture and sensitivity  Continue with Lantus 10u qhs and SSI in place  Consider restarting home PO meds with successful swallow eval  PT/OT, Speech eval    DVT Prophylaxis: Lovenox  GI Prophylaxis:           Donald Mireles MD  Pulmonary Critical Care Medicine  [facility]

## 2024-12-04 NOTE — PLAN OF CARE
Problem: Adult Inpatient Plan of Care  Goal: Plan of Care Review  Outcome: Progressing  Goal: Patient-Specific Goal (Individualized)  Outcome: Progressing  Goal: Absence of Hospital-Acquired Illness or Injury  Outcome: Progressing  Goal: Optimal Comfort and Wellbeing  Outcome: Progressing  Goal: Readiness for Transition of Care  Outcome: Progressing     Problem: Bariatric Environmental Safety  Goal: Safety Maintained with Care  Outcome: Progressing     Problem: Infection  Goal: Absence of Infection Signs and Symptoms  Outcome: Progressing     Problem: Diabetes Comorbidity  Goal: Blood Glucose Level Within Targeted Range  Outcome: Progressing     Problem: Pneumonia  Goal: Fluid Balance  Outcome: Progressing  Goal: Resolution of Infection Signs and Symptoms  Outcome: Progressing  Goal: Effective Oxygenation and Ventilation  Outcome: Progressing     Problem: Wound  Goal: Optimal Coping  Outcome: Progressing  Goal: Optimal Functional Ability  Outcome: Progressing  Goal: Absence of Infection Signs and Symptoms  Outcome: Progressing  Goal: Improved Oral Intake  Outcome: Progressing  Goal: Optimal Pain Control and Function  Outcome: Progressing  Goal: Skin Health and Integrity  Outcome: Progressing  Goal: Optimal Wound Healing  Outcome: Progressing     Problem: Skin Injury Risk Increased  Goal: Skin Health and Integrity  Outcome: Progressing

## 2024-12-04 NOTE — PLAN OF CARE
Problem: Adult Inpatient Plan of Care  Goal: Plan of Care Review  Outcome: Progressing  Goal: Patient-Specific Goal (Individualized)  Outcome: Progressing  Goal: Absence of Hospital-Acquired Illness or Injury  Outcome: Progressing  Goal: Optimal Comfort and Wellbeing  Outcome: Progressing  Goal: Readiness for Transition of Care  Outcome: Progressing     Problem: Bariatric Environmental Safety  Goal: Safety Maintained with Care  Outcome: Progressing     Problem: Infection  Goal: Absence of Infection Signs and Symptoms  Outcome: Progressing     Problem: Diabetes Comorbidity  Goal: Blood Glucose Level Within Targeted Range  Outcome: Progressing     Problem: Pneumonia  Goal: Fluid Balance  Outcome: Progressing  Goal: Resolution of Infection Signs and Symptoms  Outcome: Progressing  Goal: Effective Oxygenation and Ventilation  Outcome: Progressing     Problem: Wound  Goal: Optimal Coping  Outcome: Progressing  Goal: Optimal Functional Ability  Outcome: Progressing  Goal: Absence of Infection Signs and Symptoms  Outcome: Progressing  Goal: Improved Oral Intake  Outcome: Progressing  Goal: Optimal Pain Control and Function  Outcome: Progressing  Goal: Skin Health and Integrity  Outcome: Progressing  Goal: Optimal Wound Healing  Outcome: Progressing     Problem: Skin Injury Risk Increased  Goal: Skin Health and Integrity  Outcome: Progressing     Problem: Fall Injury Risk  Goal: Absence of Fall and Fall-Related Injury  Outcome: Progressing

## 2024-12-04 NOTE — PLAN OF CARE
Updates sent to patient's facility of residence, [facility]. Made the facility aware that there is no expected discharge date at this time.

## 2024-12-04 NOTE — PROGRESS NOTES
Pharmacokinetic Initial Assessment: IV Vancomycin    Assessment/Plan:    Re-initiate intravenous vancomycin with a maintenance dose of vancomycin 1000mg IV every 12 hours.  Desired empiric serum trough concentration is 15 to 20 mcg/mL.  Draw vancomycin trough level 60 min prior to fourth dose on 12/06 at approximately 0300.  Pharmacy will continue to follow and monitor vancomycin.      Please contact pharmacy at extension [phone] with any questions regarding this assessment.     Thank you for the consult,   Tyra Han       Patient brief summary:  Elisabet Choi is a 79 y.o. female initiated on antimicrobial therapy with IV Vancomycin for treatment of suspected bacteremia.    Drug Allergies:   Review of patient's allergies indicates:   Allergen Reactions    Gluten      Other reaction(s): ciliac disease    Gluten protein     Ropinirole      Other reaction(s): hallucinations    Wheat      Other reaction(s): ciliac disease    Wheat containing prod     Zolpidem      hallucinates  Other reaction(s): hallucinations       Actual Body Weight:   102.5 kg    Renal Function:   Estimated Creatinine Clearance: 65.5 mL/min (based on SCr of 0.74 mg/dL).,     Dialysis Method (if applicable):  N/A    CBC (last 72 hours):  Recent Labs   Lab Result Units 12/02/24 2327 12/04/24  0257   WBC x10(3)/mcL 17.65* 8.68   Hgb g/dL 12.2 10.3*   Hct % 39.2 34.6*   Platelet x10(3)/mcL 211 157   Mono % % 2.9 4.6   Eos % % 0.8 4.5   Basophil % % 0.2 0.6       Metabolic Panel (last 72 hours):  Recent Labs   Lab Result Units 12/02/24 2327 12/03/24  0025 12/03/24  0519 12/04/24  0257   Sodium mmol/L 141  --   --  138   Sodium, Blood Gas mmol/L  --   --  131*  --    Potassium mmol/L 4.6  --   --  3.6   Potassium, Blood Gas mmol/L  --   --  3.9  --    Chloride mmol/L 100  --   --  102   CO2 mmol/L 33*  --   --  30   Glucose mg/dL 227*  --   --  146*   Glucose, UA   --  Normal  --   --    Blood Urea Nitrogen mg/dL 28.9*  --   --  20.5*   Creatinine  "mg/dL 0.88  --   --  0.74   Albumin g/dL 2.9*  --   --  2.5*   Bilirubin Total mg/dL 0.3  --   --  0.4   ALP unit/L 93  --   --  68   AST unit/L 21  --   --  10   ALT unit/L 29  --   --  12   Magnesium Level mg/dL 2.40  --   --  2.20   Phosphorus Level mg/dL 3.3  --   --  3.0       Drug levels (last 3 results):  No results for input(s): "VANCOMYCINRA", "VANCORANDOM", "VANCOMYCINPE", "VANCOPEAK", "VANCOMYCINTR", "VANCOTROUGH" in the last 72 hours.    Microbiologic Results:  Microbiology Results (last 7 days)       Procedure Component Value Units Date/Time    Blood Culture [[id number]] Collected: 12/04/24 1335    Order Status: Sent Specimen: Blood Updated: 12/04/24 1404    Blood Culture #1 **CANNOT BE ORDERED STAT** [[id number]]  (Abnormal) Collected: 12/03/24 0006    Order Status: Completed Specimen: Blood from Antecubital, Left Updated: 12/04/24 1212     Blood Culture Susceptibility To Follow      Staphylococcus hominis     GRAM STAIN Gram Positive Cocci, probable Staphylococcus      Seen in gram stain of broth only      1 of 2 Aerobic bottles positive    Blood Culture #1 **CANNOT BE ORDERED STAT** [[id number]]  (Abnormal) Collected: 12/03/24 0006    Order Status: Completed Specimen: Blood from Antecubital, Left Updated: 12/04/24 1210     Blood Culture Susceptibility To Follow      Staphylococcus haemolyticus     GRAM STAIN Gram Positive Cocci, probable Staphylococcus      Seen in gram stain of broth only      1 of 2 Aerobic bottles positive    BCID2 Panel [[id number]]  (Abnormal) Collected: 12/03/24 0006    Order Status: Completed Specimen: Blood from Antecubital, Left Updated: 12/03/24 1635     CTX-M (ESBL ) N/A     IMP (Cabapenemase ) N/A     KPC resistance gene (Carbapenemase ) N/A     mcr-1 N/A     mecA ID Detected     Comment: Note: Antimicrobial resistance can occur via multiple mechanisms. A Not Detected result for antimicrobial resistance gene(s) does not indicate antimicrobial " susceptibility. Subculturing is required for species identification and susceptibility testing of   isolates.        mecA/C and MREJ (MRSA) gene N/A     NDM (Carbapenemase ) N/A     OXA-48-like (Carbapenemase ) N/A     Bk/B (VRE gene) N/A     VIM (Carbapenemase ) N/A     Enterococcus faecalis Not Detected     Enterococcus faecium Not Detected     Listeria monocytogenes Not Detected     Staphylococcus spp. Detected     Staphylococcus aureus Not Detected     Staphylococcus epidermidis Detected     Staphylococcus lugdunensis Not Detected     Streptococcus spp. Not Detected     Streptococcus agalactiae (Group B) Not Detected     Streptococcus pneumoniae Not Detected     Streptococcus pyogenes (Group A) Not Detected     Acinetobacter calcoaceticus/baumannii complex Not Detected     Bacteroides fragilis Not Detected     Enterobacterales Not Detected     Enterobacter cloacae complex Not Detected     Escherichia coli Not Detected     Klebsiella aerogenes Not Detected     Klebsiella oxytoca Not Detected     Klebsiella pneumoniae group Not Detected     Proteus spp. Not Detected     Salmonella spp. Not Detected     Serratia marcescens Not Detected     Haemophilus influenzae Not Detected     Neisseria meningitidis Not Detected     Pseudomonas aeruginosa Not Detected     Stenotrophomonas maltophilia Not Detected     Candida albicans Not Detected     Candida auris Not Detected     Candida glabrata Not Detected     Candida krusei Not Detected     Candida parapsilosis Not Detected     Candida tropicalis Not Detected     Cryptococcus neoformans/gattii Not Detected    Narrative:      The [employer] BCID2 Panel is a multiplexed nucleic acid test intended for the use with K[employer]® 2.0 or Kwan[employer]® [employer] Systems for the simultaneous qualitative detection and identification of multiple bacterial and yeast nucleic acids and select genetic determinants associated with antimicrobial resistance.   The [employer] BCID2 Panel test is performed directly on blood culture samples identified as positive by a continuous monitoring blood culture system.  Results are intended to be interpreted in conjunction with Gram stain results.    Respiratory Culture [[id number]]     Order Status: Sent Specimen: Sputum, Induced

## 2024-12-04 NOTE — PROCEDURES
[facility]  Speech Language Pathology Department  Modified Barium Swallow (MBS) Study    Patient Name:  Elisabet Choi   MRN:  [id number]    Recommendations     General recommendations:  dysphagia therapy  Repeat MBS study: 5-5 days  Solid texture recommendation:  NPO  Liquid consistency recommendation: NPO   Medications: NPO  General precautions: aspiration    History     Elisabet Choi is a/n 79 y.o. female admitted for AMS. Recurrent pna.      Past Medical History:   Diagnosis Date    Acute cystitis     Allergic rhinitis     Anemia, unspecified     Angina pectoris     Arthritis     Back pain     Bipolar disorder, unspecified     Celiac disease     Cellulitis     CHF (congestive heart failure)     Constipation     Contracture, left ankle     Dementia     Depressive episode     Diabetes mellitus     Diabetes mellitus with ulcer of foot     Displacement of other urinary catheter, subsequent encounter     Edema     Elevated white blood cell count     Encounter for blood transfusion     Fatigue     Fluid retention     Genetic anomalies of leukocytes     GERD without esophagitis     Goiter     HLD (hyperlipidemia)     Hydrocephalus     Hypertension     Hypokalemia     Hypotension     Hypothyroidism     Magnesium deficiency     Major depression     Migraine     Mild protein-calorie malnutrition     Morbid obesity     Obstructive and reflux uropathy     Osteoarthritis     Osteoporosis     Overactive bladder     Pressure ulcer of right heel     Pressure ulcer of sacral region     Pressure ulcer of sacral region, stage 3     Pruritus     PVD (peripheral vascular disease)     Radiculopathy, lumbar region     Seizures     Sleep apnea     uses CPAP    SOB (shortness of breath)     Stroke     TIA (transient ischemic attack)     Urinary tract infection, site not specified     Vitamin deficiency     Wheelchair dependent      Past Surgical History:   Procedure Laterality Date    ABDOMINAL SURGERY       APPENDECTOMY      BACK SURGERY      BLADDER SURGERY      BRAIN SURGERY      CAROTID ENDARTERECTOMY      CHOLECYSTECTOMY      CSF SHUNT      HERNIA REPAIR      HYSTERECTOMY      INSERTION, SUPRAPUBIC CATHETER N/A 3/26/2024    Procedure: INSERTION, SUPRAPUBIC CATHETER;  Surgeon: Fuad Szymanski MD;  Location: [facility];  Service: Urology;  Laterality: N/A;  CYSTO W/ SUPRAPUBIC CATH INSERTION    TONSILLECTOMY      VASCULAR SURGERY       A MBS Study was completed to assess the efficiency of her swallow function, rule out aspiration and make recommendations regarding safe dietary consistencies, effective compensatory strategies, and safe eating environment.     Home diet texture/consistency: unknown  Current Method of Nutrition: NPO    Patient complaint: to eat/drink    Imaging   Results for orders placed during the hospital encounter of 12/02/24    X-Ray Chest 1 View    Narrative  EXAMINATION:  XR CHEST 1 VIEW    CLINICAL HISTORY:  AMS;    COMPARISON:  11/21/2024    FINDINGS:  Single view of the chest shows small right pleural effusion with bibasilar atelectasis.  No pneumothorax.  Heart is enlarged.    Impression  Small right effusion      Electronically signed by: Doug Haley MD  Date:    12/03/2024  Time:    08:38    No results found for this or any previous visit.    No results found for this or any previous visit.    Subjective     Patient awake and alert.    Spiritual/Cultural/Tenriism Beliefs/Practices that affect care: no  Pain/Comfort: Pain Rating 1: 0/10    Restraints/positioning devices: none    Fluoroscopic Findings     Oral Musculature  Dentition: edentulous  Secretion Management: adequate  Mucosal Quality: good  Facial Movement: WFL  Buccal Strength & Mobility: WFL  Mandibular Strength & Mobility: WFL  Oral Labial Strength & Mobility: WFL  Lingual Strength & Mobility: WFL  Velar Elevation: WFL  Vocal Quality: adequate      Setup  Upright in bed  Able to self feed  Adequate head  control    Visualization  Lateral view    Oral Phase:   Bolus holding  Prolonged mastication  Poor bolus cohesion  Poor anterior-posterior transport  Overall reduced control    Pharyngeal Phase:   Reduced base of tongue retraction  Reduced epiglottic deflection  Reduced arytenoid/vocal fold closure      Consistency Fed by Laryngeal Penetration Aspiration Residue   Thin liquid by cup Self During the swallow  Did NOT clear SILENT  After swallow Trace  Valleculae and Pyriform sinus   Mildly thick liquid by cup Self During the swallow  After the swallow  Did NOT clear None Trace   Moderately thick liquid by cup Self During the swallow  Cleared spontaneously None None   Puree SLP None None None   Chewable solid SLP During the swallow  Cleared spontaneously None Trace     Cervical Esophageal Phase:   UES appeared to accommodate all bolus types without stasis or retrograde movement visualized    Additional Comments:  Reduced visualization due to body habitus    Assessment     Pt exhibited moderate-severe oropharyngeal dysphagia characterized by the findings noted above.  Aspiration and penetration visualized.  Both swallow safety and efficiency are impaired.     Patient appears to be at high risk for aspiration related pneumonia when considering severity of dysphagia and reduced mobility.  Prognosis for behavioral swallow rehabilitation is fair.    Outcome Measures     Functional Oral Intake Scale: 1 - Nothing by mouth    Goals     Multidisciplinary Problems       SLP Goals          Problem: SLP    Goal Priority Disciplines Outcome   SLP Goal     SLP Progressing   Description: LTG: Pt will tolerate least restrictive diet with no clinical signs/sx of aspiration.    ST.  Thermal stimulation with 100% swallow initiation  2.  Repeat MBS once appropriate                     Education     Patient and family were provided with verbal education regarding results and recommendations.  Understanding was verbalized.    Plan      SLP Follow-Up:  Yes    Patient to be seen:  daily   Plan of Care expires:  12/11/24  Plan of Care reviewed with:  patient, son     Time Tracking     SLP Treatment Date:   12/04/24  Speech Start Time:  1415  Speech Stop Time:  1430     Speech Total Time (min):  15 min    Billable minutes:   Motion Fluoroscopic Evaluation, Video Recording, 15 minutes     12/04/2024   none

## 2024-12-04 NOTE — CONSULTS
Inpatient Nutrition Assessment    Admit Date: 12/2/2024   Total duration of encounter: 2 days   Patient Age: 79 y.o.    Nutrition Recommendation/Prescription     When ok for oral intake, gluten-free/diabetic diet as tolerated; consistency per SLP.    Communication of Recommendations: reviewed with nurse, reviewed with patient, and reviewed with family    Nutrition Assessment     Chart Review    Reason Seen: continuous nutrition monitoring and physician consult for recommendations    Malnutrition Screening Tool Results   Have you recently lost weight without trying?: No  Have you been eating poorly because of a decreased appetite?: No   MST Score: 0   Diagnosis:  Altered mental status  Septic shock d/t possible aspiration pneumonia vs. CAP and possible proctocolitis  BLE Chronic lymphadema/venous stasis  ERICA/possible OHS with Hypercarbia on home CPAP  Metabolic Alkalosis d/t compensation and home lasix use  pAfib on Eliquis  Hyperglycemia with DMII  Hydrocephalus s/p  shunt  Speech and language deficit s/p TIA/CVA (3869-3450)  Dementia  Concern for polypharmacy    Relevant Medical History: speech and language deficit s/p TIA/CVA (2323-4820), dementia, hydrocephalus s/p  shunt placement, B12 deficiency, pAfib, ERICA on CPAP, type 2 DM, PVD, HLD, hypothyroidism, depression, bipolar disorder, seizure, recurrent UTI, LLE cellulitis superimposed on BLE lymphedema, venous stasis dermatitis, sacral decubitus ulcer    Scheduled Medications:  albuterol-ipratropium, 3 mL, Q4H WAKE  enoxparin, 40 mg, Q12H  insulin glargine U-100, 10 Units, QHS  [START ON 12/5/2024] mupirocin, , BID  pantoprazole, 40 mg, Daily  piperacillin-tazobactam (Zosyn) IV (PEDS and ADULTS) (extended infusion is not appropriate), 4.5 g, Q8H  zinc oxide-cod liver oil, , TID    Continuous Infusions:  NORepinephrine bitartrate-D5W, Last Rate: Stopped (12/03/24 1332)    PRN Medications:   dextrose 10%, 12.5 g, PRN  dextrose 10%, 25 g, PRN  glucagon (human  "recombinant), 1 mg, PRN  insulin aspart U-100, 0-10 Units, Q6H PRN  ketorolac, 15 mg, Q8H PRN  sodium chloride 0.9%, 10 mL, PRN    Calorie Containing IV Medications: no significant kcals from medications at this time    Recent Labs   Lab 11/27/24  1339 12/02/24  2327 12/03/24  0722 12/04/24  0257    141  --  138   K 4.4 4.6  --  3.6   CALCIUM 8.7 9.3  --  8.6   PHOS  --  3.3  --  3.0   MG  --  2.40  --  2.20    100  --  102   CO2 28 33*  --  30   BUN 20.3* 28.9*  --  20.5*   CREATININE 0.66 0.88  --  0.74   EGFRNORACEVR >60 >60  --  >60   GLUCOSE 216* 227*  --  146*   BILITOT 0.2 0.3  --  0.4   ALKPHOS 71 93  --  68   ALT 19 29  --  12   AST 24 21  --  10   ALBUMIN 2.7* 2.9*  --  2.5*   PREALB  --   --  14.8  --    CRP  --  55.00*  --   --    WBC 8.43 17.65*  --  8.68   HGB 10.8* 12.2  --  10.3*   HCT 35.2* 39.2  --  34.6*     Nutrition Orders:  Diet NPO      Appetite/Oral Intake: NPO/not applicable  Factors Affecting Nutritional Intake: difficulty/impaired swallowing and NPO  Social Needs Impacting Access to Food: none identified  Food/Confucianist/Cultural Preferences: none reported  Food Allergies: wheat  Last Bowel Movement: 12/04/24  Wound(s):     Wound 12/03/24 0500 Pressure Injury Left Heel-Tissue loss description: Full thickness       Altered Skin Integrity 03/26/24 2010 Sacral spine Intact skin with non-blanchable redness of localized area-Tissue loss description: Not applicable       Wound 12/03/24 1330 Blister(s) Right lateral Back-Tissue loss description: Partial thickness    Comments    12/4/24 Patient unable to answer questions, spoke with daughter at bedside who reports good appetite/intake prior to admission, no weight loss, gluten-free diabetic diet at home (celiac disease). NPO currently, plans for MBS.    Anthropometrics    Height: 4' 8" (142.2 cm),    Last Weight: 102.5 kg (225 lb 15.5 oz) (12/03/24 0655), Weight Method: Bed Scale  BMI (Calculated): 50.7  BMI Classification: obese grade " III (BMI >/=40)     Ideal Body Weight (IBW), Female: 80 lb     % Ideal Body Weight, Female (lb): 281.25 %                             Usual Weight Provided By: family/caregiver denies unintentional weight loss    Wt Readings from Last 5 Encounters:   12/03/24 102.5 kg (225 lb 15.5 oz)   11/21/24 102.4 kg (225 lb 12 oz)   09/27/24 102.4 kg (225 lb 12 oz)   07/08/24 68 kg (149 lb 14.6 oz)   05/01/24 68 kg (149 lb 14.6 oz)     Weight Change(s) Since Admission: stable  Wt Readings from Last 1 Encounters:   12/03/24 0655 102.5 kg (225 lb 15.5 oz)   12/02/24 2220 102.1 kg (225 lb)   Admit Weight: 102.1 kg (225 lb) (12/02/24 2220), Weight Method: Estimated    Nutrition Goals & Monitoring     Dietitian will monitor: food and beverage intake  Discharge planning:  gluten-free/diabetic diet with texture modifications per SLP  Nutrition Risk/Follow-Up: low (follow-up in 5-7 days)   Please consult if re-assessment needed sooner.

## 2024-12-05 LAB
ALBUMIN SERPL-MCNC: 2.9 G/DL (ref 3.4–4.8)
ALBUMIN/GLOB SERPL: 0.8 RATIO (ref 1.1–2)
ALP SERPL-CCNC: 75 UNIT/L (ref 40–150)
ALT SERPL-CCNC: 15 UNIT/L (ref 0–55)
ANION GAP SERPL CALC-SCNC: 9 MEQ/L
APICAL FOUR CHAMBER EJECTION FRACTION: 63 %
APICAL TWO CHAMBER EJECTION FRACTION: 65 %
AST SERPL-CCNC: 12 UNIT/L (ref 5–34)
AV INDEX (PROSTH): 0.75
AV MEAN GRADIENT: 3 MMHG
AV PEAK GRADIENT: 5.8 MMHG
AV VALVE AREA BY VELOCITY RATIO: 2.6 CM²
AV VALVE AREA: 2.4 CM²
AV VELOCITY RATIO: 0.83
BASOPHILS # BLD AUTO: 0.04 X10(3)/MCL
BASOPHILS NFR BLD AUTO: 0.3 %
BILIRUB SERPL-MCNC: 0.6 MG/DL
BSA FOR ECHO PROCEDURE: 2.01 M2
BUN SERPL-MCNC: 12.9 MG/DL (ref 9.8–20.1)
CALCIUM SERPL-MCNC: 8.8 MG/DL (ref 8.4–10.2)
CHLORIDE SERPL-SCNC: 104 MMOL/L (ref 98–107)
CO2 SERPL-SCNC: 26 MMOL/L (ref 23–31)
CREAT SERPL-MCNC: 0.72 MG/DL (ref 0.55–1.02)
CREAT/UREA NIT SERPL: 18
CV ECHO LV RWT: 0.41 CM
DOP CALC AO PEAK VEL: 1.2 M/S
DOP CALC AO VTI: 24 CM
DOP CALC LVOT AREA: 3.1 CM2
DOP CALC LVOT DIAMETER: 2 CM
DOP CALC LVOT PEAK VEL: 1 M/S
DOP CALC LVOT STROKE VOLUME: 56.8 CM3
DOP CALC MV VTI: 29.5 CM
DOP CALCLVOT PEAK VEL VTI: 18.1 CM
E WAVE DECELERATION TIME: 259 MSEC
E/A RATIO: 0.88
E/E' RATIO: 14.36 M/S
ECHO LV POSTERIOR WALL: 1 CM (ref 0.6–1.1)
EOSINOPHIL # BLD AUTO: 0.29 X10(3)/MCL (ref 0–0.9)
EOSINOPHIL NFR BLD AUTO: 2.3 %
ERYTHROCYTE [DISTWIDTH] IN BLOOD BY AUTOMATED COUNT: 16 % (ref 11.5–17)
FRACTIONAL SHORTENING: 36.7 % (ref 28–44)
GFR SERPLBLD CREATININE-BSD FMLA CKD-EPI: >60 ML/MIN/1.73/M2
GLOBULIN SER-MCNC: 3.8 GM/DL (ref 2.4–3.5)
GLUCOSE SERPL-MCNC: 141 MG/DL (ref 82–115)
HCT VFR BLD AUTO: 36.9 % (ref 37–47)
HGB BLD-MCNC: 11.5 G/DL (ref 12–16)
HR MV ECHO: 72 BPM
IMM GRANULOCYTES # BLD AUTO: 0.05 X10(3)/MCL (ref 0–0.04)
IMM GRANULOCYTES NFR BLD AUTO: 0.4 %
INTERVENTRICULAR SEPTUM: 1 CM (ref 0.6–1.1)
LEFT ATRIUM AREA SYSTOLIC (APICAL 2 CHAMBER): 13.4 CM2
LEFT ATRIUM AREA SYSTOLIC (APICAL 4 CHAMBER): 15.6 CM2
LEFT ATRIUM SIZE: 3.9 CM
LEFT ATRIUM VOLUME INDEX MOD: 18.4 ML/M2
LEFT ATRIUM VOLUME MOD: 34.4 ML
LEFT INTERNAL DIMENSION IN SYSTOLE: 3.1 CM (ref 2.1–4)
LEFT VENTRICLE DIASTOLIC VOLUME INDEX: 60.43 ML/M2
LEFT VENTRICLE DIASTOLIC VOLUME: 113 ML
LEFT VENTRICLE END DIASTOLIC VOLUME APICAL 2 CHAMBER: 47.7 ML
LEFT VENTRICLE END DIASTOLIC VOLUME APICAL 4 CHAMBER: 67 ML
LEFT VENTRICLE END SYSTOLIC VOLUME APICAL 2 CHAMBER: 30.8 ML
LEFT VENTRICLE END SYSTOLIC VOLUME APICAL 4 CHAMBER: 36.2 ML
LEFT VENTRICLE MASS INDEX: 94.1 G/M2
LEFT VENTRICLE SYSTOLIC VOLUME INDEX: 20.3 ML/M2
LEFT VENTRICLE SYSTOLIC VOLUME: 37.9 ML
LEFT VENTRICULAR INTERNAL DIMENSION IN DIASTOLE: 4.9 CM (ref 3.5–6)
LEFT VENTRICULAR MASS: 176 G
LV LATERAL E/E' RATIO: 15.8 M/S
LV SEPTAL E/E' RATIO: 13.17 M/S
LVED V (TEICH): 113 ML
LVES V (TEICH): 37.9 ML
LVOT MG: 2 MMHG
LVOT MV: 0.6 CM/S
LYMPHOCYTES # BLD AUTO: 1.75 X10(3)/MCL (ref 0.6–4.6)
LYMPHOCYTES NFR BLD AUTO: 14.1 %
MAGNESIUM SERPL-MCNC: 2.2 MG/DL (ref 1.6–2.6)
MCH RBC QN AUTO: 28.1 PG (ref 27–31)
MCHC RBC AUTO-ENTMCNC: 31.2 G/DL (ref 33–36)
MCV RBC AUTO: 90.2 FL (ref 80–94)
MONOCYTES # BLD AUTO: 0.51 X10(3)/MCL (ref 0.1–1.3)
MONOCYTES NFR BLD AUTO: 4.1 %
MV MEAN GRADIENT: 2 MMHG
MV PEAK A VEL: 0.9 M/S
MV PEAK E VEL: 0.79 M/S
MV PEAK GRADIENT: 4 MMHG
MV STENOSIS PRESSURE HALF TIME: 45 MS
MV VALVE AREA BY CONTINUITY EQUATION: 1.93 CM2
MV VALVE AREA P 1/2 METHOD: 4.89 CM2
NEUTROPHILS # BLD AUTO: 9.79 X10(3)/MCL (ref 2.1–9.2)
NEUTROPHILS NFR BLD AUTO: 78.8 %
NRBC BLD AUTO-RTO: 0 %
OHS LV EJECTION FRACTION SIMPSONS BIPLANE MOD: 65 %
PHOSPHATE SERPL-MCNC: 2.6 MG/DL (ref 2.3–4.7)
PISA TR MAX VEL: 1.7 M/S
PLATELET # BLD AUTO: 158 X10(3)/MCL (ref 130–400)
PMV BLD AUTO: 10.4 FL (ref 7.4–10.4)
POCT GLUCOSE: 133 MG/DL (ref 70–110)
POCT GLUCOSE: 134 MG/DL (ref 70–110)
POCT GLUCOSE: 156 MG/DL (ref 70–110)
POCT GLUCOSE: 159 MG/DL (ref 70–110)
POCT GLUCOSE: 160 MG/DL (ref 70–110)
POTASSIUM SERPL-SCNC: 3.3 MMOL/L (ref 3.5–5.1)
PROT SERPL-MCNC: 6.7 GM/DL (ref 5.8–7.6)
PV PEAK GRADIENT: 3 MMHG
PV PEAK VELOCITY: 0.93 M/S
RA PRESSURE ESTIMATED: 3 MMHG
RBC # BLD AUTO: 4.09 X10(6)/MCL (ref 4.2–5.4)
RV TB RVSP: 5 MMHG
SODIUM SERPL-SCNC: 139 MMOL/L (ref 136–145)
TDI LATERAL: 0.05 M/S
TDI SEPTAL: 0.06 M/S
TDI: 0.06 M/S
TR MAX PG: 12 MMHG
TRICUSPID ANNULAR PLANE SYSTOLIC EXCURSION: 1.87 CM
TV REST PULMONARY ARTERY PRESSURE: 15 MMHG
WBC # BLD AUTO: 12.43 X10(3)/MCL (ref 4.5–11.5)
Z-SCORE OF LEFT VENTRICULAR DIMENSION IN END DIASTOLE: -0.62
Z-SCORE OF LEFT VENTRICULAR DIMENSION IN END SYSTOLE: -0.29

## 2024-12-05 PROCEDURE — 94760 N-INVAS EAR/PLS OXIMETRY 1: CPT

## 2024-12-05 PROCEDURE — 94761 N-INVAS EAR/PLS OXIMETRY MLT: CPT

## 2024-12-05 PROCEDURE — 84100 ASSAY OF PHOSPHORUS: CPT

## 2024-12-05 PROCEDURE — 99900031 HC PATIENT EDUCATION (STAT)

## 2024-12-05 PROCEDURE — 92526 ORAL FUNCTION THERAPY: CPT

## 2024-12-05 PROCEDURE — 25000242 PHARM REV CODE 250 ALT 637 W/ HCPCS

## 2024-12-05 PROCEDURE — 63600175 PHARM REV CODE 636 W HCPCS

## 2024-12-05 PROCEDURE — 83735 ASSAY OF MAGNESIUM: CPT

## 2024-12-05 PROCEDURE — 21400001 HC TELEMETRY ROOM

## 2024-12-05 PROCEDURE — 27000221 HC OXYGEN, UP TO 24 HOURS

## 2024-12-05 PROCEDURE — 94640 AIRWAY INHALATION TREATMENT: CPT

## 2024-12-05 PROCEDURE — 25000003 PHARM REV CODE 250: Performed by: HOSPITALIST

## 2024-12-05 PROCEDURE — 36415 COLL VENOUS BLD VENIPUNCTURE: CPT

## 2024-12-05 PROCEDURE — 85025 COMPLETE CBC W/AUTO DIFF WBC: CPT

## 2024-12-05 PROCEDURE — 25000003 PHARM REV CODE 250

## 2024-12-05 PROCEDURE — 99900035 HC TECH TIME PER 15 MIN (STAT)

## 2024-12-05 PROCEDURE — 80053 COMPREHEN METABOLIC PANEL: CPT

## 2024-12-05 PROCEDURE — 99223 1ST HOSP IP/OBS HIGH 75: CPT | Mod: ,,, | Performed by: HOSPITALIST

## 2024-12-05 RX ORDER — ACETAMINOPHEN 650 MG/1
650 SUPPOSITORY RECTAL EVERY 6 HOURS PRN
Status: DISCONTINUED | OUTPATIENT
Start: 2024-12-05 | End: 2024-12-12

## 2024-12-05 RX ADMIN — INSULIN ASPART 2 UNITS: 100 INJECTION, SOLUTION INTRAVENOUS; SUBCUTANEOUS at 05:12

## 2024-12-05 RX ADMIN — IPRATROPIUM BROMIDE AND ALBUTEROL SULFATE 3 ML: 2.5; .5 SOLUTION RESPIRATORY (INHALATION) at 01:12

## 2024-12-05 RX ADMIN — PIPERACILLIN SODIUM AND TAZOBACTAM SODIUM 4.5 G: 4; .5 INJECTION, POWDER, LYOPHILIZED, FOR SOLUTION INTRAVENOUS at 03:12

## 2024-12-05 RX ADMIN — IPRATROPIUM BROMIDE AND ALBUTEROL SULFATE 3 ML: 2.5; .5 SOLUTION RESPIRATORY (INHALATION) at 08:12

## 2024-12-05 RX ADMIN — PIPERACILLIN SODIUM AND TAZOBACTAM SODIUM 4.5 G: 4; .5 INJECTION, POWDER, LYOPHILIZED, FOR SOLUTION INTRAVENOUS at 11:12

## 2024-12-05 RX ADMIN — IPRATROPIUM BROMIDE AND ALBUTEROL SULFATE 3 ML: 2.5; .5 SOLUTION RESPIRATORY (INHALATION) at 09:12

## 2024-12-05 RX ADMIN — Medication: at 05:12

## 2024-12-05 RX ADMIN — INSULIN GLARGINE 10 UNITS: 100 INJECTION, SOLUTION SUBCUTANEOUS at 09:12

## 2024-12-05 RX ADMIN — PIPERACILLIN SODIUM AND TAZOBACTAM SODIUM 4.5 G: 4; .5 INJECTION, POWDER, LYOPHILIZED, FOR SOLUTION INTRAVENOUS at 01:12

## 2024-12-05 RX ADMIN — PANTOPRAZOLE SODIUM 40 MG: 40 INJECTION, POWDER, FOR SOLUTION INTRAVENOUS at 08:12

## 2024-12-05 RX ADMIN — KETOROLAC TROMETHAMINE 15 MG: 30 INJECTION, SOLUTION INTRAMUSCULAR; INTRAVENOUS at 08:12

## 2024-12-05 RX ADMIN — ENOXAPARIN SODIUM 40 MG: 40 INJECTION SUBCUTANEOUS at 08:12

## 2024-12-05 RX ADMIN — ENOXAPARIN SODIUM 40 MG: 40 INJECTION SUBCUTANEOUS at 09:12

## 2024-12-05 RX ADMIN — VANCOMYCIN HYDROCHLORIDE 1000 MG: 1 INJECTION, POWDER, LYOPHILIZED, FOR SOLUTION INTRAVENOUS at 05:12

## 2024-12-05 RX ADMIN — VANCOMYCIN HYDROCHLORIDE 1000 MG: 1 INJECTION, POWDER, LYOPHILIZED, FOR SOLUTION INTRAVENOUS at 06:12

## 2024-12-05 RX ADMIN — Medication: at 11:12

## 2024-12-05 RX ADMIN — INSULIN ASPART 2 UNITS: 100 INJECTION, SOLUTION INTRAVENOUS; SUBCUTANEOUS at 11:12

## 2024-12-05 RX ADMIN — PIPERACILLIN SODIUM AND TAZOBACTAM SODIUM 4.5 G: 4; .5 INJECTION, POWDER, LYOPHILIZED, FOR SOLUTION INTRAVENOUS at 08:12

## 2024-12-05 RX ADMIN — MUPIROCIN: 20 OINTMENT TOPICAL at 09:12

## 2024-12-05 RX ADMIN — MUPIROCIN: 20 OINTMENT TOPICAL at 08:12

## 2024-12-05 RX ADMIN — Medication: at 09:12

## 2024-12-05 NOTE — PT/OT/SLP PROGRESS
[facility]  Speech Language Pathology Department  Dysphagia Therapy Progress Note    Patient Name:  Elisabet Choi   MRN:  [id number]    Recommendations     General recommendations:  dysphagia therapy  Solid texture recommendation:  NPO  Liquid consistency recommendation: NPO   Medications: NPO    Discharge therapy intensity: Moderate Intensity Therapy   Barriers to safe discharge:  severity of impairment    Subjective     Patient awake and alert.  Spiritual/Cultural/Buddhist Beliefs/Practices that affect care: no    Pain/Comfort: Pain Rating 1: 0/10      Objective     Therapeutic Activities:  Pt completed base of tongue exercises x10 with minimal-moderate cues.  Pt tolerated thermal stimulation to the anterior faucial pillars x12 with 100% swallow responses.  Laryngeal excursion fair.  Delay 2-5 seconds.    Ice chip x1: coughing with gurgling. Unable to clear despite max efforts.    Assessment     Pt continues to present with oropharyngeal dysphagia and remains unsafe for PO intake.    Outcome Measures     Functional Oral Intake Scale: 1 - Nothing by mouth    Goals     Multidisciplinary Problems       SLP Goals          Problem: SLP    Goal Priority Disciplines Outcome   SLP Goal     SLP Progressing   Description: LTG: Pt will tolerate least restrictive diet with no clinical signs/sx of aspiration.    ST.  Thermal stimulation with 100% swallow initiation  2.  Repeat MBS once appropriate                     Patient Education     Patient provided with verbal education regarding POC.  Understanding was verbalized.    Plan     Will continue to follow and tx as appropriate.    SLP Follow-Up:  Yes   Patient to be seen:  daily   Plan of Care expires:  24  Plan of Care reviewed with:  patient       Time Tracking     SLP Treatment Date:   24  Speech Start Time:  1340  Speech Stop Time:  1355     Speech Total Time (min):  15 min    Billable minutes:  Treatment of Swallow  Dysfunction, 15 minutes       12/05/2024

## 2024-12-05 NOTE — CONSULTS
Infectious Disease  Patient Name Elisabet Choi  79 y.o. female.  MRN: [id number]   Length of stay: 2  Chief Complaint: Altered Mental Status (Pt arrives via [facility], EMS reports pt is coming from [facility], Nurse checked on pt approx 1 hr ago and pt was unresponsive, no facial asymmetry noted , pt is following command, equal  strength. EMS )        Interval history:   12/5 - afebrile. Having thoracic back pain, new on set. No changes seen on CT. Obtain plain film. May need MRI. Continue abx  Assessment and Plan:   1) Septic shock  - hypoxia, leukocytosis, AMS, fever, acute renal injury   - CT A & P 12/3 - . There is a small possibly loculated right sided pleural effusion and a trace left sided pleural effusion with adjacent consolidation atelectasis and ground glass opacity. A superimposed pneumonia at the right lung base is not excluded.   There is mild thickening at the distal rectum through anorectal verge which may represent proctocolitis.  Details and other findings as discussed above.   - CT head 12/3 -   Similar right-sided ventriculostomy is again seen with unchanged hypodensities around the tube in the right frontal lobe. No acute intracranial process identified. Details and other findings as noted above.   - UA not consistent with infection  - currently on pip/tazo   - currently on vancomycin, goal 15-20, Rx to dose       2) Bacteremia  - BCx 12/2 - Staphylococcus hominis both sets   - repeat now   - currently on vancomycin, goal 15-20, Rx to dose     3) Leukocytosis  - in setting of suspected infection   - trending down   - hx of MDRO UTIs      Discussed with patient  Discussed and seen with RN    Francesca Oconnell MD, MPH  [facility]    Thank you for this consultation. I will follow up with the patient. Please contact via Epic secure chat with any questions.       HPI:   Patient is Elisabet buck 79 y.o. female admitted on 12/2 for AMS. Upon evaluation patient  with fever, leukocytosis, hypoxia, hypotension refractory to IVF requiring pressors. CT imaging showed evidence of small right pleural effusion, GGOs and thickening of distal rectum concerning for proctitis. Patient was started on empiric antimicrobials. Blood cx have isolated CONS. Patient has known afib on DOAC, DMII, PAD, hyperlipidemia, hypothyroidism, seizures,  NPH with VPS, SPC, prior TIA/CVA, b12 deficiency, anxiety, depression, dementia, s/p prior hysterectomy, cholecystectomy, appendectomy, kyphoplasty, b/l carotid endarterectomy. Infectious diseases consulted for evaluation and management.     Past Medical History:   Diagnosis Date    Acute cystitis     Allergic rhinitis     Anemia, unspecified     Angina pectoris     Arthritis     Back pain     Bipolar disorder, unspecified     Celiac disease     Cellulitis     CHF (congestive heart failure)     Constipation     Contracture, left ankle     Dementia     Depressive episode     Diabetes mellitus     Diabetes mellitus with ulcer of foot     Displacement of other urinary catheter, subsequent encounter     Edema     Elevated white blood cell count     Encounter for blood transfusion     Fatigue     Fluid retention     Genetic anomalies of leukocytes     GERD without esophagitis     Goiter     HLD (hyperlipidemia)     Hydrocephalus     Hypertension     Hypokalemia     Hypotension     Hypothyroidism     Magnesium deficiency     Major depression     Migraine     Mild protein-calorie malnutrition     Morbid obesity     Obstructive and reflux uropathy     Osteoarthritis     Osteoporosis     Overactive bladder     Pressure ulcer of right heel     Pressure ulcer of sacral region     Pressure ulcer of sacral region, stage 3     Pruritus     PVD (peripheral vascular disease)     Radiculopathy, lumbar region     Seizures     Sleep apnea     uses CPAP    SOB (shortness of breath)     Stroke     TIA (transient ischemic attack)     Urinary tract infection, site not  specified     Vitamin deficiency     Wheelchair dependent      Past Surgical History:   Procedure Laterality Date    ABDOMINAL SURGERY      APPENDECTOMY      BACK SURGERY      BLADDER SURGERY      BRAIN SURGERY      CAROTID ENDARTERECTOMY      CHOLECYSTECTOMY      CSF SHUNT      HERNIA REPAIR      HYSTERECTOMY      INSERTION, SUPRAPUBIC CATHETER N/A 3/26/2024    Procedure: INSERTION, SUPRAPUBIC CATHETER;  Surgeon: Fuad Szymanski MD;  Location: [facility];  Service: Urology;  Laterality: N/A;  CYSTO W/ SUPRAPUBIC CATH INSERTION    TONSILLECTOMY      VASCULAR SURGERY       Review of patient's allergies indicates:   Allergen Reactions    Gluten      Other reaction(s): ciliac disease    Gluten protein     Ropinirole      Other reaction(s): hallucinations    Wheat      Other reaction(s): ciliac disease    Wheat containing prod     Zolpidem      hallucinates  Other reaction(s): hallucinations     Current Outpatient Medications   Medication Instructions    acetaminophen (TYLENOL) 1,000 mg, Oral, Every 4 hours PRN    acidophilus-pectin, citrus 100 million cell-10 mg Cap 10 mg, Oral    ALPRAZolam (XANAX) 0.25 mg, Oral, 2 times daily PRN    amitriptyline (ELAVIL) 25 mg, Oral, Nightly PRN    ascorbic acid, vitamin C, (VITAMIN C) 500 MG tablet 1 tablet, Oral, Every morning    cranberry fruit (CRANBERRY) 450 mg Tab Oral    cyanocobalamin (VITAMIN B-12) 100 mcg, Oral, Daily    doxazosin (CARDURA) 1 mg, Oral, Nightly    dulaglutide (TRULICITY) 3 mg, Subcutaneous, Every 7 days, wednesdays    DULoxetine (CYMBALTA) 60 mg, Oral, Daily    ezetimibe (ZETIA) 10 mg, Oral, Daily    folic acid (FOLVITE) 1 mg, Oral, Daily    gabapentin (NEURONTIN) 300 mg, Oral, 2 times daily    HYDROcodone-acetaminophen (NORCO)  mg per tablet 1 tablet, Oral, 3 times daily    insulin degludec 20 Units, Subcutaneous, Daily    levETIRAcetam (KEPPRA) 750 mg, Oral, Nightly    levothyroxine (SYNTHROID, LEVOTHROID) 175 mcg, Oral, Before breakfast    lovastatin  (ALTOPREV) 40 mg, Oral, Nightly    magnesium oxide (MAG-OX) 400 mg (241.3 mg magnesium) tablet 1 tablet, Oral, Every morning    methenamine (HIPREX) 1 g, Oral, 2 times daily    metoprolol tartrate (LOPRESSOR) 50 mg, Oral, 2 times daily    polyethylene glycol (GLYCOLAX) 17 g, Oral, Daily    POTASSIUM CHLORIDE ORAL 20 mEq, Oral, Daily    SANTYL ointment Topical (Top)    solifenacin (VESICARE) 10 mg, Oral, Nightly    tiZANidine 2 mg, Oral, 3 times daily PRN    topiramate (TOPAMAX) 100 mg, Oral, 2 times daily    torsemide (DEMADEX) 40 mg, Oral, Daily    vitamin D (VITAMIN D3) 1,000 Units, Oral, Daily    zinc gluconate 50 mg tablet 1 tablet, Oral, Every morning       Current Facility-Administered Medications:     albuterol-ipratropium 2.5 mg-0.5 mg/3 mL nebulizer solution 3 mL, 3 mL, Nebulization, Q4H WAKE, Ashu, Vy, DO, 3 mL at 12/04/24 1917    dextrose 10% bolus 125 mL 125 mL, 12.5 g, Intravenous, PRN, Ashu, Vy, DO    dextrose 10% bolus 250 mL 250 mL, 25 g, Intravenous, PRN, Ashu, Vy, DO    enoxaparin injection 40 mg, 40 mg, Subcutaneous, Q12H, Ashu, Vy, DO, 40 mg at 12/04/24 2152    glucagon (human recombinant) injection 1 mg, 1 mg, Intramuscular, PRN, Ashu, Vy, DO    insulin aspart U-100 injection 0-10 Units, 0-10 Units, Subcutaneous, Q6H PRN, Ashu, Vy, DO, 2 Units at 12/03/24 1723    insulin glargine U-100 (Lantus) injection 10 Units, 10 Units, Subcutaneous, QHS, Ashu, Vy, DO, 10 Units at 12/04/24 2152    ketorolac injection 15 mg, 15 mg, Intravenous, Q8H PRN, Samanta Leach MD, 15 mg at 12/03/24 2148    levETIRAcetam injection 750 mg, 750 mg, Intravenous, Daily, Mj Ferreira MD, 750 mg at 12/04/24 0960    mupirocin 2 % ointment, , Nasal, BID, Donald Mireles MD    pantoprazole injection 40 mg, 40 mg, Intravenous, Daily, Samreen Wakefield DO, 40 mg at 12/04/24 0843    piperacillin-tazobactam (ZOSYN) 4.5 g in D5W 100 mL IVPB (MB+), 4.5 g, Intravenous, Q8H, Samreen Wakefield DO, Last Rate: 25 mL/hr at 12/05/24  0147, 4.5 g at 12/05/24 0147    sodium chloride 0.9% flush 10 mL, 10 mL, Intravenous, PRN, Samreen Wakefield, DO    vancomycin (VANCOCIN) 1,000 mg in 0.9% NaCl 250 mL IVPB (admixture device), 1,000 mg, Intravenous, Q12H, Mj Ferreira MD, Stopped at 12/04/24 1751    Pharmacy to dose Vancomycin consult, , , Once **AND** vancomycin - pharmacy to dose, , Intravenous, pharmacy to manage frequency, Mj Ferreira MD    zinc oxide-cod liver oil 40 % paste, , Topical (Top), TID, Donald Mireles MD, Given at 12/04/24 0843  Review of Systems   Unable to perform ROS: Patient nonverbal   Constitutional:  Negative for chills and fever.   HENT:  Negative for congestion, ear discharge, ear pain, facial swelling, mouth sores, postnasal drip, rhinorrhea, sinus pressure, sinus pain, sneezing, sore throat and trouble swallowing.    Eyes:  Negative for discharge, redness and itching.   Respiratory:  Negative for cough, chest tightness, shortness of breath and wheezing.    Cardiovascular:  Negative for chest pain, palpitations and leg swelling.   Gastrointestinal:  Negative for abdominal distention, abdominal pain, diarrhea, nausea and vomiting.   Genitourinary:  Negative for dysuria, flank pain, frequency and urgency.   Musculoskeletal:  Positive for back pain. Negative for myalgias and neck stiffness.   Skin:  Negative for rash and wound.   Allergic/Immunologic: Negative for immunocompromised state.   Neurological:  Negative for dizziness, light-headedness and headaches.   Hematological:  Negative for adenopathy.   Psychiatric/Behavioral:  Negative for agitation, confusion and suicidal ideas. The patient is not nervous/anxious.        Objective:   Temp:  [97.8 °F (36.6 °C)-99.7 °F (37.6 °C)] 99.7 °F (37.6 °C)  Pulse:  [66-94] 82  Resp:  [10-20] 10  SpO2:  [94 %-100 %] 97 %  BP: (113-145)/(59-87) 144/87     Physical Exam  Constitutional:       Appearance: Normal appearance. She is well-developed.   HENT:      Head: Head  contusion: right side cavitation.      Nose: Nose normal.      Mouth/Throat:      Pharynx: No oropharyngeal exudate.   Eyes:      General: Lids are normal. No scleral icterus.        Right eye: No discharge.      Conjunctiva/sclera: Conjunctivae normal.      Pupils: Pupils are equal, round, and reactive to light.   Neck:      Thyroid: No thyromegaly.      Vascular: No JVD.      Trachea: Trachea normal.   Cardiovascular:      Rate and Rhythm: Normal rate and regular rhythm.      Pulses: Normal pulses.      Heart sounds: Normal heart sounds. No murmur heard.     No friction rub.   Pulmonary:      Effort: Pulmonary effort is normal. No respiratory distress.      Breath sounds: Normal breath sounds. No wheezing.   Chest:      Chest wall: No tenderness.   Abdominal:      General: Bowel sounds are normal. There is no distension.      Palpations: Abdomen is soft.      Tenderness: There is no abdominal tenderness. There is no guarding or rebound.   Musculoskeletal:         General: No tenderness. Normal range of motion.      Cervical back: Full passive range of motion without pain, normal range of motion and neck supple.   Lymphadenopathy:      Cervical: No cervical adenopathy.   Skin:     General: Skin is warm and dry.      Findings: Lesion present. No rash.   Neurological:      Mental Status: She is alert and oriented to person, place, and time.      Cranial Nerves: No cranial nerve deficit.      Sensory: No sensory deficit.   Psychiatric:         Speech: Speech normal.         Behavior: Behavior normal.         Thought Content: Thought content normal.         Judgment: Judgment normal.                               Estimated Creatinine Clearance: 65.5 mL/min (based on SCr of 0.74 mg/dL).  Recent Labs   Lab 12/04/24  0257   WBC 8.68        Microbiology Results (last 7 days)       Procedure Component Value Units Date/Time    Blood Culture [[id number]] Collected: 12/04/24 0322    Order Status: Resulted Specimen:  Blood Updated: 12/04/24 1404    Blood Culture #1 **CANNOT BE ORDERED STAT** [[id number]]  (Abnormal) Collected: 12/03/24 0006    Order Status: Completed Specimen: Blood from Antecubital, Left Updated: 12/04/24 1212     Blood Culture Susceptibility To Follow      Staphylococcus hominis     GRAM STAIN Gram Positive Cocci, probable Staphylococcus      Seen in gram stain of broth only      1 of 2 Aerobic bottles positive    Blood Culture #1 **CANNOT BE ORDERED STAT** [[id number]]  (Abnormal) Collected: 12/03/24 0006    Order Status: Completed Specimen: Blood from Antecubital, Left Updated: 12/04/24 1210     Blood Culture Susceptibility To Follow      Staphylococcus haemolyticus     GRAM STAIN Gram Positive Cocci, probable Staphylococcus      Seen in gram stain of broth only      1 of 2 Aerobic bottles positive    BCID2 Panel [[id number]]  (Abnormal) Collected: 12/03/24 0006    Order Status: Completed Specimen: Blood from Antecubital, Left Updated: 12/03/24 1635     CTX-M (ESBL ) N/A     IMP (Cabapenemase ) N/A     KPC resistance gene (Carbapenemase ) N/A     mcr-1 N/A     mecA ID Detected     Comment: Note: Antimicrobial resistance can occur via multiple mechanisms. A Not Detected result for antimicrobial resistance gene(s) does not indicate antimicrobial susceptibility. Subculturing is required for species identification and susceptibility testing of   isolates.        mecA/C and MREJ (MRSA) gene N/A     NDM (Carbapenemase ) N/A     OXA-48-like (Carbapenemase ) N/A     Bk/B (VRE gene) N/A     VIM (Carbapenemase ) N/A     Enterococcus faecalis Not Detected     Enterococcus faecium Not Detected     Listeria monocytogenes Not Detected     Staphylococcus spp. Detected     Staphylococcus aureus Not Detected     Staphylococcus epidermidis Detected     Staphylococcus lugdunensis Not Detected     Streptococcus spp. Not Detected     Streptococcus agalactiae (Group B) Not  Detected     Streptococcus pneumoniae Not Detected     Streptococcus pyogenes (Group A) Not Detected     Acinetobacter calcoaceticus/baumannii complex Not Detected     Bacteroides fragilis Not Detected     Enterobacterales Not Detected     Enterobacter cloacae complex Not Detected     Escherichia coli Not Detected     Klebsiella aerogenes Not Detected     Klebsiella oxytoca Not Detected     Klebsiella pneumoniae group Not Detected     Proteus spp. Not Detected     Salmonella spp. Not Detected     Serratia marcescens Not Detected     Haemophilus influenzae Not Detected     Neisseria meningitidis Not Detected     Pseudomonas aeruginosa Not Detected     Stenotrophomonas maltophilia Not Detected     Candida albicans Not Detected     Candida auris Not Detected     Candida glabrata Not Detected     Candida krusei Not Detected     Candida parapsilosis Not Detected     Candida tropicalis Not Detected     Cryptococcus neoformans/gattii Not Detected    Narrative:      The [place] CK1 BCID2 Panel is a multiplexed nucleic acid test intended for the use with [employer]® [place] [employer] or [employer]® [employer] for the simultaneous qualitative detection and identification of multiple bacterial and yeast nucleic acids and select genetic determinants associated with antimicrobial resistance.  The [employer] BCID2 Panel test is performed directly on blood culture samples identified as positive by a continuous monitoring blood culture system.  Results are intended to be interpreted in conjunction with Gram stain results.    Respiratory Culture [[id number]]     Order Status: Sent Specimen: Sputum, Induced             Significant Labs: All pertinent labs within the past 24 hours have been reviewed.    Significant Imaging: I have reviewed all relevant and available imaging results/findings within the past 24 hours.      Plan -- see top of note

## 2024-12-05 NOTE — CONSULTS
[facility] - Emergency Dept  Pulmonary Critical Care Note    Patient Name: Elisabet Choi  MRN: [id number]  Admission Date: 12/2/2024  Hospital Length of Stay: 2 days  Code Status: Partial Code  Attending Provider: Mj Ferreira,*  Primary Care Provider: Yovanny Mcgill [facility]     Subjective:     HPI: 80yo female with PMH of Speech and Language Deficit s/p TIA/CVA ([id number]), Dementia, Hydrocephalus s/p  shunt placement, B12 deficiency, pAfib (on Eliquis), ERICA on CPAP, DMII, PVD, HLD, Hypothyroidism, Depression/Bipolar Disorder, Seizure on Keppra, recurrent UTI, LLE cellulitis superimposed on BLE lymphedema and venous stasis dermatitis, Sacral decubitus ulcer who came from [facility] and presented to [facility] ED with c/o 1hr hx of altered mental status. History provided by EMS and EMR. Patient was obtunded and hard to arouse without new neurological deficit. Per NH, baseline GCS 14, however, GCS 9 noted in ED and GCS 8 on my presentation. Febrile Tmax 102.9, tachycardic, tachypneic. Hypotensive but initially did respond to IVF resuscitation but later still require pressor. Leukocytosis 17.65. LA elevated at 2.4. Elevated Sed rate/CRP. Metabolic alkalosis with bicarb of 33. . UA not consistent with UTI. CXR and CT chest abd/pelvis concerning for multifocal pneumonia concerning for possible aspiration, with bilateral pleural effusion and possible proctocolitis with moderate stool burden. No PE identified on this exam but no CTPE performed. EKG showed sinus tachycardia but cardiac monitoring shows paroxysmal afib with rate controlled. Given 1x Zosyn 4.5g, 1x Vancomycin, 2x 1L LR bolus in ED.    Initially admitted to ICU for septic shock. Blood cultures with staph hominis and staph haemolyticus. She was weaned off pressors and downgraded to the floor on 12/4.       24 Hour Interval History:  MBS yesterday with evidence of aspiration. Hospitalist service is requesting pulmonary consult  to evaluate effusion for thoracentesis. CT chest abdomen pelvis on 12/3 with small effusions and bilateral lower lobe atelectasis. She is oxygenating well on 2L NC. Afebrile overnight.       Past Medical History:   Diagnosis Date    Acute cystitis     Allergic rhinitis     Anemia, unspecified     Angina pectoris     Arthritis     Back pain     Bipolar disorder, unspecified     Celiac disease     Cellulitis     CHF (congestive heart failure)     Constipation     Contracture, left ankle     Dementia     Depressive episode     Diabetes mellitus     Diabetes mellitus with ulcer of foot     Displacement of other urinary catheter, subsequent encounter     Edema     Elevated white blood cell count     Encounter for blood transfusion     Fatigue     Fluid retention     Genetic anomalies of leukocytes     GERD without esophagitis     Goiter     HLD (hyperlipidemia)     Hydrocephalus     Hypertension     Hypokalemia     Hypotension     Hypothyroidism     Magnesium deficiency     Major depression     Migraine     Mild protein-calorie malnutrition     Morbid obesity     Obstructive and reflux uropathy     Osteoarthritis     Osteoporosis     Overactive bladder     Pressure ulcer of right heel     Pressure ulcer of sacral region     Pressure ulcer of sacral region, stage 3     Pruritus     PVD (peripheral vascular disease)     Radiculopathy, lumbar region     Seizures     Sleep apnea     uses CPAP    SOB (shortness of breath)     Stroke     TIA (transient ischemic attack)     Urinary tract infection, site not specified     Vitamin deficiency     Wheelchair dependent        Past Surgical History:   Procedure Laterality Date    ABDOMINAL SURGERY      APPENDECTOMY      BACK SURGERY      BLADDER SURGERY      BRAIN SURGERY      CAROTID ENDARTERECTOMY      CHOLECYSTECTOMY      CSF SHUNT      HERNIA REPAIR      HYSTERECTOMY      INSERTION, SUPRAPUBIC CATHETER N/A 3/26/2024    Procedure: INSERTION, SUPRAPUBIC CATHETER;  Surgeon: Nessa  Fuad TENORIO MD;  Location: [facility];  Service: Urology;  Laterality: N/A;  CYSTO W/ SUPRAPUBIC CATH INSERTION    TONSILLECTOMY      VASCULAR SURGERY         Social History     Socioeconomic History    Marital status:    Tobacco Use    Smoking status: Never    Smokeless tobacco: Never   Substance and Sexual Activity    Alcohol use: No    Drug use: No     Social Drivers of Health     Financial Resource Strain: Patient Unable To Answer (12/3/2024)    Overall Financial Resource Strain (CARDIA)     Difficulty of Paying Living Expenses: Patient unable to answer   Food Insecurity: Patient Unable To Answer (12/3/2024)    Hunger Vital Sign     Worried About Running Out of Food in the Last Year: Patient unable to answer     Ran Out of Food in the Last Year: Patient unable to answer   Transportation Needs: Patient Unable To Answer (12/3/2024)    TRANSPORTATION NEEDS     Transportation : Patient unable to answer   Physical Activity: Inactive (9/26/2024)    Exercise Vital Sign     Days of Exercise per Week: 0 days     Minutes of Exercise per Session: 0 min   Stress: Patient Unable To Answer (12/3/2024)    [employer] - Occupational Stress Questionnaire     Feeling of Stress : Patient unable to answer   Housing Stability: Patient Unable To Answer (12/3/2024)    Housing Stability Vital Sign     Unable to Pay for Housing in the Last Year: Patient unable to answer     Homeless in the Last Year: Patient unable to answer           Objective:     Current Outpatient Medications   Medication Instructions    acetaminophen (TYLENOL) 1,000 mg, Oral, Every 4 hours PRN    acidophilus-pectin, citrus 100 million cell-10 mg Cap 10 mg, Oral    ALPRAZolam (XANAX) 0.25 mg, Oral, 2 times daily PRN    amitriptyline (ELAVIL) 25 mg, Oral, Nightly PRN    ascorbic acid, vitamin C, (VITAMIN C) 500 MG tablet 1 tablet, Oral, Every morning    cranberry fruit (CRANBERRY) 450 mg Tab Oral    cyanocobalamin (VITAMIN B-12) 100  mcg, Oral, Daily    doxazosin (CARDURA) 1 mg, Oral, Nightly    dulaglutide (TRULICITY) 3 mg, Subcutaneous, Every 7 days, wednesdays    DULoxetine (CYMBALTA) 60 mg, Oral, Daily    ezetimibe (ZETIA) 10 mg, Oral, Daily    folic acid (FOLVITE) 1 mg, Oral, Daily    gabapentin (NEURONTIN) 300 mg, Oral, 2 times daily    HYDROcodone-acetaminophen (NORCO)  mg per tablet 1 tablet, Oral, 3 times daily    insulin degludec 20 Units, Subcutaneous, Daily    levETIRAcetam (KEPPRA) 750 mg, Oral, Nightly    levothyroxine (SYNTHROID, LEVOTHROID) 175 mcg, Oral, Before breakfast    lovastatin (ALTOPREV) 40 mg, Oral, Nightly    magnesium oxide (MAG-OX) 400 mg (241.3 mg magnesium) tablet 1 tablet, Oral, Every morning    methenamine (HIPREX) 1 g, Oral, 2 times daily    metoprolol tartrate (LOPRESSOR) 50 mg, Oral, 2 times daily    polyethylene glycol (GLYCOLAX) 17 g, Oral, Daily    POTASSIUM CHLORIDE ORAL 20 mEq, Oral, Daily    SANTYL ointment Topical (Top)    solifenacin (VESICARE) 10 mg, Oral, Nightly    tiZANidine 2 mg, Oral, 3 times daily PRN    topiramate (TOPAMAX) 100 mg, Oral, 2 times daily    torsemide (DEMADEX) 40 mg, Oral, Daily    vitamin D (VITAMIN D3) 1,000 Units, Oral, Daily    zinc gluconate 50 mg tablet 1 tablet, Oral, Every morning       Current Inpatient Medications   albuterol-ipratropium  3 mL Nebulization Q4H WAKE    enoxparin  40 mg Subcutaneous Q12H    insulin glargine U-100  10 Units Subcutaneous QHS    levETIRAcetam (Keppra) IV (PEDS and ADULTS)  750 mg Intravenous Daily    mupirocin   Nasal BID    pantoprazole  40 mg Intravenous Daily    piperacillin-tazobactam (Zosyn) IV (PEDS and ADULTS) (extended infusion is not appropriate)  4.5 g Intravenous Q8H    vancomycin (VANCOCIN) 1,000 mg in 0.9% NaCl 250 mL IVPB (admixture device)  1,000 mg Intravenous Q12H    zinc oxide-cod liver oil   Topical (Top) TID           Intake/Output Summary (Last 24 hours) at 12/5/2024 0734  Last data filed at 12/5/2024 7521  Gross  per 24 hour   Intake 1093.46 ml   Output 1250 ml   Net -156.54 ml       Review of Systems   Unable to perform ROS: Acuity of condition        Vital Signs (Most Recent):  Temp: 99.7 °F (37.6 °C) (12/05/24 0425)  Pulse: 82 (12/05/24 0425)  Resp: 10 (12/04/24 1917)  BP: (!) 144/87 (12/05/24 0425)  SpO2: 97 % (12/05/24 0425)  Body mass index is 50.66 kg/m².  Weight: 102.5 kg (225 lb 15.5 oz) Vital Signs (24h Range):  Temp:  [97.8 °F (36.6 °C)-99.7 °F (37.6 °C)] 99.7 °F (37.6 °C)  Pulse:  [66-94] 82  Resp:  [10-20] 10  SpO2:  [94 %-100 %] 97 %  BP: (113-145)/(61-87) 144/87     Physical Exam  HENT:      Head: Normocephalic and atraumatic.   Eyes:      Pupils: Pupils are equal, round, and reactive to light.   Pulmonary:      Effort: No respiratory distress.      Breath sounds: Rhonchi present. No wheezing or rales.   Abdominal:      General: Bowel sounds are normal. There is distension.   Musculoskeletal:      Right lower leg: Edema present.      Left lower leg: Edema present.   Neurological:      Mental Status: She is lethargic.      Comments: Left leg is flaccid.  There is a ulcer on her heel.           Mechanical ventilation support:       Lines/Drains/Airways       Drain  Duration                  Suprapubic Catheter 03/26/24 1351 18 Fr. 253 days              Peripheral Intravenous Line  Duration                  Peripheral IV - Single Lumen 12/02/24 2300 20 G Left Antecubital 2 days         Peripheral IV - Single Lumen 12/03/24 0600 22 G Anterior;Distal;Left Forearm 2 days                    Significant Labs:    Lab Results   Component Value Date    WBC 12.43 (H) 12/05/2024    HGB 11.5 (L) 12/05/2024    HCT 36.9 (L) 12/05/2024    MCV 90.2 12/05/2024     12/05/2024         BMP  Lab Results   Component Value Date     12/05/2024    K 3.3 (L) 12/05/2024     12/05/2024    CO2 26 12/05/2024    BUN 12.9 12/05/2024    CREATININE 0.72 12/05/2024    CALCIUM 8.8 12/05/2024    ANIONGAP 8 01/23/2024     ESTGFRAFRICA 94 01/23/2024    EGFRNONAA >60 07/13/2022       ABG  Recent Labs   Lab 12/03/24  0519   PH 7.420   PO2 155.0*   PCO2 49.0*   HCO3 31.8*           Significant Imaging:  Fl Modified Barium Swallow Speech  See procedure notes from Speech Pathologist.    This procedure was auto-finalized.          Assessment/Plan:     Assessment  Altered mental status  Septic shock d/t possible aspiration pneumonia vs. CAP and possible proctocolitis.  Blood culture positive for staph hominis and staph haemolyticus   Small bilateral pleural effusions R>L  BLE Chronic lymphadema/venous stasis  ERICA/possible OHS with Hypercarbia on home CPAP  pAfib on Eliquis  Hydrocephalus s/p  shunt  Speech and Language deficit s/p TIA/CVA ([id number])  Dementia    Plan  Will review CT with MD however on my review the effusions do not appear significant enough to require intervention  Continue aspiration precautions per speech  IS and mobilization as much as possible   Continue antibiotics        YECENIA Robison  Pulmonary Critical Care Medicine  [facility]

## 2024-12-05 NOTE — H&P
[facility] Hospital Medicine History & Physical Examination       Patient Name: Elisabet Choi  MRN: [id number]  Patient Class: IP- Inpatient   Admission Date: 12/2/2024   Admitting Physician: RASHMI Service   Length of Stay: 1  Attending Physician: Dr. Mj Ferreira  Primary Care Provider: [facility]  Face-to-Face encounter date: 12/04/2024  Code Status: partial code- has [place]  Chief Complaint: Altered Mental Status (Pt arrives via [facility], EMS reports pt is coming from [facility], Nurse checked on pt approx 1 hr ago and pt was unresponsive, no facial asymmetry noted , pt is following command, equal  strength. EMS )        Screening for Social Drivers for health:  Patient screened for food insecurity, housing instability, transportation needs, utility difficulties, and interpersonal safety (select all that apply as identified as concern)  []Housing or Food  []Transportation Needs  []Utility Difficulties  []Interpersonal safety  [x]None    Patient information was obtained from patient, patient's family, past medical records and/or ER records.     HISTORY OF PRESENT ILLNESS:   Elisabet Choi is a 79 y.o. female who PMH includes  Speech and Language Deficit s/p TIA/CVA ([id number]), Dementia, Hydrocephalus s/p  shunt placement, B12 deficiency, PAF (on Eliquis), ERICA on CPAP, DMII, PVD, HLD, Hypothyroidism, Depression/Bipolar Disorder, Seizure on Keppra, recurrent UTI, LLE cellulitis superimposed on BLE lymphedema and venous stasis dermatitis, Sacral decubitus ulcer, presented to the ED at [facility] on 12/2/2024 sent from [facility] with reports of with c/o 1hr hx of altered mental status.  Can not provide any historical information secondary to clinical condition.  Information is obtained from medical records and nursing home records. Patient was obtunded and hard to arouse without new neurological deficit. Per NH, baseline GCS 14, however, GCS 9 noted in  ED and GCS 8. Pt was febrile Tmax 102.9, tachycardic, tachypneic, hypotensive which she received IV fluids in the ED with little response in her blood pressure.  Patient required vasopressor therapy and was admitted to the ICU unit.  There was concern for sepsis as her WBCs was 17.65 lactic acid was elevated at 2.4 along with elevated sed rate and CRP, patient was noted to be and metabolic alkalosis with bicarb of 33 glucose of 227.  Imaging demonstrated multifocal pneumonia with possible aspiration and pleural effusion along with proctocolitis and stool burden, no pulmonary embolism identified.  Patient was started on broad-spectrum IV antibiotic therapy with vancomycin and Zosyn.  Patient has been weaned off vasopressor therapy.  Patient remains on nasal cannula therapy.  Blood cultures identified stat MV.  She continues on IV antibiotic therapy.  Patient has been cleared for transfer out ICU to the regular floor.  Hospital medicine services have been consulted for assumption of care.      PAST MEDICAL HISTORY:     Past Medical History:   Diagnosis Date    Acute cystitis     Allergic rhinitis     Anemia, unspecified     Angina pectoris     Arthritis     Back pain     Bipolar disorder, unspecified     Celiac disease     Cellulitis     CHF (congestive heart failure)     Constipation     Contracture, left ankle     Dementia     Depressive episode     Diabetes mellitus     Diabetes mellitus with ulcer of foot     Displacement of other urinary catheter, subsequent encounter     Edema     Elevated white blood cell count     Encounter for blood transfusion     Fatigue     Fluid retention     Genetic anomalies of leukocytes     GERD without esophagitis     Goiter     HLD (hyperlipidemia)     Hydrocephalus     Hypertension     Hypokalemia     Hypotension     Hypothyroidism     Magnesium deficiency     Major depression     Migraine     Mild protein-calorie malnutrition     Morbid obesity     Obstructive and reflux uropathy      Osteoarthritis     Osteoporosis     Overactive bladder     Pressure ulcer of right heel     Pressure ulcer of sacral region     Pressure ulcer of sacral region, stage 3     Pruritus     PVD (peripheral vascular disease)     Radiculopathy, lumbar region     Seizures     Sleep apnea     uses CPAP    SOB (shortness of breath)     Stroke     TIA (transient ischemic attack)     Urinary tract infection, site not specified     Vitamin deficiency     Wheelchair dependent        PAST SURGICAL HISTORY:     Past Surgical History:   Procedure Laterality Date    ABDOMINAL SURGERY      APPENDECTOMY      BACK SURGERY      BLADDER SURGERY      BRAIN SURGERY      CAROTID ENDARTERECTOMY      CHOLECYSTECTOMY      CSF SHUNT      HERNIA REPAIR      HYSTERECTOMY      INSERTION, SUPRAPUBIC CATHETER N/A 3/26/2024    Procedure: INSERTION, SUPRAPUBIC CATHETER;  Surgeon: Fuad Szymanski MD;  Location: [facility];  Service: Urology;  Laterality: N/A;  CYSTO W/ SUPRAPUBIC CATH INSERTION    TONSILLECTOMY      VASCULAR SURGERY         ALLERGIES:   Gluten, Gluten protein, Ropinirole, Wheat, Wheat containing prod, and Zolpidem    FAMILY HISTORY:   Reviewed and negative    SOCIAL HISTORY:     Social History     Tobacco Use    Smoking status: Never    Smokeless tobacco: Never   Substance Use Topics    Alcohol use: No        HOME MEDICATIONS:   As documented  Prior to Admission medications    Medication Sig Start Date End Date Taking? Authorizing Provider   acetaminophen (TYLENOL) 500 MG tablet Take 1,000 mg by mouth every 4 (four) hours as needed for Pain.    Provider, Historical   acidophilus-pectin, citrus 100 million cell-10 mg Cap Take 10 mg by mouth.    Provider, Historical   ALPRAZolam (XANAX) 0.25 MG tablet Take 0.25 mg by mouth 2 (two) times daily as needed for Anxiety.    Provider, Historical   amitriptyline (ELAVIL) 25 MG tablet Take 25 mg by mouth nightly as needed for Insomnia.    Provider, Historical   ascorbic acid, vitamin C, (VITAMIN  C) 500 MG tablet Take 1 tablet by mouth every morning.    Provider, Historical   cranberry fruit (CRANBERRY) 450 mg Tab Take by mouth.    Provider, Historical   cyanocobalamin (VITAMIN B-12) 1000 MCG tablet Take 100 mcg by mouth once daily.    Provider, Historical   doxazosin (CARDURA) 1 MG tablet Take 1 mg by mouth nightly.    Provider, Historical   dulaglutide (TRULICITY) 3 mg/0.5 mL pen injector Inject 3 mg into the skin every 7 days. wednesdays 3/29/24   Provider, Historical   DULoxetine (CYMBALTA) 60 MG capsule Take 60 mg by mouth once daily.    Provider, Historical   ezetimibe (ZETIA) 10 mg tablet Take 10 mg by mouth once daily.    Provider, Historical   folic acid (FOLVITE) 1 MG tablet Take 1 mg by mouth once daily.    Provider, Historical   gabapentin (NEURONTIN) 300 MG capsule Take 300 mg by mouth 2 (two) times daily.    Provider, Historical   HYDROcodone-acetaminophen (NORCO)  mg per tablet Take 1 tablet by mouth 3 (three) times daily.    Provider, Historical   insulin degludec 100 unit/mL injection Inject 20 Units into the skin once daily. 7/2/24   Provider, Historical   levETIRAcetam (KEPPRA) 750 MG Tab Take 750 mg by mouth nightly.    Provider, Historical   levothyroxine (SYNTHROID, LEVOTHROID) 175 MCG tablet Take 1 tablet (175 mcg total) by mouth before breakfast. 9/28/24 9/28/25  Nayla Camara MD   lovastatin (ALTOPREV) 40 mg 24 hr tablet Take 40 mg by mouth every evening.    Provider, Historical   magnesium oxide (MAG-OX) 400 mg (241.3 mg magnesium) tablet Take 1 tablet by mouth every morning.    Provider, Historical   methenamine (HIPREX) 1 gram Tab Take 1 g by mouth 2 (two) times daily.    Provider, Historical   metoprolol tartrate (LOPRESSOR) 50 MG tablet Take 1 tablet (50 mg total) by mouth 2 (two) times daily. 9/27/24 9/27/25  Nayla Camara MD   polyethylene glycol (GLYCOLAX) 17 gram PwPk Take 17 g by mouth once daily.    Provider, Historical   POTASSIUM CHLORIDE ORAL Take 20 mEq by  mouth once daily.    Provider, Historical   SANTYL ointment Apply topically. 6/21/24   Provider, Historical   solifenacin (VESICARE) 5 MG tablet Take 10 mg by mouth every evening.    Provider, Historical   tiZANidine 2 mg Cap Take 2 mg by mouth 3 (three) times daily as needed.    Provider, Historical   topiramate (TOPAMAX) 100 MG tablet Take 100 mg by mouth 2 (two) times daily.    Provider, Historical   torsemide (DEMADEX) 20 MG Tab Take 40 mg by mouth once daily.    Provider, Historical   vitamin D (VITAMIN D3) 1000 units Tab Take 1,000 Units by mouth once daily.    Provider, Historical   zinc gluconate 50 mg tablet Take 1 tablet by mouth every morning.    Provider, Historical   metformin (GLUCOPHAGE) 1000 MG tablet Take 1 tablet (1,000 mg total) by mouth 2 (two) times daily with meals. 6/30/14 7/2/22  Anca Sharma, NP   metoprolol succinate (TOPROL-XL) 50 MG 24 hr tablet Take 1 tablet (50 mg total) by mouth once daily. 6/30/14 7/2/22  Anca Sharma NP       REVIEW OF SYSTEMS:   Unable to obtain secondary to clinical condition    PHYSICAL EXAM:     VITAL SIGNS: 24 HRS MIN & MAX LAST   Temp  Min: 97.5 °F (36.4 °C)  Max: 98 °F (36.7 °C) 98 °F (36.7 °C)   BP  Min: 95/44  Max: 147/72 114/69   Pulse  Min: 66  Max: 94  94   Resp  Min: 10  Max: 23 10   SpO2  Min: 89 %  Max: 100 % (!) 94 %       General appearance:  Semi attempted chronically ill-appearing chronic developmental delays elderly female, nontoxic  HENT: Atraumatic head.  Dry mucous membranes of oral cavity.  Eyes: PERRL  Lungs: diminished bilaterally, coarse breath sounds with scattered rhonchi; mildly labored respirations O2 saturation stable on nasal cannula therapy   Heart: Regular rate irregular rhythm. S1 and S2 present cap refill brisk, edema to BLE  Abdomen: Soft,obese,  non-distended, non-tender. No rebound tenderness/guarding. Bowel sounds are normal.   Extremities: No cyanosis, clubbing,generalized weakness left greater than right  Skin:  warm and dry, wound to left heel  See medication  Neuro:  Lethargic, confused does not follow commands, left lower extremity weakness  Psych/mental status:  Flat affect confused    LABS AND IMAGING:     Recent Labs   Lab 12/02/24 2327 12/04/24 0257   WBC 17.65* 8.68   RBC 4.30 3.68*   HGB 12.2 10.3*   HCT 39.2 34.6*   MCV 91.2 94.0   MCH 28.4 28.0   MCHC 31.1* 29.8*   RDW 16.4 16.1    157   MPV 10.6* 10.1       Recent Labs   Lab 12/02/24 2327 12/03/24  0519 12/04/24 0257     --  138   K 4.6  --  3.6     --  102   CO2 33*  --  30   BUN 28.9*  --  20.5*   CREATININE 0.88  --  0.74   CALCIUM 9.3  --  8.6   PH  --  7.420  --    MG 2.40  --  2.20   ALBUMIN 2.9*  --  2.5*   ALKPHOS 93  --  68   ALT 29  --  12   AST 21  --  10   BILITOT 0.3  --  0.4       Microbiology Results (last 7 days)       Procedure Component Value Units Date/Time    Blood Culture [[id number]] Collected: 12/04/24 1335    Order Status: Resulted Specimen: Blood Updated: 12/04/24 1404    Blood Culture #1 **CANNOT BE ORDERED STAT** [[id number]]  (Abnormal) Collected: 12/03/24 0006    Order Status: Completed Specimen: Blood from Antecubital, Left Updated: 12/04/24 1212     Blood Culture Susceptibility To Follow      Staphylococcus hominis     GRAM STAIN Gram Positive Cocci, probable Staphylococcus      Seen in gram stain of broth only      1 of 2 Aerobic bottles positive    Blood Culture #1 **CANNOT BE ORDERED STAT** [[id number]]  (Abnormal) Collected: 12/03/24 0006    Order Status: Completed Specimen: Blood from Antecubital, Left Updated: 12/04/24 1210     Blood Culture Susceptibility To Follow      Staphylococcus haemolyticus     GRAM STAIN Gram Positive Cocci, probable Staphylococcus      Seen in gram stain of broth only      1 of 2 Aerobic bottles positive    BCID2 Panel [[id number]]  (Abnormal) Collected: 12/03/24 0006    Order Status: Completed Specimen: Blood from Antecubital, Left Updated: 12/03/24 2639     CTX-M (ESBL  ) N/A     IMP (Cabapenemase ) N/A     KPC resistance gene (Carbapenemase ) N/A     mcr-1 N/A     mecA ID Detected     Comment: Note: Antimicrobial resistance can occur via multiple mechanisms. A Not Detected result for antimicrobial resistance gene(s) does not indicate antimicrobial susceptibility. Subculturing is required for species identification and susceptibility testing of   isolates.        mecA/C and MREJ (MRSA) gene N/A     NDM (Carbapenemase ) N/A     OXA-48-like (Carbapenemase ) N/A     Bk/B (VRE gene) N/A     VIM (Carbapenemase ) N/A     Enterococcus faecalis Not Detected     Enterococcus faecium Not Detected     Listeria monocytogenes Not Detected     Staphylococcus spp. Detected     Staphylococcus aureus Not Detected     Staphylococcus epidermidis Detected     Staphylococcus lugdunensis Not Detected     Streptococcus spp. Not Detected     Streptococcus agalactiae (Group B) Not Detected     Streptococcus pneumoniae Not Detected     Streptococcus pyogenes (Group A) Not Detected     Acinetobacter calcoaceticus/baumannii complex Not Detected     Bacteroides fragilis Not Detected     Enterobacterales Not Detected     Enterobacter cloacae complex Not Detected     Escherichia coli Not Detected     Klebsiella aerogenes Not Detected     Klebsiella oxytoca Not Detected     Klebsiella pneumoniae group Not Detected     Proteus spp. Not Detected     Salmonella spp. Not Detected     Serratia marcescens Not Detected     Haemophilus influenzae Not Detected     Neisseria meningitidis Not Detected     Pseudomonas aeruginosa Not Detected     Stenotrophomonas maltophilia Not Detected     Candida albicans Not Detected     Candida auris Not Detected     Candida glabrata Not Detected     Candida krusei Not Detected     Candida parapsilosis Not Detected     Candida tropicalis Not Detected     Cryptococcus neoformans/gattii Not Detected    Narrative:      The AquaBlokD2  Panel is a multiplexed nucleic acid test intended for the use with Wildcard® FilmArray® 2.0 or Wildcard® FilmArray® ClearAccess Systems for the simultaneous qualitative detection and identification of multiple bacterial and yeast nucleic acids and select genetic determinants associated with antimicrobial resistance.  The Wildcard BCID2 Panel test is performed directly on blood culture samples identified as positive by a continuous monitoring blood culture system.  Results are intended to be interpreted in conjunction with Gram stain results.    Respiratory Culture [[id number]]     Order Status: Sent Specimen: Sputum, Induced              Fl Modified Barium Swallow Speech  See procedure notes from Speech Pathologist.    This procedure was auto-finalized.        ASSESSMENT & PLAN:   ASSESSMENT:  Acute hypotensive shock-requiring vasopressor therapy-POA   Sepsis-suspected secondary to multifocal pneumonia-POA   Multifocal pneumonia-POA   Staph epi positive blood cultures cultures-POA   Acute on chronic encephalopathy-with chronic developmental delays-POA   Lactic acidosis-trending down-POA   Bilateral lower extremities lymphedema with chronic venous stasis-POA   PAF-controlled rate on Eliquis-POA   DM type 2 with hyperglycemia-POA   Hydrocephalus status post  shunt-POA   History of CVA/TIA-with chronic aphasia and left-sided deficits-POA   Chronic dementia-POA   Weakness- POA      PLAN:  Fall precautions   Continue with neurochecks   Continue with IV antibiotic therapy   PT OT ST eval and treat   Continue with wound care services   Accu-Cheks and sliding scale coverage  Home medication as deemed necessary   Accu-Cheks and sliding scale coverage   Repeat lab work in a.m.  Repeat blood cultures    VTE Prophylaxis:  Lovenox for DVT prophylaxis and will be advised to be as mobile as possible and sit in a chair as tolerated    Patient condition:   Stable    __________________________________________________________________________  INPATIENT LIST OF MEDICATIONS     Scheduled Meds:   albuterol-ipratropium  3 mL Nebulization Q4H WAKE    enoxparin  40 mg Subcutaneous Q12H    insulin glargine U-100  10 Units Subcutaneous QHS    [START ON 12/5/2024] mupirocin   Nasal BID    pantoprazole  40 mg Intravenous Daily    piperacillin-tazobactam (Zosyn) IV (PEDS and ADULTS) (extended infusion is not appropriate)  4.5 g Intravenous Q8H    vancomycin (VANCOCIN) 1,000 mg in 0.9% NaCl 250 mL IVPB (admixture device)  1,000 mg Intravenous Q12H    zinc oxide-cod liver oil   Topical (Top) TID     Continuous Infusions:  PRN Meds:.  Current Facility-Administered Medications:     dextrose 10%, 12.5 g, Intravenous, PRN    dextrose 10%, 25 g, Intravenous, PRN    glucagon (human recombinant), 1 mg, Intramuscular, PRN    insulin aspart U-100, 0-10 Units, Subcutaneous, Q6H PRN    ketorolac, 15 mg, Intravenous, Q8H PRN    sodium chloride 0.9%, 10 mL, Intravenous, PRN    Pharmacy to dose Vancomycin consult, , , Once **AND** vancomycin - pharmacy to dose, , Intravenous, pharmacy to manage frequency      I, YECENIA Pop have reviewed and discussed the case with  Dr. Mj Ferreira.  Please see the following addendum for further assessment and plan from their attending MD.  YECENIA Jones   12/04/2024    ________________________________________________________________________________

## 2024-12-05 NOTE — PLAN OF CARE
Problem: Adult Inpatient Plan of Care  Goal: Plan of Care Review  Outcome: Progressing  Goal: Patient-Specific Goal (Individualized)  Outcome: Progressing  Goal: Absence of Hospital-Acquired Illness or Injury  Outcome: Progressing  Goal: Optimal Comfort and Wellbeing  Outcome: Progressing  Goal: Readiness for Transition of Care  Outcome: Progressing     Problem: Bariatric Environmental Safety  Goal: Safety Maintained with Care  Outcome: Progressing     Problem: Wound  Goal: Optimal Coping  Outcome: Progressing  Goal: Optimal Functional Ability  Outcome: Progressing  Goal: Absence of Infection Signs and Symptoms  Outcome: Progressing  Goal: Improved Oral Intake  Outcome: Progressing  Goal: Optimal Pain Control and Function  Outcome: Progressing  Goal: Skin Health and Integrity  Outcome: Progressing  Goal: Optimal Wound Healing  Outcome: Progressing     Problem: Skin Injury Risk Increased  Goal: Skin Health and Integrity  Outcome: Progressing     Problem: Fall Injury Risk  Goal: Absence of Fall and Fall-Related Injury  Outcome: Progressing

## 2024-12-05 NOTE — PROGRESS NOTES
[facility] Hospital Medicine Progress Note        Chief Complaint: Inpatient Follow-up for     HPI:   79 year old woman with PMHx of TIA/CVA, dementia, NPH s/p  shunt, B12 deficiency, Afib on eliquis, ERICA, T2DM, PAD, HLD, hypothyroidism and seizure on keppra presented on 12/2 for AMS. She was febrile to 102.9 and had leukocytosis WBC 17.65 with elevated lactate. BP did not respond to fluids and she was admitted to the ICU for septic shock. CT head without acute abnormality. CT C/A/P with small loculated R sided pleural effusion and a trace left sided pleural effusion w/adjacent consolidation and ground glass opacities. Also conern for proctocolitis. UA was negative. She was started on zosyn. Pressors were weaned off and she was subsequently downgraded to hospital medicine on 12/4. Her blood cultures are growing S.epidermidis. Unclear if contaminant - repeat BCx were obtained and then vancomycin was added while repeat Bcx pending given history of  shunt.      Interval Hx:   T-max 99.7°.  Blood pressure looking good.  Stable on minimal nasal cannula oxygen.  When seen at bedside this morning she was lethargic, answers few questions appropriately.  Reports having chills, generalized weakness.  No family members at bedside.      Labs this morning showing increasing leukocytosis predominantly neutrophilic, mild hypokalemia.    Blood cultures showing staph epidermidis, Staph haemolyticus.  TTE pending.  Lower extremity Doppler negative for DVT        Objective/physical exam:  Vitals:    12/04/24 2358 12/05/24 0425 12/05/24 0814 12/05/24 0824   BP: 135/61 (!) 144/87 138/68    BP Location:       Patient Position:       Pulse: 66 82 79 78   Resp:    18   Temp: 98.6 °F (37 °C) 99.7 °F (37.6 °C) 98.6 °F (37 °C)    TempSrc: Oral Oral Oral    SpO2: 98% 97% (!) 94% 95%   Weight:       Height:         General: In no acute distress, afebrile  Respiratory: Clear to auscultation  bilaterally  Cardiovascular: S1, S2, no appreciable murmur  Abdomen: Soft, nontender, BS +  MSK: Warm, no lower extremity edema, no clubbing or cyanosis  Neurologic: Alert and oriented x4, moving all extremities      Lab Results   Component Value Date     12/05/2024    K 3.3 (L) 12/05/2024     12/05/2024    CO2 26 12/05/2024    BUN 12.9 12/05/2024    CREATININE 0.72 12/05/2024    CALCIUM 8.8 12/05/2024    ANIONGAP 8 01/23/2024    ESTGFRAFRICA 94 01/23/2024    EGFRNONAA >60 07/13/2022      Lab Results   Component Value Date    ALT 15 12/05/2024    AST 12 12/05/2024    ALKPHOS 75 12/05/2024    BILITOT 0.6 12/05/2024      Lab Results   Component Value Date    WBC 12.43 (H) 12/05/2024    HGB 11.5 (L) 12/05/2024    HCT 36.9 (L) 12/05/2024    MCV 90.2 12/05/2024     12/05/2024           Medications:   albuterol-ipratropium  3 mL Nebulization Q4H WAKE    enoxparin  40 mg Subcutaneous Q12H    insulin glargine U-100  10 Units Subcutaneous QHS    levETIRAcetam (Keppra) IV (PEDS and ADULTS)  750 mg Intravenous Daily    mupirocin   Nasal BID    pantoprazole  40 mg Intravenous Daily    piperacillin-tazobactam (Zosyn) IV (PEDS and ADULTS) (extended infusion is not appropriate)  4.5 g Intravenous Q8H    vancomycin (VANCOCIN) 1,000 mg in 0.9% NaCl 250 mL IVPB (admixture device)  1,000 mg Intravenous Q12H    zinc oxide-cod liver oil   Topical (Top) TID        Current Facility-Administered Medications:     dextrose 10%, 12.5 g, Intravenous, PRN    dextrose 10%, 25 g, Intravenous, PRN    glucagon (human recombinant), 1 mg, Intramuscular, PRN    insulin aspart U-100, 0-10 Units, Subcutaneous, Q6H PRN    ketorolac, 15 mg, Intravenous, Q8H PRN    sodium chloride 0.9%, 10 mL, Intravenous, PRN    Pharmacy to dose Vancomycin consult, , , Once **AND** vancomycin - pharmacy to dose, , Intravenous, pharmacy to manage frequency     Assessment/Plan:    Septic shock on admission-aspiration pneumonia vs colitis versus  cellulitis  Staph haemolyticus and epidermidis bacteremia  Proctocolitis-POA  Lower extremity swelling-?  Cellulitis  Loculated pleural effusion-POA  Hypoxemic respiratory distress due to above  Altered mental status at admission likely infectious versus metabolic encephalopathy  Possible pneumonia, consolidation, ground-glass opacities with atelectasis    HX: Obesity, dementia, history of hydrocephalus s/p  shunt, PAF on Eliquis, history of TIA/CVA, chronic lower extremity enema/venous stasis, history of seizure on Keppra, hypothyroidism, HTN, HLD, PID    Plan:   -continue vancomycin and Zosyn  -pending TTE.  Follow cultures until finalized.  Continue empiric antibiotics  -efficient to small to drain as per Pulmonary.  We will manage conservatively.  Encourage mobility when appropriate.    -continue wound care  -will review home meds     [facility]      Cecilio Bush MD

## 2024-12-05 NOTE — PROGRESS NOTES
Inpatient Nutrition Assessment    Admit Date: 12/2/2024   Total duration of encounter: 3 days   Patient Age: 79 y.o.    Nutrition Recommendation/Prescription     When ok for oral intake, gluten-free/diabetic diet as tolerated; consistency per SLP.   If oral intake remains not feasible, consider tube feeding to meet needs; start at 25 ml/hr, advance to goal rate after 4 hours.    Tube feeding recommendation:  Impact Peptide 1.5 goal rate 50 ml/hr   Alex BID  [id number] BID  to provide  1840 kcal, 100% needs  139 g protein, 100% needs  156 g carbohydrate, 85% needs  770 ml free water, 50% needs, would require additional fluid source, can be given as 50 ml/hr water flush when appropriate  (calculations based on estimated 20 hr/d run time)     Communication of Recommendations: reviewed with nurse    Nutrition Assessment     Malnutrition Assessment/Nutrition-Focused Physical Exam        Does not meet criteria at this time.                                                        A minimum of two characteristics is recommended for diagnosis of either severe or non-severe malnutrition.    Chart Review    Reason Seen: follow-up    Malnutrition Screening Tool Results   Have you recently lost weight without trying?: No  Have you been eating poorly because of a decreased appetite?: No   MST Score: 0   Diagnosis:  Altered mental status  Septic shock d/t possible aspiration pneumonia vs. CAP and possible proctocolitis  BLE Chronic lymphadema/venous stasis  ERICA/possible OHS with Hypercarbia on home CPAP  Metabolic Alkalosis d/t compensation and home lasix use  pAfib on Eliquis  Hyperglycemia with DMII  Hydrocephalus s/p  shunt  Speech and language deficit s/p TIA/CVA (6654-6331)  Dementia  Concern for polypharmacy     Relevant Medical History: speech and language deficit s/p TIA/CVA (4216-1395), dementia, hydrocephalus s/p  shunt placement, B12 deficiency, pAfib, ERICA on CPAP, type 2 DM, PVD, HLD, hypothyroidism, depression,  bipolar disorder, seizure, recurrent UTI, LLE cellulitis superimposed on BLE lymphedema, venous stasis dermatitis, sacral decubitus ulcer    Scheduled Medications:  albuterol-ipratropium, 3 mL, Q4H WAKE  enoxparin, 40 mg, Q12H  insulin glargine U-100, 10 Units, QHS  levETIRAcetam (Keppra) IV (PEDS and ADULTS), 750 mg, Daily  mupirocin, , BID  pantoprazole, 40 mg, Daily  piperacillin-tazobactam (Zosyn) IV (PEDS and ADULTS) (extended infusion is not appropriate), 4.5 g, Q8H  vancomycin (VANCOCIN) 1,000 mg in 0.9% NaCl 250 mL IVPB (admixture device), 1,000 mg, Q12H  zinc oxide-cod liver oil, , TID    Continuous Infusions: none       PRN Medications:   dextrose 10%, 12.5 g, PRN  dextrose 10%, 25 g, PRN  glucagon (human recombinant), 1 mg, PRN  insulin aspart U-100, 0-10 Units, Q6H PRN  ketorolac, 15 mg, Q8H PRN  sodium chloride 0.9%, 10 mL, PRN  vancomycin - pharmacy to dose, , pharmacy to manage frequency    Calorie Containing IV Medications: no significant kcals from medications at this time    Recent Labs   Lab 12/02/24  2327 12/03/24  0722 12/04/24  0257 12/05/24  0554     --  138 139   K 4.6  --  3.6 3.3*   CALCIUM 9.3  --  8.6 8.8   PHOS 3.3  --  3.0 2.6   MG 2.40  --  2.20 2.20     --  102 104   CO2 33*  --  30 26   BUN 28.9*  --  20.5* 12.9   CREATININE 0.88  --  0.74 0.72   EGFRNORACEVR >60  --  >60 >60   GLUCOSE 227*  --  146* 141*   BILITOT 0.3  --  0.4 0.6   ALKPHOS 93  --  68 75   ALT 29  --  12 15   AST 21  --  10 12   ALBUMIN 2.9*  --  2.5* 2.9*   PREALB  --  14.8  --   --    CRP 55.00*  --   --   --    WBC 17.65*  --  8.68 12.43*   HGB 12.2  --  10.3* 11.5*   HCT 39.2  --  34.6* 36.9*     Nutrition Orders:  Diet NPO      Appetite/Oral Intake: good/not applicable  Factors Affecting Nutritional Intake: difficulty/impaired swallowing and NPO  Social Needs Impacting Access to Food: none identified  Food/Jew/Cultural Preferences: none reported  Food Allergies: wheat  Last Bowel Movement:  "12/04/24  Wound(s):     Wound 12/03/24 0500 Pressure Injury Left Heel-Tissue loss description: Full thickness       Altered Skin Integrity 03/26/24 2010 Sacral spine Intact skin with non-blanchable redness of localized area-Tissue loss description: Not applicable       Wound 12/03/24 1330 Blister(s) Right lateral Back-Tissue loss description: Partial thickness     Comments    12/4/24 Patient unable to answer questions, spoke with daughter at bedside who reports good appetite/intake prior to admission, no weight loss, gluten-free diabetic diet at home (celiac disease). NPO currently, plans for MBS.     12/5/24 Patient remains unsafe for oral intake, tube feeding recommendation provided.    Anthropometrics    Height: 4' 8" (142.2 cm),    Last Weight: 102.5 kg (225 lb 15.5 oz) (12/03/24 0655), Weight Method: Bed Scale  BMI (Calculated): 50.7  BMI Classification: obese grade III (BMI >/=40)     Ideal Body Weight (IBW), Female: 80 lb     % Ideal Body Weight, Female (lb): 281.25 %                             Usual Weight Provided By: family/caregiver denies unintentional weight loss    Wt Readings from Last 5 Encounters:   12/03/24 102.5 kg (225 lb 15.5 oz)   11/21/24 102.4 kg (225 lb 12 oz)   09/27/24 102.4 kg (225 lb 12 oz)   07/08/24 68 kg (149 lb 14.6 oz)   05/01/24 68 kg (149 lb 14.6 oz)     Weight Change(s) Since Admission: stable  Wt Readings from Last 1 Encounters:   12/03/24 0655 102.5 kg (225 lb 15.5 oz)   12/02/24 2220 102.1 kg (225 lb)   Admit Weight: 102.1 kg (225 lb) (12/02/24 2220), Weight Method: Estimated    Estimated Needs    Weight Used For Calorie Calculations: 102.5 kg (225 lb 15.5 oz)  Energy Calorie Requirements (kcal): 9177-9123, 1.2-1.4 stress factor  Energy Need Method: [place]-St Jeor  Weight Used For Protein Calculations: 102.5 kg (225 lb 15.5 oz)  Protein Requirements: 135-164 g, 1.3-1.6 g/kg  Fluid Requirements (mL): 8846-1598, 1 ml/kcal  CHO Requirement: 183-214 g, 45% of kcal     Enteral " Nutrition Patient not receiving enteral nutrition at this time.    Parenteral Nutrition Patient not receiving parenteral nutrition support at this time.    Evaluation of Received Nutrient Intake    Calories: not meeting estimated needs  Protein: not meeting estimated needs    Patient Education Not applicable.    Nutrition Diagnosis     PES: Inadequate energy intake related to inability to consume sufficient nutrients as evidenced by less than 80% needs met. (new)    Nutrition Interventions     Intervention(s): modified composition of meals/snacks, modified composition of enteral nutrition, and collaboration with other providers  Goal: Meet greater than 80% of nutritional needs by follow-up. (new)    Nutrition Goals & Monitoring     Dietitian will monitor: food and beverage intake, energy intake, enteral nutrition intake, weight, electrolyte/renal panel, beliefs/attitudes, glucose/endocrine profile, and gastrointestinal profile  Discharge planning: too early to determine; pending clinical course  Nutrition Risk/Follow-Up: moderate (follow-up in 3-5 days)   Please consult if re-assessment needed sooner.

## 2024-12-06 LAB
ALBUMIN SERPL-MCNC: 2.7 G/DL (ref 3.4–4.8)
ALBUMIN/GLOB SERPL: 0.9 RATIO (ref 1.1–2)
ALP SERPL-CCNC: 65 UNIT/L (ref 40–150)
ALT SERPL-CCNC: 11 UNIT/L (ref 0–55)
ANION GAP SERPL CALC-SCNC: 9 MEQ/L
AST SERPL-CCNC: 10 UNIT/L (ref 5–34)
BACTERIA BLD CULT: ABNORMAL
BASOPHILS # BLD AUTO: 0.03 X10(3)/MCL
BASOPHILS NFR BLD AUTO: 0.5 %
BILIRUB SERPL-MCNC: 0.3 MG/DL
BUN SERPL-MCNC: 10.3 MG/DL (ref 9.8–20.1)
CALCIUM SERPL-MCNC: 8.1 MG/DL (ref 8.4–10.2)
CHLORIDE SERPL-SCNC: 108 MMOL/L (ref 98–107)
CO2 SERPL-SCNC: 25 MMOL/L (ref 23–31)
CREAT SERPL-MCNC: 0.68 MG/DL (ref 0.55–1.02)
CREAT/UREA NIT SERPL: 15
EOSINOPHIL # BLD AUTO: 0.3 X10(3)/MCL (ref 0–0.9)
EOSINOPHIL NFR BLD AUTO: 4.7 %
ERYTHROCYTE [DISTWIDTH] IN BLOOD BY AUTOMATED COUNT: 15.9 % (ref 11.5–17)
GFR SERPLBLD CREATININE-BSD FMLA CKD-EPI: >60 ML/MIN/1.73/M2
GLOBULIN SER-MCNC: 3 GM/DL (ref 2.4–3.5)
GLUCOSE SERPL-MCNC: 115 MG/DL (ref 82–115)
GRAM STN SPEC: ABNORMAL
HCT VFR BLD AUTO: 32.3 % (ref 37–47)
HGB BLD-MCNC: 9.9 G/DL (ref 12–16)
IMM GRANULOCYTES # BLD AUTO: 0.02 X10(3)/MCL (ref 0–0.04)
IMM GRANULOCYTES NFR BLD AUTO: 0.3 %
LYMPHOCYTES # BLD AUTO: 2.03 X10(3)/MCL (ref 0.6–4.6)
LYMPHOCYTES NFR BLD AUTO: 32.1 %
MAGNESIUM SERPL-MCNC: 2.1 MG/DL (ref 1.6–2.6)
MCH RBC QN AUTO: 27.8 PG (ref 27–31)
MCHC RBC AUTO-ENTMCNC: 30.7 G/DL (ref 33–36)
MCV RBC AUTO: 90.7 FL (ref 80–94)
MONOCYTES # BLD AUTO: 0.3 X10(3)/MCL (ref 0.1–1.3)
MONOCYTES NFR BLD AUTO: 4.7 %
NEUTROPHILS # BLD AUTO: 3.64 X10(3)/MCL (ref 2.1–9.2)
NEUTROPHILS NFR BLD AUTO: 57.7 %
NRBC BLD AUTO-RTO: 0 %
PHOSPHATE SERPL-MCNC: 2.6 MG/DL (ref 2.3–4.7)
PLATELET # BLD AUTO: 140 X10(3)/MCL (ref 130–400)
PMV BLD AUTO: 10.3 FL (ref 7.4–10.4)
POCT GLUCOSE: 107 MG/DL (ref 70–110)
POCT GLUCOSE: 108 MG/DL (ref 70–110)
POCT GLUCOSE: 113 MG/DL (ref 70–110)
POCT GLUCOSE: 134 MG/DL (ref 70–110)
POTASSIUM SERPL-SCNC: 3.3 MMOL/L (ref 3.5–5.1)
PROT SERPL-MCNC: 5.7 GM/DL (ref 5.8–7.6)
RBC # BLD AUTO: 3.56 X10(6)/MCL (ref 4.2–5.4)
SODIUM SERPL-SCNC: 142 MMOL/L (ref 136–145)
VANCOMYCIN TROUGH SERPL-MCNC: 21.3 UG/ML (ref 15–20)
VANCOMYCIN TROUGH SERPL-MCNC: 32.9 UG/ML (ref 15–20)
WBC # BLD AUTO: 6.32 X10(3)/MCL (ref 4.5–11.5)

## 2024-12-06 PROCEDURE — 63600175 PHARM REV CODE 636 W HCPCS: Performed by: INTERNAL MEDICINE

## 2024-12-06 PROCEDURE — 27000221 HC OXYGEN, UP TO 24 HOURS

## 2024-12-06 PROCEDURE — 25000003 PHARM REV CODE 250

## 2024-12-06 PROCEDURE — 25000242 PHARM REV CODE 250 ALT 637 W/ HCPCS

## 2024-12-06 PROCEDURE — 99233 SBSQ HOSP IP/OBS HIGH 50: CPT | Mod: ,,, | Performed by: HOSPITALIST

## 2024-12-06 PROCEDURE — 94760 N-INVAS EAR/PLS OXIMETRY 1: CPT

## 2024-12-06 PROCEDURE — 80202 ASSAY OF VANCOMYCIN: CPT | Performed by: INTERNAL MEDICINE

## 2024-12-06 PROCEDURE — 25000003 PHARM REV CODE 250: Performed by: INTERNAL MEDICINE

## 2024-12-06 PROCEDURE — 21400001 HC TELEMETRY ROOM

## 2024-12-06 PROCEDURE — 83735 ASSAY OF MAGNESIUM: CPT

## 2024-12-06 PROCEDURE — 99900031 HC PATIENT EDUCATION (STAT)

## 2024-12-06 PROCEDURE — 94640 AIRWAY INHALATION TREATMENT: CPT

## 2024-12-06 PROCEDURE — 63600175 PHARM REV CODE 636 W HCPCS

## 2024-12-06 PROCEDURE — 36415 COLL VENOUS BLD VENIPUNCTURE: CPT | Performed by: INTERNAL MEDICINE

## 2024-12-06 PROCEDURE — A9577 INJ MULTIHANCE: HCPCS | Performed by: INTERNAL MEDICINE

## 2024-12-06 PROCEDURE — 25000003 PHARM REV CODE 250: Performed by: NURSE PRACTITIONER

## 2024-12-06 PROCEDURE — 99900035 HC TECH TIME PER 15 MIN (STAT)

## 2024-12-06 PROCEDURE — 25500020 PHARM REV CODE 255: Performed by: INTERNAL MEDICINE

## 2024-12-06 PROCEDURE — 84100 ASSAY OF PHOSPHORUS: CPT

## 2024-12-06 PROCEDURE — 85025 COMPLETE CBC W/AUTO DIFF WBC: CPT | Performed by: INTERNAL MEDICINE

## 2024-12-06 PROCEDURE — 80053 COMPREHEN METABOLIC PANEL: CPT

## 2024-12-06 RX ADMIN — INSULIN GLARGINE 10 UNITS: 100 INJECTION, SOLUTION SUBCUTANEOUS at 09:12

## 2024-12-06 RX ADMIN — MUPIROCIN: 20 OINTMENT TOPICAL at 09:12

## 2024-12-06 RX ADMIN — PIPERACILLIN SODIUM AND TAZOBACTAM SODIUM 4.5 G: 4; .5 INJECTION, POWDER, LYOPHILIZED, FOR SOLUTION INTRAVENOUS at 08:12

## 2024-12-06 RX ADMIN — VANCOMYCIN HYDROCHLORIDE 1000 MG: 1 INJECTION, POWDER, LYOPHILIZED, FOR SOLUTION INTRAVENOUS at 05:12

## 2024-12-06 RX ADMIN — IPRATROPIUM BROMIDE AND ALBUTEROL SULFATE 3 ML: 2.5; .5 SOLUTION RESPIRATORY (INHALATION) at 12:12

## 2024-12-06 RX ADMIN — GADOBENATE DIMEGLUMINE 20 ML: 529 INJECTION, SOLUTION INTRAVENOUS at 10:12

## 2024-12-06 RX ADMIN — Medication: at 04:12

## 2024-12-06 RX ADMIN — PANTOPRAZOLE SODIUM 40 MG: 40 INJECTION, POWDER, FOR SOLUTION INTRAVENOUS at 08:12

## 2024-12-06 RX ADMIN — IPRATROPIUM BROMIDE AND ALBUTEROL SULFATE 3 ML: 2.5; .5 SOLUTION RESPIRATORY (INHALATION) at 08:12

## 2024-12-06 RX ADMIN — ENOXAPARIN SODIUM 40 MG: 40 INJECTION SUBCUTANEOUS at 08:12

## 2024-12-06 RX ADMIN — PIPERACILLIN SODIUM AND TAZOBACTAM SODIUM 4.5 G: 4; .5 INJECTION, POWDER, LYOPHILIZED, FOR SOLUTION INTRAVENOUS at 04:12

## 2024-12-06 RX ADMIN — ENOXAPARIN SODIUM 40 MG: 40 INJECTION SUBCUTANEOUS at 09:12

## 2024-12-06 RX ADMIN — Medication: at 08:12

## 2024-12-06 RX ADMIN — ACETAMINOPHEN 650 MG: 650 SUPPOSITORY RECTAL at 01:12

## 2024-12-06 RX ADMIN — VANCOMYCIN HYDROCHLORIDE 1000 MG: 1 INJECTION, POWDER, LYOPHILIZED, FOR SOLUTION INTRAVENOUS at 11:12

## 2024-12-06 RX ADMIN — LEVETIRACETAM 750 MG: 100 INJECTION, SOLUTION INTRAVENOUS at 08:12

## 2024-12-06 RX ADMIN — MUPIROCIN: 20 OINTMENT TOPICAL at 08:12

## 2024-12-06 RX ADMIN — Medication: at 09:12

## 2024-12-06 NOTE — PROGRESS NOTES
[facility] Hospital Medicine Progress Note        Chief Complaint: Inpatient Follow-up for     HPI:   79 year old woman with PMHx of TIA/CVA, dementia, NPH s/p  shunt, B12 deficiency, Afib on eliquis, ERICA, T2DM, PAD, HLD, hypothyroidism and seizure on keppra presented on 12/2 for AMS. She was febrile to 102.9 and had leukocytosis WBC 17.65 with elevated lactate. BP did not respond to fluids and she was admitted to the ICU for septic shock. CT head without acute abnormality. CT C/A/P with small loculated R sided pleural effusion and a trace left sided pleural effusion w/adjacent consolidation and ground glass opacities. Also conern for proctocolitis. UA was negative. She was started on zosyn. Pressors were weaned off and she was subsequently downgraded to hospital medicine on 12/4. Her blood cultures are growing S.epidermidis. Unclear if contaminant - repeat BCx were obtained and then vancomycin was added while repeat Bcx pending given history of  shunt.     Id were consulted.  Empiric antibiotics were continued.  TTE was ordered.  Lower extremity Doppler showed no signs of DVT     Interval Hx:     Afebrile.  Complains of pain that worsens with minimal movement.  Drop in hemoglobin noted.  White count improved.  Platelets stable.  Mild hypokalemia seen which is persistent.  TTE showed no signs of vegetations.  EF 60-65% with grade 1 diastolic dysfunction    Objective/physical exam:  Vitals:    12/06/24 0700 12/06/24 0744 12/06/24 0838 12/06/24 1039   BP:  133/76  (!) 117/56   Pulse:  64 66 73   Resp:   18    Temp:  97.6 °F (36.4 °C)  98.2 °F (36.8 °C)   TempSrc:  Oral  Oral   SpO2: 98% 98% 98% 99%   Weight:       Height:         General: In no acute distress, afebrile  Respiratory: Clear to auscultation bilaterally  Cardiovascular: S1, S2, no appreciable murmur  Abdomen: Soft, nontender, BS +  MSK: Warm, no lower extremity edema, no clubbing or cyanosis  Neurologic: Alert and  oriented x4, moving all extremities      Lab Results   Component Value Date     12/06/2024    K 3.3 (L) 12/06/2024     (H) 12/06/2024    CO2 25 12/06/2024    BUN 10.3 12/06/2024    CREATININE 0.68 12/06/2024    CALCIUM 8.1 (L) 12/06/2024    ANIONGAP 8 01/23/2024    ESTGFRAFRICA 94 01/23/2024    EGFRNONAA >60 07/13/2022      Lab Results   Component Value Date    ALT 11 12/06/2024    AST 10 12/06/2024    ALKPHOS 65 12/06/2024    BILITOT 0.3 12/06/2024      Lab Results   Component Value Date    WBC 6.32 12/06/2024    HGB 9.9 (L) 12/06/2024    HCT 32.3 (L) 12/06/2024    MCV 90.7 12/06/2024     12/06/2024           Medications:   albuterol-ipratropium  3 mL Nebulization Q4H WAKE    enoxparin  40 mg Subcutaneous Q12H    insulin glargine U-100  10 Units Subcutaneous QHS    levETIRAcetam (Keppra) IV (PEDS and ADULTS)  750 mg Intravenous Daily    mupirocin   Nasal BID    pantoprazole  40 mg Intravenous Daily    piperacillin-tazobactam (Zosyn) IV (PEDS and ADULTS) (extended infusion is not appropriate)  4.5 g Intravenous Q8H    vancomycin (VANCOCIN) 1,000 mg in 0.9% NaCl 250 mL IVPB (admixture device)  1,000 mg Intravenous Q12H    zinc oxide-cod liver oil   Topical (Top) TID        Current Facility-Administered Medications:     acetaminophen, 650 mg, Rectal, Q6H PRN    dextrose 10%, 12.5 g, Intravenous, PRN    dextrose 10%, 25 g, Intravenous, PRN    glucagon (human recombinant), 1 mg, Intramuscular, PRN    insulin aspart U-100, 0-10 Units, Subcutaneous, Q6H PRN    sodium chloride 0.9%, 10 mL, Intravenous, PRN    Pharmacy to dose Vancomycin consult, , , Once **AND** vancomycin - pharmacy to dose, , Intravenous, pharmacy to manage frequency     Assessment/Plan:    Septic shock on admission-aspiration pneumonia vs colitis versus cellulitis  Staph haemolyticus and epidermidis bacteremia  Proctocolitis-POA  Lower extremity swelling-?  Cellulitis  Loculated pleural effusion-POA  Hypoxemic respiratory distress due  to above  Altered mental status at admission likely infectious versus metabolic encephalopathy  Possible pneumonia, consolidation, ground-glass opacities with atelectasis    HX: Obesity, dementia, history of hydrocephalus s/p  shunt, PAF on Eliquis, history of TIA/CVA, chronic lower extremity enema/venous stasis, history of seizure on Keppra, hypothyroidism, HTN, HLD, PID    Plan:   -id suggesting MRI T-spine if thoracic pain continues.  -continue vancomycin and Zosyn  -TTE reviewed.  Follow cultures until finalized.  Continue empiric antibiotics  -pleural effusion too small to drain as per Pulmonary.  We will manage conservatively.  Encourage mobility when appropriate.    -continue wound care  -will review home meds     Lovex      Cecilio Bush MD

## 2024-12-06 NOTE — PROGRESS NOTES
Infectious Disease  Patient Name Elisabet Choi  79 y.o. female.  MRN: [id number]   Length of stay: 3  Chief Complaint: Altered Mental Status (Pt arrives via [facility], EMS reports pt is coming from [facility], Nurse checked on pt approx 1 hr ago and pt was unresponsive, no facial asymmetry noted , pt is following command, equal  strength. EMS )       Interval history:   12/5 - afebrile. Having thoracic back pain, new on set. No changes seen on CT. Obtain plain film. May need MRI. Continue abx  12/6 - afebrile. XR shows no changes. Still complain of 9/10, please obtain MRI T spine if possible.   Assessment and Plan:   1) Septic shock  - hypoxia, leukocytosis, AMS, fever, acute renal injury   - CT A & P 12/3 - . There is a small possibly loculated right sided pleural effusion and a trace left sided pleural effusion with adjacent consolidation atelectasis and ground glass opacity. A superimposed pneumonia at the right lung base is not excluded.   There is mild thickening at the distal rectum through anorectal verge which may represent proctocolitis.  Details and other findings as discussed above.   - CT head 12/3 -   Similar right-sided ventriculostomy is again seen with unchanged hypodensities around the tube in the right frontal lobe. No acute intracranial process identified. Details and other findings as noted above.   - UA not consistent with infection  - currently on pip/tazo   - currently on vancomycin, goal 15-20, Rx to dose      2) Bacteremia  - BCx 12/2 - Staphylococcus hominis both sets   - BCx 12/4 - ngtd  - currently on vancomycin, goal 15-20, Rx to dose      3) Leukocytosis  - in setting of suspected infection   - trending down   - hx of MDRO UTIs    4) Back pain   - XR 12/5 Degenerative changes with no acute abnormality of the thoracic spine appreciated.   - Still complain of 9/10, please obtain MRI T spine if possible.       Discussed with patient  Discussed and seen with KEITH NOBLES  MD Anish, MPH  [facility] Infect[facility]     Thank you for this consultation. I will follow up with the patient. Please contact via Epic secure chat with any questions.         HPI:   Patient is Elisabet Choi a 79 y.o. female admitted on 12/2 for AMS. Upon evaluation patient with fever, leukocytosis, hypoxia, hypotension refractory to IVF requiring pressors. CT imaging showed evidence of small right pleural effusion, GGOs and thickening of distal rectum concerning for proctitis. Patient was started on empiric antimicrobials. Blood cx have isolated CONS. Patient has known afib on DOAC, DMII, PAD, hyperlipidemia, hypothyroidism, seizures,  NPH with VPS, SPC, prior TIA/CVA, b12 deficiency, anxiety, depression, dementia, s/p prior hysterectomy, cholecystectomy, appendectomy, kyphoplasty, b/l carotid endarterectomy. Infectious diseases consulted for evaluation and management.      Past Medical History:       Past Medical History:   Diagnosis Date    Acute cystitis     Allergic rhinitis     Anemia, unspecified     Angina pectoris     Arthritis     Back pain     Bipolar disorder, unspecified     Celiac disease     Cellulitis     CHF (congestive heart failure)     Constipation     Contracture, left ankle     Dementia     Depressive episode     Diabetes mellitus     Diabetes mellitus with ulcer of foot     Displacement of other urinary catheter, subsequent encounter     Edema     Elevated white blood cell count     Encounter for blood transfusion     Fatigue     Fluid retention     Genetic anomalies of leukocytes     GERD without esophagitis     Goiter     HLD (hyperlipidemia)     Hydrocephalus     Hypertension     Hypokalemia     Hypotension     Hypothyroidism     Magnesium deficiency     Major depression     Migraine     Mild protein-calorie malnutrition     Morbid obesity     Obstructive and reflux uropathy     Osteoarthritis     Osteoporosis     Overactive bladder     Pressure ulcer of right heel      Pressure ulcer of sacral region     Pressure ulcer of sacral region, stage 3     Pruritus     PVD (peripheral vascular disease)     Radiculopathy, lumbar region     Seizures     Sleep apnea     uses CPAP    SOB (shortness of breath)     Stroke     TIA (transient ischemic attack)     Urinary tract infection, site not specified     Vitamin deficiency     Wheelchair dependent      Past Surgical History:   Procedure Laterality Date    ABDOMINAL SURGERY      APPENDECTOMY      BACK SURGERY      BLADDER SURGERY      BRAIN SURGERY      CAROTID ENDARTERECTOMY      CHOLECYSTECTOMY      CSF SHUNT      HERNIA REPAIR      HYSTERECTOMY      INSERTION, SUPRAPUBIC CATHETER N/A 3/26/2024    Procedure: INSERTION, SUPRAPUBIC CATHETER;  Surgeon: Fuad Szymanski MD;  Location: [facility];  Service: Urology;  Laterality: N/A;  CYSTO W/ SUPRAPUBIC CATH INSERTION    TONSILLECTOMY      VASCULAR SURGERY       Review of patient's allergies indicates:   Allergen Reactions    Gluten      Other reaction(s): ciliac disease    Gluten protein     Ropinirole      Other reaction(s): hallucinations    Wheat      Other reaction(s): ciliac disease    Wheat containing prod     Zolpidem      hallucinates  Other reaction(s): hallucinations     Current Outpatient Medications   Medication Instructions    acetaminophen (TYLENOL) 1,000 mg, Oral, Every 4 hours PRN    acidophilus-pectin, citrus 100 million cell-10 mg Cap 10 mg, Oral    ALPRAZolam (XANAX) 0.25 mg, Oral, 2 times daily PRN    amitriptyline (ELAVIL) 25 mg, Oral, Nightly PRN    ascorbic acid, vitamin C, (VITAMIN C) 500 MG tablet 1 tablet, Oral, Every morning    cranberry fruit (CRANBERRY) 450 mg Tab Oral    cyanocobalamin (VITAMIN B-12) 100 mcg, Oral, Daily    doxazosin (CARDURA) 1 mg, Oral, Nightly    dulaglutide (TRULICITY) 3 mg, Subcutaneous, Every 7 days, wednesdays    DULoxetine (CYMBALTA) 60 mg, Oral, Daily    ezetimibe (ZETIA) 10 mg, Oral, Daily    folic acid (FOLVITE) 1 mg, Oral, Daily     gabapentin (NEURONTIN) 300 mg, Oral, 2 times daily    HYDROcodone-acetaminophen (NORCO)  mg per tablet 1 tablet, Oral, 3 times daily    insulin degludec 20 Units, Subcutaneous, Daily    levETIRAcetam (KEPPRA) 750 mg, Oral, Nightly    levothyroxine (SYNTHROID, LEVOTHROID) 175 mcg, Oral, Before breakfast    lovastatin (ALTOPREV) 40 mg, Oral, Nightly    magnesium oxide (MAG-OX) 400 mg (241.3 mg magnesium) tablet 1 tablet, Oral, Every morning    methenamine (HIPREX) 1 g, Oral, 2 times daily    metoprolol tartrate (LOPRESSOR) 50 mg, Oral, 2 times daily    polyethylene glycol (GLYCOLAX) 17 g, Oral, Daily    POTASSIUM CHLORIDE ORAL 20 mEq, Oral, Daily    SANTYL ointment Topical (Top)    solifenacin (VESICARE) 10 mg, Oral, Nightly    tiZANidine 2 mg, Oral, 3 times daily PRN    topiramate (TOPAMAX) 100 mg, Oral, 2 times daily    torsemide (DEMADEX) 40 mg, Oral, Daily    vitamin D (VITAMIN D3) 1,000 Units, Oral, Daily    zinc gluconate 50 mg tablet 1 tablet, Oral, Every morning       Current Facility-Administered Medications:     acetaminophen suppository 650 mg, 650 mg, Rectal, Q6H PRN, Jaqui Holman AGACNP-BC    albuterol-ipratropium 2.5 mg-0.5 mg/3 mL nebulizer solution 3 mL, 3 mL, Nebulization, Q4H WAKE, Ashu, Vy, DO, 3 mL at 12/06/24 0838    dextrose 10% bolus 125 mL 125 mL, 12.5 g, Intravenous, PRN, Ashu, Vy, DO    dextrose 10% bolus 250 mL 250 mL, 25 g, Intravenous, PRN, Ashu, Vy, DO    enoxaparin injection 40 mg, 40 mg, Subcutaneous, Q12H, Ashu, Vy, DO, 40 mg at 12/05/24 2138    glucagon (human recombinant) injection 1 mg, 1 mg, Intramuscular, PRN, Ashu, Vy, DO    insulin aspart U-100 injection 0-10 Units, 0-10 Units, Subcutaneous, Q6H PRN, Ashu, Vy, DO, 2 Units at 12/05/24 1747    insulin glargine U-100 (Lantus) injection 10 Units, 10 Units, Subcutaneous, QHS, Samreen Wakefield, DO, 10 Units at 12/05/24 2139    levETIRAcetam injection 750 mg, 750 mg, Intravenous, Daily, Mj Ferreira MD, 750 mg at  12/04/24 2153    mupirocin 2 % ointment, , Nasal, BID, Donald Mireles MD, Given at 12/05/24 2140    pantoprazole injection 40 mg, 40 mg, Intravenous, Daily, Ashu, Vy, DO, 40 mg at 12/05/24 0847    piperacillin-tazobactam (ZOSYN) 4.5 g in D5W 100 mL IVPB (MB+), 4.5 g, Intravenous, Q8H, Ashu, Vy, DO, Stopped at 12/06/24 0342    sodium chloride 0.9% flush 10 mL, 10 mL, Intravenous, PRN, Ashu, Vy, DO    vancomycin (VANCOCIN) 1,000 mg in 0.9% NaCl 250 mL IVPB (admixture device), 1,000 mg, Intravenous, Q12H, Mj Ferreira MD, Stopped at 12/06/24 0647    Pharmacy to dose Vancomycin consult, , , Once **AND** vancomycin - pharmacy to dose, , Intravenous, pharmacy to manage frequency, Mj Ferreira MD    zinc oxide-cod liver oil 40 % paste, , Topical (Top), TID, Donald Mireles MD, Given at 12/05/24 2140  Review of Systems   Constitutional:  Negative for chills and fever.   HENT:  Negative for congestion, ear discharge, ear pain, facial swelling, mouth sores, postnasal drip, rhinorrhea, sinus pressure, sinus pain, sneezing, sore throat and trouble swallowing.    Eyes:  Negative for discharge, redness and itching.   Respiratory:  Negative for cough, chest tightness, shortness of breath and wheezing.    Cardiovascular:  Negative for chest pain, palpitations and leg swelling.   Gastrointestinal:  Negative for abdominal distention, abdominal pain, diarrhea, nausea and vomiting.   Genitourinary:  Negative for dysuria, flank pain, frequency and urgency.   Musculoskeletal:  Positive for back pain. Negative for myalgias and neck stiffness.   Skin:  Negative for rash and wound.   Allergic/Immunologic: Negative for immunocompromised state.   Neurological:  Negative for dizziness, light-headedness and headaches.   Hematological:  Negative for adenopathy.   Psychiatric/Behavioral:  Negative for agitation, confusion and suicidal ideas. The patient is not nervous/anxious.        Objective:   Temp:  [97.6 °F  (36.4 °C)-98.3 °F (36.8 °C)] 97.6 °F (36.4 °C)  Pulse:  [64-72] 66  Resp:  [18] 18  SpO2:  [94 %-100 %] 98 %  BP: (110-133)/(54-76) 133/76     Physical Exam  Constitutional:       Appearance: Normal appearance. She is well-developed. She is obese.   HENT:      Nose: Nose normal.      Mouth/Throat:      Pharynx: No oropharyngeal exudate.   Eyes:      General: Lids are normal. No scleral icterus.        Right eye: No discharge.      Conjunctiva/sclera: Conjunctivae normal.      Pupils: Pupils are equal, round, and reactive to light.   Neck:      Thyroid: No thyromegaly.      Vascular: No JVD.      Trachea: Trachea normal.   Cardiovascular:      Rate and Rhythm: Normal rate and regular rhythm.      Pulses: Normal pulses.      Heart sounds: Normal heart sounds. No murmur heard.     No friction rub.   Pulmonary:      Effort: Pulmonary effort is normal. No respiratory distress.      Breath sounds: Normal breath sounds. No wheezing.      Comments: nc  Chest:      Chest wall: No tenderness.   Abdominal:      General: Bowel sounds are normal. There is no distension.      Palpations: Abdomen is soft.      Tenderness: There is no abdominal tenderness. There is no guarding or rebound.   Musculoskeletal:         General: No tenderness. Normal range of motion.      Cervical back: Full passive range of motion without pain, normal range of motion and neck supple.   Lymphadenopathy:      Cervical: No cervical adenopathy.   Skin:     General: Skin is warm and dry.      Findings: No rash.   Neurological:      Mental Status: She is alert and oriented to person, place, and time.      Cranial Nerves: No cranial nerve deficit.      Sensory: No sensory deficit.   Psychiatric:         Speech: Speech normal.         Behavior: Behavior normal.         Thought Content: Thought content normal.         Judgment: Judgment normal.         Estimated Creatinine Clearance: 71.3 mL/min (based on SCr of 0.68 mg/dL).  Recent Labs   Lab 12/05/24  6368  12/06/24  0703   WBC 12.43* 6.32    140     Microbiology Results (last 7 days)       Procedure Component Value Units Date/Time    Blood Culture #1 **CANNOT BE ORDERED STAT** [[id number]]  (Abnormal)  (Susceptibility) Collected: 12/03/24 0006    Order Status: Completed Specimen: Blood from Antecubital, Left Updated: 12/06/24 0715     Blood Culture Staphylococcus hominis      Staphylococcus epidermidis     GRAM STAIN Gram Positive Cocci, probable Staphylococcus      Seen in gram stain of broth only      2 of 2 bottles positive    Blood Culture #1 **CANNOT BE ORDERED STAT** [[id number]]  (Abnormal)  (Susceptibility) Collected: 12/03/24 0006    Order Status: Completed Specimen: Blood from Antecubital, Left Updated: 12/06/24 0715     Blood Culture Staphylococcus haemolyticus      Staphylococcus epidermidis     GRAM STAIN Gram Positive Cocci, probable Staphylococcus      Seen in gram stain of broth only      1 of 2 Aerobic bottles positive    Blood Culture [[id number]]  (Normal) Collected: 12/04/24 1335    Order Status: Completed Specimen: Blood Updated: 12/05/24 1601     Blood Culture No Growth At 24 Hours    BCID2 Panel [[id number]]  (Abnormal) Collected: 12/03/24 0006    Order Status: Completed Specimen: Blood from Antecubital, Left Updated: 12/03/24 1635     CTX-M (ESBL ) N/A     IMP (Cabapenemase ) N/A     KPC resistance gene (Carbapenemase ) N/A     mcr-1 N/A     mecA ID Detected     Comment: Note: Antimicrobial resistance can occur via multiple mechanisms. A Not Detected result for antimicrobial resistance gene(s) does not indicate antimicrobial susceptibility. Subculturing is required for species identification and susceptibility testing of   isolates.        mecA/C and MREJ (MRSA) gene N/A     NDM (Carbapenemase ) N/A     OXA-48-like (Carbapenemase ) N/A     Bk/B (VRE gene) N/A     VIM (Carbapenemase ) N/A     Enterococcus faecalis Not Detected      Enterococcus faecium Not Detected     Listeria monocytogenes Not Detected     Staphylococcus spp. Detected     Staphylococcus aureus Not Detected     Staphylococcus epidermidis Detected     Staphylococcus lugdunensis Not Detected     Streptococcus spp. Not Detected     Streptococcus agalactiae (Group B) Not Detected     Streptococcus pneumoniae Not Detected     Streptococcus pyogenes (Group A) Not Detected     Acinetobacter calcoaceticus/baumannii complex Not Detected     Bacteroides fragilis Not Detected     Enterobacterales Not Detected     Enterobacter cloacae complex Not Detected     Escherichia coli Not Detected     Klebsiella aerogenes Not Detected     Klebsiella oxytoca Not Detected     Klebsiella pneumoniae group Not Detected     Proteus spp. Not Detected     Salmonella spp. Not Detected     Serratia marcescens Not Detected     Haemophilus influenzae Not Detected     Neisseria meningitidis Not Detected     Pseudomonas aeruginosa Not Detected     Stenotrophomonas maltophilia Not Detected     Candida albicans Not Detected     Candida auris Not Detected     Candida glabrata Not Detected     Candida krusei Not Detected     Candida parapsilosis Not Detected     Candida tropicalis Not Detected     Cryptococcus neoformans/gattii Not Detected    Narrative:      The [employer] BCID2 Panel is a multiplexed nucleic acid test intended for the use with [employer]® 2.0 or IDINCU® [employer] Systems for the simultaneous qualitative detection and identification of multiple bacterial and yeast nucleic acids and select genetic determinants associated with antimicrobial resistance.  The [employer] BCID2 Panel test is performed directly on blood culture samples identified as positive by a continuous monitoring blood culture system.  Results are intended to be interpreted in conjunction with Gram stain results.    Respiratory Culture [[id number]]     Order Status: Sent Specimen: Sputum, Induced             Significant  Labs: All pertinent labs within the past 24 hours have been reviewed.    Significant Imaging: I have reviewed all relevant and available imaging results/findings within the past 24 hours.      Plan -- see top of note

## 2024-12-06 NOTE — PLAN OF CARE
[facility] sent clinical updates to [facility] via [facility] Fax.

## 2024-12-06 NOTE — PROGRESS NOTES
Pharmacokinetic Assessment Follow Up: IV Vancomycin    Vancomycin serum concentration assessment(s):    The trough level was drawn incorrectly and cannot be used to guide therapy at this time.    Vancomycin Regimen Plan:    Continue regimen to Vancomycin 1000 mg IV every 12 hours with next serum trough concentration measured at 1700 prior to next dose on 12/06    Scheduled Administration Times    06  18    Drug levels (last 3 results):  Recent Labs   Lab Result Units 12/06/24  0703   Vancomycin Trough ug/ml 32.9*       Vancomycin Administrations:  vancomycin given in the last 96 hours                     vancomycin (VANCOCIN) 1,000 mg in 0.9% NaCl 250 mL IVPB (admixture device) (mg) 1,000 mg New Bag 12/06/24 0517     1,000 mg New Bag 12/05/24 1746     1,000 mg New Bag  0636     1,000 mg New Bag 12/04/24 1621    vancomycin (VANCOCIN) 1,000 mg in D5W 250 mL IVPB (admixture device) (mg) 1,000 mg New Bag 12/03/24 0222    vancomycin 1,250 mg in D5W 250 mL IVPB (admixture device) (mg) 1,250 mg New Bag 12/03/24 0038                    Pharmacy will continue to follow and monitor vancomycin.    Please contact pharmacy at extension [phone] for questions regarding this assessment.    Thank you for the consult,   Jose Juan Johnson       Patient brief summary:  Elisabet Choi is a 79 y.o. female initiated on antimicrobial therapy with IV Vancomycin for treatment of bacteremia    The patient's current regimen is 1000mg q12h    Drug Allergies:   Review of patient's allergies indicates:   Allergen Reactions    Gluten      Other reaction(s): ciliac disease    Gluten protein     Ropinirole      Other reaction(s): hallucinations    Wheat      Other reaction(s): ciliac disease    Wheat containing prod     Zolpidem      hallucinates  Other reaction(s): hallucinations       Actual Body Weight:  Wt Readings from Last 1 Encounters:   12/06/24 102.5 kg (225 lb 15.5 oz)       Renal Function:   Estimated Creatinine Clearance: 71.3 mL/min (based on  SCr of 0.68 mg/dL).,     Dialysis Method (if applicable):  N/A    CBC (last 72 hours):  Recent Labs   Lab Result Units 12/04/24 0257 12/05/24  0554 12/06/24  0703   WBC x10(3)/mcL 8.68 12.43* 6.32   Hgb g/dL 10.3* 11.5* 9.9*   Hct % 34.6* 36.9* 32.3*   Platelet x10(3)/mcL 157 158 140   Mono % % 4.6 4.1 4.7   Eos % % 4.5 2.3 4.7   Basophil % % 0.6 0.3 0.5       Metabolic Panel (last 72 hours):  Recent Labs   Lab Result Units 12/04/24 0257 12/05/24  0554 12/06/24  0703   Sodium mmol/L 138 139 142   Potassium mmol/L 3.6 3.3* 3.3*   Chloride mmol/L 102 104 108*   CO2 mmol/L 30 26 25   Glucose mg/dL 146* 141* 115   Blood Urea Nitrogen mg/dL 20.5* 12.9 10.3   Creatinine mg/dL 0.74 0.72 0.68   Albumin g/dL 2.5* 2.9* 2.7*   Bilirubin Total mg/dL 0.4 0.6 0.3   ALP unit/L 68 75 65   AST unit/L 10 12 10   ALT unit/L 12 15 11   Magnesium Level mg/dL 2.20 2.20 2.10   Phosphorus Level mg/dL 3.0 2.6 2.6       Microbiologic Results:  Microbiology Results (last 7 days)       Procedure Component Value Units Date/Time    Blood Culture #1 **CANNOT BE ORDERED STAT** [[id number]]  (Abnormal)  (Susceptibility) Collected: 12/03/24 0006    Order Status: Completed Specimen: Blood from Antecubital, Left Updated: 12/06/24 0715     Blood Culture Staphylococcus hominis      Staphylococcus epidermidis     GRAM STAIN Gram Positive Cocci, probable Staphylococcus      Seen in gram stain of broth only      2 of 2 bottles positive    Blood Culture #1 **CANNOT BE ORDERED STAT** [[id number]]  (Abnormal)  (Susceptibility) Collected: 12/03/24 0006    Order Status: Completed Specimen: Blood from Antecubital, Left Updated: 12/06/24 0715     Blood Culture Staphylococcus haemolyticus      Staphylococcus epidermidis     GRAM STAIN Gram Positive Cocci, probable Staphylococcus      Seen in gram stain of broth only      1 of 2 Aerobic bottles positive    Blood Culture [[id number]]  (Normal) Collected: 12/04/24 3255    Order Status: Completed Specimen: Blood  Updated: 12/05/24 1601     Blood Culture No Growth At 24 Hours    BCID2 Panel [[id number]]  (Abnormal) Collected: 12/03/24 0006    Order Status: Completed Specimen: Blood from Antecubital, Left Updated: 12/03/24 1635     CTX-M (ESBL ) N/A     IMP (Cabapenemase ) N/A     KPC resistance gene (Carbapenemase ) N/A     mcr-1 N/A     mecA ID Detected     Comment: Note: Antimicrobial resistance can occur via multiple mechanisms. A Not Detected result for antimicrobial resistance gene(s) does not indicate antimicrobial susceptibility. Subculturing is required for species identification and susceptibility testing of   isolates.        mecA/C and MREJ (MRSA) gene N/A     NDM (Carbapenemase ) N/A     OXA-48-like (Carbapenemase ) N/A     Bk/B (VRE gene) N/A     VIM (Carbapenemase ) N/A     Enterococcus faecalis Not Detected     Enterococcus faecium Not Detected     Listeria monocytogenes Not Detected     Staphylococcus spp. Detected     Staphylococcus aureus Not Detected     Staphylococcus epidermidis Detected     Staphylococcus lugdunensis Not Detected     Streptococcus spp. Not Detected     Streptococcus agalactiae (Group B) Not Detected     Streptococcus pneumoniae Not Detected     Streptococcus pyogenes (Group A) Not Detected     Acinetobacter calcoaceticus/baumannii complex Not Detected     Bacteroides fragilis Not Detected     Enterobacterales Not Detected     Enterobacter cloacae complex Not Detected     Escherichia coli Not Detected     Klebsiella aerogenes Not Detected     Klebsiella oxytoca Not Detected     Klebsiella pneumoniae group Not Detected     Proteus spp. Not Detected     Salmonella spp. Not Detected     Serratia marcescens Not Detected     Haemophilus influenzae Not Detected     Neisseria meningitidis Not Detected     Pseudomonas aeruginosa Not Detected     Stenotrophomonas maltophilia Not Detected     Candida albicans Not Detected     Candida auris Not  Detected     Candida glabrata Not Detected     Candida krusei Not Detected     Candida parapsilosis Not Detected     Candida tropicalis Not Detected     Cryptococcus neoformans/gattii Not Detected    Narrative:      The [employer] BCID2 Panel is a multiplexed nucleic acid test intended for the use with [employer]® 2.0 or [employer]® [employer] Systems for the simultaneous qualitative detection and identification of multiple bacterial and yeast nucleic acids and select genetic determinants associated with antimicrobial resistance.  The [employer] BCID2 Panel test is performed directly on blood culture samples identified as positive by a continuous monitoring blood culture system.  Results are intended to be interpreted in conjunction with Gram stain results.    Respiratory Culture [[id number]]     Order Status: Sent Specimen: Sputum, Induced

## 2024-12-07 LAB
ALBUMIN SERPL-MCNC: 2.7 G/DL (ref 3.4–4.8)
ALBUMIN/GLOB SERPL: 0.9 RATIO (ref 1.1–2)
ALP SERPL-CCNC: 63 UNIT/L (ref 40–150)
ALT SERPL-CCNC: 12 UNIT/L (ref 0–55)
ANION GAP SERPL CALC-SCNC: 7 MEQ/L
AST SERPL-CCNC: 10 UNIT/L (ref 5–34)
BILIRUB SERPL-MCNC: 0.4 MG/DL
BUN SERPL-MCNC: 9.1 MG/DL (ref 9.8–20.1)
CALCIUM SERPL-MCNC: 8.1 MG/DL (ref 8.4–10.2)
CHLORIDE SERPL-SCNC: 109 MMOL/L (ref 98–107)
CO2 SERPL-SCNC: 26 MMOL/L (ref 23–31)
CREAT SERPL-MCNC: 0.67 MG/DL (ref 0.55–1.02)
CREAT/UREA NIT SERPL: 14
GFR SERPLBLD CREATININE-BSD FMLA CKD-EPI: >60 ML/MIN/1.73/M2
GLOBULIN SER-MCNC: 3 GM/DL (ref 2.4–3.5)
GLUCOSE SERPL-MCNC: 84 MG/DL (ref 82–115)
MAGNESIUM SERPL-MCNC: 2.2 MG/DL (ref 1.6–2.6)
PHOSPHATE SERPL-MCNC: 2.5 MG/DL (ref 2.3–4.7)
POCT GLUCOSE: 113 MG/DL (ref 70–110)
POCT GLUCOSE: 78 MG/DL (ref 70–110)
POCT GLUCOSE: 83 MG/DL (ref 70–110)
POCT GLUCOSE: 92 MG/DL (ref 70–110)
POTASSIUM SERPL-SCNC: 3.2 MMOL/L (ref 3.5–5.1)
PROT SERPL-MCNC: 5.7 GM/DL (ref 5.8–7.6)
SODIUM SERPL-SCNC: 142 MMOL/L (ref 136–145)
VANCOMYCIN TROUGH SERPL-MCNC: 18.5 UG/ML (ref 15–20)

## 2024-12-07 PROCEDURE — 63600175 PHARM REV CODE 636 W HCPCS

## 2024-12-07 PROCEDURE — 21400001 HC TELEMETRY ROOM

## 2024-12-07 PROCEDURE — 36410 VNPNXR 3YR/> PHY/QHP DX/THER: CPT

## 2024-12-07 PROCEDURE — 25000003 PHARM REV CODE 250: Performed by: INTERNAL MEDICINE

## 2024-12-07 PROCEDURE — 76937 US GUIDE VASCULAR ACCESS: CPT

## 2024-12-07 PROCEDURE — C1751 CATH, INF, PER/CENT/MIDLINE: HCPCS

## 2024-12-07 PROCEDURE — 80053 COMPREHEN METABOLIC PANEL: CPT

## 2024-12-07 PROCEDURE — 25000242 PHARM REV CODE 250 ALT 637 W/ HCPCS

## 2024-12-07 PROCEDURE — 36415 COLL VENOUS BLD VENIPUNCTURE: CPT

## 2024-12-07 PROCEDURE — 63600175 PHARM REV CODE 636 W HCPCS: Performed by: INTERNAL MEDICINE

## 2024-12-07 PROCEDURE — 36415 COLL VENOUS BLD VENIPUNCTURE: CPT | Performed by: INTERNAL MEDICINE

## 2024-12-07 PROCEDURE — 84100 ASSAY OF PHOSPHORUS: CPT

## 2024-12-07 PROCEDURE — 27000221 HC OXYGEN, UP TO 24 HOURS

## 2024-12-07 PROCEDURE — 99900031 HC PATIENT EDUCATION (STAT)

## 2024-12-07 PROCEDURE — 80202 ASSAY OF VANCOMYCIN: CPT | Performed by: INTERNAL MEDICINE

## 2024-12-07 PROCEDURE — 83735 ASSAY OF MAGNESIUM: CPT

## 2024-12-07 PROCEDURE — 99900035 HC TECH TIME PER 15 MIN (STAT)

## 2024-12-07 PROCEDURE — 94640 AIRWAY INHALATION TREATMENT: CPT

## 2024-12-07 PROCEDURE — 25000003 PHARM REV CODE 250

## 2024-12-07 PROCEDURE — 94761 N-INVAS EAR/PLS OXIMETRY MLT: CPT

## 2024-12-07 PROCEDURE — 92526 ORAL FUNCTION THERAPY: CPT

## 2024-12-07 PROCEDURE — 94760 N-INVAS EAR/PLS OXIMETRY 1: CPT

## 2024-12-07 RX ORDER — SODIUM CHLORIDE 0.9 % (FLUSH) 0.9 %
10 SYRINGE (ML) INJECTION EVERY 12 HOURS PRN
Status: DISCONTINUED | OUTPATIENT
Start: 2024-12-07 | End: 2024-12-14 | Stop reason: HOSPADM

## 2024-12-07 RX ORDER — POTASSIUM CHLORIDE 14.9 MG/ML
20 INJECTION INTRAVENOUS ONCE
Status: COMPLETED | OUTPATIENT
Start: 2024-12-07 | End: 2024-12-07

## 2024-12-07 RX ADMIN — IPRATROPIUM BROMIDE AND ALBUTEROL SULFATE 3 ML: 2.5; .5 SOLUTION RESPIRATORY (INHALATION) at 07:12

## 2024-12-07 RX ADMIN — MUPIROCIN: 20 OINTMENT TOPICAL at 10:12

## 2024-12-07 RX ADMIN — Medication: at 10:12

## 2024-12-07 RX ADMIN — IPRATROPIUM BROMIDE AND ALBUTEROL SULFATE 3 ML: 2.5; .5 SOLUTION RESPIRATORY (INHALATION) at 08:12

## 2024-12-07 RX ADMIN — PIPERACILLIN SODIUM AND TAZOBACTAM SODIUM 4.5 G: 4; .5 INJECTION, POWDER, LYOPHILIZED, FOR SOLUTION INTRAVENOUS at 09:12

## 2024-12-07 RX ADMIN — PIPERACILLIN SODIUM AND TAZOBACTAM SODIUM 4.5 G: 4; .5 INJECTION, POWDER, LYOPHILIZED, FOR SOLUTION INTRAVENOUS at 04:12

## 2024-12-07 RX ADMIN — IPRATROPIUM BROMIDE AND ALBUTEROL SULFATE 3 ML: 2.5; .5 SOLUTION RESPIRATORY (INHALATION) at 04:12

## 2024-12-07 RX ADMIN — Medication: at 09:12

## 2024-12-07 RX ADMIN — Medication: at 04:12

## 2024-12-07 RX ADMIN — PIPERACILLIN SODIUM AND TAZOBACTAM SODIUM 4.5 G: 4; .5 INJECTION, POWDER, LYOPHILIZED, FOR SOLUTION INTRAVENOUS at 01:12

## 2024-12-07 RX ADMIN — ENOXAPARIN SODIUM 40 MG: 40 INJECTION SUBCUTANEOUS at 10:12

## 2024-12-07 RX ADMIN — POTASSIUM CHLORIDE 20 MEQ: 14.9 INJECTION, SOLUTION INTRAVENOUS at 09:12

## 2024-12-07 RX ADMIN — ENOXAPARIN SODIUM 40 MG: 40 INJECTION SUBCUTANEOUS at 09:12

## 2024-12-07 RX ADMIN — INSULIN GLARGINE 10 UNITS: 100 INJECTION, SOLUTION SUBCUTANEOUS at 10:12

## 2024-12-07 RX ADMIN — MUPIROCIN: 20 OINTMENT TOPICAL at 09:12

## 2024-12-07 RX ADMIN — LEVETIRACETAM 750 MG: 100 INJECTION, SOLUTION INTRAVENOUS at 09:12

## 2024-12-07 RX ADMIN — LEUCINE, PHENYLALANINE, LYSINE, METHIONINE, ISOLEUCINE, VALINE, HISTIDINE, THREONINE, TRYPTOPHAN, ALANINE, GLYCINE, ARGININE, PROLINE, SERINE, TYROSINE, SODIUM ACETATE, DIBASIC POTASSIUM PHOSPHATE, MAGNESIUM CHLORIDE, SODIUM CHLORIDE, CALCIUM CHLORIDE, DEXTROSE
880; 489; 33; 5; 438; 204; 255; 311; 247; 51; 170; 238; 261; 289; 213; 297; 77; 179; 77; 17; 247 INJECTION INTRAVENOUS at 04:12

## 2024-12-07 RX ADMIN — IPRATROPIUM BROMIDE AND ALBUTEROL SULFATE 3 ML: 2.5; .5 SOLUTION RESPIRATORY (INHALATION) at 12:12

## 2024-12-07 RX ADMIN — PANTOPRAZOLE SODIUM 40 MG: 40 INJECTION, POWDER, FOR SOLUTION INTRAVENOUS at 09:12

## 2024-12-07 NOTE — PLAN OF CARE
Problem: Adult Inpatient Plan of Care  Goal: Plan of Care Review  Outcome: Progressing  Goal: Patient-Specific Goal (Individualized)  Outcome: Progressing  Goal: Absence of Hospital-Acquired Illness or Injury  Outcome: Progressing  Goal: Optimal Comfort and Wellbeing  Outcome: Progressing  Goal: Readiness for Transition of Care  Outcome: Progressing     Problem: Bariatric Environmental Safety  Goal: Safety Maintained with Care  Outcome: Progressing     Problem: Infection  Goal: Absence of Infection Signs and Symptoms  Outcome: Progressing     Problem: Diabetes Comorbidity  Goal: Blood Glucose Level Within Targeted Range  Outcome: Progressing     Problem: Pneumonia  Goal: Fluid Balance  Outcome: Progressing  Goal: Resolution of Infection Signs and Symptoms  Outcome: Progressing  Goal: Effective Oxygenation and Ventilation  Outcome: Progressing     Problem: Skin Injury Risk Increased  Goal: Skin Health and Integrity  Outcome: Progressing

## 2024-12-07 NOTE — PROCEDURES
"Elisabet Choi is a 79 y.o. female patient.    Temp: 98.4 °F (36.9 °C) (12/07/24 1234)  Pulse: 69 (12/07/24 1250)  Resp: 20 (12/07/24 1250)  BP: 138/81 (12/07/24 1234)  SpO2: 98 % (12/07/24 1250)  Weight: 102.5 kg (225 lb 15.5 oz) (12/06/24 0600)  Height: 4' 8" (142.2 cm) (12/02/24 2220)    PICC  Date/Time: 12/7/2024 1:34 PM  Performed by: Radu Lubin, RN  Consent Done: Yes  Time out: Immediately prior to procedure a time out was called to verify the correct patient, procedure, equipment, support staff and site/side marked as required  Indications: med administration and vascular access  Anesthesia: local infiltration  Local anesthetic: lidocaine 1% without epinephrine  Anesthetic Total (mL): 3  Preparation: skin prepped with ChloraPrep  Skin prep agent dried: skin prep agent completely dried prior to procedure  Sterile barriers: all five maximum sterile barriers used - cap, mask, sterile gown, sterile gloves, and large sterile sheet  Hand hygiene: hand hygiene performed prior to central venous catheter insertion  Location details: right basilic  Catheter type: single lumen  Catheter size: 4 Fr  Catheter Length: 17cm    Ultrasound guidance: yes  Vessel Caliber: medium and patent, compressibility normal  Vascular Doppler: not done  Needle advanced into vessel with real time Ultrasound guidance.  Guidewire confirmed in vessel.  Sterile sheath used.  no esophageal manometryNumber of attempts: 1  Post-procedure: blood return through all ports, sterile dressing applied and chlorhexidine patch    Assessment: successful placement  Complications: none        Radu Lubin RN  12/7/2024    "

## 2024-12-07 NOTE — PROGRESS NOTES
Pharmacokinetic Assessment Follow Up: IV Vancomycin    Vancomycin serum concentration assessment(s):    The trough level was drawn correctly and can be used to guide therapy at this time. The measurement is above the desired definitive target range of 15 to 20 mcg/mL.    Vancomycin Regimen Plan:    Change to 1 gram q18h.  Check trough at 0900 on 12/8/24.    Drug levels (last 3 results):  Recent Labs   Lab Result Units 12/06/24  0703 12/06/24  1850   Vancomycin Trough ug/ml 32.9* 21.3*          Patient brief summary:  Elisabet Choi is a 79 y.o. female initiated on antimicrobial therapy with IV Vancomycin for treatment of bacteremia    CBC (last 72 hours):  Recent Labs   Lab Result Units 12/04/24  0257 12/05/24  0554 12/06/24  0703   WBC x10(3)/mcL 8.68 12.43* 6.32   Hgb g/dL 10.3* 11.5* 9.9*   Hct % 34.6* 36.9* 32.3*   Platelet x10(3)/mcL 157 158 140   Mono % % 4.6 4.1 4.7   Eos % % 4.5 2.3 4.7   Basophil % % 0.6 0.3 0.5       Metabolic Panel (last 72 hours):  Recent Labs   Lab Result Units 12/04/24  0257 12/05/24  0554 12/06/24  0703   Sodium mmol/L 138 139 142   Potassium mmol/L 3.6 3.3* 3.3*   Chloride mmol/L 102 104 108*   CO2 mmol/L 30 26 25   Glucose mg/dL 146* 141* 115   Blood Urea Nitrogen mg/dL 20.5* 12.9 10.3   Creatinine mg/dL 0.74 0.72 0.68   Albumin g/dL 2.5* 2.9* 2.7*   Bilirubin Total mg/dL 0.4 0.6 0.3   ALP unit/L 68 75 65   AST unit/L 10 12 10   ALT unit/L 12 15 11   Magnesium Level mg/dL 2.20 2.20 2.10   Phosphorus Level mg/dL 3.0 2.6 2.6       Vancomycin Administrations:  vancomycin given in the last 96 hours                     vancomycin (VANCOCIN) 1,000 mg in 0.9% NaCl 250 mL IVPB (admixture device) (mg) 1,000 mg New Bag 12/06/24 0517     1,000 mg New Bag 12/05/24 1746     1,000 mg New Bag  0636     1,000 mg New Bag 12/04/24 1621    vancomycin (VANCOCIN) 1,000 mg in D5W 250 mL IVPB (admixture device) (mg) 1,000 mg New Bag 12/03/24 0222    vancomycin 1,250 mg in D5W 250 mL IVPB (admixture device)  (mg) 1,250 mg New Bag 12/03/24 0038                    Microbiologic Results:  Microbiology Results (last 7 days)       Procedure Component Value Units Date/Time    Blood Culture [[id number]]  (Normal) Collected: 12/04/24 1335    Order Status: Completed Specimen: Blood Updated: 12/06/24 1601     Blood Culture No Growth At 48 Hours    Blood Culture #1 **CANNOT BE ORDERED STAT** [[id number]]  (Abnormal)  (Susceptibility) Collected: 12/03/24 0006    Order Status: Completed Specimen: Blood from Antecubital, Left Updated: 12/06/24 0715     Blood Culture Staphylococcus hominis      Staphylococcus epidermidis     GRAM STAIN Gram Positive Cocci, probable Staphylococcus      Seen in gram stain of broth only      2 of 2 bottles positive    Blood Culture #1 **CANNOT BE ORDERED STAT** [[id number]]  (Abnormal)  (Susceptibility) Collected: 12/03/24 0006    Order Status: Completed Specimen: Blood from Antecubital, Left Updated: 12/06/24 0715     Blood Culture Staphylococcus haemolyticus      Staphylococcus epidermidis     GRAM STAIN Gram Positive Cocci, probable Staphylococcus      Seen in gram stain of broth only      1 of 2 Aerobic bottles positive    BCID2 Panel [[id number]]  (Abnormal) Collected: 12/03/24 0006    Order Status: Completed Specimen: Blood from Antecubital, Left Updated: 12/03/24 1635     CTX-M (ESBL ) N/A     IMP (Cabapenemase ) N/A     KPC resistance gene (Carbapenemase ) N/A     mcr-1 N/A     mecA ID Detected     Comment: Note: Antimicrobial resistance can occur via multiple mechanisms. A Not Detected result for antimicrobial resistance gene(s) does not indicate antimicrobial susceptibility. Subculturing is required for species identification and susceptibility testing of   isolates.        mecA/C and MREJ (MRSA) gene N/A     NDM (Carbapenemase ) N/A     OXA-48-like (Carbapenemase ) N/A     Bk/B (VRE gene) N/A     VIM (Carbapenemase ) N/A     Enterococcus  faecalis Not Detected     Enterococcus faecium Not Detected     Listeria monocytogenes Not Detected     Staphylococcus spp. Detected     Staphylococcus aureus Not Detected     Staphylococcus epidermidis Detected     Staphylococcus lugdunensis Not Detected     Streptococcus spp. Not Detected     Streptococcus agalactiae (Group B) Not Detected     Streptococcus pneumoniae Not Detected     Streptococcus pyogenes (Group A) Not Detected     Acinetobacter calcoaceticus/baumannii complex Not Detected     Bacteroides fragilis Not Detected     Enterobacterales Not Detected     Enterobacter cloacae complex Not Detected     Escherichia coli Not Detected     Klebsiella aerogenes Not Detected     Klebsiella oxytoca Not Detected     Klebsiella pneumoniae group Not Detected     Proteus spp. Not Detected     Salmonella spp. Not Detected     Serratia marcescens Not Detected     Haemophilus influenzae Not Detected     Neisseria meningitidis Not Detected     Pseudomonas aeruginosa Not Detected     Stenotrophomonas maltophilia Not Detected     Candida albicans Not Detected     Candida auris Not Detected     Candida glabrata Not Detected     Candida krusei Not Detected     Candida parapsilosis Not Detected     Candida tropicalis Not Detected     Cryptococcus neoformans/gattii Not Detected    Narrative:      The [employer].Me BCID2 Panel is a multiplexed nucleic acid test intended for the use with adQ® 2.0 or adQ® [employer] Systems for the simultaneous qualitative detection and identification of multiple bacterial and yeast nucleic acids and select genetic determinants associated with antimicrobial resistance.  The [employer] BCID2 Panel test is performed directly on blood culture samples identified as positive by a continuous monitoring blood culture system.  Results are intended to be interpreted in conjunction with Gram stain results.    Respiratory Culture [[id number]]     Order Status: Sent Specimen: Sputum, Induced

## 2024-12-07 NOTE — PROGRESS NOTES
[facility] Hospital Medicine Progress Note        Chief Complaint: Inpatient Follow-up for     HPI:   79 year old woman with PMHx of TIA/CVA, dementia, NPH s/p  shunt, B12 deficiency, Afib on eliquis, ERICA, T2DM, PAD, HLD, hypothyroidism and seizure on keppra presented on 12/2 for AMS. She was febrile to 102.9 and had leukocytosis WBC 17.65 with elevated lactate. BP did not respond to fluids and she was admitted to the ICU for septic shock. CT head without acute abnormality. CT C/A/P with small loculated R sided pleural effusion and a trace left sided pleural effusion w/adjacent consolidation and ground glass opacities. Also conern for proctocolitis. UA was negative. She was started on zosyn. Pressors were weaned off and she was subsequently downgraded to hospital medicine on 12/4. Her blood cultures are growing S.epidermidis. Unclear if contaminant - repeat BCx were obtained and then vancomycin was added while repeat Bcx pending given history of  shunt.     Id were consulted.  Empiric antibiotics were continued.  TTE was ordered.  Lower extremity Doppler showed no signs of DVT.  Id recommended MRI thoracic spine due to persistent pain.     Interval Hx:     Alert, more comfortable and responsive today.  Denies any excessive pain.  Stable minimal nasal cannula oxygen.  Remains NPO as recommended by SLP.  Labs showing mild hypokalemia    MRI thoracic spine pending    Objective/physical exam:  Vitals:    12/06/24 1938 12/06/24 2014 12/07/24 0117 12/07/24 0535   BP: (!) 147/84  120/69 (!) 161/76   Pulse: 87 88 65 66   Resp:  17     Temp: 98.2 °F (36.8 °C)  98.1 °F (36.7 °C) 98.2 °F (36.8 °C)   TempSrc: Oral  Oral Axillary   SpO2: 98% 99% 98% 99%   Weight:       Height:         General: In no acute distress, afebrile  Respiratory: Clear to auscultation bilaterally  Cardiovascular: S1, S2, no appreciable murmur  Abdomen: Soft, nontender, BS +  MSK: Warm, no lower extremity edema, no  clubbing or cyanosis  Neurologic: Alert and oriented x4, moving all extremities      Lab Results   Component Value Date     12/07/2024    K 3.2 (L) 12/07/2024     (H) 12/07/2024    CO2 26 12/07/2024    BUN 9.1 (L) 12/07/2024    CREATININE 0.67 12/07/2024    CALCIUM 8.1 (L) 12/07/2024    ANIONGAP 8 01/23/2024    ESTGFRAFRICA 94 01/23/2024    EGFRNONAA >60 07/13/2022      Lab Results   Component Value Date    ALT 12 12/07/2024    AST 10 12/07/2024    ALKPHOS 63 12/07/2024    BILITOT 0.4 12/07/2024      Lab Results   Component Value Date    WBC 6.32 12/06/2024    HGB 9.9 (L) 12/06/2024    HCT 32.3 (L) 12/06/2024    MCV 90.7 12/06/2024     12/06/2024           Medications:   albuterol-ipratropium  3 mL Nebulization Q4H WAKE    enoxparin  40 mg Subcutaneous Q12H    insulin glargine U-100  10 Units Subcutaneous QHS    levETIRAcetam (Keppra) IV (PEDS and ADULTS)  750 mg Intravenous Daily    mupirocin   Nasal BID    pantoprazole  40 mg Intravenous Daily    piperacillin-tazobactam (Zosyn) IV (PEDS and ADULTS) (extended infusion is not appropriate)  4.5 g Intravenous Q8H    potassium chloride  20 mEq Intravenous Once    vancomycin (VANCOCIN) 1,000 mg in 0.9% NaCl 250 mL IVPB (admixture device)  1,000 mg Intravenous Q18H    zinc oxide-cod liver oil   Topical (Top) TID        Current Facility-Administered Medications:     acetaminophen, 650 mg, Rectal, Q6H PRN    dextrose 10%, 12.5 g, Intravenous, PRN    dextrose 10%, 25 g, Intravenous, PRN    glucagon (human recombinant), 1 mg, Intramuscular, PRN    insulin aspart U-100, 0-10 Units, Subcutaneous, Q6H PRN    sodium chloride 0.9%, 10 mL, Intravenous, PRN    Pharmacy to dose Vancomycin consult, , , Once **AND** vancomycin - pharmacy to dose, , Intravenous, pharmacy to manage frequency     Assessment/Plan:    Septic shock on admission-aspiration pneumonia vs colitis versus cellulitis  Staph haemolyticus and epidermidis bacteremia  Proctocolitis-POA  Lower  extremity swelling-?  Cellulitis  Loculated pleural effusion-POA  Hypoxemic respiratory distress due to above  Altered mental status at admission likely infectious versus metabolic encephalopathy  Possible pneumonia, consolidation, ground-glass opacities with atelectasis    HX: Obesity, dementia, history of hydrocephalus s/p  shunt, PAF on Eliquis, history of TIA/CVA, chronic lower extremity enema/venous stasis, history of seizure on Keppra, hypothyroidism, HTN, HLD, PID    Plan:   -follow MRI thoracic spine.  Analgesics as needed   -currently on vancomycin and Zosyn.  Id on board.  Appreciate assistance   -continue current insulin regimen   -add Clinimix for nutritional purposes.  SLP following-pleural effusion too small to drain as per Pulmonary.  Monitor while inpatient.  Wean off oxygen as tolerated.  Currently stable with 2 L  -continue wound care   -continue Keppra.  Other home meds were reviewed    Lovenox.        Cecilio Bush MD

## 2024-12-07 NOTE — PT/OT/SLP PROGRESS
[facility]  Speech Language Pathology Department  Dysphagia Therapy Progress Note    Patient Name:  Elisabet Choi   MRN:  [id number]  Admitting Diagnosis: PNA    Recommendations     General recommendations:  dysphagia therapy  Solid texture recommendation:  NPO  Liquid consistency recommendation: NPO   Medications: NPO    Discharge therapy intensity: Moderate Intensity Therapy   Barriers to safe discharge:  acuity of illness and severity of impairment    Subjective     Patient awake, alert, and cooperative.  Spiritual/Cultural/Yazidi Beliefs/Practices that affect care: no    Pain/Comfort: Pain Rating 1: 0/10    Respiratory Status:  2L via nasal cannula    Objective     Oral Musculature  Dentition: edentulous  Secretion Management: adequate  Mucosal Quality: good  Vocal Quality: harsh    Therapeutic Activities:  Pt tolerated thermal stimulation to the anterior faucial pillars x10 with 80% swallow responses.  Laryngeal excursion fair.  Delay varied 3-6 seconds.    Therapeutic PO Trials:  Consistency Amount Fed By Oral Symptoms Pharyngeal Symptoms   Ice chips 5 tsps SLP Slowed oral transit time Wet vocal quality after swallow  Cough after swallow with last presentation     Assessment     Pt continues to present with oropharyngeal dysphagia and remains unsafe for PO diet.    Outcome Measures     Functional Oral Intake Scale: 1 - Nothing by mouth    Goals     Multidisciplinary Problems       SLP Goals          Problem: SLP    Goal Priority Disciplines Outcome   SLP Goal     SLP Progressing   Description: LTG: Pt will tolerate least restrictive diet with no clinical signs/sx of aspiration.    ST.  Thermal stimulation with 100% swallow initiation  2.  Repeat MBS once appropriate                     Patient Education     Patient provided with verbal education regarding SLP POC.  Understanding was verbalized, however additional teaching warranted.    Plan     Will continue to follow and tx  as appropriate.    SLP Follow-Up:  Yes   Patient to be seen:  daily   Plan of Care expires:  12/11/24  Plan of Care reviewed with:  patient       Time Tracking     SLP Treatment Date:   12/07/24  Speech Start Time:  1200  Speech Stop Time:  1215     Speech Total Time (min):  15 min    Billable minutes:  Treatment of Swallow Dysfunction, 15 minutes       12/07/2024

## 2024-12-08 LAB
ALBUMIN SERPL-MCNC: 2.7 G/DL (ref 3.4–4.8)
ALBUMIN/GLOB SERPL: 0.8 RATIO (ref 1.1–2)
ALP SERPL-CCNC: 66 UNIT/L (ref 40–150)
ALT SERPL-CCNC: 13 UNIT/L (ref 0–55)
ANION GAP SERPL CALC-SCNC: 14 MEQ/L
AST SERPL-CCNC: 11 UNIT/L (ref 5–34)
BILIRUB SERPL-MCNC: 0.3 MG/DL
BUN SERPL-MCNC: 10.3 MG/DL (ref 9.8–20.1)
CALCIUM SERPL-MCNC: 7.6 MG/DL (ref 8.4–10.2)
CHLORIDE SERPL-SCNC: 104 MMOL/L (ref 98–107)
CO2 SERPL-SCNC: 26 MMOL/L (ref 23–31)
CREAT SERPL-MCNC: 0.53 MG/DL (ref 0.55–1.02)
CREAT/UREA NIT SERPL: 19
GFR SERPLBLD CREATININE-BSD FMLA CKD-EPI: >60 ML/MIN/1.73/M2
GLOBULIN SER-MCNC: 3.3 GM/DL (ref 2.4–3.5)
GLUCOSE SERPL-MCNC: 134 MG/DL (ref 82–115)
MAGNESIUM SERPL-MCNC: 2.1 MG/DL (ref 1.6–2.6)
PHOSPHATE SERPL-MCNC: 2.8 MG/DL (ref 2.3–4.7)
POCT GLUCOSE: 133 MG/DL (ref 70–110)
POCT GLUCOSE: 144 MG/DL (ref 70–110)
POCT GLUCOSE: 170 MG/DL (ref 70–110)
POTASSIUM SERPL-SCNC: 3.7 MMOL/L (ref 3.5–5.1)
PROT SERPL-MCNC: 6 GM/DL (ref 5.8–7.6)
SODIUM SERPL-SCNC: 144 MMOL/L (ref 136–145)

## 2024-12-08 PROCEDURE — 63600175 PHARM REV CODE 636 W HCPCS

## 2024-12-08 PROCEDURE — 99900031 HC PATIENT EDUCATION (STAT)

## 2024-12-08 PROCEDURE — 94761 N-INVAS EAR/PLS OXIMETRY MLT: CPT

## 2024-12-08 PROCEDURE — 84100 ASSAY OF PHOSPHORUS: CPT

## 2024-12-08 PROCEDURE — 25000003 PHARM REV CODE 250

## 2024-12-08 PROCEDURE — 36415 COLL VENOUS BLD VENIPUNCTURE: CPT

## 2024-12-08 PROCEDURE — 25000242 PHARM REV CODE 250 ALT 637 W/ HCPCS

## 2024-12-08 PROCEDURE — 80053 COMPREHEN METABOLIC PANEL: CPT

## 2024-12-08 PROCEDURE — 27000221 HC OXYGEN, UP TO 24 HOURS

## 2024-12-08 PROCEDURE — 21400001 HC TELEMETRY ROOM

## 2024-12-08 PROCEDURE — 63600175 PHARM REV CODE 636 W HCPCS: Performed by: INTERNAL MEDICINE

## 2024-12-08 PROCEDURE — 94760 N-INVAS EAR/PLS OXIMETRY 1: CPT

## 2024-12-08 PROCEDURE — 99900035 HC TECH TIME PER 15 MIN (STAT)

## 2024-12-08 PROCEDURE — 25000003 PHARM REV CODE 250: Performed by: HOSPITALIST

## 2024-12-08 PROCEDURE — 25000003 PHARM REV CODE 250: Performed by: INTERNAL MEDICINE

## 2024-12-08 PROCEDURE — 94640 AIRWAY INHALATION TREATMENT: CPT

## 2024-12-08 PROCEDURE — 27000190 HC CPAP FULL FACE MASK W/VALVE

## 2024-12-08 PROCEDURE — 83735 ASSAY OF MAGNESIUM: CPT

## 2024-12-08 RX ADMIN — PIPERACILLIN SODIUM AND TAZOBACTAM SODIUM 4.5 G: 4; .5 INJECTION, POWDER, LYOPHILIZED, FOR SOLUTION INTRAVENOUS at 02:12

## 2024-12-08 RX ADMIN — Medication: at 10:12

## 2024-12-08 RX ADMIN — PIPERACILLIN SODIUM AND TAZOBACTAM SODIUM 4.5 G: 4; .5 INJECTION, POWDER, LYOPHILIZED, FOR SOLUTION INTRAVENOUS at 09:12

## 2024-12-08 RX ADMIN — VANCOMYCIN HYDROCHLORIDE 1000 MG: 1 INJECTION, POWDER, LYOPHILIZED, FOR SOLUTION INTRAVENOUS at 12:12

## 2024-12-08 RX ADMIN — ENOXAPARIN SODIUM 40 MG: 40 INJECTION SUBCUTANEOUS at 08:12

## 2024-12-08 RX ADMIN — PANTOPRAZOLE SODIUM 40 MG: 40 INJECTION, POWDER, FOR SOLUTION INTRAVENOUS at 09:12

## 2024-12-08 RX ADMIN — Medication: at 08:12

## 2024-12-08 RX ADMIN — PIPERACILLIN SODIUM AND TAZOBACTAM SODIUM 4.5 G: 4; .5 INJECTION, POWDER, LYOPHILIZED, FOR SOLUTION INTRAVENOUS at 05:12

## 2024-12-08 RX ADMIN — LEUCINE, PHENYLALANINE, LYSINE, METHIONINE, ISOLEUCINE, VALINE, HISTIDINE, THREONINE, TRYPTOPHAN, ALANINE, GLYCINE, ARGININE, PROLINE, SERINE, TYROSINE, SODIUM ACETATE, DIBASIC POTASSIUM PHOSPHATE, MAGNESIUM CHLORIDE, SODIUM CHLORIDE, CALCIUM CHLORIDE, DEXTROSE
880; 489; 33; 5; 438; 204; 255; 311; 247; 51; 170; 238; 261; 289; 213; 297; 77; 179; 77; 17; 247 INJECTION INTRAVENOUS at 06:12

## 2024-12-08 RX ADMIN — MUPIROCIN: 20 OINTMENT TOPICAL at 08:12

## 2024-12-08 RX ADMIN — INSULIN GLARGINE 10 UNITS: 100 INJECTION, SOLUTION SUBCUTANEOUS at 08:12

## 2024-12-08 RX ADMIN — Medication: at 02:12

## 2024-12-08 RX ADMIN — MUPIROCIN: 20 OINTMENT TOPICAL at 09:12

## 2024-12-08 RX ADMIN — LEVETIRACETAM 750 MG: 100 INJECTION, SOLUTION INTRAVENOUS at 09:12

## 2024-12-08 RX ADMIN — VANCOMYCIN HYDROCHLORIDE 1000 MG: 1 INJECTION, POWDER, LYOPHILIZED, FOR SOLUTION INTRAVENOUS at 05:12

## 2024-12-08 RX ADMIN — IPRATROPIUM BROMIDE AND ALBUTEROL SULFATE 3 ML: 2.5; .5 SOLUTION RESPIRATORY (INHALATION) at 09:12

## 2024-12-08 RX ADMIN — IPRATROPIUM BROMIDE AND ALBUTEROL SULFATE 3 ML: 2.5; .5 SOLUTION RESPIRATORY (INHALATION) at 08:12

## 2024-12-08 RX ADMIN — IPRATROPIUM BROMIDE AND ALBUTEROL SULFATE 3 ML: 2.5; .5 SOLUTION RESPIRATORY (INHALATION) at 12:12

## 2024-12-08 RX ADMIN — ENOXAPARIN SODIUM 40 MG: 40 INJECTION SUBCUTANEOUS at 09:12

## 2024-12-08 NOTE — PROGRESS NOTES
[facility] Hospital Medicine Progress Note        Chief Complaint: Inpatient Follow-up for     HPI:   79 year old woman with PMHx of TIA/CVA, dementia, NPH s/p  shunt, B12 deficiency, Afib on eliquis, ERICA, T2DM, PAD, HLD, hypothyroidism and seizure on keppra presented on 12/2 for AMS. She was febrile to 102.9 and had leukocytosis WBC 17.65 with elevated lactate. BP did not respond to fluids and she was admitted to the ICU for septic shock. CT head without acute abnormality. CT C/A/P with small loculated R sided pleural effusion and a trace left sided pleural effusion w/adjacent consolidation and ground glass opacities. Also conern for proctocolitis. UA was negative. She was started on zosyn. Pressors were weaned off and she was subsequently downgraded to hospital medicine on 12/4. Her blood cultures are growing S.epidermidis. Unclear if contaminant - repeat BCx were obtained and then vancomycin was added while repeat Bcx pending given history of  shunt.     Id were consulted.  Empiric antibiotics were continued.  TTE was ordered.  Lower extremity Doppler showed no signs of DVT.  Id recommended MRI thoracic spine due to persistent pain.  MRI thoracic spine showed no acute abnormalities.  She remained NPO and Clinimix was added for nutritional purposes.     Interval Hx:   Comfortably resting.  Afebrile.  Blood pressure elevated.  Stable from respiratory standpoint.  Clinimix continued.     Objective/physical exam:  Vitals:    12/08/24 0001 12/08/24 0504 12/08/24 0645 12/08/24 0735   BP: 111/76 (!) 148/66  (!) 169/92   Pulse: 91 68  74   Resp:       Temp: 98.4 °F (36.9 °C) 97.8 °F (36.6 °C)  98.6 °F (37 °C)   TempSrc: Oral Oral  Oral   SpO2: 96% 100%  100%   Weight:   100.2 kg (220 lb 14.4 oz)    Height:         General: In no acute distress, afebrile  Respiratory: Clear to auscultation bilaterally  Cardiovascular: S1, S2, no appreciable murmur  Abdomen: Soft, nontender, BS +  MSK:  Warm, no lower extremity edema, no clubbing or cyanosis  Neurologic: Alert and oriented x4, moving all extremities      Lab Results   Component Value Date     12/08/2024    K 3.7 12/08/2024     12/08/2024    CO2 26 12/08/2024    BUN 10.3 12/08/2024    CREATININE 0.53 (L) 12/08/2024    CALCIUM 7.6 (L) 12/08/2024    ANIONGAP 8 01/23/2024    ESTGFRAFRICA 94 01/23/2024    EGFRNONAA >60 07/13/2022      Lab Results   Component Value Date    ALT 13 12/08/2024    AST 11 12/08/2024    ALKPHOS 66 12/08/2024    BILITOT 0.3 12/08/2024      Lab Results   Component Value Date    WBC 6.32 12/06/2024    HGB 9.9 (L) 12/06/2024    HCT 32.3 (L) 12/06/2024    MCV 90.7 12/06/2024     12/06/2024           Medications:   albuterol-ipratropium  3 mL Nebulization Q4H WAKE    enoxparin  40 mg Subcutaneous Q12H    insulin glargine U-100  10 Units Subcutaneous QHS    levETIRAcetam (Keppra) IV (PEDS and ADULTS)  750 mg Intravenous Daily    mupirocin   Nasal BID    pantoprazole  40 mg Intravenous Daily    piperacillin-tazobactam (Zosyn) IV (PEDS and ADULTS) (extended infusion is not appropriate)  4.5 g Intravenous Q8H    vancomycin (VANCOCIN) 1,000 mg in 0.9% NaCl 250 mL IVPB (admixture device)  1,000 mg Intravenous Q18H    zinc oxide-cod liver oil   Topical (Top) TID        Current Facility-Administered Medications:     acetaminophen, 650 mg, Rectal, Q6H PRN    dextrose 10%, 12.5 g, Intravenous, PRN    dextrose 10%, 25 g, Intravenous, PRN    glucagon (human recombinant), 1 mg, Intramuscular, PRN    insulin aspart U-100, 0-10 Units, Subcutaneous, Q6H PRN    sodium chloride 0.9%, 10 mL, Intravenous, PRN    Flushing PICC/Midline Protocol, , , Until Discontinued **AND** sodium chloride 0.9%, 10 mL, Intravenous, Q12H PRN    Pharmacy to dose Vancomycin consult, , , Once **AND** vancomycin - pharmacy to dose, , Intravenous, pharmacy to manage frequency     Assessment/Plan:    Septic shock on admission-aspiration pneumonia vs colitis  versus cellulitis  Staph haemolyticus and epidermidis bacteremia  Proctocolitis-POA  Lower extremity swelling-?  Cellulitis  Loculated pleural effusion-POA  Hypoxemic respiratory distress due to above-improving  Altered mental status at admission likely infectious versus metabolic encephalopathy-improving  Possible pneumonia, consolidation, ground-glass opacities with atelectasis    HX: Obesity, dementia, history of hydrocephalus s/p  shunt, PAF on Eliquis, history of TIA/CVA, chronic lower extremity enema/venous stasis, history of seizure on Keppra, hypothyroidism, HTN, HLD, PID    Plan:   -continue Clinimix.  SLP following.    -we will consult palliative for further goals of care discussions.  -MRI thoracic spine showed no acute abnormalities analgesics, therapy as tolerated   -currently vancomycin Zosyn.  Appreciate ID recommendations   -continue current insulin regimen.  No hypoglycemic episodes  -Clinimix for nutrition purposes.  SLP on board.  Remains NPO  - consider repeat x-ray for any signs of worsening shortness of breadth.  Encourage CPAP appliance workup other home meds as reviewed      Rubén Bush MD

## 2024-12-09 LAB
ALBUMIN SERPL-MCNC: 2.7 G/DL (ref 3.4–4.8)
ALBUMIN/GLOB SERPL: 0.9 RATIO (ref 1.1–2)
ALP SERPL-CCNC: 61 UNIT/L (ref 40–150)
ALT SERPL-CCNC: 14 UNIT/L (ref 0–55)
ANION GAP SERPL CALC-SCNC: 7 MEQ/L
AST SERPL-CCNC: 13 UNIT/L (ref 5–34)
BACTERIA BLD CULT: NORMAL
BILIRUB SERPL-MCNC: 0.3 MG/DL
BUN SERPL-MCNC: 11.4 MG/DL (ref 9.8–20.1)
CALCIUM SERPL-MCNC: 8.1 MG/DL (ref 8.4–10.2)
CHLORIDE SERPL-SCNC: 107 MMOL/L (ref 98–107)
CO2 SERPL-SCNC: 24 MMOL/L (ref 23–31)
CREAT SERPL-MCNC: 0.61 MG/DL (ref 0.55–1.02)
CREAT/UREA NIT SERPL: 19
GFR SERPLBLD CREATININE-BSD FMLA CKD-EPI: >60 ML/MIN/1.73/M2
GLOBULIN SER-MCNC: 2.9 GM/DL (ref 2.4–3.5)
GLUCOSE SERPL-MCNC: 146 MG/DL (ref 82–115)
MAGNESIUM SERPL-MCNC: 2 MG/DL (ref 1.6–2.6)
PHOSPHATE SERPL-MCNC: 2.2 MG/DL (ref 2.3–4.7)
POCT GLUCOSE: 140 MG/DL (ref 70–110)
POCT GLUCOSE: 148 MG/DL (ref 70–110)
POCT GLUCOSE: 156 MG/DL (ref 70–110)
POCT GLUCOSE: 164 MG/DL (ref 70–110)
POTASSIUM SERPL-SCNC: 3.5 MMOL/L (ref 3.5–5.1)
PROT SERPL-MCNC: 5.6 GM/DL (ref 5.8–7.6)
SODIUM SERPL-SCNC: 138 MMOL/L (ref 136–145)
VANCOMYCIN TROUGH SERPL-MCNC: 15.5 UG/ML (ref 15–20)

## 2024-12-09 PROCEDURE — 25000003 PHARM REV CODE 250

## 2024-12-09 PROCEDURE — 94760 N-INVAS EAR/PLS OXIMETRY 1: CPT

## 2024-12-09 PROCEDURE — 80053 COMPREHEN METABOLIC PANEL: CPT

## 2024-12-09 PROCEDURE — 99900031 HC PATIENT EDUCATION (STAT)

## 2024-12-09 PROCEDURE — 99497 ADVNCD CARE PLAN 30 MIN: CPT | Mod: 25,,, | Performed by: INTERNAL MEDICINE

## 2024-12-09 PROCEDURE — 36415 COLL VENOUS BLD VENIPUNCTURE: CPT | Performed by: INTERNAL MEDICINE

## 2024-12-09 PROCEDURE — 83735 ASSAY OF MAGNESIUM: CPT

## 2024-12-09 PROCEDURE — 99900035 HC TECH TIME PER 15 MIN (STAT)

## 2024-12-09 PROCEDURE — 25000003 PHARM REV CODE 250: Performed by: INTERNAL MEDICINE

## 2024-12-09 PROCEDURE — 94761 N-INVAS EAR/PLS OXIMETRY MLT: CPT

## 2024-12-09 PROCEDURE — 63600175 PHARM REV CODE 636 W HCPCS

## 2024-12-09 PROCEDURE — 99223 1ST HOSP IP/OBS HIGH 75: CPT | Mod: ,,, | Performed by: INTERNAL MEDICINE

## 2024-12-09 PROCEDURE — 27000221 HC OXYGEN, UP TO 24 HOURS

## 2024-12-09 PROCEDURE — 36415 COLL VENOUS BLD VENIPUNCTURE: CPT

## 2024-12-09 PROCEDURE — 84100 ASSAY OF PHOSPHORUS: CPT

## 2024-12-09 PROCEDURE — 92526 ORAL FUNCTION THERAPY: CPT

## 2024-12-09 PROCEDURE — 63600175 PHARM REV CODE 636 W HCPCS: Performed by: INTERNAL MEDICINE

## 2024-12-09 PROCEDURE — 25000242 PHARM REV CODE 250 ALT 637 W/ HCPCS

## 2024-12-09 PROCEDURE — 80202 ASSAY OF VANCOMYCIN: CPT | Performed by: INTERNAL MEDICINE

## 2024-12-09 PROCEDURE — 99233 SBSQ HOSP IP/OBS HIGH 50: CPT | Mod: ,,, | Performed by: HOSPITALIST

## 2024-12-09 PROCEDURE — 21400001 HC TELEMETRY ROOM

## 2024-12-09 PROCEDURE — 94640 AIRWAY INHALATION TREATMENT: CPT

## 2024-12-09 RX ORDER — HYDRALAZINE HYDROCHLORIDE 20 MG/ML
10 INJECTION INTRAMUSCULAR; INTRAVENOUS EVERY 6 HOURS PRN
Status: DISCONTINUED | OUTPATIENT
Start: 2024-12-09 | End: 2024-12-14 | Stop reason: HOSPADM

## 2024-12-09 RX ADMIN — HYDRALAZINE HYDROCHLORIDE 10 MG: 20 INJECTION INTRAMUSCULAR; INTRAVENOUS at 12:12

## 2024-12-09 RX ADMIN — INSULIN GLARGINE 10 UNITS: 100 INJECTION, SOLUTION SUBCUTANEOUS at 09:12

## 2024-12-09 RX ADMIN — IPRATROPIUM BROMIDE AND ALBUTEROL SULFATE 3 ML: 2.5; .5 SOLUTION RESPIRATORY (INHALATION) at 12:12

## 2024-12-09 RX ADMIN — IPRATROPIUM BROMIDE AND ALBUTEROL SULFATE 3 ML: 2.5; .5 SOLUTION RESPIRATORY (INHALATION) at 07:12

## 2024-12-09 RX ADMIN — VANCOMYCIN HYDROCHLORIDE 1000 MG: 1 INJECTION, POWDER, LYOPHILIZED, FOR SOLUTION INTRAVENOUS at 12:12

## 2024-12-09 RX ADMIN — ENOXAPARIN SODIUM 40 MG: 40 INJECTION SUBCUTANEOUS at 09:12

## 2024-12-09 RX ADMIN — LEVETIRACETAM 750 MG: 100 INJECTION, SOLUTION INTRAVENOUS at 09:12

## 2024-12-09 RX ADMIN — MUPIROCIN: 20 OINTMENT TOPICAL at 09:12

## 2024-12-09 RX ADMIN — Medication: at 09:12

## 2024-12-09 RX ADMIN — PIPERACILLIN SODIUM AND TAZOBACTAM SODIUM 4.5 G: 4; .5 INJECTION, POWDER, LYOPHILIZED, FOR SOLUTION INTRAVENOUS at 09:12

## 2024-12-09 RX ADMIN — Medication: at 02:12

## 2024-12-09 RX ADMIN — IPRATROPIUM BROMIDE AND ALBUTEROL SULFATE 3 ML: 2.5; .5 SOLUTION RESPIRATORY (INHALATION) at 03:12

## 2024-12-09 RX ADMIN — PIPERACILLIN SODIUM AND TAZOBACTAM SODIUM 4.5 G: 4; .5 INJECTION, POWDER, LYOPHILIZED, FOR SOLUTION INTRAVENOUS at 01:12

## 2024-12-09 RX ADMIN — PANTOPRAZOLE SODIUM 40 MG: 40 INJECTION, POWDER, FOR SOLUTION INTRAVENOUS at 09:12

## 2024-12-09 NOTE — PROGRESS NOTES
Infectious Disease  Patient Name Elisabet Choi  79 y.o. female.  MRN: [id number]   Length of stay: 6  Chief Complaint: Altered Mental Status (Pt arrives via [facility], EMS reports pt is coming from [facility], Nurse checked on pt approx 1 hr ago and pt was unresponsive, no facial asymmetry noted , pt is following command, equal  strength. EMS )       Interval history:   12/5 - afebrile. Having thoracic back pain, new on set. No changes seen on CT. Obtain plain film. May need MRI. Continue abx  12/6 - afebrile. XR shows no changes. Still complain of 9/10, please obtain MRI T spine if possible.   12/9 - afebrile. MRI negative. Blood cx with CONS multiple spp both sets drawn at same time and patient with multiple venipuncture sites. May be contaminant, however, no other source of her infection found, complete #7 days of vanco tomorrow on 12/10. Then would discontinue and observe. ID will sign off, call back if needed.   Assessment and Plan:   1) Septic shock  - hypoxia, leukocytosis, AMS, fever, acute renal injury   - CT A & P 12/3 - . There is a small possibly loculated right sided pleural effusion and a trace left sided pleural effusion with adjacent consolidation atelectasis and ground glass opacity. A superimposed pneumonia at the right lung base is not excluded. There is mild thickening at the distal rectum through anorectal verge which may represent proctocolitis.  Details and other findings as discussed above.   - CT head 12/3 -   Similar right-sided ventriculostomy is again seen with unchanged hypodensities around the tube in the right frontal lobe. No acute intracranial process identified. Details and other findings as noted above.   - UA not consistent with infection  - currently on pip/tazo, can discontinue completed #7 days   - currently on vancomycin, goal 15-20, Rx to dose      2) Bacteremia  - BCx 12/2 - Staphylococcus hominis both sets   - BCx 12/4 - ngtd  - currently on vancomycin, goal  15-20, Rx to dose   - both sets drawn at same time and patient with multiple venipuncture sites. May be contaminant, however, no other source of her infection found, complete #7 days of vanco tomorrow on 12/10      3) Leukocytosis  - in setting of suspected infection   - trending down   - hx of MDRO UTIs    4) Back pain   - XR 12/5 Degenerative changes with no acute abnormality of the thoracic spine appreciated.   - Still complain of 9/10, please obtain MRI T spine if possible.       Discussed with patient  Discussed and seen with RN  Discussed with Hospitalist, Dr UNDERWOOD regarding plan of care     Francesca Oconnell MD, MPH  [facility]     Thank you for this consultation. I will follow up with the patient. Please contact via Epic secure chat with any questions.         HPI:   Patient is Elisabet Choi a 79 y.o. female admitted on 12/2 for AMS. Upon evaluation patient with fever, leukocytosis, hypoxia, hypotension refractory to IVF requiring pressors. CT imaging showed evidence of small right pleural effusion, GGOs and thickening of distal rectum concerning for proctitis. Patient was started on empiric antimicrobials. Blood cx have isolated CONS. Patient has known afib on DOAC, DMII, PAD, hyperlipidemia, hypothyroidism, seizures,  NPH with VPS, SPC, prior TIA/CVA, b12 deficiency, anxiety, depression, dementia, s/p prior hysterectomy, cholecystectomy, appendectomy, kyphoplasty, b/l carotid endarterectomy. Infectious diseases consulted for evaluation and management.      Past Medical History:       Past Medical History:   Diagnosis Date    Acute cystitis     Allergic rhinitis     Anemia, unspecified     Angina pectoris     Arthritis     Back pain     Bipolar disorder, unspecified     Celiac disease     Cellulitis     CHF (congestive heart failure)     Constipation     Contracture, left ankle     Dementia     Depressive episode     Diabetes mellitus     Diabetes mellitus with ulcer of foot      Displacement of other urinary catheter, subsequent encounter     Edema     Elevated white blood cell count     Encounter for blood transfusion     Fatigue     Fluid retention     Genetic anomalies of leukocytes     GERD without esophagitis     Goiter     HLD (hyperlipidemia)     Hydrocephalus     Hypertension     Hypokalemia     Hypotension     Hypothyroidism     Magnesium deficiency     Major depression     Migraine     Mild protein-calorie malnutrition     Morbid obesity     Obstructive and reflux uropathy     Osteoarthritis     Osteoporosis     Overactive bladder     Pressure ulcer of right heel     Pressure ulcer of sacral region     Pressure ulcer of sacral region, stage 3     Pruritus     PVD (peripheral vascular disease)     Radiculopathy, lumbar region     Seizures     Sleep apnea     uses CPAP    SOB (shortness of breath)     Stroke     TIA (transient ischemic attack)     Urinary tract infection, site not specified     Vitamin deficiency     Wheelchair dependent      Past Surgical History:   Procedure Laterality Date    ABDOMINAL SURGERY      APPENDECTOMY      BACK SURGERY      BLADDER SURGERY      BRAIN SURGERY      CAROTID ENDARTERECTOMY      CHOLECYSTECTOMY      CSF SHUNT      HERNIA REPAIR      HYSTERECTOMY      INSERTION, SUPRAPUBIC CATHETER N/A 3/26/2024    Procedure: INSERTION, SUPRAPUBIC CATHETER;  Surgeon: Fuad Szymanski MD;  Location: [facility];  Service: Urology;  Laterality: N/A;  CYSTO W/ SUPRAPUBIC CATH INSERTION    TONSILLECTOMY      VASCULAR SURGERY       Review of patient's allergies indicates:   Allergen Reactions    Gluten      Other reaction(s): ciliac disease    Gluten protein     Ropinirole      Other reaction(s): hallucinations    Wheat      Other reaction(s): ciliac disease    Wheat containing prod     Zolpidem      hallucinates  Other reaction(s): hallucinations     Current Outpatient Medications   Medication Instructions    acetaminophen (TYLENOL) 1,000 mg, Oral, Every 4 hours PRN     acidophilus-pectin, citrus 100 million cell-10 mg Cap 10 mg, Oral    ALPRAZolam (XANAX) 0.25 mg, Oral, 2 times daily PRN    amitriptyline (ELAVIL) 25 mg, Oral, Nightly PRN    ascorbic acid, vitamin C, (VITAMIN C) 500 MG tablet 1 tablet, Oral, Every morning    cranberry fruit (CRANBERRY) 450 mg Tab Oral    cyanocobalamin (VITAMIN B-12) 100 mcg, Oral, Daily    doxazosin (CARDURA) 1 mg, Oral, Nightly    dulaglutide (TRULICITY) 3 mg, Subcutaneous, Every 7 days, wednesdays    DULoxetine (CYMBALTA) 60 mg, Oral, Daily    ezetimibe (ZETIA) 10 mg, Oral, Daily    folic acid (FOLVITE) 1 mg, Oral, Daily    gabapentin (NEURONTIN) 300 mg, Oral, 2 times daily    HYDROcodone-acetaminophen (NORCO)  mg per tablet 1 tablet, Oral, 3 times daily    insulin degludec 20 Units, Subcutaneous, Daily    levETIRAcetam (KEPPRA) 750 mg, Oral, Nightly    levothyroxine (SYNTHROID, LEVOTHROID) 175 mcg, Oral, Before breakfast    lovastatin (ALTOPREV) 40 mg, Oral, Nightly    magnesium oxide (MAG-OX) 400 mg (241.3 mg magnesium) tablet 1 tablet, Oral, Every morning    methenamine (HIPREX) 1 g, Oral, 2 times daily    metoprolol tartrate (LOPRESSOR) 50 mg, Oral, 2 times daily    polyethylene glycol (GLYCOLAX) 17 g, Oral, Daily    POTASSIUM CHLORIDE ORAL 20 mEq, Oral, Daily    SANTYL ointment Topical (Top)    solifenacin (VESICARE) 10 mg, Oral, Nightly    tiZANidine 2 mg, Oral, 3 times daily PRN    topiramate (TOPAMAX) 100 mg, Oral, 2 times daily    torsemide (DEMADEX) 40 mg, Oral, Daily    vitamin D (VITAMIN D3) 1,000 Units, Oral, Daily    zinc gluconate 50 mg tablet 1 tablet, Oral, Every morning       Current Facility-Administered Medications:     acetaminophen suppository 650 mg, 650 mg, Rectal, Q6H PRN, Jaqui Holman, AGACNP-BC, 650 mg at 12/06/24 1322    albuterol-ipratropium 2.5 mg-0.5 mg/3 mL nebulizer solution 3 mL, 3 mL, Nebulization, Q4H WAKE, Ashu, Samreen, DO, 3 mL at 12/09/24 0742    Amino acid 4.25% - dextrose 5% (CLINIMIX-E)  solution (1L provides 42.5 gm AA, 50 gm CHO (170 kcal/L dextrose), Na 35, K 30, Mg 5, Ca 4.5, Acetate 70, Cl 39, Phos 15), , Intravenous, Continuous, Cecilio Bush MD, Last Rate: 50 mL/hr at 12/08/24 1806, New Bag at 12/08/24 1806    dextrose 10% bolus 125 mL 125 mL, 12.5 g, Intravenous, PRN, Ashu, Vy, DO    dextrose 10% bolus 250 mL 250 mL, 25 g, Intravenous, PRN, Ashu, Vy, DO    enoxaparin injection 40 mg, 40 mg, Subcutaneous, Q12H, Ashu, Vy, DO, 40 mg at 12/08/24 2036    glucagon (human recombinant) injection 1 mg, 1 mg, Intramuscular, PRN, Ashu, Vy, DO    insulin aspart U-100 injection 0-10 Units, 0-10 Units, Subcutaneous, Q6H PRN, Ashu, Vy, DO, 2 Units at 12/05/24 1747    insulin glargine U-100 (Lantus) injection 10 Units, 10 Units, Subcutaneous, QHS, Ashu, Vy, DO, 10 Units at 12/08/24 2040    levETIRAcetam injection 750 mg, 750 mg, Intravenous, Daily, Mj Ferreira MD, 750 mg at 12/08/24 0935    mupirocin 2 % ointment, , Nasal, BID, Donald Mireles MD, Given at 12/08/24 2036    pantoprazole injection 40 mg, 40 mg, Intravenous, Daily, Ashu, Vy, DO, 40 mg at 12/08/24 0935    piperacillin-tazobactam (ZOSYN) 4.5 g in D5W 100 mL IVPB (MB+), 4.5 g, Intravenous, Q8H, Ashu, Vy, DO, Stopped at 12/09/24 0538    sodium chloride 0.9% flush 10 mL, 10 mL, Intravenous, PRN, Ashu, Vy, DO    Flushing PICC/Midline Protocol, , , Until Discontinued **AND** sodium chloride 0.9% flush 10 mL, 10 mL, Intravenous, Q12H PRN, Gadivemula, Cecilio R, MD    vancomycin (VANCOCIN) 1,000 mg in 0.9% NaCl 250 mL IVPB (admixture device), 1,000 mg, Intravenous, Q18H, Cecilio Bush MD, Stopped at 12/08/24 1920    Pharmacy to dose Vancomycin consult, , , Once **AND** vancomycin - pharmacy to dose, , Intravenous, pharmacy to manage frequency, Mj Ferreira MD    zinc oxide-cod liver oil 40 % paste, , Topical (Top), TID, KeysDonald MD, Given at 12/08/24 2036  Review of Systems   Constitutional:  Negative  for chills and fever.   HENT:  Negative for congestion, ear discharge, ear pain, facial swelling, mouth sores, postnasal drip, rhinorrhea, sinus pressure, sinus pain, sneezing, sore throat and trouble swallowing.    Eyes:  Negative for discharge, redness and itching.   Respiratory:  Negative for cough, chest tightness, shortness of breath and wheezing.    Cardiovascular:  Negative for chest pain, palpitations and leg swelling.   Gastrointestinal:  Negative for abdominal distention, abdominal pain, diarrhea, nausea and vomiting.   Genitourinary:  Negative for dysuria, flank pain, frequency and urgency.   Musculoskeletal:  Positive for back pain. Negative for myalgias and neck stiffness.   Skin:  Negative for rash and wound.   Allergic/Immunologic: Negative for immunocompromised state.   Neurological:  Negative for dizziness, light-headedness and headaches.   Hematological:  Negative for adenopathy.   Psychiatric/Behavioral:  Negative for agitation, confusion and suicidal ideas. The patient is not nervous/anxious.        Objective:   Temp:  [97.8 °F (36.6 °C)-98.5 °F (36.9 °C)] 98.2 °F (36.8 °C)  Pulse:  [] 88  Resp:  [16-18] 16  SpO2:  [97 %-100 %] 100 %  BP: (134-172)/(67-87) 161/81     Physical Exam  Constitutional:       Appearance: Normal appearance. She is well-developed. She is obese.   HENT:      Nose: Nose normal.      Mouth/Throat:      Pharynx: No oropharyngeal exudate.   Eyes:      General: Lids are normal. No scleral icterus.        Right eye: No discharge.      Conjunctiva/sclera: Conjunctivae normal.      Pupils: Pupils are equal, round, and reactive to light.   Neck:      Thyroid: No thyromegaly.      Vascular: No JVD.      Trachea: Trachea normal.   Cardiovascular:      Rate and Rhythm: Normal rate and regular rhythm.      Pulses: Normal pulses.      Heart sounds: Normal heart sounds. No murmur heard.     No friction rub.   Pulmonary:      Effort: Pulmonary effort is normal. No respiratory  "distress.      Breath sounds: Normal breath sounds. No wheezing.      Comments: nc  Chest:      Chest wall: No tenderness.   Abdominal:      General: Bowel sounds are normal. There is no distension.      Palpations: Abdomen is soft.      Tenderness: There is no abdominal tenderness. There is no guarding or rebound.   Musculoskeletal:         General: No tenderness. Normal range of motion.      Cervical back: Full passive range of motion without pain, normal range of motion and neck supple.   Lymphadenopathy:      Cervical: No cervical adenopathy.   Skin:     General: Skin is warm and dry.      Findings: No rash.   Neurological:      Mental Status: She is alert and oriented to person, place, and time.      Cranial Nerves: No cranial nerve deficit.      Sensory: No sensory deficit.   Psychiatric:         Speech: Speech normal.         Behavior: Behavior normal.         Thought Content: Thought content normal.         Judgment: Judgment normal.         Estimated Creatinine Clearance: 81.1 mL/min (based on SCr of 0.61 mg/dL).  No results for input(s): "WBC", "PLT", "CREAT", "PROCAL" in the last 48 hours.    Invalid input(s): "HEM"    Microbiology Results (last 7 days)       Procedure Component Value Units Date/Time    Blood Culture [[id number]]  (Normal) Collected: 12/04/24 1335    Order Status: Completed Specimen: Blood Updated: 12/08/24 1601     Blood Culture No Growth At 96 Hours    Blood Culture #1 **CANNOT BE ORDERED STAT** [[id number]]  (Abnormal)  (Susceptibility) Collected: 12/03/24 0006    Order Status: Completed Specimen: Blood from Antecubital, Left Updated: 12/06/24 0715     Blood Culture Staphylococcus hominis      Staphylococcus epidermidis     GRAM STAIN Gram Positive Cocci, probable Staphylococcus      Seen in gram stain of broth only      2 of 2 bottles positive    Blood Culture #1 **CANNOT BE ORDERED STAT** [[id number]]  (Abnormal)  (Susceptibility) Collected: 12/03/24 0006    Order Status: Completed " Specimen: Blood from Antecubital, Left Updated: 12/06/24 0715     Blood Culture Staphylococcus haemolyticus      Staphylococcus epidermidis     GRAM STAIN Gram Positive Cocci, probable Staphylococcus      Seen in gram stain of broth only      1 of 2 Aerobic bottles positive    BCID2 Panel [[id number]]  (Abnormal) Collected: 12/03/24 0006    Order Status: Completed Specimen: Blood from Antecubital, Left Updated: 12/03/24 1635     CTX-M (ESBL ) N/A     IMP (Cabapenemase ) N/A     KPC resistance gene (Carbapenemase ) N/A     mcr-1 N/A     mecA ID Detected     Comment: Note: Antimicrobial resistance can occur via multiple mechanisms. A Not Detected result for antimicrobial resistance gene(s) does not indicate antimicrobial susceptibility. Subculturing is required for species identification and susceptibility testing of   isolates.        mecA/C and MREJ (MRSA) gene N/A     NDM (Carbapenemase ) N/A     OXA-48-like (Carbapenemase ) N/A     Bk/B (VRE gene) N/A     VIM (Carbapenemase ) N/A     Enterococcus faecalis Not Detected     Enterococcus faecium Not Detected     Listeria monocytogenes Not Detected     Staphylococcus spp. Detected     Staphylococcus aureus Not Detected     Staphylococcus epidermidis Detected     Staphylococcus lugdunensis Not Detected     Streptococcus spp. Not Detected     Streptococcus agalactiae (Group B) Not Detected     Streptococcus pneumoniae Not Detected     Streptococcus pyogenes (Group A) Not Detected     Acinetobacter calcoaceticus/baumannii complex Not Detected     Bacteroides fragilis Not Detected     Enterobacterales Not Detected     Enterobacter cloacae complex Not Detected     Escherichia coli Not Detected     Klebsiella aerogenes Not Detected     Klebsiella oxytoca Not Detected     Klebsiella pneumoniae group Not Detected     Proteus spp. Not Detected     Salmonella spp. Not Detected     Serratia marcescens Not Detected     Haemophilus  influenzae Not Detected     Neisseria meningitidis Not Detected     Pseudomonas aeruginosa Not Detected     Stenotrophomonas maltophilia Not Detected     Candida albicans Not Detected     Candida auris Not Detected     Candida glabrata Not Detected     Candida krusei Not Detected     Candida parapsilosis Not Detected     Candida tropicalis Not Detected     Cryptococcus neoformans/gattii Not Detected    Narrative:      The [employer] BCID2 Panel is a multiplexed nucleic acid test intended for the use with [employer]am® 2.0 or [employer]olog[employer]am® [employer] for the simultaneous qualitative detection and identification of multiple bacterial and yeast nucleic acids and select genetic determinants associated with antimicrobial resistance.  The [employer] BCID2 Panel test is performed directly on blood culture samples identified as positive by a continuous monitoring blood culture system.  Results are intended to be interpreted in conjunction with Gram stain results.    Respiratory Culture [[id number]]     Order Status: Sent Specimen: Sputum, Induced             Significant Labs: All pertinent labs within the past 24 hours have been reviewed.    Significant Imaging: I have reviewed all relevant and available imaging results/findings within the past 24 hours.      Plan -- see top of note

## 2024-12-09 NOTE — CONSULTS
That there is operation in pneumonia and understanding lungs and was preserved on nephrology has a safe long-term comes in the do that usually the couple weeks patient's urinating goals so he is having midodrine 10 t.i.d. Patient Name: Elisabet Choi   MRN: [id number]   Admission Date: 12/2/2024   Hospital Length of Stay: 6   Attending Provider: Cecilio Bush MD   Consulting Provider: Mal Han M.D.  Reason for Consult: Goals of Care  Primary Care Physician: Clinic, [facility]     Principal Problem: <principal problem not specified>     Patient information was obtained from patient, relative(s), and ER records.      Final diagnoses:  [R41.82] AMS (altered mental status)  [A41.9] Sepsis, due to unspecified organism, unspecified whether acute organ dysfunction present (Primary)  [J18.9] Pneumonia due to infectious organism, unspecified laterality, unspecified part of lung  [L03.90] Cellulitis, unspecified cellulitis site       We reviewed the patient's current clinical status with the nurse. We reviewed clinical documentation, labs and imaging.       Advance Care Planning     Date: 12/09/2024    Living Will  During this visit, I engaged the patient and family  in the voluntary advance care planning process.  The patient and I reviewed the role for advance directives and their purpose in directing future healthcare if the patient's unable to speak for him/herself.  At this point in time, the patient does have full decision-making capacity.  We discussed different extreme health states that she could experience, and reviewed what kind of medical care she would want in those situations.  The patient, family, and healthcare power of   communicated that if she were comatose and had little chance of a meaningful recovery, she would not want machines/life-sustaining treatments used. In addition to the above preference, other important end-of-life issues for the patient include no persistent use of  life sustaining devices. The patient has completed a living will to reflect these preferences and The patient has already designated a healthcare power of  to make decisions on her behalf.  I spent a total of 20 minutes engaging the patient in this advance care planning discussion.          Power of   I initiated the process of voluntary advance care planning today and explained the importance of this process to the patient.  I introduced the concept of advance directives to the patient, as well. Then the patient received detailed information about the importance of designating a Health Care Power of  (HCPOA). She was also instructed to communicate with this person about their wishes for future healthcare, should she become sick and lose decision-making capacity. The patient has previously appointed a HCPOA and has appointed her  dtr-in-law , health care agent:  Christine Choi  & health care agent number:  [phone] . I encouraged her to communicate with this person about their wishes for future healthcare, should she become sick and lose decision-making capacity.      A total of 15 min was spent on advance care planning, goals of care discussion, emotional support, formulating and communicating prognosis and exploring burden/benefit of various approaches of treatment. This discussion occurred on a fully voluntary basis with the verbal consent of the patient and/or family.    [facility]  I engaged the patient and healthcare power of   in a voluntary conversation about advance care planning and we specifically addressed what the goals of care would be moving forward, in light of the patient's change in clinical status, specifically acute hospitalization secondary to aspiration pneumonia with septic shock, proctocolitis, acute hypoxic respiratory failure, not requiring intubation or mechanical ventilation.  She was also admitted with acute altered mental status.  She has been treated with IV  "broad-spectrum antibiotics.  She has had some progressive improvement in her condition.    I spoke with the patient who was of limited communication but able to answer some simple questions.  She did declare that Ms. King would assist in making medical decisions on her behalf.  She declared miscarriage to be her sister-in-law.  However we later confirmed that this was her daughter-in-law.  We also noted that a living will was previously completed by the patient in 2017.  She also had a power of  completed at the same time.  This designated Ms. King in this capacity.  An LA post was also completed along with a another similar document stating her goals and wishes.  There were some conflicts regarding these decisions and which 1 stating the patient wished for comfort care measures and another stating she wished for a full code status in the event of a cardiopulmonary arrest.    I attempted to communicate with the patient and ask questions regarding her goals of care.I reviewed the risks and benefits of aggressive cardiopulmonary resuscitative measures taken in the event of a cardiopulmonary arrest event. I discussed the high risk of non-survival from such an event.  The explained that statistically, only about 1 in 15 patients survive a cardiopulmonary arrest to return home, despite aggressive CPR and life saving measures. I discussed the risk of increased morbidity and a potential decline in quality of life with survival from such an event. Finally I reviewed the risks to the patient who survives such an event, including chest wall injuries from CPR, the risk of persistent ventilator needs and the risk of anoxic brain injuries.  The patient seemed to be limited in her ability to convey her wishes or understand the complexities of the question.  In regards to this question, her statement was, "do whatever you can.   I clarified that she would agree to whatever her POA elected on her behalf. I contacted her " POA to discuss code status.  She declared that in the event of a cardiac arrest, the patient would elect no chest compressions, no shock and no intubation.  However in the event of respiratory distress or failure without loss of pulse, she would elect temporary intubation and mechanical ventilation. I explained that the patient would be a high risk to not safely extubate should she require intubation. She confirms what was stated in the living will which is that the patient would not wish for long-term mechanical ventilation.    We explored the patient's values and preferences for future care.  The patient and healthcare power of   endorses that what is most important right now is to focus on spending time at home, avoiding the hospital, remaining as independent as possible, symptom/pain control, improvement in condition but with limits to invasive therapies, and comfort and QOL     Accordingly, we have decided that the best plan to meet the patient's goals includes continuing with treatment    A total of 25 min was spent on advance care planning, goals of care discussion, emotional support, formulating and communicating prognosis and exploring burden/benefit of various approaches of treatment. This discussion occurred on a fully voluntary basis with the verbal consent of the patient and/or family.         Artifical nutrition:  I reviewed the patient's wishes concerning the option for or against a future placement of a PEG for the purpose of long term artificial nutrition. I reviewed the benefits and risks. At this time the patient elects for PEG placement in such an event. Her POA agreed that if the patient does not improve with speech evaluations, she would agree to PEG placement.    Symptom review:    Pain. She has chronic LBP per POA in which she is taking Norco 10 mg TID at her home, [facility].  The patient admitted to some mild LBP at present. She has no prn pain meds at present. I noted to POA  that she can not take po meds until she passes a swallow study.    Patient denies anxiety, SOB or other symptoms.    Assessment and Plan:    Goals of care/counseling  Status post pneumonia with septic shock  Staphylococcal epidermidis bacteremia  Lower back pain, chronic    The patient and family would be in favor of placement of PEG tube in the event that patient is unable to improve with progressive swallow study follow up with speech therapy.  I explained to family that the more noninvasive PEG tube placement options such as percutaneous with GI or Interventional Radiology would be preferable to avoid the risk of intubation unnecessarily.  They are in agreement.    I would consider the addition of low-dose p.r.n. IV narcotic pain medications, such as morphine 2 mg IV every 4 hours as needed.  for patient with lower back pain who has been taking Norco 10 mg t.i.d. daily and at her home in his currently NPO.    We will assist in having further discussions with family regarding completion of a new LA post as well as the option of hospice care upon discharge.        History of Present Illness:     79-year-old female with a history of CVA, dementia, obstructive sleep apnea requiring CPAP, normal pressure hydrocephalus with indwelling  shunt, atrial fibrillation, seizures.  The patient is currently hospitalized with pneumonia with associated septic shock and acute hypoxic and hypercapnic respiratory failure.  We were consulted to review goals of care with the patient and family.      Active Ambulatory Problems     Diagnosis Date Noted    Chest pain 06/29/2014    DM (diabetes mellitus) 06/30/2014    H/O: CVA (cerebrovascular accident) 06/30/2014    Back pain 06/30/2014    Celiac disease 06/30/2014    Depression 06/30/2014    Hypothyroidism 06/30/2014    Hyperlipidemia 06/30/2014    Migraine headache 06/30/2014    Recurrent UTI 06/30/2014    Hip fx, left, closed, initial encounter     New onset atrial fibrillation  09/25/2024    Cellulitis of left leg 09/25/2024    CAP (community acquired pneumonia) 11/26/2024     Resolved Ambulatory Problems     Diagnosis Date Noted    Recurrent urinary tract infection 03/26/2024     Past Medical History:   Diagnosis Date    Acute cystitis     Allergic rhinitis     Anemia, unspecified     Angina pectoris     Arthritis     Bipolar disorder, unspecified     Cellulitis     CHF (congestive heart failure)     Constipation     Contracture, left ankle     Dementia     Depressive episode     Diabetes mellitus     Diabetes mellitus with ulcer of foot     Displacement of other urinary catheter, subsequent encounter     Edema     Elevated white blood cell count     Encounter for blood transfusion     Fatigue     Fluid retention     Genetic anomalies of leukocytes     GERD without esophagitis     Goiter     HLD (hyperlipidemia)     Hydrocephalus     Hypertension     Hypokalemia     Hypotension     Magnesium deficiency     Major depression     Migraine     Mild protein-calorie malnutrition     Morbid obesity     Obstructive and reflux uropathy     Osteoarthritis     Osteoporosis     Overactive bladder     Pressure ulcer of right heel     Pressure ulcer of sacral region     Pressure ulcer of sacral region, stage 3     Pruritus     PVD (peripheral vascular disease)     Radiculopathy, lumbar region     Seizures     Sleep apnea     SOB (shortness of breath)     Stroke     TIA (transient ischemic attack)     Urinary tract infection, site not specified     Vitamin deficiency     Wheelchair dependent         Past Surgical History:   Procedure Laterality Date    ABDOMINAL SURGERY      APPENDECTOMY      BACK SURGERY      BLADDER SURGERY      BRAIN SURGERY      CAROTID ENDARTERECTOMY      CHOLECYSTECTOMY      CSF SHUNT      HERNIA REPAIR      HYSTERECTOMY      INSERTION, SUPRAPUBIC CATHETER N/A 3/26/2024    Procedure: INSERTION, SUPRAPUBIC CATHETER;  Surgeon: Fuad Szymanski MD;  Location: [facility];  Service:  Urology;  Laterality: N/A;  CYSTO W/ SUPRAPUBIC CATH INSERTION    TONSILLECTOMY      VASCULAR SURGERY          Review of patient's allergies indicates:   Allergen Reactions    Gluten      Other reaction(s): ciliac disease    Gluten protein     Ropinirole      Other reaction(s): hallucinations    Wheat      Other reaction(s): ciliac disease    Wheat containing prod     Zolpidem      hallucinates  Other reaction(s): hallucinations          Current Facility-Administered Medications:     acetaminophen suppository 650 mg, 650 mg, Rectal, Q6H PRN, Jaqui Holman AGACNP-BC, 650 mg at 12/06/24 1322    albuterol-ipratropium 2.5 mg-0.5 mg/3 mL nebulizer solution 3 mL, 3 mL, Nebulization, Q4H WAKE, Ashu, Vy, DO, 3 mL at 12/09/24 1243    Amino acid 4.25% - dextrose 5% (CLINIMIX-E) solution (1L provides 42.5 gm AA, 50 gm CHO (170 kcal/L dextrose), Na 35, K 30, Mg 5, Ca 4.5, Acetate 70, Cl 39, Phos 15), , Intravenous, Continuous, Cecilio Bush MD, Last Rate: 50 mL/hr at 12/08/24 1806, New Bag at 12/08/24 1806    dextrose 10% bolus 125 mL 125 mL, 12.5 g, Intravenous, PRN, Ashu, Vy, DO    dextrose 10% bolus 250 mL 250 mL, 25 g, Intravenous, PRN, Ashu, Vy, DO    enoxaparin injection 40 mg, 40 mg, Subcutaneous, Q12H, Ashu, Vy, DO, 40 mg at 12/09/24 0926    glucagon (human recombinant) injection 1 mg, 1 mg, Intramuscular, PRN, Ashu, Vy, DO    hydrALAZINE injection 10 mg, 10 mg, Intravenous, Q6H PRN, Cecilio Bush MD, 10 mg at 12/09/24 1202    insulin aspart U-100 injection 0-10 Units, 0-10 Units, Subcutaneous, Q6H PRN, Ashu, Vy, DO, 2 Units at 12/05/24 1747    insulin glargine U-100 (Lantus) injection 10 Units, 10 Units, Subcutaneous, QHS, Ashu, Vy, DO, 10 Units at 12/08/24 2040    levETIRAcetam injection 750 mg, 750 mg, Intravenous, Daily, Mj Ferreira MD, 750 mg at 12/09/24 0925    mupirocin 2 % ointment, , Nasal, BID, Donald Mireles MD, Given at 12/09/24 0926    pantoprazole injection 40 mg, 40  "mg, Intravenous, Daily, Ashu, Vy, DO, 40 mg at 12/09/24 0925    sodium chloride 0.9% flush 10 mL, 10 mL, Intravenous, PRN, Ashu, Vy, DO    Flushing PICC/Midline Protocol, , , Until Discontinued **AND** sodium chloride 0.9% flush 10 mL, 10 mL, Intravenous, Q12H PRN, Cecilio Bush MD    vancomycin (VANCOCIN) 1,000 mg in 0.9% NaCl 250 mL IVPB (admixture device), 1,000 mg, Intravenous, Q18H, Cecilio Bush MD, Last Rate: 166.7 mL/hr at 12/09/24 1202, 1,000 mg at 12/09/24 1202    Pharmacy to dose Vancomycin consult, , , Once **AND** vancomycin - pharmacy to dose, , Intravenous, pharmacy to manage frequency, Mj Ferreira MD    zinc oxide-cod liver oil 40 % paste, , Topical (Top), TID, oDnald Mireles MD, Given at 12/09/24 0926       Current Facility-Administered Medications:     acetaminophen, 650 mg, Rectal, Q6H PRN    dextrose 10%, 12.5 g, Intravenous, PRN    dextrose 10%, 25 g, Intravenous, PRN    glucagon (human recombinant), 1 mg, Intramuscular, PRN    hydrALAZINE, 10 mg, Intravenous, Q6H PRN    insulin aspart U-100, 0-10 Units, Subcutaneous, Q6H PRN    sodium chloride 0.9%, 10 mL, Intravenous, PRN    Flushing PICC/Midline Protocol, , , Until Discontinued **AND** sodium chloride 0.9%, 10 mL, Intravenous, Q12H PRN    Pharmacy to dose Vancomycin consult, , , Once **AND** vancomycin - pharmacy to dose, , Intravenous, pharmacy to manage frequency     No family history on file.     Review of Systems   All other systems reviewed and are negative.         12 point review of systems conducted, negative except as stated in the history of present illness. See HPI for details.      Objective:   /87   Pulse 102   Temp 98.1 °F (36.7 °C) (Oral)   Resp (!) 21   Ht 4' 8" (1.422 m)   Wt 105.9 kg (233 lb 7.5 oz)   SpO2 98%   BMI 52.34 kg/m²      Physical Exam  Vitals reviewed.   Constitutional:       Appearance: She is obese. She is not ill-appearing or toxic-appearing.   HENT:      Head: " Normocephalic.      Right Ear: External ear normal.      Left Ear: External ear normal.      Nose: Nose normal.      Mouth/Throat:      Mouth: Mucous membranes are moist.      Pharynx: Oropharynx is clear.   Eyes:      Extraocular Movements: Extraocular movements intact.      Conjunctiva/sclera: Conjunctivae normal.      Pupils: Pupils are equal, round, and reactive to light.   Cardiovascular:      Rate and Rhythm: Normal rate and regular rhythm.      Pulses: Normal pulses.      Heart sounds: Normal heart sounds.   Pulmonary:      Effort: Pulmonary effort is normal.      Breath sounds: Normal breath sounds.   Abdominal:      General: Abdomen is flat. Bowel sounds are normal. There is no distension.      Palpations: Abdomen is soft.      Tenderness: There is no abdominal tenderness.   Musculoskeletal:      Right lower leg: No edema.      Left lower leg: No edema.   Skin:     General: Skin is warm.   Neurological:      Mental Status: Mental status is at baseline.            Review of Symptoms  Review of Symptoms      Symptom Assessment (ESAS 0-10 Scale)  Pain:  0  Dyspnea:  0  Anxiety:  0  Nausea:  0  Depression:  0  Anorexia:  0  Fatigue:  0  Insomnia:  0  Restlessness:  0  Agitation:  0         Performance Status:  30    Living Arrangements:  Lives in nursing home    Psychosocial/Cultural:   See Palliative Psychosocial Note: Yes  **Primary  to Follow**  Palliative Care  Consult: No      Advance Care Planning   Advance Directives:   Living Will: Yes        Copy on chart: Yes    [place]: Yes    Medical Power of : Yes    Agent's Name:  Christine Choi   Agent's Contact Number:  [phone]    Decision Making:  Family answered questions and Patient unable to communicate due to disease severity/cognitive impairment  Goals of Care: The patient and healthcare power of   endorses that what is most important right now is to focus on spending time at home, avoiding the hospital, remaining as  independent as possible, symptom/pain control, quality of life, even if it means sacrificing a little time, improvement in condition but with limits to invasive therapies, and comfort and QOL     Accordingly, we have decided that the best plan to meet the patient's goals includes continuing with treatment            Caregiver burden formerly assessed: Yes  The patient lives at [facility] since 2018.  She has been bed-bound for at least 6 months requiring Deni lift for transfers.  She has progressive dementia.      > 50% of 70 min of encounter was spent in chart review, face to face discussion of goals of care, symptom assessment, coordination of care and emotional support.     An additional 18 min of time was spent in of voluntary discussion of advanced care planning.    Mal Han M.D.  Palliative Medicine  [facility]

## 2024-12-09 NOTE — PLAN OF CARE
Problem: Adult Inpatient Plan of Care  Goal: Plan of Care Review  Outcome: Progressing  Goal: Patient-Specific Goal (Individualized)  Outcome: Progressing  Goal: Absence of Hospital-Acquired Illness or Injury  Outcome: Progressing  Goal: Optimal Comfort and Wellbeing  Outcome: Progressing  Goal: Readiness for Transition of Care  Outcome: Progressing     Problem: Bariatric Environmental Safety  Goal: Safety Maintained with Care  Outcome: Progressing     Problem: Infection  Goal: Absence of Infection Signs and Symptoms  Outcome: Progressing     Problem: Diabetes Comorbidity  Goal: Blood Glucose Level Within Targeted Range  Outcome: Progressing     Problem: Pneumonia  Goal: Fluid Balance  Outcome: Progressing  Goal: Resolution of Infection Signs and Symptoms  Outcome: Progressing  Goal: Effective Oxygenation and Ventilation  Outcome: Progressing     Problem: Wound  Goal: Optimal Coping  Outcome: Progressing  Goal: Optimal Functional Ability  Outcome: Progressing  Goal: Absence of Infection Signs and Symptoms  Outcome: Progressing  Goal: Improved Oral Intake  Outcome: Progressing  Goal: Optimal Pain Control and Function  Outcome: Progressing  Goal: Skin Health and Integrity  Outcome: Progressing  Goal: Optimal Wound Healing  Outcome: Progressing

## 2024-12-09 NOTE — PROGRESS NOTES
[facility] Hospital Medicine Progress Note        Chief Complaint: Inpatient Follow-up for     HPI:   79 year old woman with PMHx of TIA/CVA, dementia, NPH s/p  shunt, B12 deficiency, Afib on eliquis, ERICA, T2DM, PAD, HLD, hypothyroidism and seizure on keppra presented on 12/2 for AMS. She was febrile to 102.9 and had leukocytosis WBC 17.65 with elevated lactate. BP did not respond to fluids and she was admitted to the ICU for septic shock. CT head without acute abnormality. CT C/A/P with small loculated R sided pleural effusion and a trace left sided pleural effusion w/adjacent consolidation and ground glass opacities. Also conern for proctocolitis. UA was negative. She was started on zosyn. Pressors were weaned off and she was subsequently downgraded to hospital medicine on 12/4. Her blood cultures are growing S.epidermidis. Unclear if contaminant - repeat BCx were obtained and then vancomycin was added while repeat Bcx pending given history of  shunt.     Id were consulted.  Empiric antibiotics were continued.  TTE was ordered.  Lower extremity Doppler showed no signs of DVT.  Id recommended MRI thoracic spine due to persistent pain.  MRI thoracic spine showed no acute abnormalities.  She remained NPO and Clinimix was added for nutritional purposes.     Interval Hx:   No new issues.  Continued Clinimix.  Pain is well controlled.  Afebrile.        Objective/physical exam:  Vitals:    12/09/24 0808 12/09/24 1137 12/09/24 1243 12/09/24 1244   BP: (!) 161/81 (!) 174/80  127/87   Pulse: 88 94 97 102   Resp:   (!) 21    Temp: 98.2 °F (36.8 °C) 98.1 °F (36.7 °C)     TempSrc: Oral Oral     SpO2: 100% 100% 98%    Weight:       Height:         General: In no acute distress, afebrile  Respiratory: Clear to auscultation bilaterally  Cardiovascular: S1, S2, no appreciable murmur  Abdomen: Soft, nontender, BS +  MSK: Warm, no lower extremity edema, no clubbing or cyanosis  Neurologic: Alert  and oriented x4, moving all extremities      Lab Results   Component Value Date     12/09/2024    K 3.5 12/09/2024     12/09/2024    CO2 24 12/09/2024    BUN 11.4 12/09/2024    CREATININE 0.61 12/09/2024    CALCIUM 8.1 (L) 12/09/2024    ANIONGAP 8 01/23/2024    ESTGFRAFRICA 94 01/23/2024    EGFRNONAA >60 07/13/2022      Lab Results   Component Value Date    ALT 14 12/09/2024    AST 13 12/09/2024    ALKPHOS 61 12/09/2024    BILITOT 0.3 12/09/2024      Lab Results   Component Value Date    WBC 6.32 12/06/2024    HGB 9.9 (L) 12/06/2024    HCT 32.3 (L) 12/06/2024    MCV 90.7 12/06/2024     12/06/2024           Medications:   albuterol-ipratropium  3 mL Nebulization Q4H WAKE    enoxparin  40 mg Subcutaneous Q12H    insulin glargine U-100  10 Units Subcutaneous QHS    levETIRAcetam (Keppra) IV (PEDS and ADULTS)  750 mg Intravenous Daily    mupirocin   Nasal BID    pantoprazole  40 mg Intravenous Daily    vancomycin (VANCOCIN) 1,000 mg in 0.9% NaCl 250 mL IVPB (admixture device)  1,000 mg Intravenous Q18H    zinc oxide-cod liver oil   Topical (Top) TID        Current Facility-Administered Medications:     acetaminophen, 650 mg, Rectal, Q6H PRN    dextrose 10%, 12.5 g, Intravenous, PRN    dextrose 10%, 25 g, Intravenous, PRN    glucagon (human recombinant), 1 mg, Intramuscular, PRN    hydrALAZINE, 10 mg, Intravenous, Q6H PRN    insulin aspart U-100, 0-10 Units, Subcutaneous, Q6H PRN    sodium chloride 0.9%, 10 mL, Intravenous, PRN    Flushing PICC/Midline Protocol, , , Until Discontinued **AND** sodium chloride 0.9%, 10 mL, Intravenous, Q12H PRN    Pharmacy to dose Vancomycin consult, , , Once **AND** vancomycin - pharmacy to dose, , Intravenous, pharmacy to manage frequency     Assessment/Plan:    Septic shock on admission-aspiration pneumonia vs colitis versus cellulitis  Staph haemolyticus and epidermidis bacteremia  Proctocolitis-POA  Lower extremity swelling-?  Cellulitis  Loculated pleural  effusion-POA  Hypoxemic respiratory distress due to above-improving  Altered mental status at admission likely infectious versus metabolic encephalopathy-improving  Possible pneumonia, consolidation, ground-glass opacities with atelectasis    HX: Obesity, dementia, history of hydrocephalus s/p  shunt, PAF on Eliquis, history of TIA/CVA, chronic lower extremity enema/venous stasis, history of seizure on Keppra, hypothyroidism, HTN, HLD, PID    Plan:   -consulted palliative for further goals of care discussions.  Peg tube  -MRI thoracic spine showed no acute abnormalities analgesics, therapy as tolerated   -currently vancomycin Zosyn.  Appreciate ID recommendations   -continue current insulin regimen.  No hypoglycemic episodes  -Clinimix for nutrition purposes.  SLP on board.  Remains NPO  - consider repeat x-ray for any signs of worsening shortness of breadth.  Encourage CPAP appliance workup other home meds as reviewed      Rubén Bush MD

## 2024-12-09 NOTE — PT/OT/SLP PROGRESS
[facility]  Speech Language Pathology Department  Dysphagia Therapy Progress Note    Patient Name:  Elisabet Choi   MRN:  [id number]  Admitting Diagnosis: PNA    Recommendations     General recommendations:  repeat Modified Barium Swallow Study pending alertness  Solid texture recommendation:  NPO  Liquid consistency recommendation: NPO   Medications: NPO    Discharge therapy intensity: Moderate Intensity Therapy   Barriers to safe discharge:  severity of impairment    Subjective     Patient awake and alert.  Spiritual/Cultural/Confucianism Beliefs/Practices that affect care: no    Pain/Comfort: Pain Rating 1: 0/10    Respiratory Status:  NC    Objective     Therapeutic Activities:  Pt tolerated thermal stimulation to the anterior faucial pillars x16 with 100% swallow responses.  Laryngeal excursion fair.  Delay 3-7 seconds.    Therapeutic PO Trials:  Consistency Amount Fed By Oral Symptoms Pharyngeal Symptoms   Ice chips x4 SLP Slowed oral transit time Multiple swallows     Assessment     Pt continues to present with oropharyngeal dysphagia requiring repeat MBS.    Outcome Measures     Functional Oral Intake Scale: 1 - Nothing by mouth    Goals     Multidisciplinary Problems       SLP Goals          Problem: SLP    Goal Priority Disciplines Outcome   SLP Goal     SLP Progressing   Description: LTG: Pt will tolerate least restrictive diet with no clinical signs/sx of aspiration.    ST.  Thermal stimulation with 100% swallow initiation  2.  Repeat MBS once appropriate                     Patient Education     Patient provided with verbal education regarding POC.  Understanding was verbalized.    Plan     Will continue to follow and tx as appropriate.    SLP Follow-Up:  Yes   Patient to be seen:  daily   Plan of Care expires:  24  Plan of Care reviewed with:  patient, son       Time Tracking     SLP Treatment Date:   24  Speech Start Time:  1445  Speech Stop Time:  1500      Speech Total Time (min):  15 min    Billable minutes:  Treatment of Swallow Dysfunction, 15 minutes       12/09/2024

## 2024-12-09 NOTE — PROGRESS NOTES
"Pharmacokinetic Assessment Follow Up: IV Vancomycin    Vancomycin serum concentration assessment(s):    The trough level was drawn correctly and can be used to guide therapy at this time. The measurement is within the desired definitive target range of 15 to 20 mcg/mL.    Vancomycin Regimen Plan:    Continue regimen to Vancomycin 1000 mg IV every 18 hours with next serum trough concentration measured at 60 min prior to 1800 dose on 12/11/2024    Drug levels (last 3 results):  Recent Labs   Lab Result Units 12/06/24  1850 12/07/24  1957 12/09/24  1050   Vancomycin Trough ug/ml 21.3* 18.5 15.5       Pharmacy will continue to follow and monitor vancomycin.    Please contact pharmacy at extension [phone] for questions regarding this assessment.    Thank you for the consult,   Carleen Barriga       Patient brief summary:  Elisabet Choi is a 79 y.o. female initiated on antimicrobial therapy with IV Vancomycin for treatment of bacteremia    The patient's current regimen is Vancomycin 1000 mg q18h    Drug Allergies:   Review of patient's allergies indicates:   Allergen Reactions    Gluten      Other reaction(s): ciliac disease    Gluten protein     Ropinirole      Other reaction(s): hallucinations    Wheat      Other reaction(s): ciliac disease    Wheat containing prod     Zolpidem      hallucinates  Other reaction(s): hallucinations       Actual Body Weight:   105.9 kg    Renal Function:   Estimated Creatinine Clearance: 81.1 mL/min (based on SCr of 0.61 mg/dL).,     Dialysis Method (if applicable):  N/A    CBC (last 72 hours):  No results for input(s): "WHITE BLOOD CELL COUNT", "HEMOGLOBIN", "HEMATOCRIT", "PLATELETS", "GRAN%", "LYMPH%", "MONO%", "EOSINOPHIL%", "BASOPHIL%", "DIFFERENTIAL METHOD" in the last 72 hours.    Metabolic Panel (last 72 hours):  Recent Labs   Lab Result Units 12/07/24  0607 12/08/24  0458 12/09/24  0617   Sodium mmol/L 142 144 138   Potassium mmol/L 3.2* 3.7 3.5   Chloride mmol/L 109* 104 107   CO2 " [facility] mmol/L 26 26 24   Glucose mg/dL 84 134* 146*   Blood Urea Nitrogen mg/dL 9.1* 10.3 11.4   Creatinine mg/dL 0.67 0.53* 0.61   Albumin g/dL 2.7* 2.7* 2.7*   Bilirubin Total mg/dL 0.4 0.3 0.3   ALP unit/L 63 66 61   AST unit/L 10 11 13   ALT unit/L 12 13 14   Magnesium Level mg/dL 2.20 2.10 2.00   Phosphorus Level mg/dL 2.5 2.8 2.2*       Vancomycin Administrations:  vancomycin given in the last 96 hours                     vancomycin (VANCOCIN) 1,000 mg in 0.9% NaCl 250 mL IVPB (admixture device) (mg) 1,000 mg New Bag 12/08/24 1750     1,000 mg New Bag  0042     1,000 mg New Bag 12/06/24 2327    vancomycin (VANCOCIN) 1,000 mg in 0.9% NaCl 250 mL IVPB (admixture device) (mg) 1,000 mg New Bag 12/06/24 0517     1,000 mg New Bag 12/05/24 1746                    Microbiologic Results:  Microbiology Results (last 7 days)       Procedure Component Value Units Date/Time    Blood Culture [[id number]]  (Normal) Collected: 12/04/24 1335    Order Status: Completed Specimen: Blood Updated: 12/08/24 1601     Blood Culture No Growth At 96 Hours    Blood Culture #1 **CANNOT BE ORDERED STAT** [[id number]]  (Abnormal)  (Susceptibility) Collected: 12/03/24 0006    Order Status: Completed Specimen: Blood from Antecubital, Left Updated: 12/06/24 0715     Blood Culture Staphylococcus hominis      Staphylococcus epidermidis     GRAM STAIN Gram Positive Cocci, probable Staphylococcus      Seen in gram stain of broth only      2 of 2 bottles positive    Blood Culture #1 **CANNOT BE ORDERED STAT** [[id number]]  (Abnormal)  (Susceptibility) Collected: 12/03/24 0006    Order Status: Completed Specimen: Blood from Antecubital, Left Updated: 12/06/24 0715     Blood Culture Staphylococcus haemolyticus      Staphylococcus epidermidis     GRAM STAIN Gram Positive Cocci, probable Staphylococcus      Seen in gram stain of broth only      1 of 2 Aerobic bottles positive    BCID2 Panel [[id number]]  (Abnormal) Collected: 12/03/24 0006    Order Status:  Completed Specimen: Blood from Antecubital, Left Updated: 12/03/24 7651     CTX-M (ESBL ) N/A     IMP (Cabapenemase ) N/A     KPC resistance gene (Carbapenemase ) N/A     mcr-1 N/A     mecA ID Detected     Comment: Note: Antimicrobial resistance can occur via multiple mechanisms. A Not Detected result for antimicrobial resistance gene(s) does not indicate antimicrobial susceptibility. Subculturing is required for species identification and susceptibility testing of   isolates.        mecA/C and MREJ (MRSA) gene N/A     NDM (Carbapenemase ) N/A     OXA-48-like (Carbapenemase ) N/A     Bk/B (VRE gene) N/A     VIM (Carbapenemase ) N/A     Enterococcus faecalis Not Detected     Enterococcus faecium Not Detected     Listeria monocytogenes Not Detected     Staphylococcus spp. Detected     Staphylococcus aureus Not Detected     Staphylococcus epidermidis Detected     Staphylococcus lugdunensis Not Detected     Streptococcus spp. Not Detected     Streptococcus agalactiae (Group B) Not Detected     Streptococcus pneumoniae Not Detected     Streptococcus pyogenes (Group A) Not Detected     Acinetobacter calcoaceticus/baumannii complex Not Detected     Bacteroides fragilis Not Detected     Enterobacterales Not Detected     Enterobacter cloacae complex Not Detected     Escherichia coli Not Detected     Klebsiella aerogenes Not Detected     Klebsiella oxytoca Not Detected     Klebsiella pneumoniae group Not Detected     Proteus spp. Not Detected     Salmonella spp. Not Detected     Serratia marcescens Not Detected     Haemophilus influenzae Not Detected     Neisseria meningitidis Not Detected     Pseudomonas aeruginosa Not Detected     Stenotrophomonas maltophilia Not Detected     Candida albicans Not Detected     Candida auris Not Detected     Candida glabrata Not Detected     Candida krusei Not Detected     Candida parapsilosis Not Detected     Candida tropicalis Not Detected      Cryptococcus neoformans/gattii Not Detected    Narrative:      The [employer] BCID2 Panel is a multiplexed nucleic acid test intended for the use with SCADA Access® 2.0 or SCADA Access® [employer] Systems for the simultaneous qualitative detection and identification of multiple bacterial and yeast nucleic acids and select genetic determinants associated with antimicrobial resistance.  The [employer] BCID2 Panel test is performed directly on blood culture samples identified as positive by a continuous monitoring blood culture system.  Results are intended to be interpreted in conjunction with Gram stain results.    Respiratory Culture [[id number]]     Order Status: Sent Specimen: Sputum, Induced

## 2024-12-09 NOTE — PLAN OF CARE
[facility] sent clinical updates to [facility] via [facility] Fax. Eli with [facility] stated they are able to accept patient back. Patient's not ready per CM.

## 2024-12-10 LAB
ALBUMIN SERPL-MCNC: 2.4 G/DL (ref 3.4–4.8)
ALBUMIN/GLOB SERPL: 0.8 RATIO (ref 1.1–2)
ALP SERPL-CCNC: 54 UNIT/L (ref 40–150)
ALT SERPL-CCNC: 13 UNIT/L (ref 0–55)
ANION GAP SERPL CALC-SCNC: 2 MEQ/L
AST SERPL-CCNC: 15 UNIT/L (ref 5–34)
BILIRUB SERPL-MCNC: 0.3 MG/DL
BUN SERPL-MCNC: 12.5 MG/DL (ref 9.8–20.1)
CALCIUM SERPL-MCNC: 7.8 MG/DL (ref 8.4–10.2)
CHLORIDE SERPL-SCNC: 107 MMOL/L (ref 98–107)
CO2 SERPL-SCNC: 25 MMOL/L (ref 23–31)
CREAT SERPL-MCNC: 0.49 MG/DL (ref 0.55–1.02)
CREAT/UREA NIT SERPL: 26
ERYTHROCYTE [DISTWIDTH] IN BLOOD BY AUTOMATED COUNT: 16.1 % (ref 11.5–17)
GFR SERPLBLD CREATININE-BSD FMLA CKD-EPI: >60 ML/MIN/1.73/M2
GLOBULIN SER-MCNC: 3.1 GM/DL (ref 2.4–3.5)
GLUCOSE SERPL-MCNC: 165 MG/DL (ref 82–115)
HCT VFR BLD AUTO: 37.5 % (ref 37–47)
HGB BLD-MCNC: 11.8 G/DL (ref 12–16)
MAGNESIUM SERPL-MCNC: 2 MG/DL (ref 1.6–2.6)
MCH RBC QN AUTO: 28.1 PG (ref 27–31)
MCHC RBC AUTO-ENTMCNC: 31.5 G/DL (ref 33–36)
MCV RBC AUTO: 89.3 FL (ref 80–94)
NRBC BLD AUTO-RTO: 0 %
PHOSPHATE SERPL-MCNC: 2.4 MG/DL (ref 2.3–4.7)
PLATELET # BLD AUTO: 163 X10(3)/MCL (ref 130–400)
PMV BLD AUTO: 9.9 FL (ref 7.4–10.4)
POCT GLUCOSE: 163 MG/DL (ref 70–110)
POCT GLUCOSE: 169 MG/DL (ref 70–110)
POTASSIUM SERPL-SCNC: 3.8 MMOL/L (ref 3.5–5.1)
PROT SERPL-MCNC: 5.5 GM/DL (ref 5.8–7.6)
RBC # BLD AUTO: 4.2 X10(6)/MCL (ref 4.2–5.4)
SODIUM SERPL-SCNC: 134 MMOL/L (ref 136–145)
WBC # BLD AUTO: 10.58 X10(3)/MCL (ref 4.5–11.5)

## 2024-12-10 PROCEDURE — 25000242 PHARM REV CODE 250 ALT 637 W/ HCPCS

## 2024-12-10 PROCEDURE — 80053 COMPREHEN METABOLIC PANEL: CPT

## 2024-12-10 PROCEDURE — 63600175 PHARM REV CODE 636 W HCPCS

## 2024-12-10 PROCEDURE — 21400001 HC TELEMETRY ROOM

## 2024-12-10 PROCEDURE — 94760 N-INVAS EAR/PLS OXIMETRY 1: CPT

## 2024-12-10 PROCEDURE — 63600175 PHARM REV CODE 636 W HCPCS: Performed by: INTERNAL MEDICINE

## 2024-12-10 PROCEDURE — 99231 SBSQ HOSP IP/OBS SF/LOW 25: CPT | Mod: ,,, | Performed by: INTERNAL MEDICINE

## 2024-12-10 PROCEDURE — 92611 MOTION FLUOROSCOPY/SWALLOW: CPT

## 2024-12-10 PROCEDURE — A9698 NON-RAD CONTRAST MATERIALNOC: HCPCS | Performed by: INTERNAL MEDICINE

## 2024-12-10 PROCEDURE — 99900035 HC TECH TIME PER 15 MIN (STAT)

## 2024-12-10 PROCEDURE — 85027 COMPLETE CBC AUTOMATED: CPT

## 2024-12-10 PROCEDURE — 25000003 PHARM REV CODE 250: Performed by: INTERNAL MEDICINE

## 2024-12-10 PROCEDURE — 83735 ASSAY OF MAGNESIUM: CPT

## 2024-12-10 PROCEDURE — 27000221 HC OXYGEN, UP TO 24 HOURS

## 2024-12-10 PROCEDURE — 99900031 HC PATIENT EDUCATION (STAT)

## 2024-12-10 PROCEDURE — 97535 SELF CARE MNGMENT TRAINING: CPT

## 2024-12-10 PROCEDURE — 94640 AIRWAY INHALATION TREATMENT: CPT

## 2024-12-10 PROCEDURE — 25500020 PHARM REV CODE 255: Performed by: INTERNAL MEDICINE

## 2024-12-10 PROCEDURE — 36415 COLL VENOUS BLD VENIPUNCTURE: CPT

## 2024-12-10 PROCEDURE — 84100 ASSAY OF PHOSPHORUS: CPT

## 2024-12-10 RX ADMIN — Medication: at 09:12

## 2024-12-10 RX ADMIN — LEVETIRACETAM 750 MG: 100 INJECTION, SOLUTION INTRAVENOUS at 09:12

## 2024-12-10 RX ADMIN — INSULIN GLARGINE 10 UNITS: 100 INJECTION, SOLUTION SUBCUTANEOUS at 09:12

## 2024-12-10 RX ADMIN — IPRATROPIUM BROMIDE AND ALBUTEROL SULFATE 3 ML: 2.5; .5 SOLUTION RESPIRATORY (INHALATION) at 03:12

## 2024-12-10 RX ADMIN — IPRATROPIUM BROMIDE AND ALBUTEROL SULFATE 3 ML: 2.5; .5 SOLUTION RESPIRATORY (INHALATION) at 12:12

## 2024-12-10 RX ADMIN — ENOXAPARIN SODIUM 40 MG: 40 INJECTION SUBCUTANEOUS at 09:12

## 2024-12-10 RX ADMIN — HYDRALAZINE HYDROCHLORIDE 10 MG: 20 INJECTION INTRAMUSCULAR; INTRAVENOUS at 09:12

## 2024-12-10 RX ADMIN — Medication: at 03:12

## 2024-12-10 RX ADMIN — PANTOPRAZOLE SODIUM 40 MG: 40 INJECTION, POWDER, FOR SOLUTION INTRAVENOUS at 09:12

## 2024-12-10 RX ADMIN — IPRATROPIUM BROMIDE AND ALBUTEROL SULFATE 3 ML: 2.5; .5 SOLUTION RESPIRATORY (INHALATION) at 08:12

## 2024-12-10 RX ADMIN — LEUCINE, PHENYLALANINE, LYSINE, METHIONINE, ISOLEUCINE, VALINE, HISTIDINE, THREONINE, TRYPTOPHAN, ALANINE, GLYCINE, ARGININE, PROLINE, SERINE, TYROSINE, SODIUM ACETATE, DIBASIC POTASSIUM PHOSPHATE, MAGNESIUM CHLORIDE, SODIUM CHLORIDE, CALCIUM CHLORIDE, DEXTROSE
880; 489; 33; 5; 438; 204; 255; 311; 247; 51; 170; 238; 261; 289; 213; 297; 77; 179; 77; 17; 247 INJECTION INTRAVENOUS at 04:12

## 2024-12-10 RX ADMIN — BARIUM SULFATE 10 ML: 0.81 POWDER, FOR SUSPENSION ORAL at 11:12

## 2024-12-10 NOTE — PROGRESS NOTES
Patient Name: Elisabet Choi   MRN: [id number]   Admission Date: 12/2/2024   Hospital Length of Stay: 7   Attending Provider: Cecilio Bush MD   Consulting Provider: Mal Han MD    Primary Care Physician:  Yovanny Mcg[facility]     Principal Problem: <principal problem not specified>       Final diagnoses:  [R41.82] AMS (altered mental status)  [A41.9] Sepsis, due to unspecified organism, unspecified whether acute organ dysfunction present (Primary)  [J18.9] Pneumonia due to infectious organism, unspecified laterality, unspecified part of lung  [L03.90] Cellulitis, unspecified cellulitis site      Goals of care review:    We met with the patient with her daughter in law, Christine who is also POA, at bedside we review goals of care.  As the patient continues to fail bedside swallow test with speech therapy, they are awaiting speech therapy to follow up today with hopes the patient's swallow has improved.  Should she continue to fail, they have elected PEG tube placement for the purpose of long-term artificial nutrition.    Symptom review:    History of chronic lower back pain.  The patient denies any current pain.  She denies any other symptoms at present.    Assessment/Plan:     Goals of care/counseling  Status post septic shock with pneumonia  History of NPH with BP shunt.  Obesity  Weakness with chronic bed-bound state    We will continue to follow up with the patient regarding education, support regarding goals of care.    Interval History:     No acute changes.      Active Ambulatory Problems     Diagnosis Date Noted    Chest pain 06/29/2014    DM (diabetes mellitus) 06/30/2014    H/O: CVA (cerebrovascular accident) 06/30/2014    Back pain 06/30/2014    Celiac disease 06/30/2014    Depression 06/30/2014    Hypothyroidism 06/30/2014    Hyperlipidemia 06/30/2014    Migraine headache 06/30/2014    Recurrent UTI 06/30/2014    Hip fx, left, closed, initial encounter     New onset atrial fibrillation  09/25/2024    Cellulitis of left leg 09/25/2024    CAP (community acquired pneumonia) 11/26/2024     Resolved Ambulatory Problems     Diagnosis Date Noted    Recurrent urinary tract infection 03/26/2024     Past Medical History:   Diagnosis Date    Acute cystitis     Allergic rhinitis     Anemia, unspecified     Angina pectoris     Arthritis     Bipolar disorder, unspecified     Cellulitis     CHF (congestive heart failure)     Constipation     Contracture, left ankle     Dementia     Depressive episode     Diabetes mellitus     Diabetes mellitus with ulcer of foot     Displacement of other urinary catheter, subsequent encounter     Edema     Elevated white blood cell count     Encounter for blood transfusion     Fatigue     Fluid retention     Genetic anomalies of leukocytes     GERD without esophagitis     Goiter     HLD (hyperlipidemia)     Hydrocephalus     Hypertension     Hypokalemia     Hypotension     Magnesium deficiency     Major depression     Migraine     Mild protein-calorie malnutrition     Morbid obesity     Obstructive and reflux uropathy     Osteoarthritis     Osteoporosis     Overactive bladder     Pressure ulcer of right heel     Pressure ulcer of sacral region     Pressure ulcer of sacral region, stage 3     Pruritus     PVD (peripheral vascular disease)     Radiculopathy, lumbar region     Seizures     Sleep apnea     SOB (shortness of breath)     Stroke     TIA (transient ischemic attack)     Urinary tract infection, site not specified     Vitamin deficiency     Wheelchair dependent         Past Surgical History:   Procedure Laterality Date    ABDOMINAL SURGERY      APPENDECTOMY      BACK SURGERY      BLADDER SURGERY      BRAIN SURGERY      CAROTID ENDARTERECTOMY      CHOLECYSTECTOMY      CSF SHUNT      HERNIA REPAIR      HYSTERECTOMY      INSERTION, SUPRAPUBIC CATHETER N/A 3/26/2024    Procedure: INSERTION, SUPRAPUBIC CATHETER;  Surgeon: Fuad Szymanski MD;  Location: [facility];  Service:  Urology;  Laterality: N/A;  CYSTO W/ SUPRAPUBIC CATH INSERTION    TONSILLECTOMY      VASCULAR SURGERY          Review of patient's allergies indicates:   Allergen Reactions    Gluten      Other reaction(s): ciliac disease    Gluten protein     Ropinirole      Other reaction(s): hallucinations    Wheat      Other reaction(s): ciliac disease    Wheat containing prod     Zolpidem      hallucinates  Other reaction(s): hallucinations          Current Facility-Administered Medications:     acetaminophen suppository 650 mg, 650 mg, Rectal, Q6H PRN, Jaqui Holman, AGACNP-BC, 650 mg at 12/06/24 1322    albuterol-ipratropium 2.5 mg-0.5 mg/3 mL nebulizer solution 3 mL, 3 mL, Nebulization, Q4H WAKE, Ashu, Vy, DO, 3 mL at 12/10/24 0839    Amino acid 4.25% - dextrose 5% (CLINIMIX-E) solution (1L provides 42.5 gm AA, 50 gm CHO (170 kcal/L dextrose), Na 35, K 30, Mg 5, Ca 4.5, Acetate 70, Cl 39, Phos 15), , Intravenous, Continuous, Cecilio Bush MD, Last Rate: 80 mL/hr at 12/10/24 0411, New Bag at 12/10/24 0411    dextrose 10% bolus 125 mL 125 mL, 12.5 g, Intravenous, PRN, Ashu, Vy, DO    dextrose 10% bolus 250 mL 250 mL, 25 g, Intravenous, PRN, Ashu, Vy, DO    enoxaparin injection 40 mg, 40 mg, Subcutaneous, Q12H, Ashu, Vy, DO, 40 mg at 12/10/24 0954    glucagon (human recombinant) injection 1 mg, 1 mg, Intramuscular, PRN, Asuh, Vy, DO    hydrALAZINE injection 10 mg, 10 mg, Intravenous, Q6H PRN, Cecilio Bush MD, 10 mg at 12/10/24 0954    insulin aspart U-100 injection 0-10 Units, 0-10 Units, Subcutaneous, Q6H PRN, Ashu, Vy, DO, 2 Units at 12/05/24 1747    insulin glargine U-100 (Lantus) injection 10 Units, 10 Units, Subcutaneous, QHS, Ashu, Vy, DO, 10 Units at 12/09/24 2152    levETIRAcetam injection 750 mg, 750 mg, Intravenous, Daily, Mj Ferreira MD, 750 mg at 12/10/24 0954    pantoprazole injection 40 mg, 40 mg, Intravenous, Daily, Samreen Wakefield DO, 40 mg at 12/10/24 0954    sodium chloride 0.9%  "flush 10 mL, 10 mL, Intravenous, PRN, Ashu, Vy, DO    Flushing PICC/Midline Protocol, , , Until Discontinued **AND** sodium chloride 0.9% flush 10 mL, 10 mL, Intravenous, Q12H PRN, Cecilio Bush MD    zinc oxide-cod liver oil 40 % paste, , Topical (Top), TID, Donald Mireles MD, Given at 12/09/24 9743       Current Facility-Administered Medications:     acetaminophen, 650 mg, Rectal, Q6H PRN    dextrose 10%, 12.5 g, Intravenous, PRN    dextrose 10%, 25 g, Intravenous, PRN    glucagon (human recombinant), 1 mg, Intramuscular, PRN    hydrALAZINE, 10 mg, Intravenous, Q6H PRN    insulin aspart U-100, 0-10 Units, Subcutaneous, Q6H PRN    sodium chloride 0.9%, 10 mL, Intravenous, PRN    Flushing PICC/Midline Protocol, , , Until Discontinued **AND** sodium chloride 0.9%, 10 mL, Intravenous, Q12H PRN     No family history on file.       Review of Systems   All other systems reviewed and are negative.           Objective:   BP (!) 162/93   Pulse 78   Temp 97.8 °F (36.6 °C) (Axillary)   Resp 14   Ht 4' 8" (1.422 m)   Wt 105.9 kg (233 lb 7.5 oz)   SpO2 97%   BMI 52.34 kg/m²      Physical Exam   Constitutional:  Non-toxic appearance. She does not appear ill.   HENT:   Head: Normocephalic.   Right Ear: External ear normal.   Left Ear: External ear normal.   Nose: Nose normal.   Mouth/Throat: Mucous membranes are moist. Oropharynx is clear.   Eyes: Pupils are equal, round, and reactive to light. Conjunctivae are normal.   Cardiovascular: Normal rate, regular rhythm, normal heart sounds and normal pulses. Pulmonary:      Effort: Pulmonary effort is normal.      Breath sounds: Normal breath sounds.     Abdominal: Soft. Bowel sounds are normal.   Musculoskeletal:      Right lower leg: No edema.      Left lower leg: No edema.   Neurological: She is alert. She is disoriented.   Skin: Skin is warm.   Vitals reviewed.         Review of Symptoms  Review of Symptoms      Symptom Assessment (ESAS 0-10 Scale)  Pain:  " 0  Dyspnea:  0  Anxiety:  0  Nausea:  0  Depression:  0  Anorexia:  0  Fatigue:  0  Insomnia:  0  Restlessness:  0  Agitation:  0         Performance Status:  30    Living Arrangements:  Lives in nursing home    Psychosocial/Cultural:   See Palliative Psychosocial Note: Yes  **Primary  to Follow**  Palliative Care  Consult: No      Advance Care Planning   Advance Directives:   Living Will: Yes        Copy on chart: Yes    [place]: Yes    Medical Power of : Yes      Decision Making:  Patient answered questions and Family answered questions  Goals of Care: What is most important right now is to focus on spending time at home, avoiding the hospital, remaining as independent as possible, symptom/pain control, improvement in condition but with limits to invasive therapies, comfort and QOL , quality of life, even if it means sacrificing a little time. Accordingly, we have decided that the best plan to meet the patient's goals includes continuing with treatment.                > 50% of 20 min of encounter was spent in chart review, face to face discussion of goals of care, symptom assessment, coordination of care and emotional support.     Mal Han MD  Palliative Medicine  [facility]

## 2024-12-10 NOTE — PLAN OF CARE
Problem: Adult Inpatient Plan of Care  Goal: Plan of Care Review  Outcome: Progressing  Goal: Patient-Specific Goal (Individualized)  Outcome: Progressing  Goal: Absence of Hospital-Acquired Illness or Injury  Outcome: Progressing  Goal: Optimal Comfort and Wellbeing  Outcome: Progressing  Goal: Readiness for Transition of Care  Outcome: Progressing     Problem: Bariatric Environmental Safety  Goal: Safety Maintained with Care  Outcome: Progressing     Problem: Infection  Goal: Absence of Infection Signs and Symptoms  Outcome: Progressing     Problem: Diabetes Comorbidity  Goal: Blood Glucose Level Within Targeted Range  Outcome: Progressing     Problem: Pneumonia  Goal: Fluid Balance  Outcome: Progressing  Goal: Resolution of Infection Signs and Symptoms  Outcome: Progressing  Goal: Effective Oxygenation and Ventilation  Outcome: Progressing     Problem: Wound  Goal: Optimal Coping  Outcome: Progressing  Goal: Optimal Functional Ability  Outcome: Progressing  Goal: Absence of Infection Signs and Symptoms  Outcome: Progressing  Goal: Improved Oral Intake  Outcome: Progressing  Goal: Optimal Pain Control and Function  Outcome: Progressing  Goal: Skin Health and Integrity  Outcome: Progressing  Goal: Optimal Wound Healing  Outcome: Progressing     Problem: Skin Injury Risk Increased  Goal: Skin Health and Integrity  Outcome: Progressing     Problem: Fall Injury Risk  Goal: Absence of Fall and Fall-Related Injury  Outcome: Progressing     Problem: Coping Ineffective  Goal: Effective Coping  Outcome: Progressing

## 2024-12-10 NOTE — PROGRESS NOTES
[facility] Hospital Medicine Progress Note        Chief Complaint: Inpatient Follow-up for     HPI:   79 year old woman with PMHx of TIA/CVA, dementia, NPH s/p  shunt, B12 deficiency, Afib on eliquis, ERICA, T2DM, PAD, HLD, hypothyroidism and seizure on keppra presented on 12/2 for AMS. She was febrile to 102.9 and had leukocytosis WBC 17.65 with elevated lactate. BP did not respond to fluids and she was admitted to the ICU for septic shock. CT head without acute abnormality. CT C/A/P with small loculated R sided pleural effusion and a trace left sided pleural effusion w/adjacent consolidation and ground glass opacities. Also conern for proctocolitis. UA was negative. She was started on zosyn. Pressors were weaned off and she was subsequently downgraded to hospital medicine on 12/4. Her blood cultures are growing S.epidermidis. Unclear if contaminant - repeat BCx were obtained and then vancomycin was added while repeat Bcx pending given history of  shunt.     Id were consulted.  Empiric antibiotics were continued.  TTE was ordered.  Lower extremity Doppler showed no signs of DVT.  Id recommended MRI thoracic spine due to persistent pain.  MRI thoracic spine showed no acute abnormalities.  She remained NPO and Clinimix was added for nutritional purposes.         Interval Hx:   HDS. Remains on clinimix. Family has decided to pursue PEG if she remains NPO.      Objective/physical exam:  Vitals:    12/09/24 1936 12/09/24 1956 12/09/24 2352 12/10/24 0423   BP: 124/71  (!) 151/92 136/73   Pulse: 92 93 92 85   Resp:  20 18 20   Temp: 98.1 °F (36.7 °C)  98.1 °F (36.7 °C) 97.8 °F (36.6 °C)   TempSrc: Oral  Oral Axillary   SpO2: 99% 95%  Comment: 2lpm nc 98% 98%   Weight:       Height:         General: In no acute distress, afebrile  Respiratory: Clear to auscultation bilaterally  Cardiovascular: S1, S2, no appreciable murmur  Abdomen: Soft, nontender, BS +  MSK: Warm, no lower extremity  edema, no clubbing or cyanosis  Neurologic: Alert and oriented x4, moving all extremities      Lab Results   Component Value Date     (L) 12/10/2024    K 3.8 12/10/2024     12/10/2024    CO2 25 12/10/2024    BUN 12.5 12/10/2024    CREATININE 0.49 (L) 12/10/2024    CALCIUM 7.8 (L) 12/10/2024    ANIONGAP 8 01/23/2024    ESTGFRAFRICA 94 01/23/2024    EGFRNONAA >60 07/13/2022      Lab Results   Component Value Date    ALT 13 12/10/2024    AST 15 12/10/2024    ALKPHOS 54 12/10/2024    BILITOT 0.3 12/10/2024      Lab Results   Component Value Date    WBC 6.32 12/06/2024    HGB 9.9 (L) 12/06/2024    HCT 32.3 (L) 12/06/2024    MCV 90.7 12/06/2024     12/06/2024           Medications:   albuterol-ipratropium  3 mL Nebulization Q4H WAKE    enoxparin  40 mg Subcutaneous Q12H    insulin glargine U-100  10 Units Subcutaneous QHS    levETIRAcetam (Keppra) IV (PEDS and ADULTS)  750 mg Intravenous Daily    pantoprazole  40 mg Intravenous Daily    zinc oxide-cod liver oil   Topical (Top) TID        Current Facility-Administered Medications:     acetaminophen, 650 mg, Rectal, Q6H PRN    dextrose 10%, 12.5 g, Intravenous, PRN    dextrose 10%, 25 g, Intravenous, PRN    glucagon (human recombinant), 1 mg, Intramuscular, PRN    hydrALAZINE, 10 mg, Intravenous, Q6H PRN    insulin aspart U-100, 0-10 Units, Subcutaneous, Q6H PRN    sodium chloride 0.9%, 10 mL, Intravenous, PRN    Flushing PICC/Midline Protocol, , , Until Discontinued **AND** sodium chloride 0.9%, 10 mL, Intravenous, Q12H PRN     Assessment/Plan:    Septic shock on admission-aspiration pneumonia vs colitis versus cellulitis  Staph haemolyticus and epidermidis bacteremia  Proctocolitis-POA  Lower extremity swelling-?  Cellulitis  Loculated pleural effusion-POA  Hypoxemic respiratory distress due to above-improving  Altered mental status at admission likely infectious versus metabolic encephalopathy-improving  Possible pneumonia, consolidation, ground-glass  opacities with atelectasis    HX: Obesity, dementia, history of hydrocephalus s/p  shunt, PAF on Eliquis, history of TIA/CVA, chronic lower extremity enema/venous stasis, history of seizure on Keppra, hypothyroidism, HTN, HLD, PID    Plan:   - Consult Gi for pEG  - Appreciate palliative teams assistance  -MRI thoracic spine showed no acute abnormalities analgesics, therapy as tolerated   -Last day of vanc. Zosyn stopped. ID signed off.   -Clinimix for nutrition purposes.  SLP on board.  Remains NPO  - consider repeat x-ray for any signs of worsening shortness of breadth.  Encourage CPAP appliance workup other home meds as reviewed        Rubén Bush MD

## 2024-12-10 NOTE — CONSULTS
Consult Note    Reason for Consult:      We were consulted to evaluate this patient for PEG placement.     HPI:     Patient is a 79-year-old white female known to Dr. MARIELA Trinh w/pmhx of dementia, NPH status post  shunt, A-fib on Eliquis, TIA/CVA, sleep apnea, DM, PAD and seizures.    She presented to the ER 12-2 with altered mental status and fever.  She was admitted with septic shock, proctocolitis, pleural effusions, hypoxia, AMS, staph hemolyticus and epidermidis bacteremia.  She has been evaluated by speech for oropharyngeal dysphagia with failed MBS.  Recommend n.p.o. status.    GI consulted for PEG tube placement.  Afebrile, WBC 6.32 on 12/6, off antibiotics.   12/6, no INR, Lovenox 40 mg every 12 DVTP  CT abd/pel: Small pleural effusion and superimpsed pneumonia. Mild thickening at the distal rectum (proctocolitis).  Anasarca.  Denies any previous history gastric sleeve or gastric bypass.  Patient gives consent as well as Daughter in law Christine Choi.  Reports she enjoys the pudding that she had for him MBS. Having Bms.  Blood cultures normal 12 4.    Previous records reviewed. Collateral information obtained from family member present at bedside.    PCP:  Clinic, [facility]    Review of patient's allergies indicates:   Allergen Reactions    Gluten      Other reaction(s): ciliac disease    Gluten protein     Ropinirole      Other reaction(s): hallucinations    Wheat      Other reaction(s): ciliac disease    Wheat containing prod     Zolpidem      hallucinates  Other reaction(s): hallucinations        Current Facility-Administered Medications   Medication Dose Route Frequency Provider Last Rate Last Admin    acetaminophen suppository 650 mg  650 mg Rectal Q6H PRN Jaqui Holman AGACNP-BC   650 mg at 12/06/24 1322    albuterol-ipratropium 2.5 mg-0.5 mg/3 mL nebulizer solution 3 mL  3 mL Nebulization Q4H WAKE Samreen Wakefield DO   3 mL at 12/10/24 1249    Amino acid 4.25% - dextrose 5% (CLINIMIX-E)  solution (1L provides 42.5 gm AA, 50 gm CHO (170 kcal/L dextrose), Na 35, K 30, Mg 5, Ca 4.5, Acetate 70, Cl 39, Phos 15)   Intravenous Continuous Cecilio Bush MD 80 mL/hr at 12/10/24 0411 New Bag at 12/10/24 0411    dextrose 10% bolus 125 mL 125 mL  12.5 g Intravenous PRN Ashu, Vy, DO        dextrose 10% bolus 250 mL 250 mL  25 g Intravenous PRN Ashu Vy, DO        enoxaparin injection 40 mg  40 mg Subcutaneous Q12H Ashu, Vy, DO   40 mg at 12/10/24 0954    glucagon (human recombinant) injection 1 mg  1 mg Intramuscular PRN Samreen Wakefield, DO        hydrALAZINE injection 10 mg  10 mg Intravenous Q6H PRN Cecilio Bush MD   10 mg at 12/10/24 0954    insulin aspart U-100 injection 0-10 Units  0-10 Units Subcutaneous Q6H PRN Ashu, Vy, DO   2 Units at 12/05/24 1747    insulin glargine U-100 (Lantus) injection 10 Units  10 Units Subcutaneous QHS Ashu, Vy, DO   10 Units at 12/09/24 2152    levETIRAcetam injection 750 mg  750 mg Intravenous Daily Mj Ferreira MD   750 mg at 12/10/24 0954    pantoprazole injection 40 mg  40 mg Intravenous Daily Ashu, Vy, DO   40 mg at 12/10/24 0954    sodium chloride 0.9% flush 10 mL  10 mL Intravenous PRN RICK Wakefieldy, DO        sodium chloride 0.9% flush 10 mL  10 mL Intravenous Q12H PRN Cecilio Bush MD        zinc oxide-cod liver oil 40 % paste   Topical (Top) TID Donald Mireles MD   Given at 12/09/24 2151     Medications Prior to Admission   Medication Sig Dispense Refill Last Dose/Taking    acetaminophen (TYLENOL) 500 MG tablet Take 1,000 mg by mouth every 4 (four) hours as needed for Pain.       acidophilus-pectin, citrus 100 million cell-10 mg Cap Take 10 mg by mouth.       ALPRAZolam (XANAX) 0.25 MG tablet Take 0.25 mg by mouth 2 (two) times daily as needed for Anxiety.       amitriptyline (ELAVIL) 25 MG tablet Take 25 mg by mouth nightly as needed for Insomnia.       ascorbic acid, vitamin C, (VITAMIN C) 500 MG tablet Take 1 tablet by mouth every  morning.       cranberry fruit (CRANBERRY) 450 mg Tab Take by mouth.       cyanocobalamin (VITAMIN B-12) 1000 MCG tablet Take 100 mcg by mouth once daily.       doxazosin (CARDURA) 1 MG tablet Take 1 mg by mouth nightly.       dulaglutide (TRULICITY) 3 mg/0.5 mL pen injector Inject 3 mg into the skin every 7 days. wednesdays       DULoxetine (CYMBALTA) 60 MG capsule Take 60 mg by mouth once daily.       ezetimibe (ZETIA) 10 mg tablet Take 10 mg by mouth once daily.       folic acid (FOLVITE) 1 MG tablet Take 1 mg by mouth once daily.       gabapentin (NEURONTIN) 300 MG capsule Take 300 mg by mouth 2 (two) times daily.       HYDROcodone-acetaminophen (NORCO)  mg per tablet Take 1 tablet by mouth 3 (three) times daily.       insulin degludec 100 unit/mL injection Inject 20 Units into the skin once daily.       levETIRAcetam (KEPPRA) 750 MG Tab Take 750 mg by mouth nightly.       levothyroxine (SYNTHROID, LEVOTHROID) 175 MCG tablet Take 1 tablet (175 mcg total) by mouth before breakfast. 30 tablet 11     lovastatin (ALTOPREV) 40 mg 24 hr tablet Take 40 mg by mouth every evening.       magnesium oxide (MAG-OX) 400 mg (241.3 mg magnesium) tablet Take 1 tablet by mouth every morning.       methenamine (HIPREX) 1 gram Tab Take 1 g by mouth 2 (two) times daily.       metoprolol tartrate (LOPRESSOR) 50 MG tablet Take 1 tablet (50 mg total) by mouth 2 (two) times daily. 180 tablet 3     polyethylene glycol (GLYCOLAX) 17 gram PwPk Take 17 g by mouth once daily.       POTASSIUM CHLORIDE ORAL Take 20 mEq by mouth once daily.       SANTYL ointment Apply topically.       solifenacin (VESICARE) 5 MG tablet Take 10 mg by mouth every evening.       tiZANidine 2 mg Cap Take 2 mg by mouth 3 (three) times daily as needed.       topiramate (TOPAMAX) 100 MG tablet Take 100 mg by mouth 2 (two) times daily.       torsemide (DEMADEX) 20 MG Tab Take 40 mg by mouth once daily.       vitamin D (VITAMIN D3) 1000 units Tab Take 1,000 Units  by mouth once daily.       zinc gluconate 50 mg tablet Take 1 tablet by mouth every morning.          Past Medical History:  Past Medical History:   Diagnosis Date    Acute cystitis     Allergic rhinitis     Anemia, unspecified     Angina pectoris     Arthritis     Back pain     Bipolar disorder, unspecified     Celiac disease     Cellulitis     CHF (congestive heart failure)     Constipation     Contracture, left ankle     Dementia     Depressive episode     Diabetes mellitus     Diabetes mellitus with ulcer of foot     Displacement of other urinary catheter, subsequent encounter     Edema     Elevated white blood cell count     Encounter for blood transfusion     Fatigue     Fluid retention     Genetic anomalies of leukocytes     GERD without esophagitis     Goiter     HLD (hyperlipidemia)     Hydrocephalus     Hypertension     Hypokalemia     Hypotension     Hypothyroidism     Magnesium deficiency     Major depression     Migraine     Mild protein-calorie malnutrition     Morbid obesity     Obstructive and reflux uropathy     Osteoarthritis     Osteoporosis     Overactive bladder     Pressure ulcer of right heel     Pressure ulcer of sacral region     Pressure ulcer of sacral region, stage 3     Pruritus     PVD (peripheral vascular disease)     Radiculopathy, lumbar region     Seizures     Sleep apnea     uses CPAP    SOB (shortness of breath)     Stroke     TIA (transient ischemic attack)     Urinary tract infection, site not specified     Vitamin deficiency     Wheelchair dependent       Past Surgical History:  Past Surgical History:   Procedure Laterality Date    ABDOMINAL SURGERY      APPENDECTOMY      BACK SURGERY      BLADDER SURGERY      BRAIN SURGERY      CAROTID ENDARTERECTOMY      CHOLECYSTECTOMY      CSF SHUNT      HERNIA REPAIR      HYSTERECTOMY      INSERTION, SUPRAPUBIC CATHETER N/A 3/26/2024    Procedure: INSERTION, SUPRAPUBIC CATHETER;  Surgeon: Fuad Szymanski MD;  Location: [facility];  Service:  Urology;  Laterality: N/A;  CYSTO W/ SUPRAPUBIC CATH INSERTION    TONSILLECTOMY      VASCULAR SURGERY        Family History:  No family history on file.  Social History:  Social History     Tobacco Use    Smoking status: Never    Smokeless tobacco: Never   Substance Use Topics    Alcohol use: No       Review of Systems:     Review of Systems   All other systems reviewed and are negative.      Objective:     VITAL SIGNS: 24 HR MIN & MAX LAST    Temp  Min: 97.8 °F (36.6 °C)  Max: 98.1 °F (36.7 °C)  97.8 °F (36.6 °C)        BP  Min: 124/71  Max: 162/93  (!) 162/93     Pulse  Min: 76  Max: 98  98     Resp  Min: 12  Max: 20  12    SpO2  Min: 94 %  Max: 100 %  (!) 94 %      No intake or output data in the 24 hours ending 12/10/24 1307    Physical Exam  Constitutional:       General: She is not in acute distress.     Appearance: She is obese. She is ill-appearing. She is not toxic-appearing.   HENT:      Head: Normocephalic and atraumatic.      Mouth/Throat:      Mouth: Mucous membranes are dry.      Pharynx: Oropharynx is clear.   Cardiovascular:      Rate and Rhythm: Normal rate and regular rhythm.      Pulses: Normal pulses.      Heart sounds: Normal heart sounds.   Pulmonary:      Effort: Pulmonary effort is normal. No respiratory distress.      Breath sounds: Normal breath sounds. No stridor. No wheezing or rales.      Comments: 2L NC  Abdominal:      General: Bowel sounds are normal. There is no distension.      Palpations: Abdomen is soft. There is no mass.      Tenderness: There is no abdominal tenderness. There is no guarding.          Comments: Obese.  Abdominal scars noted   Genitourinary:     Comments: Banda  Musculoskeletal:      Right lower leg: Edema present.      Left lower leg: Edema present.   Skin:     General: Skin is warm and dry.      Comments: Generalized anasarca   Neurological:      Mental Status: She is alert and oriented to person, place, and time. Mental status is at baseline.   Psychiatric:          Mood and Affect: Mood normal.         Behavior: Behavior normal.         Thought Content: Thought content normal.         Judgment: Judgment normal.           Recent Results (from the past 48 hours)   POCT glucose    Collection Time: 12/08/24  6:03 PM   Result Value Ref Range    POCT Glucose 133 (H) 70 - 110 mg/dL   POCT glucose    Collection Time: 12/08/24 11:27 PM   Result Value Ref Range    POCT Glucose 170 (H) 70 - 110 mg/dL   POCT glucose    Collection Time: 12/09/24  5:12 AM   Result Value Ref Range    POCT Glucose 164 (H) 70 - 110 mg/dL   Comprehensive metabolic panel    Collection Time: 12/09/24  6:17 AM   Result Value Ref Range    Sodium 138 136 - 145 mmol/L    Potassium 3.5 3.5 - 5.1 mmol/L    Chloride 107 98 - 107 mmol/L    CO2 24 23 - 31 mmol/L    Glucose 146 (H) 82 - 115 mg/dL    Blood Urea Nitrogen 11.4 9.8 - 20.1 mg/dL    Creatinine 0.61 0.55 - 1.02 mg/dL    Calcium 8.1 (L) 8.4 - 10.2 mg/dL    Protein Total 5.6 (L) 5.8 - 7.6 gm/dL    Albumin 2.7 (L) 3.4 - 4.8 g/dL    Globulin 2.9 2.4 - 3.5 gm/dL    Albumin/Globulin Ratio 0.9 (L) 1.1 - 2.0 ratio    Bilirubin Total 0.3 <=1.5 mg/dL    ALP 61 40 - 150 unit/L    ALT 14 0 - 55 unit/L    AST 13 5 - 34 unit/L    eGFR >60 mL/min/1.73/m2    Anion Gap 7.0 mEq/L    BUN/Creatinine Ratio 19    Magnesium    Collection Time: 12/09/24  6:17 AM   Result Value Ref Range    Magnesium Level 2.00 1.60 - 2.60 mg/dL   Phosphorus    Collection Time: 12/09/24  6:17 AM   Result Value Ref Range    Phosphorus Level 2.2 (L) 2.3 - 4.7 mg/dL   POCT glucose    Collection Time: 12/09/24 10:33 AM   Result Value Ref Range    POCT Glucose 140 (H) 70 - 110 mg/dL   VANCOMYCIN, TROUGH    Collection Time: 12/09/24 10:50 AM   Result Value Ref Range    Vancomycin Trough 15.5 15.0 - 20.0 ug/ml   POCT glucose    Collection Time: 12/09/24  4:15 PM   Result Value Ref Range    POCT Glucose 148 (H) 70 - 110 mg/dL   POCT glucose    Collection Time: 12/09/24  8:10 PM   Result Value Ref Range     POCT Glucose 156 (H) 70 - 110 mg/dL   POCT glucose    Collection Time: 12/10/24  4:10 AM   Result Value Ref Range    POCT Glucose 169 (H) 70 - 110 mg/dL   Comprehensive metabolic panel    Collection Time: 12/10/24  5:21 AM   Result Value Ref Range    Sodium 134 (L) 136 - 145 mmol/L    Potassium 3.8 3.5 - 5.1 mmol/L    Chloride 107 98 - 107 mmol/L    CO2 25 23 - 31 mmol/L    Glucose 165 (H) 82 - 115 mg/dL    Blood Urea Nitrogen 12.5 9.8 - 20.1 mg/dL    Creatinine 0.49 (L) 0.55 - 1.02 mg/dL    Calcium 7.8 (L) 8.4 - 10.2 mg/dL    Protein Total 5.5 (L) 5.8 - 7.6 gm/dL    Albumin 2.4 (L) 3.4 - 4.8 g/dL    Globulin 3.1 2.4 - 3.5 gm/dL    Albumin/Globulin Ratio 0.8 (L) 1.1 - 2.0 ratio    Bilirubin Total 0.3 <=1.5 mg/dL    ALP 54 40 - 150 unit/L    ALT 13 0 - 55 unit/L    AST 15 5 - 34 unit/L    eGFR >60 mL/min/1.73/m2    Anion Gap 2.0 mEq/L    BUN/Creatinine Ratio 26    Magnesium    Collection Time: 12/10/24  5:21 AM   Result Value Ref Range    Magnesium Level 2.00 1.60 - 2.60 mg/dL   Phosphorus    Collection Time: 12/10/24  5:21 AM   Result Value Ref Range    Phosphorus Level 2.4 2.3 - 4.7 mg/dL       Fl Modified Barium Swallow Speech    Result Date: 12/10/2024  See procedure notes from Speech Pathologist. This procedure was auto-finalized.    MRI Thoracic Spine W WO Cont    Result Date: 12/7/2024  EXAMINATION: MRI THORACIC SPINE W WO CONTRAST CLINICAL HISTORY: Acute back pain; TECHNIQUE: Multiplanar, multisequence MR images were acquired through the thoracic spine without and with the administration of contrast. COMPARISON: CT chest abdomen pelvis 12/02/2024 FINDINGS: Alignment: Normal. Vertebrae: There are postsurgical changes of T12-L2 posterior spinal fusion with transpedicular screws at T12 and L2 and vertical rods.  There is associated local susceptibility artifact related to fusion hardware.  There is chronic compression deformity at L1 status post cement augmentation trace retropulsion similar to CT exam.  No  abnormal enhancement. Discs: Normal height and signal. Cord: Normal where visible.  Obscured at T12-L2 related to hardware susceptibility artifact. Paraspinal muscles & soft tissues: There are bilateral layering pleural effusions.     Post treatment changes at T12-L2.  No appreciable acute spinal abnormality. Electronically signed by: Staci Duckworth Date:    12/07/2024 Time:    10:28    CV Ultrasound doppler venous legs bilat    Result Date: 12/6/2024    There is a right subcutaneous edema located in the proximal thigh, middle thigh, distal thigh, proximal calf and distal calf veins.   There is a left subcutaneous edema located in the proximal thigh, middle thigh, distal thigh, proximal calf and distal calf veins. Negative for deep and superficial vein thrombosis in the right lower extremity Negative for deep and superficial vein thrombosis in the left lower extremity      Echo    Result Date: 12/5/2024    Left Ventricle: The left ventricle is normal in size. Normal wall thickness. There is normal systolic function with a visually estimated ejection fraction of 60 - 65%. Grade I diastolic dysfunction.   Right Ventricle: Normal right ventricular cavity size. Systolic function is normal.   Aortic Valve: The aortic valve is a trileaflet valve. There is aortic valve sclerosis.   Less than mild valvular stenosis/regurgitation.   No gross evidence of valvular endocarditis noted.     X-Ray Thoracic Spine AP Lateral    Result Date: 12/5/2024  EXAMINATION: XR THORACIC SPINE AP LATERAL CLINICAL HISTORY: back pain; TECHNIQUE: AP and lateral views of the thoracic spine were performed. COMPARISON: 11/14/2013 FINDINGS: Normal kyphotic curvature.  Vertebral body height of the mid to lower thoracic vertebral bodies well maintained.  The visualized lungs are unremarkable.  Degenerative changes with multiple bridging osteophytes.     Degenerative changes with no acute abnormality of the thoracic spine appreciated. Electronically  signed by: Fuad Weeks Date:    12/05/2024 Time:    12:44    Fl Modified Barium Swallow Speech    Result Date: 12/4/2024  See procedure notes from Speech Pathologist. This procedure was auto-finalized.    X-Ray Chest 1 View    Result Date: 12/3/2024  EXAMINATION: XR CHEST 1 VIEW CLINICAL HISTORY: AMS; COMPARISON: 11/21/2024 FINDINGS: Single view of the chest shows small right pleural effusion with bibasilar atelectasis.  No pneumothorax.  Heart is enlarged.     Small right effusion Electronically signed by: Doug Haley MD Date:    12/03/2024 Time:    08:38    CT Chest Abdomen Pelvis With IV Contrast (XPD) NO Oral Contrast    Result Date: 12/3/2024  Technique: CT Scan of the chest abdomen and pelvis was performed with intravenous contrast with axial as well as sagittal and, coronal images. Dosage Information: Automated Exposure Control was utilized 1445.92 mGy.cm. Comparison: Comparison is with study dated April 21, 2023 Clinical History: SEPSIS. Findings: Lines and Tubes: A drainage catheter is seen coursing along the right cervical region, hemithorax and hemiabdomen with distal tip in the anterior pelvic cavity. Mediastinum: Multiple subcentimeter mediastinal lymph nodes are seen paratracheal and subcarinal spaces . Heart: Mild cardiomegaly is seen. Aorta: No aortic dissection or aneurysm is seen. Mild aortic calcification is seen in the thoracic aorta. Lungs: There is a small possibly loculated right sided pleural effusion and a trace left sided pleural effusion with adjacent consolidation atelectasis and ground glass opacity. A superimposed pneumonia at the right lung base is not excluded. Bony Structures: Spine: Moderate spondylolytic changes are seen in the thoracic spine with vertebral fusion. Abdomen: Abdominal Wall: There is extensive stranding of the subcutaneous fat suggesting an element of anasarca edema of uncertain etiology. Liver: The liver appears unremarkable. Biliary System: The extra hepatic  biliary system appears prominent which may reflect prior obstructive physiology in this patient status post cholecystectomy. Gallbladder: The gallbladder is not identified in the gallbladder fossa which may reflect prior cholecystectomy correlate with surgical history and visual inspection. Pancreas: There is mild fatty infiltration of the pancreas. Spleen: The spleen appears unremarkable. Adrenals: The adrenal glands appear unremarkable. Kidneys: The kidneys appear unremarkable with no stones cysts masses or hydronephrosis. Aorta: There is mild calcification of the abdominal aorta and its branches. Bowel: Esophagus: The visualized esophagus appears unremarkable. Stomach: The stomach is decompressed and cannot be assessed. Duodenum: Unremarkable appearing duodenum. Small Bowel: The small bowel appears unremarkable. Colon: There is moderate stool in the colon which could reflect an element of constipation. There is mild thickening at the distal rectum through anorectal verge which may represent proctocolitis. Appendix: The appendix is not identified but no inflammatory changes are seen in the right lower quadrant to suggest appendicitis. Peritoneum: No intraperitoneal free air or ascites is seen. Pelvis: Bladder: The bladder is nondistended and cannot be definitively evaluated. A suprapubic catheter is in place. Female: Uterus: The uterus is not identified. Ovaries: No adnexal masses are seen. Bony structures: Dorsal Spine: There is moderate spondylosis of the visualized dorsal spine. There is non acute severe compression deformity at L1 with bone cement. Spinal fixating device is seen at T12 through L2. Bony Pelvis: There is mild to moderate degenerative change of the bilateral hip.     Impression: 1. There is a small possibly loculated right sided pleural effusion and a trace left sided pleural effusion with adjacent consolidation atelectasis and ground glass opacity. A superimposed pneumonia at the right lung base  is not excluded. 2. There is mild thickening at the distal rectum through anorectal verge which may represent proctocolitis. 3. Details and other findings as discussed above. No significant discrepancy with overnight report. Electronically signed by: Jarod Mae Date:    12/03/2024 Time:    06:34    CT head (if fall & altered, LOC>2, on Warfarin or other anticoagulants)    Result Date: 12/3/2024  Technique: CT of the head was performed without intravenous contrast with axial as well as coronal and sagittal images. Comparison: Comparison is with study dated November 21, 2024 Dosage Information: Automated Exposure Control was utilized 993.50 mGy.cm. Number of irradiation events 2. Clinical history: Altered Mental Status (Pt arrives via [facility], EMS reports pt is coming [facility], Nurse checked on pt approx 1 hr ago and pt was unresponsive, no facial asymmetry noted , pt is following command, equal  strength. EMS ). Findings: Hemorrhage: No acute intracranial hemorrhage is seen. CSF spaces: The ventricles, sulci and basal cisterns all appear mildly prominent suggesting an element of global cerebral atrophy. This is stable since the prior study. Brain parenchyma: No acute infarct is identified. A stable appearing right-sided ventriculostomy is again seen with unchanged hypodensities around the tube in the right frontal lobe. Cerebellum: Left cerebellar old infarct. Calvarium: Stable right craniectomy changes are again seen. Maxillofacial Structures: Paranasal sinuses: The visualized paranasal sinuses appear clear with no significant mucoperiosteal thickening or air fluid levels identified. Bilateral chronic under pneumatization of mastoid air cells with some opacification.     Impression: 1.  Similar right-sided ventriculostomy is again seen with unchanged hypodensities around the tube in the right frontal lobe. 2. No acute intracranial process identified. Details and other findings as noted  above. No significant discrepancy with overnight report. Electronically signed by: Jarod Mae Date:    12/03/2024 Time:    06:10    X-Ray Chest AP Portable    Result Date: 11/22/2024  EXAMINATION: XR CHEST AP PORTABLE CLINICAL HISTORY: Sepsis; TECHNIQUE: Single frontal view of the chest was performed. COMPARISON: September 25, 2024 FINDINGS: Examination reveals mediastinal silhouette to be within normal limits cardiac silhouette is at the upper limits of normal. Confluent airspace opacities in the left retrocardiac region early infiltrate cannot be completely excluded. No other focal consolidative changes. A ventriculoperitoneal shunt catheter is identified     Some confluent opacities in the left retrocardiac region infiltrate cannot be completely excluded. Otherwise no active pulmonary disease and no significant change Electronically signed by: Evert Villareal Date:    11/22/2024 Time:    08:50    CT Head Without Contrast    Result Date: 11/22/2024  EXAMINATION: CT HEAD WITHOUT CONTRAST CLINICAL HISTORY: Mental status change, unknown cause; TECHNIQUE: Axial scans were obtained from skull base to the vertex. Coronal and sagittal reconstructions obtained from the axial data. Automatic exposure control was utilized to limit radiation dose. Contrast: None Radiation Dose: Total DLP: 980 mGy*cm COMPARISON: CT head dated 03/20/2024 FINDINGS: There is no acute intracranial hemorrhage or edema.  There is encephalomalacia in the right frontal lobe and left cerebellum. Right frontal approach ventricular shunt catheter is in place with tip over the right lateral ventricle.  The ventricles are stable in size.  There is no mass effect or midline shift.  The basal cisterns are patent.  There is no abnormal extra-axial fluid collection.  Carotid artery calcifications are noted. There are postoperative changes from prior right-sided craniectomy..  The visualized paranasal sinuses and the mastoid air cells are clear.     No  acute intracranial abnormality or significant interval change. No significant change from the [facility] interpretation Electronically signed by: Mery Han Date:    11/22/2024 Time:    07:08      Imaging personally reviewed by myself and SP.    Assessment / Plan:     79-year-old white female known to Dr. MARIELA Trinh w/pmhx of dementia, NPH status post  shunt, A-fib on Eliquis, TIA/CVA, sleep apnea, DM, PAD and seizures. She presented to the ER 12-2 with altered mental status and fever.  She was admitted with septic shock, proctocolitis, pleural effusions, hypoxia, AMS, staph hemolyticus and epidermidis bacteremia.      GI consulted for PEG tube placement.  1. Need for alternative means of nutrition  2. Oropharyngeal dysphagia   Failed MBS.   - Discussed EGD/PEG with patient and daughter in law (YURI King) in detail including R/B/A.  Wishes to proceed.    - Keep NPO for EGD/PEG tomorrow.   - Hold AM ASA and lovenox.   - Will need to keep abdominal binder in place at all times to prevent dislodgement.      Christine Choi (POA) daughter in law # [phone]    Thank you for allowing us to participate in this patient's care.   Britany Parada NP acting as scribe for Dr. Armani Chen MD

## 2024-12-10 NOTE — CONSULTS
The patient is receiving her treatment with strong confidence and strong hope.  Her daughter waws there to provide comfort and support.  I provided care and support. I also offered some words of hope.  I also prayed for the patient. I gave her the anointing of the sick.

## 2024-12-10 NOTE — PROCEDURES
[facility]  Speech Language Pathology Department  Modified Barium Swallow (MBS) Study    Patient Name:  Elisabet Choi   MRN:  [id number]    Recommendations     General recommendations:  GI consult for long-term non-oral nutrition    Solid texture recommendation:  NPO  Liquid consistency recommendation: NPO   Medications: NPO  General precautions: aspiration    History     Elisabet Choi is a/n 79 y.o. female admitted for AMS. Recurrent pna.     Previous MBS 12/4/24: NPO    Past Medical History:   Diagnosis Date    Acute cystitis     Allergic rhinitis     Anemia, unspecified     Angina pectoris     Arthritis     Back pain     Bipolar disorder, unspecified     Celiac disease     Cellulitis     CHF (congestive heart failure)     Constipation     Contracture, left ankle     Dementia     Depressive episode     Diabetes mellitus     Diabetes mellitus with ulcer of foot     Displacement of other urinary catheter, subsequent encounter     Edema     Elevated white blood cell count     Encounter for blood transfusion     Fatigue     Fluid retention     Genetic anomalies of leukocytes     GERD without esophagitis     Goiter     HLD (hyperlipidemia)     Hydrocephalus     Hypertension     Hypokalemia     Hypotension     Hypothyroidism     Magnesium deficiency     Major depression     Migraine     Mild protein-calorie malnutrition     Morbid obesity     Obstructive and reflux uropathy     Osteoarthritis     Osteoporosis     Overactive bladder     Pressure ulcer of right heel     Pressure ulcer of sacral region     Pressure ulcer of sacral region, stage 3     Pruritus     PVD (peripheral vascular disease)     Radiculopathy, lumbar region     Seizures     Sleep apnea     uses CPAP    SOB (shortness of breath)     Stroke     TIA (transient ischemic attack)     Urinary tract infection, site not specified     Vitamin deficiency     Wheelchair dependent      Past Surgical History:   Procedure Laterality Date     ABDOMINAL SURGERY      APPENDECTOMY      BACK SURGERY      BLADDER SURGERY      BRAIN SURGERY      CAROTID ENDARTERECTOMY      CHOLECYSTECTOMY      CSF SHUNT      HERNIA REPAIR      HYSTERECTOMY      INSERTION, SUPRAPUBIC CATHETER N/A 3/26/2024    Procedure: INSERTION, SUPRAPUBIC CATHETER;  Surgeon: Fuad Szymanski MD;  Location: [facility];  Service: Urology;  Laterality: N/A;  CYSTO W/ SUPRAPUBIC CATH INSERTION    TONSILLECTOMY      VASCULAR SURGERY       A MBS Study was completed to assess the efficiency of her swallow function, rule out aspiration and make recommendations regarding safe dietary consistencies, effective compensatory strategies, and safe eating environment.     Home diet texture/consistency: unknown  Current Method of Nutrition: NPO    Patient complaint: to eat/drink    Imaging   Results for orders placed during the hospital encounter of 12/02/24    X-Ray Chest 1 View    Narrative  EXAMINATION:  XR CHEST 1 VIEW    CLINICAL HISTORY:  AMS;    COMPARISON:  11/21/2024    FINDINGS:  Single view of the chest shows small right pleural effusion with bibasilar atelectasis.  No pneumothorax.  Heart is enlarged.    Impression  Small right effusion      Electronically signed by: Doug Haley MD  Date:    12/03/2024  Time:    08:38    No results found for this or any previous visit.    No results found for this or any previous visit.    Subjective     Patient awake and alert.    Spiritual/Cultural/Mormon Beliefs/Practices that affect care: no  Pain/Comfort: Pain Rating 1: 0/10    Respiratory Status:  NC    Restraints/positioning devices: none    Fluoroscopic Findings     Oral Musculature  Dentition: edentulous  Secretion Management: adequate  Mucosal Quality: good  Facial Movement: general weakness  Vocal Quality: breathy  Volitional Cough: Able to clear secretions  Volitional Swallow: delayed    Setup  Upright in bed  Able to self feed  Adequate head control    Visualization  Lateral view    Oral Phase:    Bolus holding  Prolonged/disorganized bolus formation  Poor bolus cohesion  Severely reduced bolus control    Pharyngeal Phase:   Reduced base of tongue retraction  Reduced epiglottic deflection  Reduced hyolaryngeal excursion  Poor airway protection    Consistency Fed by Laryngeal Penetration Aspiration Residue   Mildly thick liquid by spoon SLP Before the swallow  To the vocal folds SILENT None   Moderately thick liquid by spoon SLP During the swallow  To the vocal folds SILENT None   Moderately thick liquid by straw SLP Before the swallow  To the vocal folds SILENT None   Puree SLP None None None     Cervical Esophageal Phase:   UES appeared to accommodate all bolus types without stasis or retrograde movement visualized      Assessment     Pt exhibited severe-profound oropharyngeal dysphagia characterized by the findings noted above.  Aspiration of mildly and moderately thick liquids.  Both swallow safety and efficiency are impaired.     Patient appears to be at high risk for aspiration related pneumonia when considering severity of dysphagia.  Prognosis for behavioral swallow rehabilitation is poor due to reduced mobility and overlal weakness .    SLP rec: strict NPO; consider GI consult for non-oral long term means of nutrition vs pleasure feeds.    Outcome Measures     Functional Oral Intake Scale: 1 - Nothing by mouth    Goals     Multidisciplinary Problems       SLP Goals          Problem: SLP    Goal Priority Disciplines Outcome   SLP Goal     SLP Progressing   Description: LTG: Pt will tolerate least restrictive diet with no clinical signs/sx of aspiration.    ST.  Thermal stimulation with 100% swallow initiation  2.  Repeat MBS once appropriate                     Education     Patient and daughter-in-law provided with verbal education regarding POC.  Understanding was verbalized, however additional teaching warranted.    Plan     SLP Follow-Up:  Yes    Patient to be seen:  daily   Plan of Care  expires:  12/11/24  Plan of Care reviewed with:  patient, daughter     Time Tracking     SLP Treatment Date:   12/10/24  Speech Start Time:  1130  Speech Stop Time:  1150     Speech Total Time (min):  20 min    Billable minutes:   Motion Fluoroscopic Evaluation, Video Recording, 12 minutes  Self Care/Home Management, 8 minutes     12/10/2024

## 2024-12-11 ENCOUNTER — ANESTHESIA (OUTPATIENT)
Dept: ENDOSCOPY | Facility: HOSPITAL | Age: 79
End: 2024-12-11
Payer: MEDICARE

## 2024-12-11 ENCOUNTER — ANESTHESIA EVENT (OUTPATIENT)
Dept: ENDOSCOPY | Facility: HOSPITAL | Age: 79
End: 2024-12-11
Payer: MEDICARE

## 2024-12-11 LAB
ALBUMIN SERPL-MCNC: 2.6 G/DL (ref 3.4–4.8)
ALBUMIN/GLOB SERPL: 0.8 RATIO (ref 1.1–2)
ALP SERPL-CCNC: 54 UNIT/L (ref 40–150)
ALT SERPL-CCNC: 13 UNIT/L (ref 0–55)
ANION GAP SERPL CALC-SCNC: 6 MEQ/L
AST SERPL-CCNC: 13 UNIT/L (ref 5–34)
BILIRUB SERPL-MCNC: 0.3 MG/DL
BUN SERPL-MCNC: 13.4 MG/DL (ref 9.8–20.1)
CALCIUM SERPL-MCNC: 8.5 MG/DL (ref 8.4–10.2)
CHLORIDE SERPL-SCNC: 105 MMOL/L (ref 98–107)
CO2 SERPL-SCNC: 23 MMOL/L (ref 23–31)
CREAT SERPL-MCNC: 0.57 MG/DL (ref 0.55–1.02)
CREAT/UREA NIT SERPL: 24
GFR SERPLBLD CREATININE-BSD FMLA CKD-EPI: >60 ML/MIN/1.73/M2
GLOBULIN SER-MCNC: 3.2 GM/DL (ref 2.4–3.5)
GLUCOSE SERPL-MCNC: 162 MG/DL (ref 82–115)
INR PPP: 1.1
MAGNESIUM SERPL-MCNC: 2 MG/DL (ref 1.6–2.6)
PHOSPHATE SERPL-MCNC: 2.6 MG/DL (ref 2.3–4.7)
POCT GLUCOSE: 134 MG/DL (ref 70–110)
POCT GLUCOSE: 141 MG/DL (ref 70–110)
POCT GLUCOSE: 156 MG/DL (ref 70–110)
POCT GLUCOSE: 166 MG/DL (ref 70–110)
POTASSIUM SERPL-SCNC: 3.8 MMOL/L (ref 3.5–5.1)
PROT SERPL-MCNC: 5.8 GM/DL (ref 5.8–7.6)
PROTHROMBIN TIME: 14 SECONDS (ref 12.5–14.5)
SODIUM SERPL-SCNC: 134 MMOL/L (ref 136–145)

## 2024-12-11 PROCEDURE — 25000003 PHARM REV CODE 250: Performed by: NURSE ANESTHETIST, CERTIFIED REGISTERED

## 2024-12-11 PROCEDURE — 27201423 OPTIME MED/SURG SUP & DEVICES STERILE SUPPLY: Performed by: STUDENT IN AN ORGANIZED HEALTH CARE EDUCATION/TRAINING PROGRAM

## 2024-12-11 PROCEDURE — 25000242 PHARM REV CODE 250 ALT 637 W/ HCPCS

## 2024-12-11 PROCEDURE — 63600175 PHARM REV CODE 636 W HCPCS

## 2024-12-11 PROCEDURE — 99900035 HC TECH TIME PER 15 MIN (STAT)

## 2024-12-11 PROCEDURE — 94760 N-INVAS EAR/PLS OXIMETRY 1: CPT

## 2024-12-11 PROCEDURE — 43246 EGD PLACE GASTROSTOMY TUBE: CPT | Performed by: STUDENT IN AN ORGANIZED HEALTH CARE EDUCATION/TRAINING PROGRAM

## 2024-12-11 PROCEDURE — 94640 AIRWAY INHALATION TREATMENT: CPT

## 2024-12-11 PROCEDURE — 21400001 HC TELEMETRY ROOM

## 2024-12-11 PROCEDURE — 0DH63UZ INSERTION OF FEEDING DEVICE INTO STOMACH, PERCUTANEOUS APPROACH: ICD-10-PCS | Performed by: STUDENT IN AN ORGANIZED HEALTH CARE EDUCATION/TRAINING PROGRAM

## 2024-12-11 PROCEDURE — 36415 COLL VENOUS BLD VENIPUNCTURE: CPT

## 2024-12-11 PROCEDURE — 99900031 HC PATIENT EDUCATION (STAT)

## 2024-12-11 PROCEDURE — 37000009 HC ANESTHESIA EA ADD 15 MINS: Performed by: STUDENT IN AN ORGANIZED HEALTH CARE EDUCATION/TRAINING PROGRAM

## 2024-12-11 PROCEDURE — 84100 ASSAY OF PHOSPHORUS: CPT

## 2024-12-11 PROCEDURE — 80053 COMPREHEN METABOLIC PANEL: CPT

## 2024-12-11 PROCEDURE — 27000221 HC OXYGEN, UP TO 24 HOURS

## 2024-12-11 PROCEDURE — 83735 ASSAY OF MAGNESIUM: CPT

## 2024-12-11 PROCEDURE — 63600175 PHARM REV CODE 636 W HCPCS: Performed by: NURSE ANESTHETIST, CERTIFIED REGISTERED

## 2024-12-11 PROCEDURE — 85610 PROTHROMBIN TIME: CPT

## 2024-12-11 PROCEDURE — 37000008 HC ANESTHESIA 1ST 15 MINUTES: Performed by: STUDENT IN AN ORGANIZED HEALTH CARE EDUCATION/TRAINING PROGRAM

## 2024-12-11 RX ORDER — PHENYLEPHRINE HYDROCHLORIDE 10 MG/ML
INJECTION INTRAVENOUS
Status: DISCONTINUED | OUTPATIENT
Start: 2024-12-11 | End: 2024-12-11

## 2024-12-11 RX ORDER — CEFAZOLIN 2 G/1
2 INJECTION, POWDER, FOR SOLUTION INTRAMUSCULAR; INTRAVENOUS ONCE
Status: CANCELLED | OUTPATIENT
Start: 2024-12-11

## 2024-12-11 RX ORDER — PROPOFOL 10 MG/ML
VIAL (ML) INTRAVENOUS
Status: COMPLETED
Start: 2024-12-11 | End: 2024-12-11

## 2024-12-11 RX ORDER — PROPOFOL 10 MG/ML
VIAL (ML) INTRAVENOUS
Status: DISCONTINUED | OUTPATIENT
Start: 2024-12-11 | End: 2024-12-11

## 2024-12-11 RX ORDER — CEFAZOLIN SODIUM 1 G/3ML
INJECTION, POWDER, FOR SOLUTION INTRAMUSCULAR; INTRAVENOUS
Status: DISPENSED
Start: 2024-12-11 | End: 2024-12-11

## 2024-12-11 RX ORDER — ETOMIDATE 2 MG/ML
INJECTION INTRAVENOUS
Status: DISCONTINUED | OUTPATIENT
Start: 2024-12-11 | End: 2024-12-11

## 2024-12-11 RX ORDER — ETOMIDATE 2 MG/ML
INJECTION INTRAVENOUS
Status: COMPLETED
Start: 2024-12-11 | End: 2024-12-11

## 2024-12-11 RX ADMIN — PANTOPRAZOLE SODIUM 40 MG: 40 INJECTION, POWDER, FOR SOLUTION INTRAVENOUS at 12:12

## 2024-12-11 RX ADMIN — IPRATROPIUM BROMIDE AND ALBUTEROL SULFATE 3 ML: 2.5; .5 SOLUTION RESPIRATORY (INHALATION) at 07:12

## 2024-12-11 RX ADMIN — INSULIN GLARGINE 10 UNITS: 100 INJECTION, SOLUTION SUBCUTANEOUS at 09:12

## 2024-12-11 RX ADMIN — PHENYLEPHRINE HYDROCHLORIDE 50 MCG: 10 INJECTION INTRAVENOUS at 10:12

## 2024-12-11 RX ADMIN — ETOMIDATE 5 MG: 2 INJECTION INTRAVENOUS at 10:12

## 2024-12-11 RX ADMIN — IPRATROPIUM BROMIDE AND ALBUTEROL SULFATE 3 ML: 2.5; .5 SOLUTION RESPIRATORY (INHALATION) at 12:12

## 2024-12-11 RX ADMIN — PROPOFOL 30 MG: 10 INJECTION, EMULSION INTRAVENOUS at 10:12

## 2024-12-11 RX ADMIN — Medication: at 09:12

## 2024-12-11 RX ADMIN — Medication: at 04:12

## 2024-12-11 RX ADMIN — IPRATROPIUM BROMIDE AND ALBUTEROL SULFATE 3 ML: 2.5; .5 SOLUTION RESPIRATORY (INHALATION) at 03:12

## 2024-12-11 RX ADMIN — LEVETIRACETAM 750 MG: 100 INJECTION, SOLUTION INTRAVENOUS at 12:12

## 2024-12-11 NOTE — TRANSFER OF CARE
"Anesthesia Transfer of Care Note    Patient: Elisabet Choi    Procedure(s) Performed: Procedure(s) (LRB):  PEG (N/A)    Patient location: PACU    Anesthesia Type: general    Transport from OR: Transported from OR on room air with adequate spontaneous ventilation    Post pain: adequate analgesia    Post assessment: no apparent anesthetic complications    Post vital signs: stable    Level of consciousness: awake, responds to stimulation and alert    Nausea/Vomiting: no nausea/vomiting    Complications: none    Transfer of care protocol was followed      Last vitals: Visit Vitals  /83 (BP Location: Left arm, Patient Position: Lying)   Pulse 90   Temp 35.9 °C (96.6 °F) (Temporal)   Resp (!) 25   Ht 4' 8" (1.422 m)   Wt 105.9 kg (233 lb 7.5 oz)   SpO2 98%   BMI 52.34 kg/m²     "

## 2024-12-11 NOTE — ANESTHESIA POSTPROCEDURE EVALUATION
Anesthesia Post Evaluation    Patient: Elisabet Choi    Procedure(s) Performed: Procedure(s) (LRB):  PEG (N/A)    Final Anesthesia Type: general      Patient location during evaluation: GI PACU  Patient participation: Yes- Able to Participate  Level of consciousness: awake and alert  Post-procedure vital signs: reviewed and stable  Pain management: adequate  Airway patency: patent    PONV status at discharge: No PONV  Anesthetic complications: no      Respiratory status: unassisted  Hydration status: euvolemic  Follow-up not needed.          VSS, nl    No case tracking events are documented in the log.      Pain/Berta Score: No data recorded         10

## 2024-12-11 NOTE — PHYSICIAN QUERY
Please further specify the type and acuity of heart failure diagnosis.       Chronic diastolic heart failure (HFpEF)

## 2024-12-11 NOTE — PROGRESS NOTES
[facility] Hospital Medicine Progress Note        Chief Complaint: Inpatient Follow-up for     HPI:   79 year old woman with PMHx of TIA/CVA, dementia, NPH s/p  shunt, B12 deficiency, Afib on eliquis, ERICA, T2DM, PAD, HLD, hypothyroidism and seizure on keppra presented on 12/2 for AMS. She was febrile to 102.9 and had leukocytosis WBC 17.65 with elevated lactate. BP did not respond to fluids and she was admitted to the ICU for septic shock. CT head without acute abnormality. CT C/A/P with small loculated R sided pleural effusion and a trace left sided pleural effusion w/adjacent consolidation and ground glass opacities. Also conern for proctocolitis. UA was negative. She was started on zosyn. Pressors were weaned off and she was subsequently downgraded to hospital medicine on 12/4. Her blood cultures are growing S.epidermidis. Unclear if contaminant - repeat BCx were obtained and then vancomycin was added while repeat Bcx pending given history of  shunt.     Id were consulted.  Empiric antibiotics were continued.  TTE was ordered.  Lower extremity Doppler showed no signs of DVT.  Id recommended MRI thoracic spine due to persistent pain.  MRI thoracic spine showed no acute abnormalities.  She remained NPO and Clinimix was added for nutritional purposes.         Interval Hx:     Afebrile.  Alert, comfortably resting in the bed.  Patient is scheduled for PEG tube today.  Clinimix continued overnight.  No new major electrolyte abnormality seen on the labs.  Daughter-in-law at bedside        Objective/physical exam:  Vitals:    12/10/24 2357 12/11/24 0412 12/11/24 0555 12/11/24 0656   BP: (!) 104/59 108/63  132/69   Pulse: 105 96  83   Resp:       Temp: 98.6 °F (37 °C) 97.9 °F (36.6 °C)  97.8 °F (36.6 °C)   TempSrc: Oral Oral  Oral   SpO2: 97% 99%  99%   Weight:   (P) 97 kg (213 lb 13.5 oz)    Height:         General: In no acute distress, afebrile  Respiratory: Clear to auscultation  bilaterally  Cardiovascular: S1, S2, no appreciable murmur  Abdomen: Soft, nontender, BS +  MSK: Warm, no lower extremity edema, no clubbing or cyanosis  Neurologic: Alert and oriented x4, moving all extremities      Lab Results   Component Value Date     (L) 12/11/2024    K 3.8 12/11/2024     12/11/2024    CO2 23 12/11/2024    BUN 13.4 12/11/2024    CREATININE 0.57 12/11/2024    CALCIUM 8.5 12/11/2024    ANIONGAP 8 01/23/2024    ESTGFRAFRICA 94 01/23/2024    EGFRNONAA >60 07/13/2022      Lab Results   Component Value Date    ALT 13 12/11/2024    AST 13 12/11/2024    ALKPHOS 54 12/11/2024    BILITOT 0.3 12/11/2024      Lab Results   Component Value Date    WBC 10.58 12/10/2024    HGB 11.8 (L) 12/10/2024    HCT 37.5 12/10/2024    MCV 89.3 12/10/2024     12/10/2024           Medications:   albuterol-ipratropium  3 mL Nebulization Q4H WAKE    enoxparin  40 mg Subcutaneous Q12H    insulin glargine U-100  10 Units Subcutaneous QHS    levETIRAcetam (Keppra) IV (PEDS and ADULTS)  750 mg Intravenous Daily    pantoprazole  40 mg Intravenous Daily    zinc oxide-cod liver oil   Topical (Top) TID        Current Facility-Administered Medications:     acetaminophen, 650 mg, Rectal, Q6H PRN    dextrose 10%, 12.5 g, Intravenous, PRN    dextrose 10%, 25 g, Intravenous, PRN    glucagon (human recombinant), 1 mg, Intramuscular, PRN    hydrALAZINE, 10 mg, Intravenous, Q6H PRN    insulin aspart U-100, 0-10 Units, Subcutaneous, Q6H PRN    sodium chloride 0.9%, 10 mL, Intravenous, PRN    Flushing PICC/Midline Protocol, , , Until Discontinued **AND** sodium chloride 0.9%, 10 mL, Intravenous, Q12H PRN     Assessment/Plan:    Septic shock on admission-aspiration pneumonia vs colitis versus cellulitis  Staph haemolyticus and epidermidis bacteremia  Proctocolitis-POA  Lower extremity swelling-?  Cellulitis  Loculated pleural effusion-POA  Hypoxemic respiratory distress due to above-improving  Altered mental status at  admission likely infectious versus metabolic encephalopathy-improving  Possible pneumonia, consolidation, ground-glass opacities with atelectasis    HX: Obesity, dementia, history of hydrocephalus s/p  shunt, PAF on Eliquis, history of TIA/CVA, chronic lower extremity enema/venous stasis, history of seizure on Keppra, hypothyroidism, HTN, HLD, PID    Plan:   -patient is scheduled for PEG tube today.  Appreciate GI assistance   -nutrition on board.  Continue Clinimix and advance tube feeds when appropriate  -completed by Medicine and Zosyn.  Appreciate ID recommendations.  Signed off   -stable from respiratory standpoint.  Encourage CPAP during bedtime   -continue other medical management  -DC back to her facility once PEG tube feeds were optimized     Rubén Bush MD

## 2024-12-11 NOTE — PROVATION PATIENT INSTRUCTIONS
Discharge Summary/Instructions after an Endoscopic Procedure  Patient Name: Elisabet Choi  Patient MRN: [id number]  Patient YOB: 1945  Wednesday, December 11, 2024  Armani Chen MD  Dear patient,  As a result of recent federal legislation (The Federal Cures Act), you may   receive lab or pathology results from your procedure in your [facility]   account before your physician is able to contact you. Your physician or   their representative will relay the results to you with their   recommendations at their soonest availability.  Thank you,  RESTRICTIONS:  During your procedure today, you received medications for sedation.  These   medications may affect your judgment, balance and coordination.  Therefore,   for 24 hours, you have the following restrictions:   - DO NOT drive a car, operate machinery, make legal/financial decisions,   sign important papers or drink alcohol.    ACTIVITY:  Today: no heavy lifting, straining or running due to procedural   sedation/anesthesia.  The following day: return to full activity including work.  DIET:  Eat and drink normally unless instructed otherwise.     TREATMENT FOR COMMON SIDE EFFECTS:  - Mild abdominal pain, nausea, belching, bloating or excessive gas:  rest,   eat lightly and use a heating pad.  - Sore Throat: treat with throat lozenges and/or gargle with warm salt   water.  - Because air was used during the procedure, expelling large amounts of air   from your rectum or belching is normal.  - If a bowel prep was taken, you may not have a bowel movement for 1-3 days.    This is normal.  SYMPTOMS TO WATCH FOR AND REPORT TO YOUR PHYSICIAN:  1. Abdominal pain or bloating, other than gas cramps.  2. Chest pain.  3. Back pain.  4. Signs of infection such as: chills or fever occurring within 24 hours   after the procedure.  5. Rectal bleeding, which would show as bright red, maroon, or black stools.   (A tablespoon of blood from the rectum is not serious, especially  if   hemorrhoids are present.)  6. Vomiting.  7. Weakness or dizziness.  GO DIRECTLY TO THE NEAREST EMERGENCY ROOM IF YOU HAVE ANY OF THE FOLLOWING:      Difficulty breathing              Chills and/or fever over 101 F   Persistent vomiting and/or vomiting blood   Severe abdominal pain   Severe chest pain   Black, tarry stools   Bleeding- more than one tablespoon   Any other symptom or condition that you feel may need urgent attention  Your doctor recommends these additional instructions:  If any biopsies were taken, your doctors clinic will contact you in 1 to 2   weeks with any results.  - Return patient to hospital [facility] for ongoing care.   - NPO.   - Continue present medications.   - Resume Eliquis (apixaban) at prior dose in 2 days. I recommend avoidance   of all anticoagulation for 48hr due to small amount of bleeding seen with   tube placement.   - Please follow the post-PEG recommendations including: Nutrition consult   for formula and volume, advance food and medications per primary care   provider, external bolster snug to abdominal wall, change dressing once per   day, NPO x4 hrs then water today, may use PEG today for meds and water, may   use PEG tomorrow for feedings, antibiotic ointment to site and clean site   with soap and water daily and dry thoroughly.  For questions, problems or results please call your physician - Armani Chen MD at Work:  [phone].  [facility] ED at [phone]  IF A COMPLICATION OR EMERGENCY SITUATION ARISES AND YOU ARE UNABLE TO REACH   YOUR PHYSICIAN - GO DIRECTLY TO THE EMERGENCY ROOM.  MD Armani Johnson MD  12/11/2024 11:44:26 AM  This report has been verified and signed electronically.  Dear patient,  As a result of recent federal legislation (The Federal Cures Act), you may   receive lab or pathology results from your procedure in your [facility]   account before your physician is able to contact you. Your physician or    their representative will relay the results to you with their   recommendations at their soonest availability.  Thank you,  PROVATION

## 2024-12-11 NOTE — ANESTHESIA PREPROCEDURE EVALUATION
12/11/2024  Elisabet Choi is a 79 y.o., female.    Diagnosis: Oropharyngeal dysphagia [R13.12]             Lytes ok, h/h nl    We discussed suspending DNR in aidan-operative setting to allow for endotracheal intubation, shocks, compressions, and medications as necessary in the event of cardiopulmonary arrest.  Though, we agreed no extensive heroic measures if immediate resuscitation was not effective.      Pre-op Assessment    I have reviewed the Patient Summary Reports.     I have reviewed the Nursing Notes. I have reviewed the NPO Status.   I have reviewed the Medications.     Review of Systems  Anesthesia Hx:  No problems with previous Anesthesia                Social:  Non-Smoker       Hematology/Oncology:       -- Anemia:                                  Cardiovascular:     Hypertension   CAD       CHF   PVD                                Pulmonary:      Shortness of breath  Sleep Apnea                Renal/:     Recurrent UTI's             Hepatic/GI:     GERD   Celiac disease             Musculoskeletal:  Arthritis               Neurological:  TIA, CVA, residual symptoms   Headaches Seizures    Apparently has  shunt                            Endocrine:  Diabetes Hypothyroidism        Obesity / BMI > 30  Psych:    depression                Physical Exam  General: Alert    Airway:  Mallampati: II   Mouth Opening: Small, but > 3cm  TM Distance: > 6 cm  Tongue: Normal  Neck ROM: Extension Decreased    Dental:  Edentulous        Anesthesia Plan  Type of Anesthesia, risks & benefits discussed:    Anesthesia Type: Gen Natural Airway  Intra-op Monitoring Plan: Standard ASA Monitors  Post Op Pain Control Plan: IV/PO Opioids PRN  (medical reason for not using multimodal pain management)  Induction:  IV  Informed Consent: Informed consent signed with the Patient and all parties understand the risks and agree  with anesthesia plan.  All questions answered.   ASA Score: 3  Day of Surgery Review of History & Physical: H&P Update referred to the surgeon/provider.    Ready For Surgery From Anesthesia Perspective.     .

## 2024-12-12 PROBLEM — R41.82 AMS (ALTERED MENTAL STATUS): Status: ACTIVE | Noted: 2024-12-12

## 2024-12-12 LAB
ALBUMIN SERPL-MCNC: 2.6 G/DL (ref 3.4–4.8)
ALBUMIN/GLOB SERPL: 0.9 RATIO (ref 1.1–2)
ALP SERPL-CCNC: 68 UNIT/L (ref 40–150)
ALT SERPL-CCNC: 19 UNIT/L (ref 0–55)
ANION GAP SERPL CALC-SCNC: 6 MEQ/L
AST SERPL-CCNC: 20 UNIT/L (ref 5–34)
BASOPHILS # BLD AUTO: 0.05 X10(3)/MCL
BASOPHILS NFR BLD AUTO: 0.7 %
BILIRUB SERPL-MCNC: 0.3 MG/DL
BUN SERPL-MCNC: 10.8 MG/DL (ref 9.8–20.1)
CALCIUM SERPL-MCNC: 8.1 MG/DL (ref 8.4–10.2)
CHLORIDE SERPL-SCNC: 108 MMOL/L (ref 98–107)
CO2 SERPL-SCNC: 24 MMOL/L (ref 23–31)
CREAT SERPL-MCNC: 0.56 MG/DL (ref 0.55–1.02)
CREAT/UREA NIT SERPL: 19
EOSINOPHIL # BLD AUTO: 0.35 X10(3)/MCL (ref 0–0.9)
EOSINOPHIL NFR BLD AUTO: 4.8 %
ERYTHROCYTE [DISTWIDTH] IN BLOOD BY AUTOMATED COUNT: 16.5 % (ref 11.5–17)
GFR SERPLBLD CREATININE-BSD FMLA CKD-EPI: >60 ML/MIN/1.73/M2
GLOBULIN SER-MCNC: 2.9 GM/DL (ref 2.4–3.5)
GLUCOSE SERPL-MCNC: 124 MG/DL (ref 82–115)
HCT VFR BLD AUTO: 34.2 % (ref 37–47)
HGB BLD-MCNC: 10.6 G/DL (ref 12–16)
IMM GRANULOCYTES # BLD AUTO: 0.04 X10(3)/MCL (ref 0–0.04)
IMM GRANULOCYTES NFR BLD AUTO: 0.6 %
LYMPHOCYTES # BLD AUTO: 2.04 X10(3)/MCL (ref 0.6–4.6)
LYMPHOCYTES NFR BLD AUTO: 28.1 %
MAGNESIUM SERPL-MCNC: 2 MG/DL (ref 1.6–2.6)
MCH RBC QN AUTO: 28.1 PG (ref 27–31)
MCHC RBC AUTO-ENTMCNC: 31 G/DL (ref 33–36)
MCV RBC AUTO: 90.7 FL (ref 80–94)
MONOCYTES # BLD AUTO: 0.48 X10(3)/MCL (ref 0.1–1.3)
MONOCYTES NFR BLD AUTO: 6.6 %
NEUTROPHILS # BLD AUTO: 4.29 X10(3)/MCL (ref 2.1–9.2)
NEUTROPHILS NFR BLD AUTO: 59.2 %
NRBC BLD AUTO-RTO: 0 %
PHOSPHATE SERPL-MCNC: 2.7 MG/DL (ref 2.3–4.7)
PLATELET # BLD AUTO: 126 X10(3)/MCL (ref 130–400)
PMV BLD AUTO: 9.7 FL (ref 7.4–10.4)
POCT GLUCOSE: 162 MG/DL (ref 70–110)
POCT GLUCOSE: 206 MG/DL (ref 70–110)
POCT GLUCOSE: 212 MG/DL (ref 70–110)
POTASSIUM SERPL-SCNC: 3.9 MMOL/L (ref 3.5–5.1)
PROT SERPL-MCNC: 5.5 GM/DL (ref 5.8–7.6)
RBC # BLD AUTO: 3.77 X10(6)/MCL (ref 4.2–5.4)
SODIUM SERPL-SCNC: 138 MMOL/L (ref 136–145)
WBC # BLD AUTO: 7.25 X10(3)/MCL (ref 4.5–11.5)

## 2024-12-12 PROCEDURE — 85025 COMPLETE CBC W/AUTO DIFF WBC: CPT | Performed by: INTERNAL MEDICINE

## 2024-12-12 PROCEDURE — 63600175 PHARM REV CODE 636 W HCPCS

## 2024-12-12 PROCEDURE — 25000242 PHARM REV CODE 250 ALT 637 W/ HCPCS

## 2024-12-12 PROCEDURE — 94640 AIRWAY INHALATION TREATMENT: CPT

## 2024-12-12 PROCEDURE — 83735 ASSAY OF MAGNESIUM: CPT

## 2024-12-12 PROCEDURE — 80053 COMPREHEN METABOLIC PANEL: CPT

## 2024-12-12 PROCEDURE — 27000221 HC OXYGEN, UP TO 24 HOURS

## 2024-12-12 PROCEDURE — 99900031 HC PATIENT EDUCATION (STAT)

## 2024-12-12 PROCEDURE — 36415 COLL VENOUS BLD VENIPUNCTURE: CPT

## 2024-12-12 PROCEDURE — 99900035 HC TECH TIME PER 15 MIN (STAT)

## 2024-12-12 PROCEDURE — 25000003 PHARM REV CODE 250: Performed by: HOSPITALIST

## 2024-12-12 PROCEDURE — 25000003 PHARM REV CODE 250: Performed by: INTERNAL MEDICINE

## 2024-12-12 PROCEDURE — 21400001 HC TELEMETRY ROOM

## 2024-12-12 PROCEDURE — 94760 N-INVAS EAR/PLS OXIMETRY 1: CPT

## 2024-12-12 PROCEDURE — 84100 ASSAY OF PHOSPHORUS: CPT

## 2024-12-12 RX ORDER — SODIUM CHLORIDE 9 MG/ML
INJECTION, SOLUTION INTRAVENOUS ONCE
Status: COMPLETED | OUTPATIENT
Start: 2024-12-12 | End: 2024-12-12

## 2024-12-12 RX ORDER — ACETAMINOPHEN 650 MG/20.3ML
650 LIQUID ORAL EVERY 6 HOURS PRN
Status: DISCONTINUED | OUTPATIENT
Start: 2024-12-12 | End: 2024-12-14 | Stop reason: HOSPADM

## 2024-12-12 RX ORDER — SODIUM CHLORIDE 9 MG/ML
INJECTION, SOLUTION INTRAVENOUS CONTINUOUS
Status: DISCONTINUED | OUTPATIENT
Start: 2024-12-12 | End: 2024-12-13

## 2024-12-12 RX ADMIN — Medication: at 03:12

## 2024-12-12 RX ADMIN — IPRATROPIUM BROMIDE AND ALBUTEROL SULFATE 3 ML: 2.5; .5 SOLUTION RESPIRATORY (INHALATION) at 08:12

## 2024-12-12 RX ADMIN — Medication: at 09:12

## 2024-12-12 RX ADMIN — SODIUM CHLORIDE: 9 INJECTION, SOLUTION INTRAVENOUS at 03:12

## 2024-12-12 RX ADMIN — IPRATROPIUM BROMIDE AND ALBUTEROL SULFATE 3 ML: 2.5; .5 SOLUTION RESPIRATORY (INHALATION) at 04:12

## 2024-12-12 RX ADMIN — LEVETIRACETAM 750 MG: 100 INJECTION, SOLUTION INTRAVENOUS at 09:12

## 2024-12-12 RX ADMIN — IPRATROPIUM BROMIDE AND ALBUTEROL SULFATE 3 ML: 2.5; .5 SOLUTION RESPIRATORY (INHALATION) at 07:12

## 2024-12-12 RX ADMIN — INSULIN ASPART 4 UNITS: 100 INJECTION, SOLUTION INTRAVENOUS; SUBCUTANEOUS at 05:12

## 2024-12-12 RX ADMIN — SODIUM CHLORIDE: 9 INJECTION, SOLUTION INTRAVENOUS at 08:12

## 2024-12-12 RX ADMIN — IPRATROPIUM BROMIDE AND ALBUTEROL SULFATE 3 ML: 2.5; .5 SOLUTION RESPIRATORY (INHALATION) at 12:12

## 2024-12-12 RX ADMIN — INSULIN ASPART 2 UNITS: 100 INJECTION, SOLUTION INTRAVENOUS; SUBCUTANEOUS at 11:12

## 2024-12-12 RX ADMIN — ENOXAPARIN SODIUM 40 MG: 40 INJECTION SUBCUTANEOUS at 09:12

## 2024-12-12 RX ADMIN — ACETAMINOPHEN 650 MG: 650 SOLUTION ORAL at 08:12

## 2024-12-12 RX ADMIN — INSULIN GLARGINE 10 UNITS: 100 INJECTION, SOLUTION SUBCUTANEOUS at 08:12

## 2024-12-12 RX ADMIN — PANTOPRAZOLE SODIUM 40 MG: 40 INJECTION, POWDER, FOR SOLUTION INTRAVENOUS at 09:12

## 2024-12-12 NOTE — PLAN OF CARE
Notified Eli with [facility] that plans are to discharge patient back to facility tomorrow if medically stable.

## 2024-12-12 NOTE — PROGRESS NOTES
[facility] Hospital Medicine Progress Note        Chief Complaint: Inpatient Follow-up     HPI:   79 year old woman with PMHx of TIA/CVA, dementia, NPH s/p  shunt, B12 deficiency, Afib on eliquis, ERICA, T2DM, PAD, HLD, hypothyroidism and seizure on keppra presented on 12/2 for AMS. She was febrile to 102.9 and had leukocytosis WBC 17.65 with elevated lactate. BP did not respond to fluids and she was admitted to the ICU for septic shock. CT head without acute abnormality. CT C/A/P with small loculated R sided pleural effusion and a trace left sided pleural effusion w/adjacent consolidation and ground glass opacities. Also conern for proctocolitis. UA was negative. She was started on zosyn. Pressors were weaned off and she was subsequently downgraded to hospital medicine on 12/4. Her blood cultures are growing S.epidermidis. Unclear if contaminant - repeat BCx were obtained and then vancomycin was added while repeat Bcx pending given history of  shunt.      Id were consulted.  Empiric antibiotics were continued.  TTE was ordered.  Lower extremity Doppler showed no signs of DVT.  Id recommended MRI thoracic spine due to persistent pain.  MRI thoracic spine showed no acute abnormalities.  She remained NPO and Clinimix was added for nutritional purposes      Interval Hx:   Patient underwent PEG placement yesterday 12/11/2024 no significant events reported overnight.  Tube feeds were initiated , so far tolerating, not at goal yet, seen at bedside this morning patient alert awake following basic commands remained nonverbal     Stable on nasal cannula    Labs this morning showed hemoglobin 10.6, glucose 124    Case was discussed with patient's nurse and  on the floor.    Objective/physical exam:  General: In no acute distress, afebrile  Chest:  Unlabored breathing on nasal cannula  Heart:  Normal  Abdomen: Soft,  peg  MSK: Warm  Neurologic: Alert and nonverbal    VITAL SIGNS: 24  HRS MIN & MAX LAST   Temp  Min: 96.3 °F (35.7 °C)  Max: 98.6 °F (37 °C) 97.5 °F (36.4 °C)   BP  Min: 117/57  Max: 177/98 122/60   Pulse  Min: 79  Max: 105  82   Resp  Min: 16  Max: 27 16   SpO2  Min: 93 %  Max: 100 % 100 %     I have reviewed the following labs:  Recent Labs   Lab 12/06/24  0703 12/10/24  1613   WBC 6.32 10.58   RBC 3.56* 4.20   HGB 9.9* 11.8*   HCT 32.3* 37.5   MCV 90.7 89.3   MCH 27.8 28.1   MCHC 30.7* 31.5*   RDW 15.9 16.1    163   MPV 10.3 9.9     Recent Labs   Lab 12/09/24  0617 12/10/24  0521 12/11/24  0622    134* 134*   K 3.5 3.8 3.8    107 105   CO2 24 25 23   BUN 11.4 12.5 13.4   CREATININE 0.61 0.49* 0.57   CALCIUM 8.1* 7.8* 8.5   MG 2.00 2.00 2.00   ALBUMIN 2.7* 2.4* 2.6*   ALKPHOS 61 54 54   ALT 14 13 13   AST 13 15 13   BILITOT 0.3 0.3 0.3     Microbiology Results (last 7 days)       Procedure Component Value Units Date/Time    Blood Culture [[id number]]  (Normal) Collected: 12/04/24 1335    Order Status: Completed Specimen: Blood Updated: 12/09/24 1601     Blood Culture No Growth at 5 days    Blood Culture #1 **CANNOT BE ORDERED STAT** [[id number]]  (Abnormal)  (Susceptibility) Collected: 12/03/24 0006    Order Status: Completed Specimen: Blood from Antecubital, Left Updated: 12/06/24 0715     Blood Culture Staphylococcus hominis      Staphylococcus epidermidis     GRAM STAIN Gram Positive Cocci, probable Staphylococcus      Seen in gram stain of broth only      2 of 2 bottles positive    Blood Culture #1 **CANNOT BE ORDERED STAT** [[id number]]  (Abnormal)  (Susceptibility) Collected: 12/03/24 0006    Order Status: Completed Specimen: Blood from Antecubital, Left Updated: 12/06/24 0715     Blood Culture Staphylococcus haemolyticus      Staphylococcus epidermidis     GRAM STAIN Gram Positive Cocci, probable Staphylococcus      Seen in gram stain of broth only      1 of 2 Aerobic bottles positive             See below for Radiology    Assessment/Plan:  Septic shock on  admission-aspiration pneumonia vs colitis versus cellulitis  Staph haemolyticus and epidermidis bacteremia  Proctocolitis-POA  Lower extremity swelling-concern for Cellulitis  Loculated pleural effusion-POA  Hypoxic respiratory distress due to above-improving  Altered mental status at admission likely infectious versus metabolic encephalopathy-improving  Possible pneumonia, consolidation, ground-glass opacities with atelectasis   Oropharyngeal dysphagia failed MBS status post PEG placement 12/11/2024     HX: Obesity, dementia, history of hydrocephalus s/p  shunt, PAF on Eliquis, history of TIA/CVA, chronic lower extremity enema/venous stasis, history of seizure on Keppra, hypothyroidism, HTN, HLD, PID    Post Peg day 1  Tube feeds as tolerated, peg site care follow GI recommendations  Patient has completed  antibiotics as recommended by ID.  ID signed off  Continue Keppra, ppi   monitor CBGS, cover with ISS  Continue with current care and monitoring      VTE prophylaxis:  SCDs, Lovenox to hold per GI recs      Anticipated discharge and Disposition:   Back to facility, Likely tomorrow if stable          Portions of this note dictated using EMR integrated voice recognition software, and may be subject to voice recognition errors not corrected at proofreading. Please contact writer for clarification if needed.   _____________________________________________________________________    Malnutrition Status:  Nutrition consulted. Most recent weight and BMI monitored-     Measurements:  Wt Readings from Last 1 Encounters:   12/09/24 105.9 kg (233 lb 7.5 oz)   Body mass index is 52.34 kg/m².    Patient has been screened and assessed by RD.    Malnutrition Type:  Context:    Level:      Malnutrition Characteristic Summary:       Interventions/Recommendations (treatment strategy):        Scheduled Med:   albuterol-ipratropium  3 mL Nebulization Q4H WAKE    enoxparin  40 mg Subcutaneous Q12H    insulin glargine U-100  10  Units Subcutaneous QHS    levETIRAcetam (Keppra) IV (PEDS and ADULTS)  750 mg Intravenous Daily    pantoprazole  40 mg Intravenous Daily    zinc oxide-cod liver oil   Topical (Top) TID      Continuous Infusions:     PRN Meds:    Current Facility-Administered Medications:     acetaminophen, 650 mg, Rectal, Q6H PRN    dextrose 10%, 12.5 g, Intravenous, PRN    dextrose 10%, 25 g, Intravenous, PRN    glucagon (human recombinant), 1 mg, Intramuscular, PRN    hydrALAZINE, 10 mg, Intravenous, Q6H PRN    insulin aspart U-100, 0-10 Units, Subcutaneous, Q6H PRN    sodium chloride 0.9%, 10 mL, Intravenous, PRN    Flushing PICC/Midline Protocol, , , Until Discontinued **AND** sodium chloride 0.9%, 10 mL, Intravenous, Q12H PRN     Radiology:  I have personally reviewed the following imaging and agree with the radiologist.     CV Ultrasound doppler venous arm right  Negative for deep and superficial vein thrombosis in the right upper   extremity       Michelle Bach MD  Department of Hospital Medicine   [facility]   12/12/2024

## 2024-12-12 NOTE — PROGRESS NOTES
"Gastroenterology Progress Note    Subjective/Interval History:  Tolerating continuous TFs at 45 cc/hr.  No residuals per nursing and no issues with medications per PEG.  Denies abdominal pain.  No bleeding. Peg bumper tight against skin. Loosened to 4.5 cm at the skin. Tolerated well- remains free of bleeding.    ROS:  Review of Systems   All other systems reviewed and are negative.      Vital Signs:  /60   Pulse 82   Temp 97.5 °F (36.4 °C) (Oral)   Resp 16   Ht 4' 8" (1.422 m)   Wt 105.9 kg (233 lb 7.5 oz)   SpO2 100%   BMI 52.34 kg/m²   Body mass index is 52.34 kg/m².    Physical Exam:  Physical Exam  Constitutional:       General: She is not in acute distress.     Appearance: She is obese. She is ill-appearing. She is not toxic-appearing.   HENT:      Head: Normocephalic and atraumatic.      Mouth/Throat:      Mouth: Mucous membranes are dry.      Pharynx: Oropharynx is clear.   Cardiovascular:      Rate and Rhythm: Normal rate and regular rhythm.      Pulses: Normal pulses.      Heart sounds: Normal heart sounds.   Pulmonary:      Effort: Pulmonary effort is normal. No respiratory distress.      Breath sounds: Normal breath sounds. No stridor. No wheezing or rales.      Comments: 2L NC  Abdominal:      General: Bowel sounds are normal. There is no distension.      Palpations: Abdomen is soft. There is no mass.      Tenderness: There is no abdominal tenderness. There is no guarding.      Comments: RUQ peg   Musculoskeletal:      Comments: Generalized anasarca   Skin:     General: Skin is warm and dry.   Neurological:      Mental Status: She is alert. Mental status is at baseline.         Labs:  Recent Results (from the past 48 hours)   CBC Without Differential    Collection Time: 12/10/24  4:13 PM   Result Value Ref Range    WBC 10.58 4.50 - 11.50 x10(3)/mcL    RBC 4.20 4.20 - 5.40 x10(6)/mcL    Hgb 11.8 (L) 12.0 - 16.0 g/dL    Hct 37.5 37.0 - 47.0 %    MCV 89.3 80.0 - 94.0 fL    MCH 28.1 27.0 - 31.0 " pg    MCHC 31.5 (L) 33.0 - 36.0 g/dL    RDW 16.1 11.5 - 17.0 %    Platelet 163 130 - 400 x10(3)/mcL    MPV 9.9 7.4 - 10.4 fL    NRBC% 0.0 %   POCT glucose    Collection Time: 12/10/24  4:33 PM   Result Value Ref Range    POCT Glucose 163 (H) 70 - 110 mg/dL   POCT glucose    Collection Time: 12/10/24  9:48 PM   Result Value Ref Range    POCT Glucose 156 (H) 70 - 110 mg/dL   POCT glucose    Collection Time: 12/11/24  5:51 AM   Result Value Ref Range    POCT Glucose 166 (H) 70 - 110 mg/dL   Comprehensive metabolic panel    Collection Time: 12/11/24  6:22 AM   Result Value Ref Range    Sodium 134 (L) 136 - 145 mmol/L    Potassium 3.8 3.5 - 5.1 mmol/L    Chloride 105 98 - 107 mmol/L    CO2 23 23 - 31 mmol/L    Glucose 162 (H) 82 - 115 mg/dL    Blood Urea Nitrogen 13.4 9.8 - 20.1 mg/dL    Creatinine 0.57 0.55 - 1.02 mg/dL    Calcium 8.5 8.4 - 10.2 mg/dL    Protein Total 5.8 5.8 - 7.6 gm/dL    Albumin 2.6 (L) 3.4 - 4.8 g/dL    Globulin 3.2 2.4 - 3.5 gm/dL    Albumin/Globulin Ratio 0.8 (L) 1.1 - 2.0 ratio    Bilirubin Total 0.3 <=1.5 mg/dL    ALP 54 40 - 150 unit/L    ALT 13 0 - 55 unit/L    AST 13 5 - 34 unit/L    eGFR >60 mL/min/1.73/m2    Anion Gap 6.0 mEq/L    BUN/Creatinine Ratio 24    Magnesium    Collection Time: 12/11/24  6:22 AM   Result Value Ref Range    Magnesium Level 2.00 1.60 - 2.60 mg/dL   Phosphorus    Collection Time: 12/11/24  6:22 AM   Result Value Ref Range    Phosphorus Level 2.6 2.3 - 4.7 mg/dL   Protime-INR    Collection Time: 12/11/24  6:22 AM   Result Value Ref Range    PT 14.0 12.5 - 14.5 seconds    INR 1.1 <=1.3   POCT glucose    Collection Time: 12/11/24  5:01 PM   Result Value Ref Range    POCT Glucose 141 (H) 70 - 110 mg/dL   POCT glucose    Collection Time: 12/11/24  8:38 PM   Result Value Ref Range    POCT Glucose 134 (H) 70 - 110 mg/dL   Comprehensive metabolic panel    Collection Time: 12/12/24  8:28 AM   Result Value Ref Range    Sodium 138 136 - 145 mmol/L    Potassium 3.9 3.5 - 5.1  mmol/L    Chloride 108 (H) 98 - 107 mmol/L    CO2 24 23 - 31 mmol/L    Glucose 124 (H) 82 - 115 mg/dL    Blood Urea Nitrogen 10.8 9.8 - 20.1 mg/dL    Creatinine 0.56 0.55 - 1.02 mg/dL    Calcium 8.1 (L) 8.4 - 10.2 mg/dL    Protein Total 5.5 (L) 5.8 - 7.6 gm/dL    Albumin 2.6 (L) 3.4 - 4.8 g/dL    Globulin 2.9 2.4 - 3.5 gm/dL    Albumin/Globulin Ratio 0.9 (L) 1.1 - 2.0 ratio    Bilirubin Total 0.3 <=1.5 mg/dL    ALP 68 40 - 150 unit/L    ALT 19 0 - 55 unit/L    AST 20 5 - 34 unit/L    eGFR >60 mL/min/1.73/m2    Anion Gap 6.0 mEq/L    BUN/Creatinine Ratio 19    Magnesium    Collection Time: 12/12/24  8:28 AM   Result Value Ref Range    Magnesium Level 2.00 1.60 - 2.60 mg/dL   Phosphorus    Collection Time: 12/12/24  8:28 AM   Result Value Ref Range    Phosphorus Level 2.7 2.3 - 4.7 mg/dL   CBC with Differential    Collection Time: 12/12/24  8:28 AM   Result Value Ref Range    WBC 7.25 4.50 - 11.50 x10(3)/mcL    RBC 3.77 (L) 4.20 - 5.40 x10(6)/mcL    Hgb 10.6 (L) 12.0 - 16.0 g/dL    Hct 34.2 (L) 37.0 - 47.0 %    MCV 90.7 80.0 - 94.0 fL    MCH 28.1 27.0 - 31.0 pg    MCHC 31.0 (L) 33.0 - 36.0 g/dL    RDW 16.5 11.5 - 17.0 %    Platelet 126 (L) 130 - 400 x10(3)/mcL    MPV 9.7 7.4 - 10.4 fL    Neut % 59.2 %    Lymph % 28.1 %    Mono % 6.6 %    Eos % 4.8 %    Basophil % 0.7 %    Lymph # 2.04 0.6 - 4.6 x10(3)/mcL    Neut # 4.29 2.1 - 9.2 x10(3)/mcL    Mono # 0.48 0.1 - 1.3 x10(3)/mcL    Eos # 0.35 0 - 0.9 x10(3)/mcL    Baso # 0.05 <=0.2 x10(3)/mcL    IG# 0.04 0 - 0.04 x10(3)/mcL    IG% 0.6 %    NRBC% 0.0 %       Imaging:  CV Ultrasound doppler venous arm right    Result Date: 12/11/2024  Negative for deep and superficial vein thrombosis in the right upper extremity     Fl Modified Barium Swallow Speech    Result Date: 12/10/2024  See procedure notes from Speech Pathologist. This procedure was auto-finalized.    MRI Thoracic Spine W WO Cont    Result Date: 12/7/2024  EXAMINATION: MRI THORACIC SPINE W WO CONTRAST CLINICAL  HISTORY: Acute back pain; TECHNIQUE: Multiplanar, multisequence MR images were acquired through the thoracic spine without and with the administration of contrast. COMPARISON: CT chest abdomen pelvis 12/02/2024 FINDINGS: Alignment: Normal. Vertebrae: There are postsurgical changes of T12-L2 posterior spinal fusion with transpedicular screws at T12 and L2 and vertical rods.  There is associated local susceptibility artifact related to fusion hardware.  There is chronic compression deformity at L1 status post cement augmentation trace retropulsion similar to CT exam.  No abnormal enhancement. Discs: Normal height and signal. Cord: Normal where visible.  Obscured at T12-L2 related to hardware susceptibility artifact. Paraspinal muscles & soft tissues: There are bilateral layering pleural effusions.     Post treatment changes at T12-L2.  No appreciable acute spinal abnormality. Electronically signed by: Staci Duckworth Date:    12/07/2024 Time:    10:28    CV Ultrasound doppler venous legs bilat    Result Date: 12/6/2024    There is a right subcutaneous edema located in the proximal thigh, middle thigh, distal thigh, proximal calf and distal calf veins.   There is a left subcutaneous edema located in the proximal thigh, middle thigh, distal thigh, proximal calf and distal calf veins. Negative for deep and superficial vein thrombosis in the right lower extremity Negative for deep and superficial vein thrombosis in the left lower extremity      Echo    Result Date: 12/5/2024    Left Ventricle: The left ventricle is normal in size. Normal wall thickness. There is normal systolic function with a visually estimated ejection fraction of 60 - 65%. Grade I diastolic dysfunction.   Right Ventricle: Normal right ventricular cavity size. Systolic function is normal.   Aortic Valve: The aortic valve is a trileaflet valve. There is aortic valve sclerosis.   Less than mild valvular stenosis/regurgitation.   No gross evidence of  valvular endocarditis noted.     X-Ray Thoracic Spine AP Lateral    Result Date: 12/5/2024  EXAMINATION: XR THORACIC SPINE AP LATERAL CLINICAL HISTORY: back pain; TECHNIQUE: AP and lateral views of the thoracic spine were performed. COMPARISON: 11/14/2013 FINDINGS: Normal kyphotic curvature.  Vertebral body height of the mid to lower thoracic vertebral bodies well maintained.  The visualized lungs are unremarkable.  Degenerative changes with multiple bridging osteophytes.     Degenerative changes with no acute abnormality of the thoracic spine appreciated. Electronically signed by: Fuad Weeks Date:    12/05/2024 Time:    12:44    Fl Modified Barium Swallow Speech    Result Date: 12/4/2024  See procedure notes from Speech Pathologist. This procedure was auto-finalized.    X-Ray Chest 1 View    Result Date: 12/3/2024  EXAMINATION: XR CHEST 1 VIEW CLINICAL HISTORY: AMS; COMPARISON: 11/21/2024 FINDINGS: Single view of the chest shows small right pleural effusion with bibasilar atelectasis.  No pneumothorax.  Heart is enlarged.     Small right effusion Electronically signed by: Doug Haley MD Date:    12/03/2024 Time:    08:38    CT Chest Abdomen Pelvis With IV Contrast (XPD) NO Oral Contrast    Result Date: 12/3/2024  Technique: CT Scan of the chest abdomen and pelvis was performed with intravenous contrast with axial as well as sagittal and, coronal images. Dosage Information: Automated Exposure Control was utilized 1445.92 mGy.cm. Comparison: Comparison is with study dated April 21, 2023 Clinical History: SEPSIS. Findings: Lines and Tubes: A drainage catheter is seen coursing along the right cervical region, hemithorax and hemiabdomen with distal tip in the anterior pelvic cavity. Mediastinum: Multiple subcentimeter mediastinal lymph nodes are seen paratracheal and subcarinal spaces . Heart: Mild cardiomegaly is seen. Aorta: No aortic dissection or aneurysm is seen. Mild aortic calcification is seen in the  thoracic aorta. Lungs: There is a small possibly loculated right sided pleural effusion and a trace left sided pleural effusion with adjacent consolidation atelectasis and ground glass opacity. A superimposed pneumonia at the right lung base is not excluded. Bony Structures: Spine: Moderate spondylolytic changes are seen in the thoracic spine with vertebral fusion. Abdomen: Abdominal Wall: There is extensive stranding of the subcutaneous fat suggesting an element of anasarca edema of uncertain etiology. Liver: The liver appears unremarkable. Biliary System: The extra hepatic biliary system appears prominent which may reflect prior obstructive physiology in this patient status post cholecystectomy. Gallbladder: The gallbladder is not identified in the gallbladder fossa which may reflect prior cholecystectomy correlate with surgical history and visual inspection. Pancreas: There is mild fatty infiltration of the pancreas. Spleen: The spleen appears unremarkable. Adrenals: The adrenal glands appear unremarkable. Kidneys: The kidneys appear unremarkable with no stones cysts masses or hydronephrosis. Aorta: There is mild calcification of the abdominal aorta and its branches. Bowel: Esophagus: The visualized esophagus appears unremarkable. Stomach: The stomach is decompressed and cannot be assessed. Duodenum: Unremarkable appearing duodenum. Small Bowel: The small bowel appears unremarkable. Colon: There is moderate stool in the colon which could reflect an element of constipation. There is mild thickening at the distal rectum through anorectal verge which may represent proctocolitis. Appendix: The appendix is not identified but no inflammatory changes are seen in the right lower quadrant to suggest appendicitis. Peritoneum: No intraperitoneal free air or ascites is seen. Pelvis: Bladder: The bladder is nondistended and cannot be definitively evaluated. A suprapubic catheter is in place. Female: Uterus: The uterus is not  identified. Ovaries: No adnexal masses are seen. Bony structures: Dorsal Spine: There is moderate spondylosis of the visualized dorsal spine. There is non acute severe compression deformity at L1 with bone cement. Spinal fixating device is seen at T12 through L2. Bony Pelvis: There is mild to moderate degenerative change of the bilateral hip.     Impression: 1. There is a small possibly loculated right sided pleural effusion and a trace left sided pleural effusion with adjacent consolidation atelectasis and ground glass opacity. A superimposed pneumonia at the right lung base is not excluded. 2. There is mild thickening at the distal rectum through anorectal verge which may represent proctocolitis. 3. Details and other findings as discussed above. No significant discrepancy with overnight report. Electronically signed by: Jarod Mae Date:    12/03/2024 Time:    06:34    CT head (if fall & altered, LOC>2, on Warfarin or other anticoagulants)    Result Date: 12/3/2024  Technique: CT of the head was performed without intravenous contrast with axial as well as coronal and sagittal images. Comparison: Comparison is with study dated November 21, 2024 Dosage Information: Automated Exposure Control was utilized 993.50 mGy.cm. Number of irradiation events 2 . Clinical history: Altered Mental Status (Pt arrives via [facility], EMS reports pt is coming from [facility], Nurse checked on pt approx 1 hr ago and pt was unresponsive, no facial asymmetry noted , pt is following command, equal  strength. EMS ). Findings: Hemorrhage: No acute intracranial hemorrhage is seen. CSF spaces: The ventricles, sulci and basal cisterns all appear mildly prominent suggesting an element of global cerebral atrophy. This is stable since the prior study. Brain parenchyma: No acute infarct is identified. A stable appearing right-sided ventriculostomy is again seen with unchanged hypodensities around the tube in the right frontal lobe.  Cerebellum: Left cerebellar old infarct. Calvarium: Stable right craniectomy changes are again seen. Maxillofacial Structures: Paranasal sinuses: The visualized paranasal sinuses appear clear with no significant mucoperiosteal thickening or air fluid levels identified. Bilateral chronic under pneumatization of mastoid air cells with some opacification.     Impression: 1.  Similar right-sided ventriculostomy is again seen with unchanged hypodensities around the tube in the right frontal lobe. 2. No acute intracranial process identified. Details and other findings as noted above. No significant discrepancy with overnight report. Electronically signed by: Jarod Mae Date:    12/03/2024 Time:    06:10    X-Ray Chest AP Portable    Result Date: 11/22/2024  EXAMINATION: XR CHEST AP PORTABLE CLINICAL HISTORY: Sepsis; TECHNIQUE: Single frontal view of the chest was performed. COMPARISON: September 25, 2024 FINDINGS: Examination reveals mediastinal silhouette to be within normal limits cardiac silhouette is at the upper limits of normal. Confluent airspace opacities in the left retrocardiac region early infiltrate cannot be completely excluded. No other focal consolidative changes. A ventriculoperitoneal shunt catheter is identified     Some confluent opacities in the left retrocardiac region infiltrate cannot be completely excluded. Otherwise no active pulmonary disease and no significant change Electronically signed by: Evert Villareal Date:    11/22/2024 Time:    08:50    CT Head Without Contrast    Result Date: 11/22/2024  EXAMINATION: CT HEAD WITHOUT CONTRAST CLINICAL HISTORY: Mental status change, unknown cause; TECHNIQUE: Axial scans were obtained from skull base to the vertex. Coronal and sagittal reconstructions obtained from the axial data. Automatic exposure control was utilized to limit radiation dose. Contrast: None Radiation Dose: Total DLP: 980 mGy*cm COMPARISON: CT head dated 03/20/2024 FINDINGS: There is  no acute intracranial hemorrhage or edema.  There is encephalomalacia in the right frontal lobe and left cerebellum. Right frontal approach ventricular shunt catheter is in place with tip over the right lateral ventricle.  The ventricles are stable in size.  There is no mass effect or midline shift.  The basal cisterns are patent.  There is no abnormal extra-axial fluid collection.  Carotid artery calcifications are noted. There are postoperative changes from prior right-sided craniectomy..  The visualized paranasal sinuses and the mastoid air cells are clear.     No acute intracranial abnormality or significant interval change. No significant change from the [facility] interpretation Electronically signed by: Mery Han Date:    11/22/2024 Time:    07:08         Assessment/Plan:  79-year-old white female known to Dr. MARIELA Trinh w/pmhx of dementia, NPH status post  shunt, A-fib on Eliquis, TIA/CVA, sleep apnea, DM, PAD and seizures. She presented to the ER 12-2 with altered mental status and fever.  She was admitted with septic shock, proctocolitis, pleural effusions, hypoxia, AMS, staph hemolyticus and epidermidis bacteremia.       GI consulted for PEG tube placement.  1. Need for alternative means of nutrition  2. Oropharyngeal dysphagia   Failed MBS.   - Initiate TFs and advance as tolerated to goal as per set orders.   - Continue TFs and advance as tolerated to goal as per set orders.   - PEG site care.   - Keep abdominal binder in place at all times to prevent dislodgement.     Recs: Ok tor resume eliquis in 48 hours (sat) and avoid all anticoagulation for 48 hr due to small amount of bleeding with peg placement.    PEG looks good and is without malfunction. Please call GI if needed.       Thank you for allowing us to participate in this patient's care.   Britany Parada NP acting as scribe for Dr. Armani Cehn MD

## 2024-12-13 LAB
ALBUMIN SERPL-MCNC: 2.1 G/DL (ref 3.4–4.8)
ALBUMIN/GLOB SERPL: 0.8 RATIO (ref 1.1–2)
ALP SERPL-CCNC: 48 UNIT/L (ref 40–150)
ALT SERPL-CCNC: 13 UNIT/L (ref 0–55)
ANION GAP SERPL CALC-SCNC: 5 MEQ/L
AST SERPL-CCNC: 16 UNIT/L (ref 5–34)
BASOPHILS # BLD AUTO: 0.03 X10(3)/MCL
BASOPHILS NFR BLD AUTO: 0.5 %
BILIRUB SERPL-MCNC: 0.2 MG/DL
BUN SERPL-MCNC: 13.2 MG/DL (ref 9.8–20.1)
CALCIUM SERPL-MCNC: 7.1 MG/DL (ref 8.4–10.2)
CHLORIDE SERPL-SCNC: 111 MMOL/L (ref 98–107)
CO2 SERPL-SCNC: 23 MMOL/L (ref 23–31)
CREAT SERPL-MCNC: 0.51 MG/DL (ref 0.55–1.02)
CREAT/UREA NIT SERPL: 26
EOSINOPHIL # BLD AUTO: 0.27 X10(3)/MCL (ref 0–0.9)
EOSINOPHIL NFR BLD AUTO: 4.7 %
ERYTHROCYTE [DISTWIDTH] IN BLOOD BY AUTOMATED COUNT: 16.6 % (ref 11.5–17)
GFR SERPLBLD CREATININE-BSD FMLA CKD-EPI: >60 ML/MIN/1.73/M2
GLOBULIN SER-MCNC: 2.6 GM/DL (ref 2.4–3.5)
GLUCOSE SERPL-MCNC: 214 MG/DL (ref 82–115)
HCT VFR BLD AUTO: 29.6 % (ref 37–47)
HGB BLD-MCNC: 9.1 G/DL (ref 12–16)
IMM GRANULOCYTES # BLD AUTO: 0.03 X10(3)/MCL (ref 0–0.04)
IMM GRANULOCYTES NFR BLD AUTO: 0.5 %
LYMPHOCYTES # BLD AUTO: 1.49 X10(3)/MCL (ref 0.6–4.6)
LYMPHOCYTES NFR BLD AUTO: 26 %
MAGNESIUM SERPL-MCNC: 1.6 MG/DL (ref 1.6–2.6)
MCH RBC QN AUTO: 28.1 PG (ref 27–31)
MCHC RBC AUTO-ENTMCNC: 30.7 G/DL (ref 33–36)
MCV RBC AUTO: 91.4 FL (ref 80–94)
MONOCYTES # BLD AUTO: 0.39 X10(3)/MCL (ref 0.1–1.3)
MONOCYTES NFR BLD AUTO: 6.8 %
NEUTROPHILS # BLD AUTO: 3.52 X10(3)/MCL (ref 2.1–9.2)
NEUTROPHILS NFR BLD AUTO: 61.5 %
NRBC BLD AUTO-RTO: 0 %
PHOSPHATE SERPL-MCNC: 1.6 MG/DL (ref 2.3–4.7)
PLATELET # BLD AUTO: 103 X10(3)/MCL (ref 130–400)
PLATELETS.RETICULATED NFR BLD AUTO: 4 % (ref 0.9–11.2)
PMV BLD AUTO: 10.8 FL (ref 7.4–10.4)
POCT GLUCOSE: 219 MG/DL (ref 70–110)
POCT GLUCOSE: 238 MG/DL (ref 70–110)
POCT GLUCOSE: 253 MG/DL (ref 70–110)
POCT GLUCOSE: 263 MG/DL (ref 70–110)
POTASSIUM SERPL-SCNC: 3.2 MMOL/L (ref 3.5–5.1)
PROT SERPL-MCNC: 4.7 GM/DL (ref 5.8–7.6)
RBC # BLD AUTO: 3.24 X10(6)/MCL (ref 4.2–5.4)
SODIUM SERPL-SCNC: 139 MMOL/L (ref 136–145)
WBC # BLD AUTO: 5.73 X10(3)/MCL (ref 4.5–11.5)

## 2024-12-13 PROCEDURE — 27000221 HC OXYGEN, UP TO 24 HOURS

## 2024-12-13 PROCEDURE — 84100 ASSAY OF PHOSPHORUS: CPT

## 2024-12-13 PROCEDURE — 80053 COMPREHEN METABOLIC PANEL: CPT

## 2024-12-13 PROCEDURE — 94761 N-INVAS EAR/PLS OXIMETRY MLT: CPT

## 2024-12-13 PROCEDURE — 63600175 PHARM REV CODE 636 W HCPCS

## 2024-12-13 PROCEDURE — 21400001 HC TELEMETRY ROOM

## 2024-12-13 PROCEDURE — 99900031 HC PATIENT EDUCATION (STAT)

## 2024-12-13 PROCEDURE — 94760 N-INVAS EAR/PLS OXIMETRY 1: CPT

## 2024-12-13 PROCEDURE — 99900035 HC TECH TIME PER 15 MIN (STAT)

## 2024-12-13 PROCEDURE — 94640 AIRWAY INHALATION TREATMENT: CPT

## 2024-12-13 PROCEDURE — 25000003 PHARM REV CODE 250: Performed by: INTERNAL MEDICINE

## 2024-12-13 PROCEDURE — 36415 COLL VENOUS BLD VENIPUNCTURE: CPT

## 2024-12-13 PROCEDURE — 25000242 PHARM REV CODE 250 ALT 637 W/ HCPCS

## 2024-12-13 PROCEDURE — 85025 COMPLETE CBC W/AUTO DIFF WBC: CPT | Performed by: INTERNAL MEDICINE

## 2024-12-13 PROCEDURE — 83735 ASSAY OF MAGNESIUM: CPT

## 2024-12-13 PROCEDURE — 63600175 PHARM REV CODE 636 W HCPCS: Performed by: INTERNAL MEDICINE

## 2024-12-13 RX ORDER — KETOROLAC TROMETHAMINE 30 MG/ML
15 INJECTION, SOLUTION INTRAMUSCULAR; INTRAVENOUS ONCE
Status: COMPLETED | OUTPATIENT
Start: 2024-12-13 | End: 2024-12-13

## 2024-12-13 RX ORDER — SODIUM CHLORIDE 9 MG/ML
INJECTION, SOLUTION INTRAVENOUS CONTINUOUS
Status: ACTIVE | OUTPATIENT
Start: 2024-12-13 | End: 2024-12-13

## 2024-12-13 RX ADMIN — Medication: at 03:12

## 2024-12-13 RX ADMIN — SODIUM CHLORIDE: 9 INJECTION, SOLUTION INTRAVENOUS at 11:12

## 2024-12-13 RX ADMIN — LEVETIRACETAM 750 MG: 100 INJECTION, SOLUTION INTRAVENOUS at 09:12

## 2024-12-13 RX ADMIN — INSULIN GLARGINE 10 UNITS: 100 INJECTION, SOLUTION SUBCUTANEOUS at 08:12

## 2024-12-13 RX ADMIN — IPRATROPIUM BROMIDE AND ALBUTEROL SULFATE 3 ML: 2.5; .5 SOLUTION RESPIRATORY (INHALATION) at 07:12

## 2024-12-13 RX ADMIN — IPRATROPIUM BROMIDE AND ALBUTEROL SULFATE 3 ML: 2.5; .5 SOLUTION RESPIRATORY (INHALATION) at 03:12

## 2024-12-13 RX ADMIN — Medication: at 09:12

## 2024-12-13 RX ADMIN — IPRATROPIUM BROMIDE AND ALBUTEROL SULFATE 3 ML: 2.5; .5 SOLUTION RESPIRATORY (INHALATION) at 11:12

## 2024-12-13 RX ADMIN — PANTOPRAZOLE SODIUM 40 MG: 40 INJECTION, POWDER, FOR SOLUTION INTRAVENOUS at 09:12

## 2024-12-13 RX ADMIN — KETOROLAC TROMETHAMINE 15 MG: 30 INJECTION, SOLUTION INTRAMUSCULAR at 08:12

## 2024-12-13 RX ADMIN — ACETAMINOPHEN 650 MG: 650 SOLUTION ORAL at 08:12

## 2024-12-13 RX ADMIN — INSULIN ASPART 6 UNITS: 100 INJECTION, SOLUTION INTRAVENOUS; SUBCUTANEOUS at 01:12

## 2024-12-13 RX ADMIN — ACETAMINOPHEN 650 MG: 650 SOLUTION ORAL at 05:12

## 2024-12-13 RX ADMIN — Medication: at 08:12

## 2024-12-13 RX ADMIN — INSULIN ASPART 4 UNITS: 100 INJECTION, SOLUTION INTRAVENOUS; SUBCUTANEOUS at 08:12

## 2024-12-13 RX ADMIN — INSULIN ASPART 5 UNITS: 100 INJECTION, SOLUTION INTRAVENOUS; SUBCUTANEOUS at 06:12

## 2024-12-13 RX ADMIN — POTASSIUM BICARBONATE 20 MEQ: 391 TABLET, EFFERVESCENT ORAL at 05:12

## 2024-12-13 NOTE — PLAN OF CARE
[facility] sent clinical updates to [facility] via [facility] Fax.

## 2024-12-13 NOTE — PROGRESS NOTES
Inpatient Nutrition Assessment    Admit Date: 12/2/2024   Total duration of encounter: 11 days   Patient Age: 79 y.o.    Nutrition Recommendation/Prescription     Continue Impact Peptide 1.5 @ 55 mL/hr with Alex BID, providing (based on est 20 hr/d run time):   1830 kcal (100% est needs)   108 gm (100% est needs)   847 mL fluid (45% est needs)  Free water flush 85 mL q2hr to provide total 1867 mL fluid (98% est needs)    Monitor labs, energy intake and weight    Communication of Recommendations: reviewed with nurse    Nutrition Assessment     Malnutrition Assessment/Nutrition-Focused Physical Exam    Does not meet criteria at this time  A minimum of two characteristics is recommended for diagnosis of either severe or non-severe malnutrition.    Chart Review    Reason Seen: follow-up    Malnutrition Screening Tool Results   Have you recently lost weight without trying?: No  Have you been eating poorly because of a decreased appetite?: No   MST Score: 0   Diagnosis:  Septic shock on admission-aspiration pneumonia vs colitis versus cellulitis  Staph haemolyticus and epidermidis bacteremia  Proctocolitis-POA  Lower extremity swelling-concern for Cellulitis  Loculated pleural effusion-POA  Hypoxic respiratory distress due to above-improving  Altered mental status at admission likely infectious versus metabolic encephalopathy-improving  Possible pneumonia, consolidation, ground-glass opacities with atelectasis  Oropharyngeal dysphagia failed MBS status post PEG placement 12/11/2024     Relevant Medical History: speech and language deficit s/p TIA/CVA ([id number]), dementia, hydrocephalus s/p  shunt placement, B12 deficiency, pAfib, ERICA on CPAP, type 2 DM, PVD, HLD, hypothyroidism, depression, bipolar disorder, seizure, recurrent UTI, LLE cellulitis superimposed on BLE lymphedema, venous stasis dermatitis, sacral decubitus ulcer     Scheduled Medications:  albuterol-ipratropium, 3 mL, Q4H WAKE  insulin glargine U-100, 10  Units, QHS  levETIRAcetam (Keppra) IV (PEDS and ADULTS), 750 mg, Daily  pantoprazole, 40 mg, Daily  zinc oxide-cod liver oil, , TID    Continuous Infusions:  0.9% NaCl, Last Rate: 75 mL/hr at 12/13/24 1111    PRN Medications:  acetaminophen, 650 mg, Q6H PRN  dextrose 10%, 12.5 g, PRN  dextrose 10%, 25 g, PRN  glucagon (human recombinant), 1 mg, PRN  hydrALAZINE, 10 mg, Q6H PRN  insulin aspart U-100, 0-10 Units, Q6H PRN  sodium chloride 0.9%, 10 mL, PRN  sodium chloride 0.9%, 10 mL, Q12H PRN    Calorie Containing IV Medications: no significant kcals from medications at this time    Recent Labs   Lab 12/07/24  0607 12/08/24  0458 12/09/24  0617 12/10/24  0521 12/10/24  1613 12/11/24  0622 12/12/24  0828 12/13/24  0625    144 138 134*  --  134* 138 139   K 3.2* 3.7 3.5 3.8  --  3.8 3.9 3.2*   CALCIUM 8.1* 7.6* 8.1* 7.8*  --  8.5 8.1* 7.1*   PHOS 2.5 2.8 2.2* 2.4  --  2.6 2.7 1.6*   MG 2.20 2.10 2.00 2.00  --  2.00 2.00 1.60   * 104 107 107  --  105 108* 111*   CO2 26 26 24 25  --  23 24 23   BUN 9.1* 10.3 11.4 12.5  --  13.4 10.8 13.2   CREATININE 0.67 0.53* 0.61 0.49*  --  0.57 0.56 0.51*   EGFRNORACEVR >60 >60 >60 >60  --  >60 >60 >60   GLUCOSE 84 134* 146* 165*  --  162* 124* 214*   BILITOT 0.4 0.3 0.3 0.3  --  0.3 0.3 0.2   ALKPHOS 63 66 61 54  --  54 68 48   ALT 12 13 14 13  --  13 19 13   AST 10 11 13 15  --  13 20 16   ALBUMIN 2.7* 2.7* 2.7* 2.4*  --  2.6* 2.6* 2.1*   WBC  --   --   --   --  10.58  --  7.25 5.73   HGB  --   --   --   --  11.8*  --  10.6* 9.1*   HCT  --   --   --   --  37.5  --  34.2* 29.6*     Nutrition Orders:  Diet NPO      Appetite/Oral Intake: NPO/NPO  Factors Affecting Nutritional Intake: difficulty/impaired swallowing and NPO  Social Needs Impacting Access to Food: none identified  Food/Mandaeism/Cultural Preferences: none reported  Food Allergies: wheat  Last Bowel Movement: 12/12/24  Wound(s):     Wound 12/03/24 0500 Pressure Injury Left Heel-Tissue loss description: Full  "thickness       Altered Skin Integrity 03/26/24 2010 Sacral spine Intact skin with non-blanchable redness of localized area-Tissue loss description: Not applicable       Wound 12/03/24 1330 Blister(s) Right lateral Back-Tissue loss description: Partial thickness       Wound 12/13/24 Skin Tear Left Groin-Tissue loss description: Partial thickness     Comments    12/4/24 Patient unable to answer questions, spoke with daughter at bedside who reports good appetite/intake prior to admission, no weight loss, gluten-free diabetic diet at home (celiac disease). NPO currently, plans for MBS.      12/5/24 Patient remains unsafe for oral intake, tube feeding recommendation provided.     12/9/24: Pt remains NPO and unsafe for PO intake. Clinimix 4.25/5 started 12/7 at 50 mL/hr. Recommend increasing PPN rate to 80 mL/hr to meet protein needs. Once medically appropriate, recommend starting tube feeds to meet nutrition needs; recs provided. CNA denies n/v, LBM 12/8. Will continue to monitor.    12/13/24: PEG placed 12/11, TF started 12/12. Currently running at goal 55 mL/hr rate, pt tolerating. LBM 12/12. Will continue to monitor.    Anthropometrics    Height: 4' 8" (142.2 cm),    Last Weight: 103 kg (227 lb 1.2 oz) (12/13/24 0624), Weight Method: Bed Scale  BMI (Calculated): 50.9  BMI Classification: obese grade III (BMI >/=40)     Ideal Body Weight (IBW), Female: 80 lb     % Ideal Body Weight, Female (lb): 281.25 %                             Usual Weight Provided By: family/caregiver denies unintentional weight loss    Wt Readings from Last 5 Encounters:   12/13/24 103 kg (227 lb 1.2 oz)   11/21/24 102.4 kg (225 lb 12 oz)   09/27/24 102.4 kg (225 lb 12 oz)   07/08/24 68 kg (149 lb 14.6 oz)   05/01/24 68 kg (149 lb 14.6 oz)     Weight Change(s) Since Admission: weight stable  Wt Readings from Last 1 Encounters:   12/13/24 0624 103 kg (227 lb 1.2 oz)   12/09/24 0600 105.9 kg (233 lb 7.5 oz)   12/09/24 0522 105.9 kg (233 lb 7.5 " oz)   12/08/24 0645 100.2 kg (220 lb 14.4 oz)   12/06/24 0600 102.5 kg (225 lb 15.5 oz)   12/03/24 0655 102.5 kg (225 lb 15.5 oz)   12/02/24 2220 102.1 kg (225 lb)   Admit Weight: 102.1 kg (225 lb) (12/02/24 2220), Weight Method: Estimated    Estimated Needs    Weight Used For Calorie Calculations: 102.5 kg (225 lb 15.5 oz)  Energy Calorie Requirements (kcal): 0558-6012 kcal (1.2-1.4 stress factor)  Energy Need Method: Cheatham-St Jeor  Weight Used For Protein Calculations: 102.5 kg (225 lb 15.5 oz)  Protein Requirements: 102-123 gm (1.0-1.2 gm/kg)  Fluid Requirements (mL): 0489-5083 mL( 1 mL/kcal)  CHO Requirement: 183-214 g/d (45% of kcal)     Enteral Nutrition     Formula: Impact Peptide 1.5 J Luis  Rate/Volume: 55 mL/hr  Water Flushes: 85 mL q2hr  Additives/Modulars: Alex  Route: PEG tube  Method: continuous  Total Nutrition Provided by Tube Feeding, Additives, and Flushes:  Calories Provided  1830 kcal/d, 100% needs   Protein Provided  108 g/d, 100% needs   Fluid Provided  1867 ml/d, 98% needs   Continuous feeding calculations based on estimated 20 hr/d run time unless otherwise stated.    Parenteral Nutrition     Patient not receiving parenteral nutrition support at this time.    Evaluation of Received Nutrient Intake    Calories: meeting estimated needs  Protein: meeting estimated needs    Patient Education     Not applicable.    Nutrition Diagnosis     PES: Inadequate energy intake related to inability to consume sufficient nutrients as evidenced by less than 80% needs met . (resolved)     Nutrition Interventions     Intervention(s): modified composition of enteral nutrition, modified rate of enteral nutrition, commercial beverage, and collaboration with other providers    Goal: Meet greater than 80% of nutritional needs by follow-up. (goal met)    Nutrition Goals & Monitoring     Dietitian will monitor: energy intake, enteral nutrition intake, weight, electrolyte/renal panel, glucose/endocrine profile, and  gastrointestinal profile  Discharge planning: tube feeding (Impact Peptide 1.5 @ 55 mL/hr with Alex BID and water flushes per MD)  Nutrition Risk/Follow-Up: high (follow-up in 1-4 days)   Please consult if re-assessment needed sooner.

## 2024-12-13 NOTE — PROGRESS NOTES
[facility] Hospital Medicine Progress Note        Chief Complaint: Inpatient Follow-up     HPI:   79 year old woman with PMHx of TIA/CVA, dementia, NPH s/p  shunt, B12 deficiency, Afib on eliquis, ERICA, T2DM, PAD, HLD, hypothyroidism and seizure on keppra presented on 12/2 for AMS. She was febrile to 102.9 and had leukocytosis WBC 17.65 with elevated lactate. BP did not respond to fluids and she was admitted to the ICU for septic shock. CT head without acute abnormality. CT C/A/P with small loculated R sided pleural effusion and a trace left sided pleural effusion w/adjacent consolidation and ground glass opacities. Also conern for proctocolitis. UA was negative. She was started on zosyn. Pressors were weaned off and she was subsequently downgraded to hospital medicine on 12/4. Her blood cultures are growing S.epidermidis. Unclear if contaminant - repeat BCx were obtained and then vancomycin was added while repeat Bcx pending given history of  shunt.      Id were consulted.  Empiric antibiotics were continued.  TTE was ordered.  Lower extremity Doppler showed no signs of DVT.  Id recommended MRI thoracic spine due to persistent pain.  MRI thoracic spine showed no acute abnormalities.  She remained NPO and Clinimix was added for nutritional purposes.    Patient underwent PEG placement  12/11/2024       Interval Hx:   Patient received IV fluid bolus yesterday for hypotension, improved, No acute events reported overnight she was seen at bedside this morning appears more alert than yesterday oriented to self, date of birth, no complaints voiced RN reported patient tolerating tube feeds     Chart review showed patient still having BP 100s over 50s, low-grade tachy 100s    Labs this morning showed hemoglobin 9.1 drop from 10.6, platelets 103 K, potassium 3.2    Case was discussed with patient's nurse and  on the floor.    Objective/physical exam:  General: In no acute  distress, afebrile  Chest:  Unlabored breathing on nasal cannula  Heart:  Low-grade tachy  Abdomen: Soft,  peg  MSK: Warm  Neurologic: Alert and nonverbal    VITAL SIGNS: 24 HRS MIN & MAX LAST   Temp  Min: 97.5 °F (36.4 °C)  Max: 100.2 °F (37.9 °C) 97.5 °F (36.4 °C)   BP  Min: 82/34  Max: 116/88 (!) 106/53   Pulse  Min: 94  Max: 116  103   Resp  Min: 13  Max: 23 18   SpO2  Min: 96 %  Max: 99 % 99 %     I have reviewed the following labs:  Recent Labs   Lab 12/10/24  1613 12/12/24  0828 12/13/24  0625   WBC 10.58 7.25 5.73   RBC 4.20 3.77* 3.24*   HGB 11.8* 10.6* 9.1*   HCT 37.5 34.2* 29.6*   MCV 89.3 90.7 91.4   MCH 28.1 28.1 28.1   MCHC 31.5* 31.0* 30.7*   RDW 16.1 16.5 16.6    126* 103*   MPV 9.9 9.7 10.8*     Recent Labs   Lab 12/11/24  0622 12/12/24  0828 12/13/24  0625   * 138 139   K 3.8 3.9 3.2*    108* 111*   CO2 23 24 23   BUN 13.4 10.8 13.2   CREATININE 0.57 0.56 0.51*   CALCIUM 8.5 8.1* 7.1*   MG 2.00 2.00 1.60   ALBUMIN 2.6* 2.6* 2.1*   ALKPHOS 54 68 48   ALT 13 19 13   AST 13 20 16   BILITOT 0.3 0.3 0.2     Microbiology Results (last 7 days)       Procedure Component Value Units Date/Time    Blood Culture [[id number]]  (Normal) Collected: 12/04/24 1335    Order Status: Completed Specimen: Blood Updated: 12/09/24 1601     Blood Culture No Growth at 5 days             See below for Radiology    Assessment/Plan:  Septic shock on admission-aspiration pneumonia vs colitis versus cellulitis  Staph haemolyticus and epidermidis bacteremia  Proctocolitis-POA  Lower extremity swelling-concern for Cellulitis  Loculated pleural effusion-POA  Hypoxic respiratory distress due to above-improving  Altered mental status at admission likely infectious versus metabolic encephalopathy-improving  Possible pneumonia, consolidation, ground-glass opacities with atelectasis   Oropharyngeal dysphagia failed MBS status post PEG placement 12/11/2024     HX: Obesity, dementia, history of hydrocephalus s/p   shunt, PAF on Eliquis, history of TIA/CVA, chronic lower extremity enema/venous stasis, history of seizure on Keppra, hypothyroidism, HTN, HLD, PID    Post Peg day 2  IV fluids, monitor blood pressure, heart rate   Continue Tube feeds as tolerated, peg site care follow GI recommendations, monitor for signs of bleeding, monitor hemoglobin  Patient has completed  antibiotics as recommended by ID.  ID signed off  Continue Keppra, ppi   monitor CBGS, cover with ISS  Continue with current care and monitoring  Labs in a.m.    VTE prophylaxis:  SCDs, Lovenox on hold per GI recs      Anticipated discharge and Disposition:   TBD/Back to facility once more stable          Portions of this note dictated using EMR integrated voice recognition software, and may be subject to voice recognition errors not corrected at proofreading. Please contact writer for clarification if needed.   _____________________________________________________________________    Malnutrition Status:  Nutrition consulted. Most recent weight and BMI monitored-     Measurements:  Wt Readings from Last 1 Encounters:   12/13/24 103 kg (227 lb 1.2 oz)   Body mass index is 50.91 kg/m².    Patient has been screened and assessed by RD.    Malnutrition Type:  Context:    Level:      Malnutrition Characteristic Summary:       Interventions/Recommendations (treatment strategy):        Scheduled Med:   albuterol-ipratropium  3 mL Nebulization Q4H WAKE    insulin glargine U-100  10 Units Subcutaneous QHS    levETIRAcetam (Keppra) IV (PEDS and ADULTS)  750 mg Intravenous Daily    pantoprazole  40 mg Intravenous Daily    zinc oxide-cod liver oil   Topical (Top) TID      Continuous Infusions:   0.9% NaCl   Intravenous Continuous          PRN Meds:    Current Facility-Administered Medications:     acetaminophen, 650 mg, Per G Tube, Q6H PRN    dextrose 10%, 12.5 g, Intravenous, PRN    dextrose 10%, 25 g, Intravenous, PRN    glucagon (human recombinant), 1 mg,  Intramuscular, PRN    hydrALAZINE, 10 mg, Intravenous, Q6H PRN    insulin aspart U-100, 0-10 Units, Subcutaneous, Q6H PRN    sodium chloride 0.9%, 10 mL, Intravenous, PRN    Flushing PICC/Midline Protocol, , , Until Discontinued **AND** sodium chloride 0.9%, 10 mL, Intravenous, Q12H PRN     Radiology:  I have personally reviewed the following imaging and agree with the radiologist.     CV Ultrasound doppler venous arm right  Negative for deep and superficial vein thrombosis in the right upper   extremity       Michelle Bach MD  Department of Hospital Medicine   [facility]   12/13/2024

## 2024-12-13 NOTE — PROGRESS NOTES
[facility] [address] Surg  Wound Care    Patient Name:  Elisabet Choi   MRN:  [id number]  Date: 12/13/2024  Diagnosis: AMS (altered mental status)    History:     Past Medical History:   Diagnosis Date    Acute cystitis     Allergic rhinitis     Anemia, unspecified     Angina pectoris     Arthritis     Back pain     Bipolar disorder, unspecified     Celiac disease     Cellulitis     CHF (congestive heart failure)     Constipation     Contracture, left ankle     Dementia     Depressive episode     Diabetes mellitus     Diabetes mellitus with ulcer of foot     Displacement of other urinary catheter, subsequent encounter     Edema     Elevated white blood cell count     Encounter for blood transfusion     Fatigue     Fluid retention     Genetic anomalies of leukocytes     GERD without esophagitis     Goiter     HLD (hyperlipidemia)     Hydrocephalus     Hypertension     Hypokalemia     Hypotension     Hypothyroidism     Magnesium deficiency     Major depression     Migraine     Mild protein-calorie malnutrition     Morbid obesity     Obstructive and reflux uropathy     Osteoarthritis     Osteoporosis     Overactive bladder     Pressure ulcer of right heel     Pressure ulcer of sacral region     Pressure ulcer of sacral region, stage 3     Pruritus     PVD (peripheral vascular disease)     Radiculopathy, lumbar region     Seizures     Sleep apnea     uses CPAP    SOB (shortness of breath)     Stroke     TIA (transient ischemic attack)     Urinary tract infection, site not specified     Vitamin deficiency     Wheelchair dependent        Social History     Socioeconomic History    Marital status:    Tobacco Use    Smoking status: Never    Smokeless tobacco: Never   Substance and Sexual Activity    Alcohol use: No    Drug use: No     Social Drivers of Health     Financial Resource Strain: Patient Unable To Answer (12/3/2024)    Overall Financial Resource Strain (CARDIA)     Difficulty of Paying  Living Expenses: Patient unable to answer   Food Insecurity: Patient Unable To Answer (12/3/2024)    Hunger Vital Sign     Worried About Running Out of Food in the Last Year: Patient unable to answer     Ran Out of Food in the Last Year: Patient unable to answer   Transportation Needs: Patient Unable To Answer (12/3/2024)    TRANSPORTATION NEEDS     Transportation : Patient unable to answer   Physical Activity: Inactive (9/26/2024)    Exercise Vital Sign     Days of Exercise per Week: 0 days     Minutes of Exercise per Session: 0 min   Stress: Patient Unable To Answer (12/3/2024)    [employer] - Occupational Stress Questionnaire     Feeling of Stress : Patient unable to answer   Housing Stability: Patient Unable To Answer (12/3/2024)    Housing Stability Vital Sign     Unable to Pay for Housing in the Last Year: Patient unable to answer     Homeless in the Last Year: Patient unable to answer       Precautions:     Allergies as of 12/02/2024 - Reviewed 12/02/2024   Allergen Reaction Noted    Gluten  12/10/2019    Gluten protein  06/29/2014    Ropinirole  07/24/2022    Wheat  12/10/2019    Wheat containing prod  06/29/2014    Zolpidem  06/29/2014       WOC Assessment Details/Treatment   WOCN follow up for perineum and right lateral back. Introduced myself to patient along with nurse Suzie zamudio. Explained reason for visit. Pt agreed for wound care nurse to assess patient. Skin assessment performed. Noted new skin tear to left inner groin and increased moisture. Cleansed well. Measurements obtained, photos taken. Treatment recommendations: zinc oxide, leave open to air . Insert exudry sheets to abdominal folds and inner groins to assist with moisture control. Pt turned with assistance x 2 . Back assessed and wound healed to right lateral back. Pt with abdominal binder in place. Heel boots in place. Keep patient clean and dry. Turn q2 hours. Pressure prevention measures to be continued.  Will follow up.   12/13/2024

## 2024-12-14 VITALS
BODY MASS INDEX: 51.08 KG/M2 | RESPIRATION RATE: 20 BRPM | DIASTOLIC BLOOD PRESSURE: 74 MMHG | TEMPERATURE: 98 F | OXYGEN SATURATION: 96 % | HEIGHT: 56 IN | HEART RATE: 90 BPM | SYSTOLIC BLOOD PRESSURE: 131 MMHG | WEIGHT: 227.06 LBS

## 2024-12-14 LAB
ANION GAP SERPL CALC-SCNC: 4 MEQ/L
BASOPHILS # BLD AUTO: 0.05 X10(3)/MCL
BASOPHILS NFR BLD AUTO: 0.7 %
BUN SERPL-MCNC: 14.8 MG/DL (ref 9.8–20.1)
CALCIUM SERPL-MCNC: 7.7 MG/DL (ref 8.4–10.2)
CHLORIDE SERPL-SCNC: 107 MMOL/L (ref 98–107)
CO2 SERPL-SCNC: 28 MMOL/L (ref 23–31)
CREAT SERPL-MCNC: 0.56 MG/DL (ref 0.55–1.02)
CREAT/UREA NIT SERPL: 26
EOSINOPHIL # BLD AUTO: 0.39 X10(3)/MCL (ref 0–0.9)
EOSINOPHIL NFR BLD AUTO: 5.7 %
ERYTHROCYTE [DISTWIDTH] IN BLOOD BY AUTOMATED COUNT: 16.5 % (ref 11.5–17)
GFR SERPLBLD CREATININE-BSD FMLA CKD-EPI: >60 ML/MIN/1.73/M2
GLUCOSE SERPL-MCNC: 283 MG/DL (ref 82–115)
HCT VFR BLD AUTO: 36 % (ref 37–47)
HGB BLD-MCNC: 11 G/DL (ref 12–16)
IMM GRANULOCYTES # BLD AUTO: 0.07 X10(3)/MCL (ref 0–0.04)
IMM GRANULOCYTES NFR BLD AUTO: 1 %
LYMPHOCYTES # BLD AUTO: 2.43 X10(3)/MCL (ref 0.6–4.6)
LYMPHOCYTES NFR BLD AUTO: 35.5 %
MCH RBC QN AUTO: 28.2 PG (ref 27–31)
MCHC RBC AUTO-ENTMCNC: 30.6 G/DL (ref 33–36)
MCV RBC AUTO: 92.3 FL (ref 80–94)
MONOCYTES # BLD AUTO: 0.48 X10(3)/MCL (ref 0.1–1.3)
MONOCYTES NFR BLD AUTO: 7 %
NEUTROPHILS # BLD AUTO: 3.43 X10(3)/MCL (ref 2.1–9.2)
NEUTROPHILS NFR BLD AUTO: 50.1 %
NRBC BLD AUTO-RTO: 0 %
PLATELET # BLD AUTO: 121 X10(3)/MCL (ref 130–400)
PLATELETS.RETICULATED NFR BLD AUTO: 2.8 % (ref 0.9–11.2)
PMV BLD AUTO: 10.5 FL (ref 7.4–10.4)
POCT GLUCOSE: 258 MG/DL (ref 70–110)
POCT GLUCOSE: 292 MG/DL (ref 70–110)
POCT GLUCOSE: 293 MG/DL (ref 70–110)
POCT GLUCOSE: 299 MG/DL (ref 70–110)
POTASSIUM SERPL-SCNC: 5.1 MMOL/L (ref 3.5–5.1)
RBC # BLD AUTO: 3.9 X10(6)/MCL (ref 4.2–5.4)
SODIUM SERPL-SCNC: 139 MMOL/L (ref 136–145)
WBC # BLD AUTO: 6.85 X10(3)/MCL (ref 4.5–11.5)

## 2024-12-14 PROCEDURE — 36415 COLL VENOUS BLD VENIPUNCTURE: CPT | Performed by: INTERNAL MEDICINE

## 2024-12-14 PROCEDURE — 94760 N-INVAS EAR/PLS OXIMETRY 1: CPT

## 2024-12-14 PROCEDURE — 94640 AIRWAY INHALATION TREATMENT: CPT

## 2024-12-14 PROCEDURE — 27000221 HC OXYGEN, UP TO 24 HOURS

## 2024-12-14 PROCEDURE — 63600175 PHARM REV CODE 636 W HCPCS

## 2024-12-14 PROCEDURE — 85025 COMPLETE CBC W/AUTO DIFF WBC: CPT | Performed by: INTERNAL MEDICINE

## 2024-12-14 PROCEDURE — 99900035 HC TECH TIME PER 15 MIN (STAT)

## 2024-12-14 PROCEDURE — 25000242 PHARM REV CODE 250 ALT 637 W/ HCPCS

## 2024-12-14 PROCEDURE — 80048 BASIC METABOLIC PNL TOTAL CA: CPT | Performed by: INTERNAL MEDICINE

## 2024-12-14 PROCEDURE — 25000003 PHARM REV CODE 250: Performed by: INTERNAL MEDICINE

## 2024-12-14 PROCEDURE — 99900031 HC PATIENT EDUCATION (STAT)

## 2024-12-14 PROCEDURE — 94761 N-INVAS EAR/PLS OXIMETRY MLT: CPT

## 2024-12-14 RX ORDER — IPRATROPIUM BROMIDE AND ALBUTEROL SULFATE 2.5; .5 MG/3ML; MG/3ML
3 SOLUTION RESPIRATORY (INHALATION) EVERY 6 HOURS PRN
Start: 2024-12-14 | End: 2025-12-14

## 2024-12-14 RX ORDER — MICONAZOLE NITRATE 2 G/100G
POWDER TOPICAL 2 TIMES DAILY
Status: DISCONTINUED | OUTPATIENT
Start: 2024-12-14 | End: 2024-12-14 | Stop reason: HOSPADM

## 2024-12-14 RX ORDER — MICONAZOLE NITRATE 2 G/100G
POWDER TOPICAL 2 TIMES DAILY
Qty: 85 G | Refills: 0 | Status: SHIPPED | OUTPATIENT
Start: 2024-12-14

## 2024-12-14 RX ADMIN — MICONAZOLE NITRATE: 20 POWDER TOPICAL at 12:12

## 2024-12-14 RX ADMIN — IPRATROPIUM BROMIDE AND ALBUTEROL SULFATE 3 ML: 2.5; .5 SOLUTION RESPIRATORY (INHALATION) at 04:12

## 2024-12-14 RX ADMIN — ACETAMINOPHEN 650 MG: 650 SOLUTION ORAL at 06:12

## 2024-12-14 RX ADMIN — Medication: at 09:12

## 2024-12-14 RX ADMIN — LEVETIRACETAM 750 MG: 100 INJECTION, SOLUTION INTRAVENOUS at 09:12

## 2024-12-14 RX ADMIN — INSULIN ASPART 6 UNITS: 100 INJECTION, SOLUTION INTRAVENOUS; SUBCUTANEOUS at 06:12

## 2024-12-14 RX ADMIN — IPRATROPIUM BROMIDE AND ALBUTEROL SULFATE 3 ML: 2.5; .5 SOLUTION RESPIRATORY (INHALATION) at 08:12

## 2024-12-14 RX ADMIN — IPRATROPIUM BROMIDE AND ALBUTEROL SULFATE 3 ML: 2.5; .5 SOLUTION RESPIRATORY (INHALATION) at 01:12

## 2024-12-14 RX ADMIN — PANTOPRAZOLE SODIUM 40 MG: 40 INJECTION, POWDER, FOR SOLUTION INTRAVENOUS at 09:12

## 2024-12-14 RX ADMIN — INSULIN ASPART 6 UNITS: 100 INJECTION, SOLUTION INTRAVENOUS; SUBCUTANEOUS at 12:12

## 2024-12-14 RX ADMIN — IPRATROPIUM BROMIDE AND ALBUTEROL SULFATE 3 ML: 2.5; .5 SOLUTION RESPIRATORY (INHALATION) at 12:12

## 2024-12-14 NOTE — DISCHARGE SUMMARY
[facility] Medicine Discharge Summary    Admit Date: 12/2/2024  Discharge Date and Time: 12/14/202412:25 PM  Admitting Physician:  Team  Discharging Physician: Michelle Bach MD.  Primary Care Physician: [facility]  Consults: {consultation: [id number]}    Discharge Diagnoses:  ***    Hospital Course:   ***  Pt was seen and examined on the day of discharge  Vitals:  VITAL SIGNS: 24 HRS MIN & MAX LAST   Temp  Min: 97.7 °F (36.5 °C)  Max: 98.4 °F (36.9 °C) 98.2 °F (36.8 °C)   BP  Min: 103/47  Max: 144/95 (!) 123/56   Pulse  Min: 81  Max: 111  104   Resp  Min: 12  Max: 20 18   SpO2  Min: 97 %  Max: 100 % 98 %       Physical Exam:  ***    Procedures Performed: No admission procedures for hospital encounter.     Significant Diagnostic Studies: See Full reports for all details    Recent Labs   Lab 12/12/24  0828 12/13/24  0625 12/14/24  0850   WBC 7.25 5.73 6.85   RBC 3.77* 3.24* 3.90*   HGB 10.6* 9.1* 11.0*   HCT 34.2* 29.6* 36.0*   MCV 90.7 91.4 92.3   MCH 28.1 28.1 28.2   MCHC 31.0* 30.7* 30.6*   RDW 16.5 16.6 16.5   * 103* 121*   MPV 9.7 10.8* 10.5*       Recent Labs   Lab 12/11/24  0622 12/12/24  0828 12/13/24  0625 12/14/24  1047   * 138 139 139   K 3.8 3.9 3.2* 5.1    108* 111* 107   CO2 23 24 23 28   BUN 13.4 10.8 13.2 14.8   CREATININE 0.57 0.56 0.51* 0.56   CALCIUM 8.5 8.1* 7.1* 7.7*   MG 2.00 2.00 1.60  --    ALBUMIN 2.6* 2.6* 2.1*  --    ALKPHOS 54 68 48  --    ALT 13 19 13  --    AST 13 20 16  --    BILITOT 0.3 0.3 0.2  --         Microbiology Results (last 7 days)       Procedure Component Value Units Date/Time    Blood Culture [[id number]]  (Normal) Collected: 12/04/24 1335    Order Status: Completed Specimen: Blood Updated: 12/09/24 1601     Blood Culture No Growth at 5 days             CV Ultrasound doppler venous arm right  Negative for deep and superficial vein thrombosis in the right upper   extremity          Medication List         START taking these medications      albuterol-ipratropium 2.5 mg-0.5 mg/3 mL nebulizer solution  Commonly known as: DUO-NEB  Take 3 mLs by nebulization every 6 (six) hours as needed for Wheezing or Shortness of Breath. Rescue     miconazole NITRATE 2 % 2 % top powder  Commonly known as: MICOTIN  Apply topically 2 (two) times daily.     zinc oxide-cod liver oil 40 % Pste paste  Commonly known as: DESITIN  Apply topically 3 (three) times daily.            CONTINUE taking these medications      acetaminophen 500 MG tablet  Commonly known as: TYLENOL     acidophilus-pectin, citrus 100 million cell-10 mg Cap     ALPRAZolam 0.25 MG tablet  Commonly known as: XANAX     amitriptyline 25 MG tablet  Commonly known as: ELAVIL     ascorbic acid (vitamin C) 500 MG tablet  Commonly known as: VITAMIN C     CRANBERRY 450 mg Tab  Generic drug: cranberry fruit     cyanocobalamin 1000 MCG tablet  Commonly known as: VITAMIN B-12     dulaglutide 3 mg/0.5 mL pen injector  Commonly known as: TRULICITY     DULoxetine 60 MG capsule  Commonly known as: CYMBALTA     ezetimibe 10 mg tablet  Commonly known as: ZETIA     folic acid 1 MG tablet  Commonly known as: FOLVITE     gabapentin 300 MG capsule  Commonly known as: NEURONTIN     HYDROcodone-acetaminophen  mg per tablet  Commonly known as: NORCO     insulin degludec 100 unit/mL injection     levETIRAcetam 750 MG Tab  Commonly known as: KEPPRA     levothyroxine 175 MCG tablet  Commonly known as: SYNTHROID, LEVOTHROID  Take 1 tablet (175 mcg total) by mouth before breakfast.     lovastatin 40 mg 24 hr tablet  Commonly known as: ALTOPREV     magnesium oxide 400 mg (241.3 mg magnesium) tablet  Commonly known as: MAG-OX     methenamine 1 gram Tab  Commonly known as: HIPREX     metoprolol tartrate 50 MG tablet  Commonly known as: LOPRESSOR  Take 1 tablet (50 mg total) by mouth 2 (two) times daily.     polyethylene glycol 17 gram Pwpk  Commonly known as: GLYCOLAX     POTASSIUM CHLORIDE  ORAL     SantyL ointment  Generic drug: collagenase     solifenacin 5 MG tablet  Commonly known as: VESICARE     tiZANidine 2 mg Cap     topiramate 100 MG tablet  Commonly known as: TOPAMAX     torsemide 20 MG Tab  Commonly known as: DEMADEX     vitamin D 1000 units Tab  Commonly known as: VITAMIN D3     zinc gluconate 50 mg tablet            STOP taking these medications      doxazosin 1 MG tablet  Commonly known as: CARDURA               Where to Get Your Medications        These medications were sent to [facility] - [address]  [address] [address]      Phone: [phone]   miconazole NITRATE 2 % 2 % top powder  zinc oxide-cod liver oil 40 % Pste paste       Information about where to get these medications is not yet available    Ask your nurse or doctor about these medications  albuterol-ipratropium 2.5 mg-0.5 mg/3 mL nebulizer solution          Explained in detail to the patient about the discharge plan, medications, and follow-up visits. Pt understands and agrees with the treatment plan  Discharge Disposition: Home or Self Care   Discharged Condition: stable  Diet-   Dietary Orders (From admission, onward)       Start     Ordered    12/13/24 1444  Tube Feedings/Formulas 55; 1,100; Impact Peptide 1.5; Peg; Alex - Orange; 2 times daily; 85; Every 2 hours  (Tube Feedings/Formulas Order Panel)  Continuous        Comments: Add Alex BID through PEG- mix as indicated on package   Question Answer Comment   Formula Rate (mL/hr): 55    Feeding Volume (mL): 1100    Select Formula: Impact Peptide 1.5    Route: Peg    Additives/Modulars: Alex - Orange    Additives/Modulars frequency: 2 times daily    Free water flush volume (mL): 85    Free water flush frequency: Every 2 hours        12/13/24 1445    12/03/24 0406  Diet NPO  (Diet/Nutrition - [facility])  Diet effective now         12/03/24 0414                   Medications Per DC med rec  Activities as tolerated    Follow-up Information       Clinic, [facility] up.    Contact information:  [address] [place] [address] [address] [place] [id number] [phone]                           For further questions contact hospitalist office    Discharge time 33 minutes    For worsening symptoms, chest pain, shortness of breath, increased abdominal pain, high grade fever, stroke or stroke like symptoms, immediately go to the nearest Emergency Room or call 911 as soon as possible.      Michelle Tiwari M.D, on 12/14/2024. at 12:25 PM.

## 2024-12-14 NOTE — PLAN OF CARE
12/14/24 1342   Final Note   Assessment Type Final Discharge Note   Anticipated Discharge Disposition [facility]   Post-Acute Status   Post-Acute Authorization Placement   Post-Acute Placement Status Set-up Complete/Auth obtained   Discharge Delays None known at this time     Patient [facility] from PRISCA [facility]. Sent d/c paperwork via fax. Gave nurse report instructions and to send face sheet and AVS with patient for transport.

## 2024-12-14 NOTE — PLAN OF CARE
Problem: Adult Inpatient Plan of Care  Goal: Plan of Care Review  Outcome: Met  Goal: Patient-Specific Goal (Individualized)  Outcome: Met  Goal: Absence of Hospital-Acquired Illness or Injury  Outcome: Met  Goal: Optimal Comfort and Wellbeing  Outcome: Met  Goal: Readiness for Transition of Care  Outcome: Met     Problem: Bariatric Environmental Safety  Goal: Safety Maintained with Care  Outcome: Met     Problem: Infection  Goal: Absence of Infection Signs and Symptoms  Outcome: Met     Problem: Diabetes Comorbidity  Goal: Blood Glucose Level Within Targeted Range  Outcome: Met     Problem: Pneumonia  Goal: Fluid Balance  Outcome: Met  Goal: Resolution of Infection Signs and Symptoms  Outcome: Met  Goal: Effective Oxygenation and Ventilation  Outcome: Met     Problem: Wound  Goal: Optimal Coping  Outcome: Met  Goal: Optimal Functional Ability  Outcome: Met  Goal: Absence of Infection Signs and Symptoms  Outcome: Met  Goal: Improved Oral Intake  Outcome: Met  Goal: Optimal Pain Control and Function  Outcome: Met  Goal: Skin Health and Integrity  Outcome: Met  Goal: Optimal Wound Healing  Outcome: Met     Problem: Skin Injury Risk Increased  Goal: Skin Health and Integrity  Outcome: Met     Problem: Fall Injury Risk  Goal: Absence of Fall and Fall-Related Injury  Outcome: Met     Problem: Coping Ineffective  Goal: Effective Coping  Outcome: Met

## 2024-12-14 NOTE — PROGRESS NOTES
[facility] [address] Surg  Wound Care    Patient Name:  Elisabet Choi   MRN:  [id number]  Date: 12/14/2024  Diagnosis: AMS (altered mental status)    History:     Past Medical History:   Diagnosis Date    Acute cystitis     Allergic rhinitis     Anemia, unspecified     Angina pectoris     Arthritis     Back pain     Bipolar disorder, unspecified     Celiac disease     Cellulitis     CHF (congestive heart failure)     Constipation     Contracture, left ankle     Dementia     Depressive episode     Diabetes mellitus     Diabetes mellitus with ulcer of foot     Displacement of other urinary catheter, subsequent encounter     Edema     Elevated white blood cell count     Encounter for blood transfusion     Fatigue     Fluid retention     Genetic anomalies of leukocytes     GERD without esophagitis     Goiter     HLD (hyperlipidemia)     Hydrocephalus     Hypertension     Hypokalemia     Hypotension     Hypothyroidism     Magnesium deficiency     Major depression     Migraine     Mild protein-calorie malnutrition     Morbid obesity     Obstructive and reflux uropathy     Osteoarthritis     Osteoporosis     Overactive bladder     Pressure ulcer of right heel     Pressure ulcer of sacral region     Pressure ulcer of sacral region, stage 3     Pruritus     PVD (peripheral vascular disease)     Radiculopathy, lumbar region     Seizures     Sleep apnea     uses CPAP    SOB (shortness of breath)     Stroke     TIA (transient ischemic attack)     Urinary tract infection, site not specified     Vitamin deficiency     Wheelchair dependent        Social History     Socioeconomic History    Marital status:    Tobacco Use    Smoking status: Never    Smokeless tobacco: Never   Substance and Sexual Activity    Alcohol use: No    Drug use: No     Social Drivers of Health     Financial Resource Strain: Patient Unable To Answer (12/3/2024)    Overall Financial Resource Strain (CARDIA)     Difficulty of Paying  Living Expenses: Patient unable to answer   Food Insecurity: Patient Unable To Answer (12/3/2024)    Hunger Vital Sign     Worried About Running Out of Food in the Last Year: Patient unable to answer     Ran Out of Food in the Last Year: Patient unable to answer   Transportation Needs: Patient Unable To Answer (12/3/2024)    TRANSPORTATION NEEDS     Transportation : Patient unable to answer   Physical Activity: Inactive (9/26/2024)    Exercise Vital Sign     Days of Exercise per Week: 0 days     Minutes of Exercise per Session: 0 min   Stress: Patient Unable To Answer (12/3/2024)    [employer] [place] [employer] - Occupational Stress Questionnaire     Feeling of Stress : Patient unable to answer   Housing Stability: Patient Unable To Answer (12/3/2024)    Housing Stability Vital Sign     Unable to Pay for Housing in the Last Year: Patient unable to answer     Homeless in the Last Year: Patient unable to answer       Precautions:     Allergies as of 12/02/2024 - Reviewed 12/02/2024   Allergen Reaction Noted    Gluten  12/10/2019    Gluten protein  06/29/2014    Ropinirole  07/24/2022    Wheat  12/10/2019    Wheat containing prod  06/29/2014    Zolpidem  06/29/2014       WO Assessment Details/Treatment      12/14/24 1009   WO Assessment   Visit Date 12/14/24   Visit Time 1009   Consult Type Follow Up   [facility] Wound   Intervention chart review;assessed;changed;applied;orders        Wound 12/03/24 0500 Pressure Injury Left Heel   Date First Assessed/Time First Assessed: 12/03/24 0500   Present on Original Admission: Yes  Primary Wound Type: Pressure Injury  Side: Left  Location: Heel   Wound Image    Dressing Appearance Dried drainage   Drainage Amount Moderate   Drainage Characteristics/Odor Serosanguineous   Appearance Red;Pink;Black;Moist   Tissue loss description Partial thickness   Black (%), Wound Tissue Color 25 %   Red (%), Wound Tissue Color 75 %   Yellow (%), Wound Tissue Color 0 %    Periwound Area Dry;Pink   Wound Edges Irregular   Wound Length (cm) 6.2 cm   Wound Width (cm) 3.3 cm   Wound Depth (cm) 0.1 cm   Wound Volume (cm^3) 2.046 cm^3   Wound Surface Area (cm^2) 20.46 cm^2   Care Cleansed with:;Antimicrobial agent   Dressing Applied;Other (comment)  (Aquacel Ag, abd pad, kerlix, tape.)     [facility] follow up for left heel blister. Spoke with nurse Johnson prior to seeing patient.  Discussed antifungal powder to abdominal folds and groin area for preventative measures.Introduced myself and nurse robb Larsen to patient. Explained we needed to reassess left heel blister. Pt agreed for assessment and treatment. Removed heel protector and dressing. Cleansed well with Vashe. Patted dry, Measurements obtained, and photos for EMR. Treatment to left heel will be changed to the following: cleanse with vashe. Pat dry, Apply aquacel Ag, abd pad, kerlix, and tape BID. Continue with offloading measures and pressure prevention. Pt tolerated well. Call bell placed within reach. Bed low position. Plan of care discussed with nurse. Orders placed. Will follow up.   12/14/2024

## 2024-12-14 NOTE — NURSING
Report given to [facility] to accepting nurse KEITH Benitez at 1600.     [facility] called at 1612. Patient removed from will call.     Patient's daughter-in-law and POA, Christine Choi, notified of discharge.

## 2024-12-15 NOTE — NURSING
[facility]. RFA peripheral IV removed. Telemetry discontinued. Patient stable, moved to stretcher without incident. Left the floor at 1935. Report was called by previous nurse before shift change.

## (undated) DEVICE — TUBING O2 FEMALE CONN 13FT

## (undated) DEVICE — HOLDER TUBE MEDIUM

## (undated) DEVICE — TRAY SKIN SCRUB WET PREMIUM

## (undated) DEVICE — BLADE SURG STAINLESS STEEL #15

## (undated) DEVICE — BINDER AB SIZE XL 12X72-96IN

## (undated) DEVICE — SOL IRRI STRL WATER 1000ML

## (undated) DEVICE — KIT SURGICAL TURNOVER

## (undated) DEVICE — SYR 50ML CATH TIP

## (undated) DEVICE — SET NEPHSTMY MACLOC 25CM 14FR

## (undated) DEVICE — TIP SUCTION YANKAUER

## (undated) DEVICE — SOL IRRIGATION WATER 3000ML

## (undated) DEVICE — KIT CANIST SUCTION 1200CC

## (undated) DEVICE — SUT SILK BLK BR. 2 2-60

## (undated) DEVICE — COLLECTION SPECIMEN NEPTUNE

## (undated) DEVICE — CYSTOSCOPE ASCOPE 4 STD DEFL

## (undated) DEVICE — GLOVE PROTEXIS HYDROGEL SZ7.5

## (undated) DEVICE — SUPPORT ULNA NERVE PROTECTOR

## (undated) DEVICE — Device

## (undated) DEVICE — BAG DRAIN ANTI REFLUX 2000ML

## (undated) DEVICE — BOWL STERILE LARGE 32OZ

## (undated) DEVICE — NDL HYPO 22GX1 1/2 SYR 10ML LL

## (undated) DEVICE — POSITIONER HEAD ADULT

## (undated) DEVICE — KIT PEG PULL SAFETY 24FR

## (undated) DEVICE — BAG DRAIN UROLOGY AND HOSE

## (undated) DEVICE — KIT SURGICAL COLON .25 1.1OZ

## (undated) DEVICE — HANDLE DEVON RIGID OR LIGHT